# Patient Record
Sex: FEMALE | Race: WHITE | NOT HISPANIC OR LATINO | Employment: FULL TIME | URBAN - METROPOLITAN AREA
[De-identification: names, ages, dates, MRNs, and addresses within clinical notes are randomized per-mention and may not be internally consistent; named-entity substitution may affect disease eponyms.]

---

## 2017-01-04 ENCOUNTER — OFFICE VISIT (OUTPATIENT)
Dept: FAMILY MEDICINE | Facility: CLINIC | Age: 62
End: 2017-01-04
Payer: COMMERCIAL

## 2017-01-04 VITALS
OXYGEN SATURATION: 98 % | RESPIRATION RATE: 16 BRPM | HEART RATE: 90 BPM | DIASTOLIC BLOOD PRESSURE: 82 MMHG | TEMPERATURE: 97.7 F | BODY MASS INDEX: 21.31 KG/M2 | WEIGHT: 115.8 LBS | SYSTOLIC BLOOD PRESSURE: 132 MMHG | HEIGHT: 62 IN

## 2017-01-04 DIAGNOSIS — M79.12 STERNOCLEIDOMASTOID MUSCLE TENDERNESS: Primary | ICD-10-CM

## 2017-01-04 PROCEDURE — 99213 OFFICE O/P EST LOW 20 MIN: CPT | Performed by: FAMILY MEDICINE

## 2017-01-04 ASSESSMENT — PAIN SCALES - GENERAL: PAINLEVEL: MODERATE PAIN (5)

## 2017-01-05 NOTE — NURSING NOTE
"Chief Complaint   Patient presents with     Musculoskeletal Problem     left side collarbone pain x1 1/2 months       Initial /82 mmHg  Pulse 90  Temp(Src) 97.7  F (36.5  C) (Tympanic)  Resp 16  Ht 5' 1.6\" (1.565 m)  Wt 115 lb 12.8 oz (52.527 kg)  BMI 21.45 kg/m2  SpO2 98%  LMP 03/26/2003 Estimated body mass index is 21.45 kg/(m^2) as calculated from the following:    Height as of this encounter: 5' 1.6\" (1.565 m).    Weight as of this encounter: 115 lb 12.8 oz (52.527 kg).  BP completed using cuff size: regular  There are no preventive care reminders to display for this patient.  Micaela Alexander, [facility]      "

## 2017-01-05 NOTE — PROGRESS NOTES
SUBJECTIVE:                                                    Jackie Siddiqi is a 61 year old female who presents to clinic today for the following health issues:      Joint Pain     Onset: 1 1/2 months     Description:   Location: left collarbone  Character: Sharp and intermittent, constant dull ache2    Intensity: moderate, 5/10    Progression of Symptoms: worse    Accompanying Signs & Symptoms:  Other symptoms: radiation of pain to rt shoulder   History:   Previous similar pain: Sudden onset during coughing spell with sharp pain initially over the medial left clavicle and anterior triangle and then slowly moving laterally over the next few days. The pain is now a dull ache.     Precipitating factors:   Trauma or overuse: YES- she noticed a sharp pain when coughing and she states something might have happened to her collarbone    Alleviating factors:  Improved by: nothing       Therapies Tried and outcome: none          Problem list and histories reviewed & adjusted, as indicated.  Additional history: as documented    Patient Active Problem List   Diagnosis     Esophageal reflux     Lichen sclerosus et atrophicus of the vulva     Advanced directives, counseling/discussion     Hypothyroidism, unspecified type     Hyperlipidemia LDL goal <160     Tobacco use disorder     Osteoarthritis of carpometacarpal joint of right thumb, unspecified osteoarthritis type     Past Surgical History   Procedure Laterality Date     C  delivery only        x2     C nonspecific procedure       Laparoscopic exam with adhesions     C nonspecific procedure       Mastectomy for cystic breast disease, breast implants     Endoscopy  2007     Sm hiatus hernia     Colonoscopy  2007     Mastectomy, bilateral       Colonoscopy  2013     Procedure: COLONOSCOPY;  colonoscopy, Esophagoscopy, Gastroscopy, Duodenoscopy EGD, multiple biopsies;  Surgeon: Joe Benson MD;  Location: [facility]     Esophagoscopy,  gastroscopy, duodenoscopy (egd), combined  1/18/2013     Procedure: COMBINED ESOPHAGOSCOPY, GASTROSCOPY, DUODENOSCOPY (EGD), BIOPSY SINGLE OR MULTIPLE;  ESOPHAGOGASTRODUODENOSCOPY WITH MULTIPLE BIOPSIES;  Surgeon: Joe Benson MD;  Location:  GI       Social History   Substance Use Topics     Smoking status: Current Some Day Smoker -- 0.50 packs/day for 50 years     Types: Cigarettes     Smokeless tobacco: Never Used      Comment: 1/2  pack per day, started age 11     Alcohol Use: No      Comment: holidays only     Family History   Problem Relation Age of Onset     DIABETES Father      Hypertension Father      Alcohol/Drug Father      Eye Disorder Father      Obesity Father      Breast Cancer Mother      OSTEOPOROSIS Mother      Thyroid Disease Mother      CANCER Mother      Bladder     CANCER Sister      fallopian tube cancer     Obesity Sister      Alzheimer Disease Sister      CANCER Sister      Skin Cancer     Allergies Daughter      CANCER Maternal Grandmother      uterine cancer     Liver Disease Daughter      Biliary Atresia     Genitourinary Problems Brother      HEART DISEASE Brother      Heart Murmur     Genitourinary Problems Brother      kidney disease (two brothers)         Current Outpatient Prescriptions   Medication Sig Dispense Refill     levothyroxine (SYNTHROID, LEVOTHROID) 25 MCG tablet Take 1 tablet (25 mcg) by mouth daily 90 tablet 0     order for DME Equipment being ordered: Arm Band 1 Device 0     order for DME Equipment being ordered: Thumb Splint 1 Device 0     PREMARIN vaginal cream Place 0.5 g vaginally twice a week 42.5 g 12     meloxicam (MOBIC) 15 MG tablet Take 1 tablet (15 mg) by mouth daily 30 tablet 1     clobetasol (TEMOVATE) 0.05 % ointment Apply a small pea size amount at bedtime for 2 weeks then decrease it to a few times a week then weekly 30 g 0     Allergies   Allergen Reactions     No Known Drug Allergies      Recent Labs   Lab Test  11/28/16   1605  04/19/16   1145    "01/11/13   1131  07/01/11   0928   LDL  139*   --    --   161*  141*   HDL  58   --    --   59  52   TRIG  139   --    --   154*  164*   ALT  19  18   --   24   --    CR  0.70  0.76   < >  0.71  0.90   GFRESTIMATED  84  77   < >  84  64   GFRESTBLACK  >90   GFR Calc    >90   GFR Calc     < >  >90  78   POTASSIUM  3.6  4.0   < >  3.8   --    TSH  5.26*   --    --   5.39*   --     < > = values in this interval not displayed.        BP Readings from Last 3 Encounters:   01/04/17 132/82   11/28/16 112/68   10/20/16 98/54    Wt Readings from Last 3 Encounters:   01/04/17 115 lb 12.8 oz (52.527 kg)   11/28/16 119 lb 1.6 oz (54.023 kg)   11/28/16 118 lb (53.524 kg)                  Labs reviewed in EPIC  Problem list, Medication list, Allergies, and Medical/Social/Surgical histories reviewed in [facility] and updated as appropriate.    ROS:  Constitutional, HEENT, cardiovascular, pulmonary, gi and gu systems are negative, except as otherwise noted.    OBJECTIVE:                                                    /82 mmHg  Pulse 90  Temp(Src) 97.7  F (36.5  C) (Tympanic)  Resp 16  Ht 5' 1.6\" (1.565 m)  Wt 115 lb 12.8 oz (52.527 kg)  BMI 21.45 kg/m2  SpO2 98%  LMP 03/26/2003  Body mass index is 21.45 kg/(m^2).  GENERAL: healthy, alert and no distress  HENT: ear canals and TM's normal, nose and mouth without ulcers or lesions  NECK: no adenopathy, no asymmetry, masses, or scars, thyroid normal to palpation and tender insertion of SCM on medial clavicle and no palpable supraclavicular mass or adenopathy.  RESP: lungs clear to auscultation - no rales, rhonchi or wheezes  CV: regular rate and rhythm, normal S1 S2, no S3 or S4, no murmur, click or rub, no peripheral edema and peripheral pulses strong  ABDOMEN: soft, nontender, no hepatosplenomegaly, no masses and bowel sounds normal  MS: no gross musculoskeletal defects noted, no edema  LYMPH: no cervical, supraclavicular, axillary, or " inguinal adenopathy    Diagnostic Test Results:  none      ASSESSMENT/PLAN:                                                      1. Sternocleidomastoid muscle tenderness  Acute onset with cough and now with diffuse anterior triangle tenderness without appreciable mass or fullness. Most consistent with muscle strain with possible small hematoma which has resolved. Definitely no tenderness with firm palpation of clavicle and no abnormality in neck to warrant imaging at this time. Symptomatic therapy suggested:   Apply  warm packs for neck pain  May use acetaminophen, ibuprofen, aleve prn. Follow up if not improving as expected or if new symptoms develop.      Merrick Reardon MD  [facility]

## 2017-02-11 ENCOUNTER — TELEPHONE (OUTPATIENT)
Dept: NURSING | Facility: CLINIC | Age: 62
End: 2017-02-11

## 2017-02-11 NOTE — TELEPHONE ENCOUNTER
"Call Type: Triage Call    Presenting Problem: Jackie has some nagging \" lower back pain.\"  [facility]  Triage/Back Symptoms/disposition is to be seen within 72 hours and  Jackie agreed.  Triage Note:  Guideline Title: Back Symptoms  Recommended Disposition: See Provider within 72 Hours  Original Inclination: Wanted to speak with a nurse  Override Disposition:  Intended Action: Call PCP/HCP  Physician Contacted: No  Mild to moderate pain in back with normal activity or rest AND not responding to  72 hours of home care ?  YES  New or worsening signs and symptoms that may indicate shock ? NO  Pregnant,  less than 20 weeks gestation ? NO  Severe back pain AND history of IV drug use, alcoholism, or previous spinal trauma  ? NO  Pregnant and gestation greater than 37 weeks AND low backache/pain that is  constant or increasing in intensity ? NO  Pregnant and gestation 20-37 weeks AND low backache/pain that is constant or  increasing in intensity ? NO  Following significant trauma or injury to neck or back AND immobile since event ?  NO  Pain intensifies with coughing, sneezing or straining ? NO  Pregnant, back symptoms AND no signs of labor ? NO  Burning or tingling feeling, itchiness or stabbing pain in a localized area on one  side of body followed by reddened fluid-filled blisters that break and crust ? NO  Back pain associated with any breathing symptoms (such as noisy breathing,  struggling to breathe, sudden change in respiratory rate) ? NO  New paralysis (unable to move); new weakness (not due to pain); new loss of  coordination (purposeful action) OR new unexplained numbness/tingling involving  arm and leg on same side of body ? NO  Following significant trauma AND has been mobile since injury but is now having  back or neck pain ? NO  Age 50 years or older with sudden onset of deep boring or tearing pain in back OR  abdomen; may radiate to groin, hips or lower extremities ? NO  Pain predominately in flank area ? " NO  Urinary tract symptoms are primary symptoms ? NO  Urinary tract symptoms are primary symptoms ? NO  New onset back pain associated with numbness or weakness in both legs ? NO  Fever of 100.5 F (38.1 C) or higher associated with back symptoms ? NO  Low back pain AND urinary tract symptoms ? NO  Back pain radiates into thigh or below knee ? NO  New onset or unexplained change in bowel or bladder control (unable to urinate and  full feeling or loss of control of bowel or bladder) ? NO  Numbness in the groin and saddle area of pelvis AND lower extremity weakness OR  change in bowel or bladder control (loss of control, retention, overflow) ? NO  Painful spasms or cramping of large muscle groups (back, legs or abdomen) AND  recent heat exposure ? NO  Any other cardiac signs/symptoms for more than 5 minutes, now or within last hour.  Pain is NOT associated with taking a deep breath or a productive cough, movement,  or touch to a localized area on the chest or upper body. ? NO  Pain, redness, and local swelling over tailbone in the crease of the buttocks; may  notice pinkish, cloudy drainage or drainage of pus. ? NO  Pain predominately in the neck ? NO  New onset of severe disabling back pain (unable to stand upright; pain wakens you  from sleep; constant or intense pain when lying down) ? NO  Any temperature elevation in an immunocompromised individual OR frail elderly with  signs of dehydration ? NO  Physician Instructions:  Care Advice: Call provider if symptoms worsen or new symptoms develop.  Avoid heavy lifting, bending and twisting of the back, and prolonged  sitting until evaluated by provider.  CAUTIONS  Avoid activity that causes or worsens symptoms.  SYMPTOM / CONDITION MANAGEMENT  Sleep on a moderately firm mattress.  Try sleeping on back with a pillow  placed under knees or sleep on side with knees bent and a pillow between  knees.  When lying on stomach, place pillow under the abdomen and pelvis  do not  use a pillow for your head.  Go to the ED if you have worsening pain, numbness or weakness of arms or  legs, cannot walk, or have new unexplained changes in bladder or bowel  function. Another adult should drive.  To relieve discomfort and swelling, apply a cloth-covered cold pack to the  area for 20 minutes 4 to 8 times a day for the first 24 to 48 hours. After  24 to 48 hours of cold application, use a cloth-covered heat pack to the  area for 20 minutes 3 to 4 times a day.  Analgesic/Antipyretic Advice - NSAIDs: Consider aspirin, ibuprofen,  naproxen or ketoprofen for pain or fever as directed on label or by  pharmacist/provider. PRECAUTIONS: - You should not take this medicine for  more than 10 days unless recommended by your provider. EXCEPTIONS: - Should  not be used if taking blood thinners or have bleeding problems. - Do not  use if have history of sensitivity/allergy to any of these medications  or history of cardiovascular, ulcer, kidney, liver disease or diabetes  unless approved by provider. - Do not exceed recommended dose or frequency.  Analgesic/Antipyretic Advice - Acetaminophen: Consider acetaminophen as  directed on label or by pharmacist/provider for pain or fever. PRECAUTIONS:  - Use if there is no history of liver disease, alcoholism, or intake of  three or more alcohol drinks per day - Only if approved by provider during  pregnancy or when breastfeeding - Do not exceed recommended dose or  frequency. Do not take more than 3000 milligrams (mg) in 24 hours. Do not  take this medicine for more than 10 days unless recommended by your  provider. - During pregnancy, acetaminophen should not be taken more than 3  consecutive days without telling provider - To make sure you don't take too  much, check other medicines you take to see if they also contain  acetaminophen.

## 2017-02-12 ENCOUNTER — APPOINTMENT (OUTPATIENT)
Dept: GENERAL RADIOLOGY | Facility: CLINIC | Age: 62
End: 2017-02-12
Attending: PHYSICIAN ASSISTANT
Payer: COMMERCIAL

## 2017-02-12 ENCOUNTER — APPOINTMENT (OUTPATIENT)
Dept: CT IMAGING | Facility: CLINIC | Age: 62
End: 2017-02-12
Attending: PHYSICIAN ASSISTANT
Payer: COMMERCIAL

## 2017-02-12 ENCOUNTER — HOSPITAL ENCOUNTER (EMERGENCY)
Facility: CLINIC | Age: 62
Discharge: HOME | End: 2017-02-12
Attending: PHYSICIAN ASSISTANT | Admitting: PHYSICIAN ASSISTANT
Payer: COMMERCIAL

## 2017-02-12 VITALS
RESPIRATION RATE: 18 BRPM | TEMPERATURE: 98.3 F | HEART RATE: 62 BPM | WEIGHT: 114 LBS | BODY MASS INDEX: 21.12 KG/M2 | SYSTOLIC BLOOD PRESSURE: 92 MMHG | DIASTOLIC BLOOD PRESSURE: 56 MMHG | OXYGEN SATURATION: 96 %

## 2017-02-12 DIAGNOSIS — R73.09 ABNORMAL GLUCOSE: ICD-10-CM

## 2017-02-12 DIAGNOSIS — R53.83 FATIGUE, UNSPECIFIED TYPE: ICD-10-CM

## 2017-02-12 DIAGNOSIS — K80.20 CALCULUS OF GALLBLADDER WITHOUT CHOLECYSTITIS WITHOUT OBSTRUCTION: ICD-10-CM

## 2017-02-12 DIAGNOSIS — R10.12 LUQ ABDOMINAL PAIN: ICD-10-CM

## 2017-02-12 LAB
ALBUMIN SERPL-MCNC: 3.4 G/DL (ref 3.4–5)
ALBUMIN UR-MCNC: NEGATIVE MG/DL
ALP SERPL-CCNC: 57 U/L (ref 40–150)
ALT SERPL W P-5'-P-CCNC: 22 U/L (ref 0–50)
ANION GAP SERPL CALCULATED.3IONS-SCNC: 5 MMOL/L (ref 3–14)
APPEARANCE UR: CLEAR
AST SERPL W P-5'-P-CCNC: 18 U/L (ref 0–45)
BACTERIA #/AREA URNS HPF: ABNORMAL /HPF
BASOPHILS # BLD AUTO: 0.1 10E9/L (ref 0–0.2)
BASOPHILS NFR BLD AUTO: 1.3 %
BILIRUB SERPL-MCNC: 0.4 MG/DL (ref 0.2–1.3)
BILIRUB UR QL STRIP: NEGATIVE
BUN SERPL-MCNC: 15 MG/DL (ref 7–30)
CALCIUM SERPL-MCNC: 8.4 MG/DL (ref 8.5–10.1)
CHLORIDE SERPL-SCNC: 106 MMOL/L (ref 94–109)
CO2 SERPL-SCNC: 29 MMOL/L (ref 20–32)
COLOR UR AUTO: YELLOW
CREAT SERPL-MCNC: 0.69 MG/DL (ref 0.52–1.04)
DIFFERENTIAL METHOD BLD: ABNORMAL
EOSINOPHIL # BLD AUTO: 0 10E9/L (ref 0–0.7)
EOSINOPHIL NFR BLD AUTO: 0.8 %
ERYTHROCYTE [DISTWIDTH] IN BLOOD BY AUTOMATED COUNT: 12.6 % (ref 10–15)
GFR SERPL CREATININE-BSD FRML MDRD: 85 ML/MIN/1.7M2
GLUCOSE SERPL-MCNC: 145 MG/DL (ref 70–99)
GLUCOSE UR STRIP-MCNC: NEGATIVE MG/DL
HBA1C MFR BLD: 5.6 % (ref 4.3–6)
HCT VFR BLD AUTO: 39.3 % (ref 35–47)
HGB BLD-MCNC: 13.1 G/DL (ref 11.7–15.7)
HGB UR QL STRIP: ABNORMAL
IMM GRANULOCYTES # BLD: 0 10E9/L (ref 0–0.4)
IMM GRANULOCYTES NFR BLD: 0 %
KETONES UR STRIP-MCNC: NEGATIVE MG/DL
LEUKOCYTE ESTERASE UR QL STRIP: ABNORMAL
LIPASE SERPL-CCNC: 73 U/L (ref 73–393)
LYMPHOCYTES # BLD AUTO: 1.6 10E9/L (ref 0.8–5.3)
LYMPHOCYTES NFR BLD AUTO: 41.8 %
MCH RBC QN AUTO: 29.2 PG (ref 26.5–33)
MCHC RBC AUTO-ENTMCNC: 33.3 G/DL (ref 31.5–36.5)
MCV RBC AUTO: 88 FL (ref 78–100)
MONOCYTES # BLD AUTO: 0.4 10E9/L (ref 0–1.3)
MONOCYTES NFR BLD AUTO: 9.7 %
NEUTROPHILS # BLD AUTO: 1.8 10E9/L (ref 1.6–8.3)
NEUTROPHILS NFR BLD AUTO: 46.4 %
NITRATE UR QL: NEGATIVE
NON-SQ EPI CELLS #/AREA URNS LPF: ABNORMAL /LPF
PH UR STRIP: 6 PH (ref 5–7)
PLATELET # BLD AUTO: 171 10E9/L (ref 150–450)
POTASSIUM SERPL-SCNC: 3.7 MMOL/L (ref 3.4–5.3)
PROT SERPL-MCNC: 7.1 G/DL (ref 6.8–8.8)
RBC # BLD AUTO: 4.49 10E12/L (ref 3.8–5.2)
RBC #/AREA URNS AUTO: ABNORMAL /HPF (ref 0–2)
SODIUM SERPL-SCNC: 140 MMOL/L (ref 133–144)
SP GR UR STRIP: 1.01 (ref 1–1.03)
TSH SERPL DL<=0.005 MIU/L-ACNC: 2.5 MU/L (ref 0.4–4)
URN SPEC COLLECT METH UR: ABNORMAL
UROBILINOGEN UR STRIP-ACNC: 0.2 EU/DL (ref 0.2–1)
WBC # BLD AUTO: 3.9 10E9/L (ref 4–11)
WBC #/AREA URNS AUTO: ABNORMAL /HPF (ref 0–2)

## 2017-02-12 PROCEDURE — 99285 EMERGENCY DEPT VISIT HI MDM: CPT | Performed by: PHYSICIAN ASSISTANT

## 2017-02-12 PROCEDURE — 81001 URINALYSIS AUTO W/SCOPE: CPT | Performed by: PHYSICIAN ASSISTANT

## 2017-02-12 PROCEDURE — 85025 COMPLETE CBC W/AUTO DIFF WBC: CPT | Performed by: PHYSICIAN ASSISTANT

## 2017-02-12 PROCEDURE — 96361 HYDRATE IV INFUSION ADD-ON: CPT

## 2017-02-12 PROCEDURE — 96360 HYDRATION IV INFUSION INIT: CPT

## 2017-02-12 PROCEDURE — 74177 CT ABD & PELVIS W/CONTRAST: CPT

## 2017-02-12 PROCEDURE — 25500064 ZZH RX 255 OP 636: Performed by: PHYSICIAN ASSISTANT

## 2017-02-12 PROCEDURE — 25000125 ZZHC RX 250: Performed by: PHYSICIAN ASSISTANT

## 2017-02-12 PROCEDURE — 25000132 ZZH RX MED GY IP 250 OP 250 PS 637: Performed by: PHYSICIAN ASSISTANT

## 2017-02-12 PROCEDURE — 83690 ASSAY OF LIPASE: CPT | Performed by: PHYSICIAN ASSISTANT

## 2017-02-12 PROCEDURE — 25000128 H RX IP 250 OP 636: Performed by: PHYSICIAN ASSISTANT

## 2017-02-12 PROCEDURE — 71020 XR CHEST 2 VW: CPT | Mod: TC

## 2017-02-12 PROCEDURE — 83036 HEMOGLOBIN GLYCOSYLATED A1C: CPT | Performed by: PHYSICIAN ASSISTANT

## 2017-02-12 PROCEDURE — 80053 COMPREHEN METABOLIC PANEL: CPT | Performed by: PHYSICIAN ASSISTANT

## 2017-02-12 PROCEDURE — 99285 EMERGENCY DEPT VISIT HI MDM: CPT | Mod: 25

## 2017-02-12 PROCEDURE — 84443 ASSAY THYROID STIM HORMONE: CPT | Performed by: PHYSICIAN ASSISTANT

## 2017-02-12 RX ORDER — IOPAMIDOL 755 MG/ML
100 INJECTION, SOLUTION INTRAVASCULAR ONCE
Status: COMPLETED | OUTPATIENT
Start: 2017-02-12 | End: 2017-02-12

## 2017-02-12 RX ORDER — SODIUM CHLORIDE 9 MG/ML
1000 INJECTION, SOLUTION INTRAVENOUS CONTINUOUS
Status: DISCONTINUED | OUTPATIENT
Start: 2017-02-12 | End: 2017-02-12 | Stop reason: HOSPADM

## 2017-02-12 RX ORDER — ACETAMINOPHEN 325 MG/1
975 TABLET ORAL ONCE
Status: COMPLETED | OUTPATIENT
Start: 2017-02-12 | End: 2017-02-12

## 2017-02-12 RX ORDER — LIDOCAINE 40 MG/G
CREAM TOPICAL
Status: DISCONTINUED | OUTPATIENT
Start: 2017-02-12 | End: 2017-02-12 | Stop reason: HOSPADM

## 2017-02-12 RX ADMIN — SODIUM CHLORIDE 1000 ML: 9 INJECTION, SOLUTION INTRAVENOUS at 15:35

## 2017-02-12 RX ADMIN — SODIUM CHLORIDE 60 ML: 9 INJECTION, SOLUTION INTRAVENOUS at 16:35

## 2017-02-12 RX ADMIN — ACETAMINOPHEN 975 MG: 325 TABLET ORAL at 16:25

## 2017-02-12 RX ADMIN — IOPAMIDOL 56 ML: 755 INJECTION, SOLUTION INTRAVENOUS at 16:35

## 2017-02-12 ASSESSMENT — ENCOUNTER SYMPTOMS
DIARRHEA: 0
FEVER: 0
ABDOMINAL PAIN: 1
VOMITING: 0
WEAKNESS: 1
COUGH: 1
POLYDIPSIA: 1
FATIGUE: 1
APPETITE CHANGE: 1
HEMATURIA: 1
MYALGIAS: 1
BACK PAIN: 1
LIGHT-HEADEDNESS: 1
NAUSEA: 1

## 2017-02-12 NOTE — ED PROVIDER NOTES
"  History     Chief Complaint   Patient presents with     Abdominal Pain     The history is provided by the patient.     Jackie Siddiqi is a 61 year old female with a history of Hypothyroidism and Hyperlipidemia who presents to the ED complaining of abdominal pain. Patient states that her symptoms began with polydipsia about 3 weeks ago. She was waking up several times during the night to get water. She developed bilateral lower back pain 2 weeks ago, that radiates to her bilateral lower abdomen. She notes a \"nagging pain\" in her LLQ that has persisted since onset. Patient developed \"influenza like symptoms\" 5 days ago. She reports weakness, \"foggy-headedness\", muffled hearing, chills, fatigue, and myalgias. Patient also complains of a dry cough and congestion, she has been clearing her throat often because she feels that something is caught in he throat. with decreased appetite - she feels hungry \"but her brain is telling her not to eat\". Patient also notes decreased bowel movents. She thinks she was urinating frequently, but this has decreased as she is also not able to take much water in now that she is feeling sick. Patient has been in bed for 5 days because of these symptoms, and states that she does not feel that she is getting any better. Patient denies any fever, emesis, diarrhea, hemoptysis, rashes, hematuria, or other associated symptoms. She notes that she started on Levothyroxine to treat Hypothyroidism about 1.5 months ago. Her last TSH was 5.26 mU/L. She also reports paternal FHx of Type II Diabetes at age 58. Patient admits a history of tobacco use intermittently since age 11. She was smoking about 1/3 PPD before she got sick, but has not smoked since the onset of her symptoms.     Patient Active Problem List   Diagnosis     Esophageal reflux     Lichen sclerosus et atrophicus of the vulva     Advanced directives, counseling/discussion     Hypothyroidism, unspecified type     Hyperlipidemia LDL " goal <160     Tobacco use disorder     Osteoarthritis of carpometacarpal joint of right thumb, unspecified osteoarthritis type     Past Medical History   Diagnosis Date     Abnormal Papanicolaou smear of cervix and cervical HPV      cryocautery     Breast cystic disease      Hiatal hernia      Peptic ulcer, unspecified site, unspecified as acute or chronic, without mention of hemorrhage, perforation, or obstruction      Peptic ulcer disease     Personal history of colonic polyps        Past Surgical History   Procedure Laterality Date     C  delivery only        x2     C nonspecific procedure       Laparoscopic exam with adhesions     C nonspecific procedure       Mastectomy for cystic breast disease, breast implants     Endoscopy  2007     Sm hiatus hernia     Colonoscopy  2007     Mastectomy, bilateral       Colonoscopy  2013     Procedure: COLONOSCOPY;  colonoscopy, Esophagoscopy, Gastroscopy, Duodenoscopy EGD, multiple biopsies;  Surgeon: Joe Benson MD;  Location:  GI     Esophagoscopy, gastroscopy, duodenoscopy (egd), combined  2013     Procedure: COMBINED ESOPHAGOSCOPY, GASTROSCOPY, DUODENOSCOPY (EGD), BIOPSY SINGLE OR MULTIPLE;  ESOPHAGOGASTRODUODENOSCOPY WITH MULTIPLE BIOPSIES;  Surgeon: Joe Benson MD;  Location:  GI       Family History   Problem Relation Age of Onset     DIABETES Father      Hypertension Father      Alcohol/Drug Father      Eye Disorder Father      Obesity Father      Breast Cancer Mother      OSTEOPOROSIS Mother      Thyroid Disease Mother      CANCER Mother      Bladder     CANCER Sister      fallopian tube cancer     Obesity Sister      Alzheimer Disease Sister      CANCER Sister      Skin Cancer     Allergies Daughter      CANCER Maternal Grandmother      uterine cancer     Liver Disease Daughter      Biliary Atresia     Genitourinary Problems Brother      HEART DISEASE Brother      Heart Murmur     Genitourinary Problems Brother       kidney disease (two brothers)       Social History   Substance Use Topics     Smoking status: Current Some Day Smoker     Packs/day: 0.50     Years: 50.00     Types: Cigarettes     Smokeless tobacco: Never Used      Comment: 1/2  pack per day, started age 11     Alcohol use No      Comment: holidays only        Immunization History   Administered Date(s) Administered     TD (ADULT, 7+) 09/09/1994     TDAP (ADACEL AGES 11-64) 11/21/2010          Allergies   Allergen Reactions     No Known Drug Allergies        Current Outpatient Prescriptions   Medication Sig Dispense Refill     omeprazole (PRILOSEC) 20 MG CR capsule Take 1 capsule (20 mg) by mouth daily 30 capsule 0     levothyroxine (SYNTHROID, LEVOTHROID) 25 MCG tablet Take 1 tablet (25 mcg) by mouth daily 90 tablet 0     meloxicam (MOBIC) 15 MG tablet Take 1 tablet (15 mg) by mouth daily 30 tablet 1     order for DME Equipment being ordered: Thumb Splint 1 Device 0     clobetasol (TEMOVATE) 0.05 % ointment Apply a small pea size amount at bedtime for 2 weeks then decrease it to a few times a week then weekly 30 g 0     PREMARIN vaginal cream Place 0.5 g vaginally twice a week 42.5 g 12     order for DME Equipment being ordered: Arm Band 1 Device 0     I have reviewed the Medications, Allergies, Past Medical and Surgical History, and Social History in the Epic system.    Review of Systems   Constitutional: Positive for appetite change (decreased) and fatigue. Negative for fever.   HENT: Positive for congestion and hearing loss (muffled bilaterally).    Respiratory: Positive for cough (dry).    Gastrointestinal: Positive for abdominal pain (lower, more severe in LLQ) and nausea. Negative for diarrhea and vomiting.   Endocrine: Positive for polydipsia.   Genitourinary: Positive for hematuria.   Musculoskeletal: Positive for back pain (bilateral, lower) and myalgias.   Skin: Negative for rash.   Neurological: Positive for weakness and light-headedness.        Physical Exam   BP: 115/73  Pulse: 75  Temp: 98.3  F (36.8  C)  Resp: 18  Weight: 51.7 kg (114 lb)  SpO2: 97 %    Physical Exam   Constitutional: She is oriented to person, place, and time. No distress.   HENT:   Head: Atraumatic.   Mouth/Throat: Oropharynx is clear and moist. Mucous membranes are dry.   Eyes: Conjunctivae and EOM are normal.   Neck: Neck supple. No thyromegaly present.   Cardiovascular: Normal rate, regular rhythm, normal heart sounds and intact distal pulses.    No murmur heard.  Pulmonary/Chest: Effort normal and breath sounds normal. No respiratory distress. She has no decreased breath sounds. She has no wheezes. She has no rhonchi. She has no rales. She exhibits no tenderness.   Abdominal: Normal appearance. She exhibits no distension. There is no hepatosplenomegaly. There is generalized tenderness (Most severe in LLQ). There is no rigidity, no rebound, no guarding and no CVA tenderness.   Musculoskeletal:        Right hip: Normal. She exhibits normal range of motion and normal strength.        Left hip: She exhibits normal range of motion and normal strength.        Thoracic back: Normal.        Lumbar back: She exhibits tenderness (bilateral ). She exhibits no bony tenderness.   Lymphadenopathy:     She has no cervical adenopathy.   Neurological: She is alert and oriented to person, place, and time.   Skin: Skin is warm and dry. No rash noted. She is not diaphoretic.   Psychiatric: She has a normal mood and affect. Her behavior is normal.   Nursing note and vitals reviewed.      ED Course     ED Course     Procedures             Critical Care time:  none              Results for orders placed or performed during the hospital encounter of 02/12/17   XR Chest 2 Views    Narrative    CHEST TWO VIEW   2/12/2017 4:54 PM     HISTORY: Cough.    COMPARISON: Chest x-ray dated 6/17/2015.      FINDINGS:  The lungs are hyperaerated but otherwise clear.  There is  mild pulmonary scarring. No pleural  effusions or pneumothorax. Heart  size and vascularity are normal.      Impression    IMPRESSION:  Findings of COPD. No acute cardiopulmonary disease is  seen.         JUSTIN MARTIN MD   CT Abdomen Pelvis w Contrast    Narrative    CT ABDOMEN AND PELVIS WITH CONTRAST  2/12/2017 4:46 PM    HISTORY: LLQ abdominal pain. Family history of kidney problems. Peptic  ulcer, hiatal hernia, breast cystic disease, urinary tract infection,  bilateral mastectomy, laparoscopy for adhesions, C-sections x2.    TECHNIQUE: Scans obtained from the diaphragm through the pelvis with  oral and IV contrast, 56 mL Isovue-370.   Radiation dose for this scan was reduced using automated exposure  control, adjustment of the mA and/or kV according to patient size, or  iterative reconstruction technique.    COMPARISON:  Pelvic ultrasound dated 12/8/2016, abdominal ultrasound  dated 6/8/2007.    FINDINGS: Visualized portions of the lung bases and mediastinal  contents are unremarkable.  Degenerative changes are noted in the  spine and SI joints. No aggressive osseous lesions are seen.      Dependently layering calculi are seen in the gallbladder. The  gallbladder, liver, pancreas, spleen, bilateral adrenal glands, and  bilateral kidneys otherwise enhance normally. No hydronephrosis,  nephrolithiasis, hydroureter, or ureteral calculus is identified.  Urinary bladder is normal in appearance.    There is nonaneurysmal atherosclerosis. Structure likely representing  a normal uterus is seen in the pelvis. Ovaries are not well seen. No  adnexal masses are identified.    No adenopathy, free fluid, or free air is seen in the peritoneal  cavity. There is nonaneurysmal atherosclerosis.    The colon is grossly of normal caliber without pericolonic  inflammatory change to suggest acute diverticulitis. Appendix is not  definitely seen. No pericecal inflammatory change to suggest acute  appendicitis. Small bowel is grossly of normal caliber. Stomach  is  unremarkable. No evidence for bowel obstruction.      Impression    IMPRESSION:  1. Cholelithiasis without evidence for acute cholecystitis.  2. No evidence for diverticulitis, appendicitis, or urinary system  obstruction. Etiology for patient's symptoms is not definitely seen.  3. Hiatal hernia is not seen on this study.  4. Degenerative changes of spine are noted.    JUSTIN MARTIN MD   CBC with platelets differential   Result Value Ref Range    WBC 3.9 (L) 4.0 - 11.0 10e9/L    RBC Count 4.49 3.8 - 5.2 10e12/L    Hemoglobin 13.1 11.7 - 15.7 g/dL    Hematocrit 39.3 35.0 - 47.0 %    MCV 88 78 - 100 fl    MCH 29.2 26.5 - 33.0 pg    MCHC 33.3 31.5 - 36.5 g/dL    RDW 12.6 10.0 - 15.0 %    Platelet Count 171 150 - 450 10e9/L    Diff Method Automated Method     % Neutrophils 46.4 %    % Lymphocytes 41.8 %    % Monocytes 9.7 %    % Eosinophils 0.8 %    % Basophils 1.3 %    % Immature Granulocytes 0.0 %    Absolute Neutrophil 1.8 1.6 - 8.3 10e9/L    Absolute Lymphocytes 1.6 0.8 - 5.3 10e9/L    Absolute Monocytes 0.4 0.0 - 1.3 10e9/L    Absolute Eosinophils 0.0 0.0 - 0.7 10e9/L    Absolute Basophils 0.1 0.0 - 0.2 10e9/L    Abs Immature Granulocytes 0.0 0 - 0.4 10e9/L   Comprehensive metabolic panel   Result Value Ref Range    Sodium 140 133 - 144 mmol/L    Potassium 3.7 3.4 - 5.3 mmol/L    Chloride 106 94 - 109 mmol/L    Carbon Dioxide 29 20 - 32 mmol/L    Anion Gap 5 3 - 14 mmol/L    Glucose 145 (H) 70 - 99 mg/dL    Urea Nitrogen 15 7 - 30 mg/dL    Creatinine 0.69 0.52 - 1.04 mg/dL    GFR Estimate 85 >60 mL/min/1.7m2    GFR Estimate If Black >90   GFR Calc   >60 mL/min/1.7m2    Calcium 8.4 (L) 8.5 - 10.1 mg/dL    Bilirubin Total 0.4 0.2 - 1.3 mg/dL    Albumin 3.4 3.4 - 5.0 g/dL    Protein Total 7.1 6.8 - 8.8 g/dL    Alkaline Phosphatase 57 40 - 150 U/L    ALT 22 0 - 50 U/L    AST 18 0 - 45 U/L   Lipase   Result Value Ref Range    Lipase 73 73 - 393 U/L   TSH with free T4 reflex   Result Value Ref Range     TSH 2.50 0.40 - 4.00 mU/L   UA reflex to Microscopic and Culture   Result Value Ref Range    Color Urine Yellow     Appearance Urine Clear     Glucose Urine Negative NEG mg/dL    Bilirubin Urine Negative NEG    Ketones Urine Negative NEG mg/dL    Specific Gravity Urine 1.015 1.003 - 1.035    Blood Urine Trace (A) NEG    pH Urine 6.0 5.0 - 7.0 pH    Protein Albumin Urine Negative NEG mg/dL    Urobilinogen Urine 0.2 0.2 - 1.0 EU/dL    Nitrite Urine Negative NEG    Leukocyte Esterase Urine Trace (A) NEG    Source Midstream Urine    Hemoglobin A1c   Result Value Ref Range    Hemoglobin A1C 5.6 4.3 - 6.0 %   Urine Microscopic   Result Value Ref Range    WBC Urine O - 2 0 - 2 /HPF    RBC Urine O - 2 0 - 2 /HPF    Squamous Epithelial /LPF Urine Few FEW /LPF    Bacteria Urine Few (A) NEG /HPF          Results for orders placed or performed during the hospital encounter of 02/12/17 (from the past 24 hour(s))   CBC with platelets differential   Result Value Ref Range    WBC 3.9 (L) 4.0 - 11.0 10e9/L    RBC Count 4.49 3.8 - 5.2 10e12/L    Hemoglobin 13.1 11.7 - 15.7 g/dL    Hematocrit 39.3 35.0 - 47.0 %    MCV 88 78 - 100 fl    MCH 29.2 26.5 - 33.0 pg    MCHC 33.3 31.5 - 36.5 g/dL    RDW 12.6 10.0 - 15.0 %    Platelet Count 171 150 - 450 10e9/L    Diff Method Automated Method     % Neutrophils 46.4 %    % Lymphocytes 41.8 %    % Monocytes 9.7 %    % Eosinophils 0.8 %    % Basophils 1.3 %    % Immature Granulocytes 0.0 %    Absolute Neutrophil 1.8 1.6 - 8.3 10e9/L    Absolute Lymphocytes 1.6 0.8 - 5.3 10e9/L    Absolute Monocytes 0.4 0.0 - 1.3 10e9/L    Absolute Eosinophils 0.0 0.0 - 0.7 10e9/L    Absolute Basophils 0.1 0.0 - 0.2 10e9/L    Abs Immature Granulocytes 0.0 0 - 0.4 10e9/L   Comprehensive metabolic panel   Result Value Ref Range    Sodium 140 133 - 144 mmol/L    Potassium 3.7 3.4 - 5.3 mmol/L    Chloride 106 94 - 109 mmol/L    Carbon Dioxide 29 20 - 32 mmol/L    Anion Gap 5 3 - 14 mmol/L    Glucose 145 (H) 70 - 99  mg/dL    Urea Nitrogen 15 7 - 30 mg/dL    Creatinine 0.69 0.52 - 1.04 mg/dL    GFR Estimate 85 >60 mL/min/1.7m2    GFR Estimate If Black >90   GFR Calc   >60 mL/min/1.7m2    Calcium 8.4 (L) 8.5 - 10.1 mg/dL    Bilirubin Total 0.4 0.2 - 1.3 mg/dL    Albumin 3.4 3.4 - 5.0 g/dL    Protein Total 7.1 6.8 - 8.8 g/dL    Alkaline Phosphatase 57 40 - 150 U/L    ALT 22 0 - 50 U/L    AST 18 0 - 45 U/L   Lipase   Result Value Ref Range    Lipase 73 73 - 393 U/L   TSH with free T4 reflex   Result Value Ref Range    TSH 2.50 0.40 - 4.00 mU/L   Hemoglobin A1c   Result Value Ref Range    Hemoglobin A1C 5.6 4.3 - 6.0 %   UA reflex to Microscopic and Culture   Result Value Ref Range    Color Urine Yellow     Appearance Urine Clear     Glucose Urine Negative NEG mg/dL    Bilirubin Urine Negative NEG    Ketones Urine Negative NEG mg/dL    Specific Gravity Urine 1.015 1.003 - 1.035    Blood Urine Trace (A) NEG    pH Urine 6.0 5.0 - 7.0 pH    Protein Albumin Urine Negative NEG mg/dL    Urobilinogen Urine 0.2 0.2 - 1.0 EU/dL    Nitrite Urine Negative NEG    Leukocyte Esterase Urine Trace (A) NEG    Source Midstream Urine    Urine Microscopic   Result Value Ref Range    WBC Urine O - 2 0 - 2 /HPF    RBC Urine O - 2 0 - 2 /HPF    Squamous Epithelial /LPF Urine Few FEW /LPF    Bacteria Urine Few (A) NEG /HPF   CT Abdomen Pelvis w Contrast    Narrative    CT ABDOMEN AND PELVIS WITH CONTRAST  2/12/2017 4:46 PM    HISTORY: LLQ abdominal pain. Family history of kidney problems. Peptic  ulcer, hiatal hernia, breast cystic disease, urinary tract infection,  bilateral mastectomy, laparoscopy for adhesions, C-sections x2.    TECHNIQUE: Scans obtained from the diaphragm through the pelvis with  oral and IV contrast, 56 mL Isovue-370.   Radiation dose for this scan was reduced using automated exposure  control, adjustment of the mA and/or kV according to patient size, or  iterative reconstruction technique.    COMPARISON:  Pelvic  ultrasound dated 12/8/2016, abdominal ultrasound  dated 6/8/2007.    FINDINGS: Visualized portions of the lung bases and mediastinal  contents are unremarkable.  Degenerative changes are noted in the  spine and SI joints. No aggressive osseous lesions are seen.      Dependently layering calculi are seen in the gallbladder. The  gallbladder, liver, pancreas, spleen, bilateral adrenal glands, and  bilateral kidneys otherwise enhance normally. No hydronephrosis,  nephrolithiasis, hydroureter, or ureteral calculus is identified.  Urinary bladder is normal in appearance.    There is nonaneurysmal atherosclerosis. Structure likely representing  a normal uterus is seen in the pelvis. Ovaries are not well seen. No  adnexal masses are identified.    No adenopathy, free fluid, or free air is seen in the peritoneal  cavity. There is nonaneurysmal atherosclerosis.    The colon is grossly of normal caliber without pericolonic  inflammatory change to suggest acute diverticulitis. Appendix is not  definitely seen. No pericecal inflammatory change to suggest acute  appendicitis. Small bowel is grossly of normal caliber. Stomach is  unremarkable. No evidence for bowel obstruction.      Impression    IMPRESSION:  1. Cholelithiasis without evidence for acute cholecystitis.  2. No evidence for diverticulitis, appendicitis, or urinary system  obstruction. Etiology for patient's symptoms is not definitely seen.  3. Hiatal hernia is not seen on this study.  4. Degenerative changes of spine are noted.    JUSTIN MARTIN MD   XR Chest 2 Views    Narrative    CHEST TWO VIEW   2/12/2017 4:54 PM     HISTORY: Cough.    COMPARISON: Chest x-ray dated 6/17/2015.      FINDINGS:  The lungs are hyperaerated but otherwise clear.  There is  mild pulmonary scarring. No pleural effusions or pneumothorax. Heart  size and vascularity are normal.      Impression    IMPRESSION:  Findings of COPD. No acute cardiopulmonary disease is  seen.         JUSTIN MARTIN  MD       Medications   0.9% sodium chloride BOLUS (0 mLs Intravenous Stopped 2/12/17 1721)   acetaminophen (TYLENOL) tablet 975 mg (975 mg Oral Given 2/12/17 1625)   sodium chloride (PF) 0.9% PF flush 3 mL (3 mLs Intracatheter Given 2/12/17 1635)   iopamidol (ISOVUE-370) solution 100 mL (56 mLs Intravenous Given 2/12/17 1635)   sodium chloride 0.9 % for CT scan flush dose 500 mL (60 mLs Intravenous Given 2/12/17 1635)       Assessments & Plan (with Medical Decision Making)     LUQ abdominal pain  Calculus of gallbladder without cholecystitis without obstruction  Abnormal glucose  Fatigue, unspecified type     Jackie is a 61 year old female who presents to the ED complaining of lower back pain, fatigue, nausea, polydipsia, decreased appetite, and lower abdominal pain. She is afebrile with normal vitals upon presentation to the ED. She exhibits generalized tenderness of the abdomen, most severe in the LLQ, as well as tenderness in the bilateral lower lumbar area.   She does have noted dry oral mucosal membranes likely secondary to dehydration.  No other abnormalities are noted on exam. Patient was given IV fluid rehydration with one liter of normal saline and oral Tylenol with relief.  Labs drawn including Hemoglobin A1C, CBC, CMP, Lipase, and TSH.  Labs displayed an elevated glucose but normal hemoglobin A1c. Glucose is only up to 146, but this was a fasting specimen. It could be an indication that she has impaired fasting glucose versus the early development of type 2 diabetes. CMP normal otherwise with no hepatic inflammation. CBC with a borderline low WBC and no evidence for acute infection currently. Urinalysis negative for infection. CT of the abdomen/pelvis was performed and showed cholelithiasis without active cholecystitis. On repeat abdominal exam she does have some right upper quadrant discomfort with deep palpation and inspiration.  She certainly could have some cholelithiasis symptoms, but I do not think  this is a full cause of her early cystitis, left upper quadrant abdominal discomfort, fatigue, and weakness. We discussed a low fat diet to help avoid symptoms of cholecystitis. Given her persistent abdominal pain, I think it would be wise to try an antacid in the form of omeprazole to see if this helps some of her symptoms. Further evaluation may need to be considered with upper endoscopy if her GI symptoms persist despite this treatment.    I recommended a follow-up appointment with her primary care physician in the next 3-4 days. She is instructed to fast for 8 hours prior to this appointment so that she could have a repeat blood sugar done. It also would be wise to consider rechecking her hepatic enzymes if her abdominal discomfort persists.   I discussed with the patient in detail that I do not find any acute cause for her symptoms today, but we were able to rule out some significant health issues. She does state follow-up regarding her condition. It is possible that she could have a viral syndrome that has worsened her symptoms in the last 5 days. She was encouraged to return to the emergency department for repeat evaluation if her symptoms greatly worsened or changed prior to seeing her PCP for the follow-up evaluation.   Smoking cessation recommended and discussed with her in detail. The patient was in agreement with this plan and was suitable for discharge.    Discharge Instructions:  1. Please try and avoid 'fatty foods'. Fatty foods can cause gallbladder associated pain.   2. Please start taking the Omeprazole ( stomach acid medication ) to see if this helps with the abdominal discomfort, nausea, and troubles feeling full. If these symptoms are not improving, Dr. Quinonez could consider doing an Upper Endoscopy to look into the stomach.   3. Please see Dr. Quinonez in 3-4 days for a follow up appointment.  4. Make sure that you are drinking at least 8 glasses of water daily ( 64 ounces daily at a minimum ) to help  maintain adequate hydration.   5. Please do not eat or drink any sugar containing liquids within 8 hours of your appointment with Dr. Quinonez so that he can recheck your blood sugar.         I have reviewed the nursing notes.    I have reviewed the findings, diagnosis, plan and need for follow up with the patient.    Discharge Medication List as of 2/12/2017  7:10 PM      START taking these medications    Details   omeprazole (PRILOSEC) 20 MG CR capsule Take 1 capsule (20 mg) by mouth daily, Disp-30 capsule, R-0, E-Prescribe             Final diagnoses:   LUQ abdominal pain   Calculus of gallbladder without cholecystitis without obstruction   Abnormal glucose   Fatigue, unspecified type     This document serves as a record of services personally performed by Ricardo Medrano PA. It was created on their behalf by Eula Back, a trained medical scribe. The creation of this record is based on the provider's personal observations and the statements of the patient. This document has been checked and approved by the attending provider.    Note: Chart documentation done in part with Dragon Voice Recognition software. Although reviewed after completion, some word and grammatical errors may remain.    2/12/2017   Ricardo Medrano PA-C   [facility] EMERGENCY DEPARTMENT     Ricardo Medrano PA-C  02/13/17 0051

## 2017-02-12 NOTE — ED AVS SNAPSHOT
[facility] Emergency Department    [address] [place] [address]    Phone:  [phone]    Fax:  [phone]                                       Jackie Siddiqi   MRN: [id number]    Department:  [facility] Emergency Department   Date of Visit:  2/12/2017           Patient Information     Date Of Birth          1955        Your diagnoses for this visit were:     LUQ abdominal pain     Calculus of gallbladder without cholecystitis without obstruction     Abnormal glucose     Fatigue, unspecified type        You were seen by Ricardo Medrano PA-C.      Follow-up Information     Follow up with Jovana Quinonez MD. Schedule an appointment as soon as possible for a visit in 3 days.    Specialty:  Family Practice    Why:  For ED recheck    Contact information:    [facility]  [address] [place] [address]  [phone]          Discharge Instructions       1.  Please try and avoid 'fatty foods'.  Fatty foods can cause gallbladder associated pain.   2.  Please start taking the Omeprazole ( stomach acid medication ) to see if this helps with the abdominal discomfort, nausea, and troubles feeling full.  If these symptoms are not improving, Dr. Quinonez could consider doing an Upper Endoscopy to look into the stomach.   3.  Please see Dr. Quinonez in 3-4 days for a follow up appointment.  4.  Make sure that you are drinking at least 8 glasses of water daily ( 64 ounces daily at a minimum ) to help maintain adequate hydration.    5.  Please do not eat or drink any sugar containing liquids within 8 hours of your appointment with Dr. Quinonez so that he can recheck your blood sugar.            Discharge References/Attachments     GALLSTONES WITH BILIARY COLIC (CONFIRMED) (ENGLISH)      Future Appointments        Provider Department Dept Phone Center    2/13/2017 3:00 PM Tl Lyons MD [facility] [phone] [facility]      24 Hour Appointment Hotline        To make an appointment at any [facility], call [phone] ([phone]). If you don't have a family doctor or clinic, we will help you find one. [facility] clinics are conveniently located to serve the needs of you and your family.             Review of your medicines      START taking        Dose / Directions Last dose taken    omeprazole 20 MG CR capsule   Commonly known as:  priLOSEC   Dose:  20 mg   Quantity:  30 capsule        Take 1 capsule (20 mg) by mouth daily   Refills:  0          Our records show that you are taking the medicines listed below. If these are incorrect, please call your family doctor or clinic.        Dose / Directions Last dose taken    clobetasol 0.05 % ointment   Commonly known as:  TEMOVATE   Quantity:  30 g        Apply a small pea size amount at bedtime for 2 weeks then decrease it to a few times a week then weekly   Refills:  0        levothyroxine 25 MCG tablet   Commonly known as:  SYNTHROID/LEVOTHROID   Dose:  25 mcg   Quantity:  90 tablet        Take 1 tablet (25 mcg) by mouth daily   Refills:  0        meloxicam 15 MG tablet   Commonly known as:  MOBIC   Dose:  15 mg   Quantity:  30 tablet        Take 1 tablet (15 mg) by mouth daily   Refills:  1        * order for DME   Quantity:  1 Device        Equipment being ordered: Arm Band   Refills:  0        * order for DME   Quantity:  1 Device        Equipment being ordered: Thumb Splint   Refills:  0        PREMARIN cream   Dose:  0.5 g   Quantity:  42.5 g   Generic drug:  conjugated estrogens        Place 0.5 g vaginally twice a week   Refills:  12        * Notice:  This list has 2 medication(s) that are the same as other medications prescribed for you. Read the directions carefully, and ask your doctor or other care provider to review them with you.            Prescriptions were sent or printed at these locations (1 Prescription)                   [facility]   [address]  [place] [address]    Telephone:  [phone]   Fax:  [phone]   Hours:                  These medications are not ready yet because we are checking if your insurance will help you pay for them. Call your pharmacy to confirm that your medication is ready for pickup. It may take up to 24 hours for them to receive the prescription. If the prescription is not ready within 3 business days, please contact your clinic or your provider (1 of 1)         omeprazole (PRILOSEC) 20 MG CR capsule                Procedures and tests performed during your visit     CBC with platelets differential    CT Abdomen Pelvis w Contrast    Comprehensive metabolic panel    Hemoglobin A1c    Lipase    Peripheral IV catheter    TSH with free T4 reflex    UA reflex to Microscopic and Culture    Urine Microscopic    XR Chest 2 Views      Orders Needing Specimen Collection     None      Pending Results     Date and Time Order Name Status Description    2/12/2017 1519 CT Abdomen Pelvis w Contrast Preliminary     2/12/2017 1519 XR Chest 2 Views Preliminary             Pending Culture Results     No orders found from 2/10/2017 to 2/13/2017.            Thank you for choosing [facility]       Thank you for choosing [facility] for your care. Our goal is always to provide you with excellent care. Hearing back from our patients is one way we can continue to improve our services. Please take a few minutes to complete the written survey that you may receive in the mail after you visit with us. Thank you!        [employer] Information     [employer] gives you secure access to your electronic health record. If you see a primary care provider, you can also send messages to your care team and make appointments. If you have questions, please call your primary care clinic.  If you do not have a primary care provider, please call [phone] and they will assist you.        Care EveryWhere ID     This is your Care EveryWhere ID. This could be used by other organizations  to access your [facility] medical records  [id number]        After Visit Summary       This is your record. Keep this with you and show to your community pharmacist(s) and doctor(s) at your next visit.

## 2017-02-13 NOTE — DISCHARGE INSTRUCTIONS
1.  Please try and avoid 'fatty foods'.  Fatty foods can cause gallbladder associated pain.   2.  Please start taking the Omeprazole ( stomach acid medication ) to see if this helps with the abdominal discomfort, nausea, and troubles feeling full.  If these symptoms are not improving, Dr. Quinonez could consider doing an Upper Endoscopy to look into the stomach.   3.  Please see Dr. Quinonez in 3-4 days for a follow up appointment.  4.  Make sure that you are drinking at least 8 glasses of water daily ( 64 ounces daily at a minimum ) to help maintain adequate hydration.    5.  Please do not eat or drink any sugar containing liquids within 8 hours of your appointment with Dr. Quinonez so that he can recheck your blood sugar.

## 2017-02-15 ENCOUNTER — OFFICE VISIT (OUTPATIENT)
Dept: FAMILY MEDICINE | Facility: CLINIC | Age: 62
End: 2017-02-15
Payer: COMMERCIAL

## 2017-02-15 VITALS
TEMPERATURE: 97.2 F | RESPIRATION RATE: 16 BRPM | OXYGEN SATURATION: 97 % | DIASTOLIC BLOOD PRESSURE: 52 MMHG | BODY MASS INDEX: 21.31 KG/M2 | WEIGHT: 115 LBS | SYSTOLIC BLOOD PRESSURE: 84 MMHG | HEART RATE: 78 BPM

## 2017-02-15 DIAGNOSIS — R11.0 NAUSEA: ICD-10-CM

## 2017-02-15 DIAGNOSIS — K80.20 GALLSTONES: ICD-10-CM

## 2017-02-15 DIAGNOSIS — R73.01 IMPAIRED FASTING GLUCOSE: ICD-10-CM

## 2017-02-15 DIAGNOSIS — M54.50 LOW BACK PAIN WITHOUT SCIATICA, UNSPECIFIED BACK PAIN LATERALITY, UNSPECIFIED CHRONICITY: ICD-10-CM

## 2017-02-15 DIAGNOSIS — R10.84 ABDOMINAL PAIN, GENERALIZED: ICD-10-CM

## 2017-02-15 DIAGNOSIS — R10.2 PELVIC PAIN IN FEMALE: Primary | ICD-10-CM

## 2017-02-15 PROCEDURE — 99215 OFFICE O/P EST HI 40 MIN: CPT | Performed by: OBSTETRICS & GYNECOLOGY

## 2017-02-15 ASSESSMENT — PAIN SCALES - GENERAL: PAINLEVEL: MILD PAIN (2)

## 2017-02-15 NOTE — MR AVS SNAPSHOT
After Visit Summary   2/15/2017    Jackie Siddiqi    MRN: [id number]           Patient Information     Date Of Birth          1955        Visit Information        Provider Department      2/15/2017 8:10 AM Paxton Nieves MD [facility]        Today's Diagnoses     Pelvic pain in female    -  1    Low back pain without sciatica, unspecified back pain laterality, unspecified chronicity        Abdominal pain, generalized        Impaired fasting glucose        Nausea        Gallstones           Follow-ups after your visit        Additional Services     GENERAL SURG ADULT REFERRAL       Your provider has referred you to: [facility]: [facility] [phone]   [email or web address]    Please be aware that coverage of these services is subject to the terms and limitations of your health insurance plan.  Call member services at your health plan with any benefit or coverage questions.      Please bring the following with you to your appointment:    (1) Any X-Rays, CTs or MRIs which have been performed.  Contact the facility where they were done to arrange for  prior to your scheduled appointment.   (2) List of current medications   (3) This referral request   (4) Any documents/labs given to you for this referral                  Your next 10 appointments already scheduled     Feb 21, 2017 11:00 AM CST   US PELVIC COMPLETE W TRANSVAGINAL with PHUS1   [facility] ([facility])    [address] [place] [address] [phone]           Please bring a list of your medicines (including vitamins, minerals and over-the-counter drugs). Also, tell your doctor about any allergies you may have. Wear comfortable clothes and leave your valuables at home.  Adults: Drink four 8-ounce glasses of fluid one hour before your exam. Do NOT empty your  bladder.  If you need to empty your bladder before your exam, try to release only a little bit of urine. Then, drink another 8oz glass of fluid.  Children: Children who are potty trained should drink at least 4 cups (32 oz) of liquid 45 minutes to one hour prior to the exam. The child s bladder must be full in order to achieve a diagnostic exam. If your child is very uncomfortable or has an urgent need to pee, please notify a technologist; they will try to find out how much longer the wait may be and provide instructions to help relieve the pressure. Occasionally it is medically necessary to insert a urinary catheter to fill the bladder.  Please call the Imaging Department at your exam site with any questions.              Future tests that were ordered for you today     Open Future Orders        Priority Expected Expires Ordered    ALT Routine  2/15/2018 2/15/2017    AST Routine  2/15/2018 2/15/2017    US Pelvic Complete w Transvaginal Routine 5/16/2017 2/15/2018 2/15/2017    Lipase Routine  2/15/2018 2/15/2017    Amylase Routine  2/15/2018 2/15/2017    Glucose Routine  2/15/2018 2/15/2017            Who to contact     If you have questions or need follow up information about today's clinic visit or your schedule please contact [facility] directly at [phone].  Normal or non-critical lab and imaging results will be communicated to you by [employer], letter or phone within 4 business days after the clinic has received the results. If you do not hear from us within 7 days, please contact the clinic through MyChart or phone. If you have a critical or abnormal lab result, we will notify you by phone as soon as possible.  Submit refill requests through [employer] or call your pharmacy and they will forward the refill request to us. Please allow 3 business days for your refill to be completed.          Additional Information About Your Visit        [employer] Information     [employer] gives you secure access to  your electronic health record. If you see a primary care provider, you can also send messages to your care team and make appointments. If you have questions, please call your primary care clinic.  If you do not have a primary care provider, please call [phone] and they will assist you.        Care EveryWhere ID     This is your Care EveryWhere ID. This could be used by other organizations to access your [facility] medical records  [id number]        Your Vitals Were     Pulse Temperature Respirations Last Period Pulse Oximetry Breastfeeding?    78 97.2  F (36.2  C) (Tympanic) 16 03/26/2003 97% No    BMI (Body Mass Index)                   21.31 kg/m2            Blood Pressure from Last 3 Encounters:   02/15/17 (!) 84/52   02/12/17 92/56   01/04/17 132/82    Weight from Last 3 Encounters:   02/15/17 115 lb (52.2 kg)   02/12/17 114 lb (51.7 kg)   01/04/17 115 lb 12.8 oz (52.5 kg)              We Performed the Following     CBC with platelets     GENERAL SURG ADULT REFERRAL        Primary Care Provider Office Phone # Fax #    Jovana Wooten Mai, [phone]       [address] [place] [address]        Thank you!     Thank you for choosing [facility]  for your care. Our goal is always to provide you with excellent care. Hearing back from our patients is one way we can continue to improve our services. Please take a few minutes to complete the written survey that you may receive in the mail after your visit with us. Thank you!             Your Updated Medication List - Protect others around you: Learn how to safely use, store and throw away your medicines at www.disposemymeds.org.          This list is accurate as of: 2/15/17  8:58 AM.  Always use your most recent med list.                   Brand Name Dispense Instructions for use    clobetasol 0.05 % ointment    TEMOVATE    30 g    Apply a small pea size amount at bedtime for 2 weeks then decrease it to a  few times a week then weekly       levothyroxine 25 MCG tablet    SYNTHROID/LEVOTHROID    90 tablet    Take 1 tablet (25 mcg) by mouth daily       meloxicam 15 MG tablet    MOBIC    30 tablet    Take 1 tablet (15 mg) by mouth daily       omeprazole 20 MG CR capsule    priLOSEC    30 capsule    Take 1 capsule (20 mg) by mouth daily       * order for DME     1 Device    Equipment being ordered: Arm Band       * order for DME     1 Device    Equipment being ordered: Thumb Splint       PREMARIN cream   Generic drug:  conjugated estrogens     42.5 g    Place 0.5 g vaginally twice a week       * Notice:  This list has 2 medication(s) that are the same as other medications prescribed for you. Read the directions carefully, and ask your doctor or other care provider to review them with you.

## 2017-02-15 NOTE — NURSING NOTE
"Chief Complaint   Patient presents with     Hospital F/U     2/12/17       Initial BP (!) 84/52 (BP Location: Right arm, Patient Position: Chair, Cuff Size: Adult Regular)  Pulse 78  Temp 97.2  F (36.2  C) (Tympanic)  Resp 16  Wt 115 lb (52.2 kg)  LMP 03/26/2003  SpO2 97%  Breastfeeding? No  BMI 21.31 kg/m2 Estimated body mass index is 21.31 kg/(m^2) as calculated from the following:    Height as of 1/4/17: 5' 1.6\" (1.565 m).    Weight as of this encounter: 115 lb (52.2 kg).  Medication Reconciliation: complete   Phi Connelly MA      "

## 2017-02-15 NOTE — PROGRESS NOTES
Subjective: She was in ER on 2/12/17 with abdominal pain- mostly LUQ-  But also LLQ. She had a CT scan which was unremarkable, also a number of blood tests including a normal cbc and comprehensive panel except fasting sugar was high but A1C was normal at 5.6. She was advised to rechek in a few days and she was started on omeprazole. However she has not filled the prescription so she hasn't taken any of the omeprazole yet.          since then her abdominal pain and back pain have resolved But now she has bilateral pelvic pain. She is having normal daily bowel movements. No rectal bleeding. No vomiting. No fevers. She just feels tired. No energy. No cough. No other complaints. She has minimal appetite but is eating some and she is drinking well without difficulty.      The past medical history, social history, past surgical history and family history as shown below have been reviewed by me today.  Past Medical History   Diagnosis Date     Abnormal Papanicolaou smear of cervix and cervical HPV      cryocautery     Breast cystic disease      Hiatal hernia      Peptic ulcer, unspecified site, unspecified as acute or chronic, without mention of hemorrhage, perforation, or obstruction      Peptic ulcer disease     Personal history of colonic polyps         Allergies   Allergen Reactions     No Known Drug Allergies      Current Outpatient Prescriptions   Medication Sig Dispense Refill     levothyroxine (SYNTHROID, LEVOTHROID) 25 MCG tablet Take 1 tablet (25 mcg) by mouth daily 90 tablet 0     order for DME Equipment being ordered: Arm Band 1 Device 0     order for DME Equipment being ordered: Thumb Splint 1 Device 0     PREMARIN vaginal cream Place 0.5 g vaginally twice a week 42.5 g 12     omeprazole (PRILOSEC) 20 MG CR capsule Take 1 capsule (20 mg) by mouth daily (Patient not taking: Reported on 2/15/2017) 30 capsule 0     meloxicam (MOBIC) 15 MG tablet Take 1 tablet (15 mg) by mouth daily (Patient not taking: Reported  on 2/15/2017) 30 tablet 1     clobetasol (TEMOVATE) 0.05 % ointment Apply a small pea size amount at bedtime for 2 weeks then decrease it to a few times a week then weekly (Patient not taking: Reported on 2/15/2017) 30 g 0     Past Surgical History   Procedure Laterality Date     C  delivery only        x2     C nonspecific procedure       Laparoscopic exam with adhesions     C nonspecific procedure       Mastectomy for cystic breast disease, breast implants     Endoscopy  2007     Sm hiatus hernia     Colonoscopy  2007     Mastectomy, bilateral       Colonoscopy  2013     Procedure: COLONOSCOPY;  colonoscopy, Esophagoscopy, Gastroscopy, Duodenoscopy EGD, multiple biopsies;  Surgeon: Joe Benson MD;  Location: [facility]     Esophagoscopy, gastroscopy, duodenoscopy (egd), combined  2013     Procedure: COMBINED ESOPHAGOSCOPY, GASTROSCOPY, DUODENOSCOPY (EGD), BIOPSY SINGLE OR MULTIPLE;  ESOPHAGOGASTRODUODENOSCOPY WITH MULTIPLE BIOPSIES;  Surgeon: Joe Benson MD;  Location:  GI     Social History     Social History     Marital status: Single     Spouse name: N/A     Number of children: N/A     Years of education: N/A     Social History Main Topics     Smoking status: Current Some Day Smoker     Packs/day: 0.50     Years: 50.00     Types: Cigarettes     Smokeless tobacco: Never Used      Comment: 1/2  pack per day, started age 11     Alcohol use No      Comment: holidays only     Drug use: No     Sexual activity: Not Currently      Comment: divorce, 3 children.     Other Topics Concern      Service No     Blood Transfusions No     Caffeine Concern No     Occupational Exposure No     Hobby Hazards No     Sleep Concern No     Stress Concern No     Weight Concern No     Special Diet No     Back Care No     Exercise No     Bike Helmet No     Seat Belt Yes     Self-Exams No     Social History Narrative     Family History   Problem Relation Age of Onset     DIABETES Father       Hypertension Father      Alcohol/Drug Father      Eye Disorder Father      Obesity Father      Breast Cancer Mother      OSTEOPOROSIS Mother      Thyroid Disease Mother      CANCER Mother      Bladder     CANCER Sister      fallopian tube cancer     Obesity Sister      Alzheimer Disease Sister      CANCER Sister      Skin Cancer     Allergies Daughter      CANCER Maternal Grandmother      uterine cancer     Liver Disease Daughter      Biliary Atresia     Genitourinary Problems Brother      HEART DISEASE Brother      Heart Murmur     Genitourinary Problems Brother      kidney disease (two brothers)       ROS: A 12 point review of systems was done. Except for what is listed above in the HPI, the systems review is negative .      Objective: Vital signs: Blood pressure (!) 84/52, pulse 78, temperature 97.2  F (36.2  C), temperature source Tympanic, resp. rate 16, weight 115 lb (52.2 kg), last menstrual period 03/26/2003, SpO2 97 %, not currently breastfeeding.  HEENT:  Normal   Sclerae and conjunctiva are normal.   Ear canals and TMs look normal.  Nasal mucosa is pink  - no polyps or masses seen.    Throat is unremarkable . Mucous membranes are moist.   Neck is supple, mobile, no adenoapthy or masses palpable. Normal range of motion noted.  Chest is clear to auscultation. No wheezes, rales or rhonchi heard.  cardiac exam is normal with s1, s2, no murmurs or adventitious sounds.Normal rate and rhythm is heard.  Abdomen is soft,  nondistended, No masses felt.No HSM. No guarding or rigidity or rebound noted. Palpation reveals  no    tenderness   Normal bowel sounds heard.   Back is nontender to palpation.Pelvic exam:My nurse Estela   was present to chaperone the exam.    The external genitalia appeared normal. Atrophic-     The vaginal vault was without bleeding  or   discharge or odor.     No vaginal support defects were noted,      Bimanual exam revealed a    Nulliparous  sized uterus. It does not   descend   well in  the vaginal vault.      No adnexal masses were felt.  However she is tender in the right adnexa- to palpation. No uterine tenderness and no left sided tenderness.    There was no  cervical motion tenderness.      Exam was NOT   limited by the patient's body habitus.                Assessment/Plan:    1. Back pain- resolved    2. Abdominal pain-resolved- however, the CT scan showed gallstones- the wbc was normal and her pain is gone so i doubt cholecystitis- but if her pain in the upper quadrants or upper back is noted again, I would advise her to see Dr Spear to discuss possible cholecystectomy because of the gallstones and possible cause of her pain.    3. New bilateral pelvic pain - and on exam the right adnexa is tender- i will check pelvic US to r/o adnexal ,mass or torsion.    4. Recent evidence of elevated fasting glucose= will order fasting glucose to be checked tomorrow since she ate today.    5. Continued nausea- will check pancreatic enzymes with tomorrow's lab draw- also LFTs as advised by the ER.    6. Recheck acutely if her sx are worse, otherwise we will call with results and then decide further disposition.    ABBE Nieves MD

## 2017-02-16 ENCOUNTER — TELEPHONE (OUTPATIENT)
Dept: FAMILY MEDICINE | Facility: CLINIC | Age: 62
End: 2017-02-16

## 2017-02-16 DIAGNOSIS — R73.01 IMPAIRED FASTING GLUCOSE: ICD-10-CM

## 2017-02-16 DIAGNOSIS — R11.0 NAUSEA: ICD-10-CM

## 2017-02-16 DIAGNOSIS — R10.2 PELVIC PAIN IN FEMALE: ICD-10-CM

## 2017-02-16 DIAGNOSIS — R10.84 ABDOMINAL PAIN, GENERALIZED: ICD-10-CM

## 2017-02-16 LAB
ALT SERPL W P-5'-P-CCNC: 27 U/L (ref 0–50)
AMYLASE SERPL-CCNC: 33 U/L (ref 30–110)
AST SERPL W P-5'-P-CCNC: 22 U/L (ref 0–45)
ERYTHROCYTE [DISTWIDTH] IN BLOOD BY AUTOMATED COUNT: 12.3 % (ref 10–15)
GLUCOSE SERPL-MCNC: 91 MG/DL (ref 70–99)
HCT VFR BLD AUTO: 39 % (ref 35–47)
HGB BLD-MCNC: 13 G/DL (ref 11.7–15.7)
LIPASE SERPL-CCNC: 130 U/L (ref 73–393)
MCH RBC QN AUTO: 29.1 PG (ref 26.5–33)
MCHC RBC AUTO-ENTMCNC: 33.3 G/DL (ref 31.5–36.5)
MCV RBC AUTO: 87 FL (ref 78–100)
PLATELET # BLD AUTO: 247 10E9/L (ref 150–450)
RBC # BLD AUTO: 4.47 10E12/L (ref 3.8–5.2)
WBC # BLD AUTO: 4.8 10E9/L (ref 4–11)

## 2017-02-16 PROCEDURE — 84450 TRANSFERASE (AST) (SGOT): CPT | Performed by: OBSTETRICS & GYNECOLOGY

## 2017-02-16 PROCEDURE — 83690 ASSAY OF LIPASE: CPT | Performed by: OBSTETRICS & GYNECOLOGY

## 2017-02-16 PROCEDURE — 85027 COMPLETE CBC AUTOMATED: CPT | Performed by: OBSTETRICS & GYNECOLOGY

## 2017-02-16 PROCEDURE — 84460 ALANINE AMINO (ALT) (SGPT): CPT | Performed by: OBSTETRICS & GYNECOLOGY

## 2017-02-16 PROCEDURE — 82947 ASSAY GLUCOSE BLOOD QUANT: CPT | Performed by: OBSTETRICS & GYNECOLOGY

## 2017-02-16 PROCEDURE — 36415 COLL VENOUS BLD VENIPUNCTURE: CPT | Performed by: OBSTETRICS & GYNECOLOGY

## 2017-02-16 PROCEDURE — 82150 ASSAY OF AMYLASE: CPT | Performed by: OBSTETRICS & GYNECOLOGY

## 2017-02-16 NOTE — TELEPHONE ENCOUNTER
----- Message from Paxton Nieves MD sent at 2/16/2017 12:49 PM CST -----  Estela Please inform Jackie/ or caretaker  that this result(s) is/are normal.  Thanks. ABBE Nieves MD

## 2017-02-16 NOTE — TELEPHONE ENCOUNTER
Left message for patient to return call to clinic.  Please inform patient that all recent lab test were normal  Estela Leonardo CMA

## 2017-02-16 NOTE — LETTER
[address] [address]  Phone: [phone]  Fax: [phone]      February 20, 2017    Jackie Siddiqi  [address] [place] [address]          Dear Jackie,      LAB RESULTS:     The results of your recent labs were NORMAL.  If you have any further questions or problems, please contact our office.          Sincerely,      Paxton Nieves M.D.

## 2017-02-16 NOTE — PROGRESS NOTES
Estela Please inform Jackie/ or caretaker  that this result(s) is/are normal.  Thanks. ABBE Nieves MD

## 2017-02-21 ENCOUNTER — HOSPITAL ENCOUNTER (OUTPATIENT)
Dept: ULTRASOUND IMAGING | Facility: CLINIC | Age: 62
Discharge: HOME | End: 2017-02-21
Attending: OBSTETRICS & GYNECOLOGY | Admitting: OBSTETRICS & GYNECOLOGY
Payer: COMMERCIAL

## 2017-02-21 DIAGNOSIS — R10.2 PELVIC PAIN IN FEMALE: ICD-10-CM

## 2017-02-21 PROCEDURE — 76830 TRANSVAGINAL US NON-OB: CPT

## 2017-02-22 NOTE — PROGRESS NOTES
Estela Please inform Jackie/ or caretaker  that this result(s) is/are normal. The ultrasound was normal-Thanks. ABBE Nieves MD

## 2017-03-28 DIAGNOSIS — N95.2 ATROPHIC VAGINITIS: ICD-10-CM

## 2017-03-28 DIAGNOSIS — L29.2 VULVAR PRURITUS: ICD-10-CM

## 2017-03-28 NOTE — TELEPHONE ENCOUNTER
Premarin Cream 30gm      Last Written Prescription Date: 02/08/16  Last Fill Quantity: 42.5g,  # refills: 12   Last Office Visit with [facility], [facility] or [facility] prescribing provider: 02/15/17

## 2017-03-28 NOTE — TELEPHONE ENCOUNTER
clobetasol (TEMOVATE) 0.05 % ointment      Last Written Prescription Date: 02/08/16  Last Fill Quantity: 30g,  # refills: 0   Last Office Visit with [facility], [facility] or [facility] prescribing provider: 02/15/17

## 2017-03-30 ENCOUNTER — OFFICE VISIT (OUTPATIENT)
Dept: SURGERY | Facility: OTHER | Age: 62
End: 2017-03-30
Payer: COMMERCIAL

## 2017-03-30 VITALS — TEMPERATURE: 97.8 F | OXYGEN SATURATION: 99 % | HEART RATE: 70 BPM | WEIGHT: 120 LBS | BODY MASS INDEX: 22.23 KG/M2

## 2017-03-30 DIAGNOSIS — R10.32 LLQ ABDOMINAL PAIN: Primary | ICD-10-CM

## 2017-03-30 DIAGNOSIS — K80.20 ASYMPTOMATIC GALLSTONES: ICD-10-CM

## 2017-03-30 DIAGNOSIS — M54.17 LUMBOSACRAL RADICULOPATHY: ICD-10-CM

## 2017-03-30 PROCEDURE — 99244 OFF/OP CNSLTJ NEW/EST MOD 40: CPT | Performed by: SPECIALIST

## 2017-03-30 NOTE — NURSING NOTE
"Chief Complaint   Patient presents with     Abdominal Pain     lower left abd. pain-radiates to lower back and down leg     Consult     referring [place]       Initial Pulse 70  Temp 97.8  F (36.6  C) (Temporal)  Wt 120 lb (54.4 kg)  LMP 03/26/2003  SpO2 99%  BMI 22.23 kg/m2 Estimated body mass index is 22.23 kg/(m^2) as calculated from the following:    Height as of 1/4/17: 5' 1.6\" (1.565 m).    Weight as of this encounter: 120 lb (54.4 kg).  Medication Reconciliation: complete    "

## 2017-03-30 NOTE — PROGRESS NOTES
"Consult requested by Dr. Nieves    Reason for consultation - LLQ pain and gallstones      HPI:  Patient is a 61-year-old white female presenting with a several month history of left lower quadrant abdominal pain. The pain is intermittent throbbing occasionally radiates around to the back and down the lateral thigh. She denies any nausea vomiting fevers chills. She does report her \"whole-body feels sick\" after going to work for several hours.  There is no association of these symptoms with any meals. Her last colonoscopy was in  and was essentially normal. She had a CT that was essentially negative other than some gallstones. She now presents for evaluation of her left lower quadrant pain and gallstones. She also had an ultrasound in  that did not reveal any gallstones.    Past Medical History:   Diagnosis Date     Abnormal Papanicolaou smear of cervix and cervical HPV     cryocautery     Breast cystic disease      Hiatal hernia      Peptic ulcer, unspecified site, unspecified as acute or chronic, without mention of hemorrhage, perforation, or obstruction     Peptic ulcer disease     Personal history of colonic polyps      Past Surgical History:   Procedure Laterality Date     C  DELIVERY ONLY       x2     C NONSPECIFIC PROCEDURE      Laparoscopic exam with adhesions     C NONSPECIFIC PROCEDURE      Mastectomy for cystic breast disease, breast implants     COLONOSCOPY  2007     COLONOSCOPY  2013    Procedure: COLONOSCOPY;  colonoscopy, Esophagoscopy, Gastroscopy, Duodenoscopy EGD, multiple biopsies;  Surgeon: Joe Benson MD;  Location:  GI     ENDOSCOPY  2007    Sm hiatus hernia     ESOPHAGOSCOPY, GASTROSCOPY, DUODENOSCOPY (EGD), COMBINED  2013    Procedure: COMBINED ESOPHAGOSCOPY, GASTROSCOPY, DUODENOSCOPY (EGD), BIOPSY SINGLE OR MULTIPLE;  ESOPHAGOGASTRODUODENOSCOPY WITH MULTIPLE BIOPSIES;  Surgeon: Joe Benson MD;  Location:  GI     MASTECTOMY, " BILATERAL       Current Outpatient Prescriptions   Medication     levothyroxine (SYNTHROID, LEVOTHROID) 25 MCG tablet     meloxicam (MOBIC) 15 MG tablet     order for DME     order for DME     clobetasol (TEMOVATE) 0.05 % ointment     PREMARIN vaginal cream     No current facility-administered medications for this visit.         Allergies   Allergen Reactions     No Known Drug Allergies      Social History   Substance Use Topics     Smoking status: Current Some Day Smoker     Packs/day: 0.50     Years: 50.00     Types: Cigarettes     Smokeless tobacco: Never Used      Comment: 1/2  pack per day, started age 11     Alcohol use No      Comment: holidays only     Family History   Problem Relation Age of Onset     DIABETES Father      Hypertension Father      Alcohol/Drug Father      Eye Disorder Father      Obesity Father      Breast Cancer Mother      OSTEOPOROSIS Mother      Thyroid Disease Mother      CANCER Mother      Bladder     CANCER Sister      fallopian tube cancer     Obesity Sister      Alzheimer Disease Sister      CANCER Sister      Skin Cancer     Allergies Daughter      CANCER Maternal Grandmother      uterine cancer     Liver Disease Daughter      Biliary Atresia     Genitourinary Problems Brother      HEART DISEASE Brother      Heart Murmur     Genitourinary Problems Brother      kidney disease (two brothers)      ROS: 10 point ROS neg other than the symptoms noted above in the HPI.    PE:  B/P: Data Unavailable, T: 97.8, P: 70, R: Data Unavailable  General: well developed, well nourished thin WD who appears older than her stated age  HEENT: NC/AT, EOMI, (-)icterus, (-)injection  Neck: Supple, No JVD  Chest: CTA  Heart: S1, S2, (-)m/r/g  Abd: Soft, non distended, point tenderness LLQ.  No rebound, guarding or peritoneal signs  Back: Non tender  Ext; Warm, no edema  Psych: AAOx3  Neuro: No focal deficits    Lab Results   Component Value Date    WBC 4.8 02/16/2017     Lab Results   Component Value Date     RBC 4.47 02/16/2017     Lab Results   Component Value Date    HGB 13.0 02/16/2017     Lab Results   Component Value Date    HCT 39.0 02/16/2017     No components found for: MCT  Lab Results   Component Value Date    MCV 87 02/16/2017     Lab Results   Component Value Date    MCH 29.1 02/16/2017     Lab Results   Component Value Date    MCHC 33.3 02/16/2017     Lab Results   Component Value Date    RDW 12.3 02/16/2017     Lab Results   Component Value Date     02/16/2017       Liver Function Studies -   Recent Labs   Lab Test  02/16/17   0839  02/12/17   1526   PROTTOTAL   --   7.1   ALBUMIN   --   3.4   BILITOTAL   --   0.4   ALKPHOS   --   57   AST  22  18   ALT  27  22     Amylase, lipase normal    CT -  CT ABDOMEN AND PELVIS WITH CONTRAST 2/12/2017 4:46 PM     HISTORY: LLQ abdominal pain. Family history of kidney problems. Peptic  ulcer, hiatal hernia, breast cystic disease, urinary tract infection,  bilateral mastectomy, laparoscopy for adhesions, C-sections x2.     TECHNIQUE: Scans obtained from the diaphragm through the pelvis with  oral and IV contrast, 56 mL Isovue-370.   Radiation dose for this scan was reduced using automated exposure  control, adjustment of the mA and/or kV according to patient size, or  iterative reconstruction technique.     COMPARISON: Pelvic ultrasound dated 12/8/2016, abdominal ultrasound  dated 6/8/2007.     FINDINGS: Visualized portions of the lung bases and mediastinal  contents are unremarkable. Degenerative changes are noted in the  spine and SI joints. No aggressive osseous lesions are seen.      Dependently layering calculi are seen in the gallbladder. The  gallbladder, liver, pancreas, spleen, bilateral adrenal glands, and  bilateral kidneys otherwise enhance normally. No hydronephrosis,  nephrolithiasis, hydroureter, or ureteral calculus is identified.  Urinary bladder is normal in appearance.     There is nonaneurysmal atherosclerosis. Structure likely  representing  a normal uterus is seen in the pelvis. Ovaries are not well seen. No  adnexal masses are identified.     No adenopathy, free fluid, or free air is seen in the peritoneal  cavity. There is nonaneurysmal atherosclerosis.     The colon is grossly of normal caliber without pericolonic  inflammatory change to suggest acute diverticulitis. Appendix is not  definitely seen. No pericecal inflammatory change to suggest acute  appendicitis. Small bowel is grossly of normal caliber. Stomach is  unremarkable. No evidence for bowel obstruction.         IMPRESSION:  1. Cholelithiasis without evidence for acute cholecystitis.  2. No evidence for diverticulitis, appendicitis, or urinary system  obstruction. Etiology for patient's symptoms is not definitely seen.  3. Hiatal hernia is not seen on this study.  4. Degenerative changes of spine are noted.     JUSTIN MARTIN MD         Impression/plan:  This is a 61-year-old lady presenting with multiple complaints and left lower quadrant. I suspect it is musculoskeletal in origin and may represent radiculopathy as she does report chronic low back pain. There is a possibility she may have intra-abdominal adhesions as she did have a laparoscopy in that area many years ago. He is also an incidental finding of gallstones and those appear to be asymptomatic. I discussed all these findings with the patient and she expressed understanding. I also explained that even if I do remove her gallbladder it would most likely most likely not relieve her left lower quadrant pain. I did offer a diagnostic laparoscopy to rule out any intra-abdominal adhesions that could be causing the pain that she would like to think about it at this time. She will follow-up with me as necessary. She knows to follow-up she developed right upper quadrant pain or epigastric pain associated with meals.    Paxton Spear MD, FACS

## 2017-03-30 NOTE — MR AVS SNAPSHOT
After Visit Summary   3/30/2017    Jackie Siddiqi    MRN: [id number]           Patient Information     Date Of Birth          1955        Visit Information        Provider Department      3/30/2017 10:00 AM Paxton Spear MD [facility]        Today's Diagnoses     LLQ abdominal pain    -  1    Lumbosacral radiculopathy        Asymptomatic gallstones           Follow-ups after your visit        Who to contact     If you have questions or need follow up information about today's clinic visit or your schedule please contact [facility] directly at [phone].  Normal or non-critical lab and imaging results will be communicated to you by MyChart, letter or phone within 4 business days after the clinic has received the results. If you do not hear from us within 7 days, please contact the clinic through [employer] or phone. If you have a critical or abnormal lab result, we will notify you by phone as soon as possible.  Submit refill requests through [employer] or call your pharmacy and they will forward the refill request to us. Please allow 3 business days for your refill to be completed.          Additional Information About Your Visit        MyChart Information     [employer] gives you secure access to your electronic health record. If you see a primary care provider, you can also send messages to your care team and make appointments. If you have questions, please call your primary care clinic.  If you do not have a primary care provider, please call [phone] and they will assist you.        Care EveryWhere ID     This is your Care EveryWhere ID. This could be used by other organizations to access your [place] medical records  [id number]        Your Vitals Were     Pulse Temperature Last Period Pulse Oximetry BMI (Body Mass Index)       70 97.8  F (36.6  C) (Temporal) 03/26/2003 99% 22.23 kg/m2        Blood Pressure from Last 3 Encounters:   02/15/17 (!) 84/52    02/12/17 92/56   01/04/17 132/82    Weight from Last 3 Encounters:   03/30/17 120 lb (54.4 kg)   02/15/17 115 lb (52.2 kg)   02/12/17 114 lb (51.7 kg)              Today, you had the following     No orders found for display       Primary Care Provider Office Phone # Fax #    Jovana Wooten Mai, [phone]       [address] [place] [address]        Thank you!     Thank you for choosing [facility]  for your care. Our goal is always to provide you with excellent care. Hearing back from our patients is one way we can continue to improve our services. Please take a few minutes to complete the written survey that you may receive in the mail after your visit with us. Thank you!             Your Updated Medication List - Protect others around you: Learn how to safely use, store and throw away your medicines at www.disposemymeds.org.          This list is accurate as of: 3/30/17 10:33 AM.  Always use your most recent med list.                   Brand Name Dispense Instructions for use    clobetasol 0.05 % ointment    TEMOVATE    30 g    Apply a small pea size amount at bedtime for 2 weeks then decrease it to a few times a week then weekly       levothyroxine 25 MCG tablet    SYNTHROID/LEVOTHROID    90 tablet    Take 1 tablet (25 mcg) by mouth daily       meloxicam 15 MG tablet    MOBIC    30 tablet    Take 1 tablet (15 mg) by mouth daily       * order for DME     1 Device    Equipment being ordered: Arm Band       * order for DME     1 Device    Equipment being ordered: Thumb Splint       PREMARIN cream   Generic drug:  conjugated estrogens     42.5 g    Place 0.5 g vaginally twice a week       * Notice:  This list has 2 medication(s) that are the same as other medications prescribed for you. Read the directions carefully, and ask your doctor or other care provider to review them with you.

## 2017-03-31 RX ORDER — CLOBETASOL PROPIONATE 0.5 MG/G
OINTMENT TOPICAL
Qty: 30 G | Refills: 0 | Status: SHIPPED | OUTPATIENT
Start: 2017-03-31 | End: 2018-06-25

## 2017-03-31 RX ORDER — CONJUGATED ESTROGENS 0.62 MG/G
0.5 CREAM VAGINAL
Qty: 42.5 G | Refills: 10 | Status: SHIPPED | OUTPATIENT
Start: 2017-04-03 | End: 2018-06-25

## 2017-03-31 NOTE — TELEPHONE ENCOUNTER
Prescription approved per [facility] Refill Protocol.  Angelita Marin, RN, BSN

## 2017-04-03 DIAGNOSIS — E03.9 HYPOTHYROIDISM, UNSPECIFIED TYPE: ICD-10-CM

## 2017-04-03 NOTE — TELEPHONE ENCOUNTER
levothyroxine (SYNTHROID, LEVOTHROID) 25 MCG tablet     Last Written Prescription Date: 11/29/16  Last Quantity: 90, # refills: 0  Last Office Visit with [facility], [facility] or [facility] prescribing provider: 2/15/17        TSH   Date Value Ref Range Status   02/12/2017 2.50 0.40 - 4.00 mU/L Final

## 2017-04-05 RX ORDER — LEVOTHYROXINE SODIUM 25 UG/1
TABLET ORAL
Qty: 90 TABLET | Refills: 3 | Status: SHIPPED | OUTPATIENT
Start: 2017-04-05 | End: 2018-05-16

## 2017-04-05 NOTE — TELEPHONE ENCOUNTER
Prescription approved per [facility] Refill Protocol.    Alan No RN, BSN

## 2017-05-25 ENCOUNTER — HOSPITAL ENCOUNTER (EMERGENCY)
Facility: CLINIC | Age: 62
Discharge: HOME | End: 2017-05-25
Attending: EMERGENCY MEDICINE | Admitting: EMERGENCY MEDICINE
Payer: COMMERCIAL

## 2017-05-25 ENCOUNTER — APPOINTMENT (OUTPATIENT)
Dept: GENERAL RADIOLOGY | Facility: CLINIC | Age: 62
End: 2017-05-25
Attending: EMERGENCY MEDICINE
Payer: COMMERCIAL

## 2017-05-25 VITALS
HEART RATE: 81 BPM | OXYGEN SATURATION: 96 % | WEIGHT: 122 LBS | TEMPERATURE: 96.7 F | SYSTOLIC BLOOD PRESSURE: 109 MMHG | BODY MASS INDEX: 22.6 KG/M2 | RESPIRATION RATE: 16 BRPM | DIASTOLIC BLOOD PRESSURE: 66 MMHG
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
## 2017-05-25 DIAGNOSIS — M79.644 PAIN OF FINGER OF RIGHT HAND: ICD-10-CM

## 2017-05-25 PROCEDURE — 99284 EMERGENCY DEPT VISIT MOD MDM: CPT | Performed by: EMERGENCY MEDICINE

## 2017-05-25 PROCEDURE — 29125 APPL SHORT ARM SPLINT STATIC: CPT | Mod: RT | Performed by: EMERGENCY MEDICINE

## 2017-05-25 PROCEDURE — 73130 X-RAY EXAM OF HAND: CPT | Mod: TC,RT

## 2017-05-25 PROCEDURE — 99284 EMERGENCY DEPT VISIT MOD MDM: CPT | Mod: Z6 | Performed by: EMERGENCY MEDICINE

## 2017-05-25 RX ORDER — HYDROCODONE BITARTRATE AND ACETAMINOPHEN 5; 325 MG/1; MG/1
1-2 TABLET ORAL EVERY 4 HOURS PRN
Qty: 15 TABLET | Refills: 0 | Status: SHIPPED | OUTPATIENT
Start: 2017-05-25 | End: 2017-08-01

## 2017-05-25 NOTE — ED AVS SNAPSHOT
[facility] Emergency Department    [address] [place] [address]    Phone:  [phone]    Fax:  [phone]                                       Jackie Siddiqi   MRN: [id number]    Department:  [facility] Emergency Department   Date of Visit:  5/25/2017           Patient Information     Date Of Birth          1955        Your diagnoses for this visit were:     Pain of finger of right hand     Strain of finger of right hand, initial encounter        You were seen by Azael Machuca MD.      Follow-up Information     Follow up with Jovana Quinonez MD.    Specialty:  Family Practice    Why:  In 4-5 days for reassessment.    Contact information:    [facility]  [address] [place] [id number] [phone]        Discharge References/Attachments     FINGER SPRAIN (ENGLISH)      24 Hour Appointment Hotline       To make an appointment at any [facility], call [phone] ([phone]). If you don't have a family doctor or clinic, we will help you find one. [facility] are conveniently located to serve the needs of you and your family.             Review of your medicines      START taking        Dose / Directions Last dose taken    HYDROcodone-acetaminophen 5-325 MG per tablet   Commonly known as:  NORCO   Dose:  1-2 tablet   Quantity:  15 tablet        Take 1-2 tablets by mouth every 4 hours as needed for moderate to severe pain   Refills:  0          Our records show that you are taking the medicines listed below. If these are incorrect, please call your family doctor or clinic.        Dose / Directions Last dose taken    clobetasol 0.05 % ointment   Commonly known as:  TEMOVATE   Quantity:  30 g        Apply a small pea size amount at bedtime for 2 weeks then decrease it to a few times a week then weekly   Refills:  0        levothyroxine 25 MCG tablet   Commonly known as:  SYNTHROID/LEVOTHROID   Quantity:  90 tablet        TAKE ONE TABLET BY  MOUTH EVERY DAY   Refills:  3        meloxicam 15 MG tablet   Commonly known as:  MOBIC   Dose:  15 mg   Quantity:  30 tablet        Take 1 tablet (15 mg) by mouth daily   Refills:  1        * order for DME   Quantity:  1 Device        Equipment being ordered: Arm Band   Refills:  0        * order for DME   Quantity:  1 Device        Equipment being ordered: Thumb Splint   Refills:  0        PREMARIN cream   Dose:  0.5 g   Quantity:  42.5 g   Generic drug:  conjugated estrogens        Place 0.5 g vaginally twice a week   Refills:  10        * Notice:  This list has 2 medication(s) that are the same as other medications prescribed for you. Read the directions carefully, and ask your doctor or other care provider to review them with you.            Prescriptions were sent or printed at these locations (1 Prescription)                   [facility]   [address] [address]    Telephone:  [phone]   Fax:  [phone]   Hours:                  Printed at Department/Unit printer (1 of 1)         HYDROcodone-acetaminophen (NORCO) 5-325 MG per tablet                Procedures and tests performed during your visit     XR Hand Right G/E 3 Views      Orders Needing Specimen Collection     None      Pending Results     No orders found from 5/23/2017 to 5/26/2017.            Pending Culture Results     No orders found from 5/23/2017 to 5/26/2017.            Pending Results Instructions     If you had any lab results that were not finalized at the time of your Discharge, you can call the ED Lab Result RN at [phone]. You will be contacted by this team for any positive Lab results or changes in treatment. The nurses are available 7 days a week from 10A to 6:30P.  You can leave a message 24 hours per day and they will return your call.        Thank you for choosing [facility]       Thank you for choosing [facility] for your care. Our goal is always to provide you with excellent  care. Hearing back from our patients is one way we can continue to improve our services. Please take a few minutes to complete the written survey that you may receive in the mail after you visit with us. Thank you!        [employer] Information     [employer] gives you secure access to your electronic health record. If you see a primary care provider, you can also send messages to your care team and make appointments. If you have questions, please call your primary care clinic.  If you do not have a primary care provider, please call [phone] and they will assist you.        Care EveryWhere ID     This is your Care EveryWhere ID. This could be used by other organizations to access your [place] medical records  [id number]        After Visit Summary       This is your record. Keep this with you and show to your community pharmacist(s) and doctor(s) at your next visit.

## 2017-05-25 NOTE — ED AVS SNAPSHOT
[facility] Emergency Department    [address] [place] [address]    Phone:  [phone]    Fax:  [phone]                                       Jackie Sididqi   MRN: [id number]    Department:  [facility] Emergency Department   Date of Visit:  5/25/2017           After Visit Summary Signature Page     I have received my discharge instructions, and my questions have been answered. I have discussed any challenges I see with this plan with the nurse or doctor.    ..........................................................................................................................................  Patient/Patient Representative Signature      ..........................................................................................................................................  Patient Representative Print Name and Relationship to Patient    ..................................................               ................................................  Date                                            Time    ..........................................................................................................................................  Reviewed by Signature/Title    ...................................................              ..............................................  Date                                                            Time

## 2017-05-26 NOTE — ED NOTES
Pt reports about an hour ago she smashed her right third finger between a garden brick and cement. Pt is able to move finger but reports pain with activity and states that finger feels numb. Finger is swollen.

## 2017-05-26 NOTE — ED PROVIDER NOTES
History     Chief Complaint   Patient presents with     Hand Pain     HPI  Jackie Siddiqi is a 62 year old female who presents with complaints of right middle finger pain.  Patient states she is working the garden all day, laying paving blocks.  She thinks she dropped a patient block on her hand and now presents with moderate pain in the middle finger.  No abrasions or open sores.  No injury to the nail.  Denies paresthesias.  Has moderate pain.  Increased pain with movement.  No treatment prior to arrival.  She is right-hand dominant.  She denies headache, neck pain, shoulder, elbow or wrist pain.  Patient does have osteoarthritis of her right thumb.  No history of carpal tunnel syndrome.  No history of gout    I have reviewed the Medications, Allergies, Past Medical and Surgical History, and Social History in the Epic system.    Review of Systems all systems reviewed and are negative.  Past Medical History:   Diagnosis Date     Abnormal Papanicolaou smear of cervix and cervical HPV     cryocautery     Breast cystic disease      Hiatal hernia      Peptic ulcer, unspecified site, unspecified as acute or chronic, without mention of hemorrhage, perforation, or obstruction     Peptic ulcer disease     Personal history of colonic polyps      Patient Active Problem List   Diagnosis     Esophageal reflux     Lichen sclerosus et atrophicus of the vulva     Advanced directives, counseling/discussion     Hypothyroidism, unspecified type     Hyperlipidemia LDL goal <160     Tobacco use disorder     Osteoarthritis of carpometacarpal joint of right thumb, unspecified osteoarthritis type     No current facility-administered medications for this encounter.      Current Outpatient Prescriptions   Medication     HYDROcodone-acetaminophen (NORCO) 5-325 MG per tablet     levothyroxine (SYNTHROID/LEVOTHROID) 25 MCG tablet     PREMARIN cream     clobetasol (TEMOVATE) 0.05 % ointment     meloxicam (MOBIC) 15 MG tablet     order for  DME     order for DME        Allergies   Allergen Reactions     No Known Drug Allergies      Social History     Social History     Marital status: Single     Spouse name: N/A     Number of children: N/A     Years of education: N/A     Occupational History     Not on file.     Social History Main Topics     Smoking status: Current Some Day Smoker     Packs/day: 0.50     Years: 50.00     Types: Cigarettes     Smokeless tobacco: Never Used      Comment: 1/2  pack per day, started age 11     Alcohol use No      Comment: holidays only     Drug use: No     Sexual activity: Not Currently      Comment: divorce, 3 children.     Other Topics Concern      Service No     Blood Transfusions No     Caffeine Concern No     Occupational Exposure No     Hobby Hazards No     Sleep Concern No     Stress Concern No     Weight Concern No     Special Diet No     Back Care No     Exercise No     Bike Helmet No     Seat Belt Yes     Self-Exams No     Social History Narrative     Family History   Problem Relation Age of Onset     DIABETES Father      Hypertension Father      Alcohol/Drug Father      Eye Disorder Father      Obesity Father      Breast Cancer Mother      OSTEOPOROSIS Mother      Thyroid Disease Mother      CANCER Mother      Bladder     CANCER Sister      fallopian tube cancer     Obesity Sister      Alzheimer Disease Sister      CANCER Sister      Skin Cancer     Allergies Daughter      CANCER Maternal Grandmother      uterine cancer     Liver Disease Daughter      Biliary Atresia     Genitourinary Problems Brother      HEART DISEASE Brother      Heart Murmur     Genitourinary Problems Brother      kidney disease (two brothers)     Physical Exam   BP: 122/69  Pulse: 81  Temp: 97.9  F (36.6  C)  Resp: 20  Weight: 55.3 kg (122 lb)  SpO2: 96 %  Physical Exam Gen. alert cooperative female in moderate distress.  Examination of her head is unremarkable.  Neck is supple without limitation or reproduction of her hand pain.   "Patient's right shoulder and right elbow are unremarkable.  She has intact pulses at the wrist.  She has negative Phalen's and Tinel's sign.  No obvious swelling or deformity of the digits.  She has no tenderness of the thumb index ring or little finger.  She has tenderness of the PIP and DIP of the middle finger.  Limited range of motion due to pain.  She does not have a \"sausage finger\".  The nail is unaffected.  There is no puncture wounds, bruising or abrasions.  No obvious joint effusion and no laxity to joints.    ED Course     ED Course     Procedures           Results for orders placed or performed during the hospital encounter of 05/25/17   XR Hand Right G/E 3 Views    Narrative    XR HAND RT G/E 3 VW   5/25/2017 8:17 PM     HISTORY: Middle finger pain and swelling    COMPARISON: None.    FINDINGS: ] Right middle finger. No fracture.      Impression    IMPRESSION: Negative.    DOMINICK NGUYEN MD         Critical Care time:  none               Labs Ordered and Resulted from Time of ED Arrival Up to the Time of Departure from the ED - No data to display  Extremities the right hand showed no acute abnormality or fracture.  Assessments & Plan (with Medical Decision Making)   Patient is a 62-year-old female presents with right hand pain especially her middle finger.  She states she was working in garden with caregiver blocks and dropped a block injuring the finger.  No cuts, contusions or abrasions.  Moderate pain especially with movement.  She has no neck, shoulder, elbow or wrist swelling, pain or reproduction of symptoms with movement of these areas.  The hand is unaffected except at the base of the middle finger she has mild tenderness.  No sudden swelling.  She does not have a \"sausage finger\".  The nail is unaffected.  No joint effusion or laxity of the joints.  No history of gout X-ray showed no acute abnormality.  Patient was placed in a palmar splint for mobilization.  She will use ice for " inflammation.  Eye pain.  Vicodin for severe pain.  If she has persistent symptoms in 4-5 days as received primary for reassessment.  Reasons to return for reassessment in the department such as uncontrolled pain, increased swelling of the finger, redness, fever or other concerning symptoms.  I have reviewed the nursing notes.    I have reviewed the findings, diagnosis, plan and need for follow up with the patient.    New Prescriptions    HYDROCODONE-ACETAMINOPHEN (NORCO) 5-325 MG PER TABLET    Take 1-2 tablets by mouth every 4 hours as needed for moderate to severe pain       Final diagnoses:   Pain of finger of right hand   Strain of finger of right hand, initial encounter       5/25/2017   [facility] EMERGENCY DEPARTMENT     Azael Machuca MD  05/25/17 2037       Azael Machuca MD  05/25/17 2038

## 2017-05-26 NOTE — ED NOTES
Patient c/o pain and tingling to right hand, especially to right middle finger. 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

## 2017-06-14 ENCOUNTER — OFFICE VISIT (OUTPATIENT)
Dept: FAMILY MEDICINE | Facility: OTHER | Age: 62
End: 2017-06-14
Payer: COMMERCIAL

## 2017-06-14 VITALS
HEART RATE: 67 BPM | DIASTOLIC BLOOD PRESSURE: 60 MMHG | SYSTOLIC BLOOD PRESSURE: 102 MMHG | RESPIRATION RATE: 12 BRPM | WEIGHT: 120 LBS | OXYGEN SATURATION: 95 % | BODY MASS INDEX: 22.23 KG/M2 | TEMPERATURE: 97.9 F

## 2017-06-14 DIAGNOSIS — J30.2 SEASONAL ALLERGIC RHINITIS, UNSPECIFIED ALLERGIC RHINITIS TRIGGER: Primary | ICD-10-CM

## 2017-06-14 DIAGNOSIS — F17.200 TOBACCO USE DISORDER: ICD-10-CM

## 2017-06-14 PROCEDURE — 99213 OFFICE O/P EST LOW 20 MIN: CPT | Performed by: PHYSICIAN ASSISTANT

## 2017-06-14 RX ORDER — NICOTINE 21 MG/24HR
1 PATCH, TRANSDERMAL 24 HOURS TRANSDERMAL EVERY 24 HOURS
Qty: 30 PATCH | Refills: 0 | Status: SHIPPED | OUTPATIENT
Start: 2017-06-14 | End: 2017-11-08

## 2017-06-14 NOTE — NURSING NOTE
"Chief Complaint   Patient presents with     Sinus Problem       Initial /60 (BP Location: Right arm, Patient Position: Chair, Cuff Size: Adult Regular)  Pulse 67  Temp 97.9  F (36.6  C) (Temporal)  Resp 12  Wt 120 lb (54.4 kg)  LMP 03/26/2003  SpO2 95%  BMI 22.23 kg/m2 Estimated body mass index is 22.23 kg/(m^2) as calculated from the following:    Height as of 1/4/17: 5' 1.6\" (1.565 m).    Weight as of this encounter: 120 lb (54.4 kg).  Medication Reconciliation: complete     Betina Siddiqi CMA      "

## 2017-06-14 NOTE — PROGRESS NOTES
SUBJECTIVE:                                                    Jackie Siddiqi is a 62 year old female who presents to clinic today for the following health issues:    HPI    Acute Illness   Acute illness concerns: sinus  Onset: 2-3 months    Fever: no    Chills/Sweats: no    Headache (location?): no    Sinus Pressure:YES- teeth pain    Conjunctivitis:  no    Ear Pain: YES: left    Rhinorrhea: no    Congestion: YES- a little bit    Sore Throat: no     Cough: no    Wheeze: no    Decreased Appetite: no    Nausea: no    Vomiting: no    Diarrhea:  no    Dysuria/Freq.: no    Fatigue/Achiness: no    Sick/Strep Exposure: no     Therapies Tried and outcome: none    Patient was scratching inside of nose a few months ago a few months ago and had a bloody nose but has not had one since. She has had some teeth aching intermititently since that time and occasional congestion and watery eyes but denies current rhinorrhea, sinus congestion, fevers, or cough. She is a 1/2 PPD smoker and is interested in quitting smoking.    She did notice a grayish film over her tongue a few days ago but no pain.     Problem list and histories reviewed & adjusted, as indicated.  Additional history: none    ROS:  GENERAL: Denies fever, fatigue, weakness, weight gain, or weight loss.  HEENT: Eyes-Denies pain, redness, loss of vision, double or blurred vision.     Ears/Nose- +Congestion. Denies tinnitus, loss of hearing, epistaxis, decreased sense of smell. Denies loss of sense of taste, dry mouth, or sore throat.   CARDIOVASCULAR: Denies chest pain, shortness of breath, irregular heartbeats, palpitations, or edema.  RESPIRATORY: Denies cough, hemoptysis, and shortness of breath.    OBJECTIVE:                                                    /60 (BP Location: Right arm, Patient Position: Chair, Cuff Size: Adult Regular)  Pulse 67  Temp 97.9  F (36.6  C) (Temporal)  Resp 12  Wt 120 lb (54.4 kg)  LMP 03/26/2003  SpO2 95%  BMI 22.23  kg/m2  Body mass index is 22.23 kg/(m^2).  GENERAL: healthy, alert and no distress  EYES: Eyes grossly normal to inspection, PERRL and conjunctivae and sclerae normal  HENT: ear canals and TM's normal, nose and mouth without ulcers or lesions. Mild nasal mucosal erythema.   NECK: no adenopathy, no asymmetry, masses, or scars and thyroid normal to palpation  RESP: lungs clear to auscultation - no rales, rhonchi or wheezes  CV: regular rate and rhythm, normal S1 S2, no S3 or S4, no murmur, click or rub       ASSESSMENT/PLAN:                                                        ICD-10-CM    1. Seasonal allergic rhinitis, unspecified allergic rhinitis trigger J30.2    2. Tobacco use disorder F17.200 nicotine (NICODERM CQ) 14 MG/24HR 24 hr patch     nicotine (NICODERM CQ) 7 MG/24HR 24 hr patch       1. No signs of a sinus infection. Symptoms consistent with allergies.   I recommend she add a daily medication like Claritin or Zyrtec.  If nasal congestion worsens, she can try Flonase nasal spray.  Drink plenty of fluids.    2. She is interested in quitting smoking again so will try nicotine patches 14 mg daily for 1 month and then 7 mg daily thereafter.  Instructed to set a quit date before starting the patch.  Follow up if still having trouble quitting.  I provide smoking cessation counseling for a total of 4 minutes.       Cesar William PA-C  [facility]

## 2017-06-14 NOTE — MR AVS SNAPSHOT
After Visit Summary   6/14/2017    Jackie Siddiqi    MRN: [id number]           Patient Information     Date Of Birth          1955        Visit Information        Provider Department      6/14/2017 4:00 PM Cesar William PA-C [facility]        Today's Diagnoses     Seasonal allergic rhinitis, unspecified allergic rhinitis trigger    -  1    Tobacco use disorder          Care Instructions    No signs of a sinus infection.  I recommend you add a daily medication like Claritin or Zyrtec to help with allergies as I think this is contributing to your symptoms.  If nasal congestion worsens, you can try Flonase nasal spray.  Drink plenty of fluids.    Work on quitting smoking.  Set a quit date and start the patches.  Follow up if you are still having trouble.           Follow-ups after your visit        Who to contact     If you have questions or need follow up information about today's clinic visit or your schedule please contact [facility] directly at [phone].  Normal or non-critical lab and imaging results will be communicated to you by DocsInkhart, letter or phone within 4 business days after the clinic has received the results. If you do not hear from us within 7 days, please contact the clinic through CerRxt or phone. If you have a critical or abnormal lab result, we will notify you by phone as soon as possible.  Submit refill requests through Qonf or call your pharmacy and they will forward the refill request to us. Please allow 3 business days for your refill to be completed.          Additional Information About Your Visit        DocsInkhart Information     Qonf gives you secure access to your electronic health record. If you see a primary care provider, you can also send messages to your care team and make appointments. If you have questions, please call your primary care clinic.  If you do not have a primary care provider, please call [phone]  and they will assist you.        Care EveryWhere ID     This is your Care EveryWhere ID. This could be used by other organizations to access your [facility] medical records  [id number]        Your Vitals Were     Pulse Temperature Respirations Last Period Pulse Oximetry BMI (Body Mass Index)    67 97.9  F (36.6  C) (Temporal) 12 03/26/2003 95% 22.23 kg/m2       Blood Pressure from Last 3 Encounters:   06/14/17 102/60   05/25/17 109/66   02/15/17 (!) 84/52    Weight from Last 3 Encounters:   06/14/17 120 lb (54.4 kg)   05/25/17 122 lb (55.3 kg)   03/30/17 120 lb (54.4 kg)              Today, you had the following     No orders found for display         Today's Medication Changes          These changes are accurate as of: 6/14/17  4:27 PM.  If you have any questions, ask your nurse or doctor.               Start taking these medicines.        Dose/Directions    * nicotine 14 MG/24HR 24 hr patch   Commonly known as:  NICODERM CQ   Used for:  Tobacco use disorder   Started by:  Cesar William PA-C        Dose:  1 patch   Place 1 patch onto the skin every 24 hours   Quantity:  30 patch   Refills:  0       * nicotine 7 MG/24HR 24 hr patch   Commonly known as:  NICODERM CQ   Used for:  Tobacco use disorder   Started by:  Cesar William PA-C        Dose:  1 patch   Place 1 patch onto the skin every 24 hours Start after 14 mg patch   Quantity:  30 patch   Refills:  0       * Notice:  This list has 2 medication(s) that are the same as other medications prescribed for you. Read the directions carefully, and ask your doctor or other care provider to review them with you.         Where to get your medicines      These medications were sent to [facility] [place], [place] - [address]  [address] [address]     Phone:  [phone]     nicotine 14 MG/24HR 24 hr patch    nicotine 7 MG/24HR 24 hr patch                Primary Care Provider Office Phone # Fax #    Jovana Wooten Mai, MD  [phone]       [address]   [facility] [place] [address]        Thank you!     Thank you for choosing [facility]  for your care. Our goal is always to provide you with excellent care. Hearing back from our patients is one way we can continue to improve our services. Please take a few minutes to complete the written survey that you may receive in the mail after your visit with us. Thank you!             Your Updated Medication List - Protect others around you: Learn how to safely use, store and throw away your medicines at www.disposemymeds.org.          This list is accurate as of: 6/14/17  4:27 PM.  Always use your most recent med list.                   Brand Name Dispense Instructions for use    clobetasol 0.05 % ointment    TEMOVATE    30 g    Apply a small pea size amount at bedtime for 2 weeks then decrease it to a few times a week then weekly       HYDROcodone-acetaminophen 5-325 MG per tablet    NORCO    15 tablet    Take 1-2 tablets by mouth every 4 hours as needed for moderate to severe pain       levothyroxine 25 MCG tablet    SYNTHROID/LEVOTHROID    90 tablet    TAKE ONE TABLET BY MOUTH EVERY DAY       meloxicam 15 MG tablet    MOBIC    30 tablet    Take 1 tablet (15 mg) by mouth daily       * nicotine 14 MG/24HR 24 hr patch    NICODERM CQ    30 patch    Place 1 patch onto the skin every 24 hours       * nicotine 7 MG/24HR 24 hr patch    NICODERM CQ    30 patch    Place 1 patch onto the skin every 24 hours Start after 14 mg patch       * order for DME     1 Device    Equipment being ordered: Arm Band       * order for DME     1 Device    Equipment being ordered: Thumb Splint       PREMARIN cream   Generic drug:  conjugated estrogens     42.5 g    Place 0.5 g vaginally twice a week       * Notice:  This list has 4 medication(s) that are the same as other medications prescribed for you. Read the directions carefully, and ask your doctor or other care  provider to review them with you.

## 2017-06-14 NOTE — PATIENT INSTRUCTIONS
No signs of a sinus infection.  I recommend you add a daily medication like Claritin or Zyrtec to help with allergies as I think this is contributing to your symptoms.  If nasal congestion worsens, you can try Flonase nasal spray.  Drink plenty of fluids.    Work on quitting smoking.  Set a quit date and start the patches.  Follow up if you are still having trouble.

## 2017-08-01 ENCOUNTER — OFFICE VISIT (OUTPATIENT)
Dept: FAMILY MEDICINE | Facility: OTHER | Age: 62
End: 2017-08-01
Payer: COMMERCIAL

## 2017-08-01 VITALS
SYSTOLIC BLOOD PRESSURE: 96 MMHG | WEIGHT: 120 LBS | RESPIRATION RATE: 16 BRPM | HEART RATE: 78 BPM | BODY MASS INDEX: 22.08 KG/M2 | DIASTOLIC BLOOD PRESSURE: 64 MMHG | TEMPERATURE: 99.2 F | HEIGHT: 62 IN

## 2017-08-01 DIAGNOSIS — H04.209 WATERING OF EYE: ICD-10-CM

## 2017-08-01 DIAGNOSIS — H04.301 TEAR DUCT INFECTION, RIGHT: Primary | ICD-10-CM

## 2017-08-01 PROCEDURE — 99213 OFFICE O/P EST LOW 20 MIN: CPT | Performed by: FAMILY MEDICINE

## 2017-08-01 RX ORDER — OFLOXACIN 3 MG/ML
SOLUTION/ DROPS OPHTHALMIC
Qty: 1 BOTTLE | Refills: 0 | Status: SHIPPED | OUTPATIENT
Start: 2017-08-01 | End: 2017-08-10

## 2017-08-01 ASSESSMENT — PAIN SCALES - GENERAL: PAINLEVEL: NO PAIN (1)

## 2017-08-01 NOTE — MR AVS SNAPSHOT
After Visit Summary   8/1/2017    Jackie Siddiqi    MRN: [id number]           Patient Information     Date Of Birth          1955        Visit Information        Provider Department      8/1/2017 1:30 PM Jarod Alvarado MD [facility]        Today's Diagnoses     Tear duct infection, right    -  1    Watering of eye          Care Instructions      Infected Tear Sac (Dacryocystitis)    Dacryocystitis is an infection of the tear sac. The tear sac is the narrow pouch where tears from the eye drain before they flow into the nose. You have 1 tear sac for each eye.  Tears moisten and clean the eyes. They are made in a gland under the upper eyelids. Tiny tubes called tear ducts drain excess tears away from the eyes. The tears flow into the tear sacs, and then into the nose.  In some cases, a tear sac can get infected. This can happen if the tear duct is blocked. A blocked duct can happen for many reasons. It may be caused by an injury to the nose or eye. Or the duct may have narrowed on its own. A blocked tear duct can t drain tears. Bacteria may then become trapped in the duct and in the tear sac.  Dacryocystitis can cause redness, swelling, and pain just below the lower lid, near the nose. A fluid (pus) may drain from the eye. Antibiotics are used to treat the infection and stop it from spreading. If the infection doesn t go away, or comes back often, minor surgery may be needed to open the duct.  Home care  Follow these guidelines when caring for yourself at home:    You will be given antibiotic medicine to treat the infection. Follow all instructions for taking this medication. It may be taken by mouth. Or it may be eye drops or eye ointment. If you have pain, you may use acetaminophen or ibuprofen to reduce pain and fever. (Note: If you have chronic liver or kidney disease, or you have ever had a stomach ulcer or gastrointestinal bleeding, talk with your health care provider  before using these medicines.)    Wash your hands before caring for your eye.    Several times a day, apply a warm compress for 1 to 2 minutes. A warm compress is a clean towel damp with warm water.    After the warm compress, massage the tear sac with clean hands. Place your index finger between the corner of the eye and the nose. Your finger should be pointing toward the top of the nose. Gently massage from the nose toward the eye. A small amount of tear fluid or pus may appear in the corner of the eye.  Using eye drops  Apply drops in the corner of the eye, where the eyelid meets the nose. The drops will pool in this area. When you blink or open your eyelid, the drops will flow into the eye. Use the exact number of drops prescribed. Be careful not to touch your eye or eyelashes with the dropper.  Using ointment  If both drops and ointment are prescribed, use the drops first. Wait 3 minutes, and then apply the ointment. Doing this will give each medicine time to work. To apply the ointment, start by gently pulling down your lower lid. Place a thin line of ointment along the inside of the lid. Begin at the nose and move outward. Close the lid. Wipe away excess medicine from the nose area outward. This is to keep the eyes as clean as possible. Keep your eye closed for 1 or 2 minutes so the medicine has time to coat the eye. Eye ointment may cause blurry vision. This is normal. It may help to apply ointment at bedtime instead of during the day.  Follow-up care  Follow up with your health care provider, or as advised.  When to seek medical advice  Call your health care provider right away if any of these occur.    Fever of 100.4 F (38 C) or higher after 2 days of antibiotic treatment    You are pregnant    You just had surgery or another medical procedure, or were just discharged from the hospital    Symptoms don t get better, or get worse  Date Last Reviewed: 6/24/2015 2000-2017 [employer] [address]  "[place] [address]. All rights reserved. This information is not intended as a substitute for professional medical care. Always follow your healthcare professional's instructions.                Follow-ups after your visit        Who to contact     If you have questions or need follow up information about today's clinic visit or your schedule please contact [facility] directly at [phone].  Normal or non-critical lab and imaging results will be communicated to you by [employer], letter or phone within 4 business days after the clinic has received the results. If you do not hear from us within 7 days, please contact the clinic through [employer] or phone. If you have a critical or abnormal lab result, we will notify you by phone as soon as possible.  Submit refill requests through [employer] or call your pharmacy and they will forward the refill request to us. Please allow 3 business days for your refill to be completed.          Additional Information About Your Visit        MyChart Information     [employer] gives you secure access to your electronic health record. If you see a primary care provider, you can also send messages to your care team and make appointments. If you have questions, please call your primary care clinic.  If you do not have a primary care provider, please call [phone] and they will assist you.        Care EveryWhere ID     This is your Care EveryWhere ID. This could be used by other organizations to access your [place] medical records  [id number]        Your Vitals Were     Pulse Temperature Respirations Height Last Period Breastfeeding?    78 99.2  F (37.3  C) (Temporal) 16 5' 1.81\" (1.57 m) 03/26/2003 No    BMI (Body Mass Index)                   22.08 kg/m2            Blood Pressure from Last 3 Encounters:   08/01/17 96/64   06/14/17 102/60   05/25/17 109/66    Weight from Last 3 Encounters:   08/01/17 120 lb (54.4 kg)   06/14/17 120 lb (54.4 kg) "   05/25/17 122 lb (55.3 kg)              Today, you had the following     No orders found for display         Today's Medication Changes          These changes are accurate as of: 8/1/17  2:04 PM.  If you have any questions, ask your nurse or doctor.               Start taking these medicines.        Dose/Directions    ofloxacin 0.3 % ophthalmic solution   Commonly known as:  OCUFLOX   Used for:  Tear duct infection, right, Watering of eye   Started by:  Jarod Alvarado MD        Instill 1-2 drops in the affected eye(s) every 2 hours while awake for 2 days then 1-2 drops every 4 hours while awake for the next 5 days.   Quantity:  1 Bottle   Refills:  0            Where to get your medicines      These medications were sent to [facility] - [place] - [address] [place] [address]     Phone:  [phone]     ofloxacin 0.3 % ophthalmic solution                Primary Care Provider Office Phone # Fax #    Jovana Je Quinonez [phone]       [address]   [facility] [address]        Equal Access to Services     [facility] AH: Hadii aad ku hadasho Soomaali, waaxda luqadaha, qaybta kaalmada adeegyada, guzman cárdenas . So [facility] [phone].    ATENCIÓN: Si habla español, tiene a randall disposición servicios gratuitos de asistencia lingüística. Llame al [phone].    We comply with applicable federal civil rights laws and [place] laws. We do not discriminate on the basis of race, color, national origin, age, disability sex, sexual orientation or gender identity.            Thank you!     Thank you for choosing [facility]  for your care. Our goal is always to provide you with excellent care. Hearing back from our patients is one way we can continue to improve our services. Please take a few minutes to complete the written survey that you may receive in the mail after your visit with us.  Thank you!             Your Updated Medication List - Protect others around you: Learn how to safely use, store and throw away your medicines at www.disposemymeds.org.          This list is accurate as of: 8/1/17  2:04 PM.  Always use your most recent med list.                   Brand Name Dispense Instructions for use Diagnosis    clobetasol 0.05 % ointment    TEMOVATE    30 g    Apply a small pea size amount at bedtime for 2 weeks then decrease it to a few times a week then weekly    Vulvar pruritus       levothyroxine 25 MCG tablet    SYNTHROID/LEVOTHROID    90 tablet    TAKE ONE TABLET BY MOUTH EVERY DAY    Hypothyroidism, unspecified type       meloxicam 15 MG tablet    MOBIC    30 tablet    Take 1 tablet (15 mg) by mouth daily    Primary osteoarthritis of first carpometacarpal joint of left hand, Lateral epicondylitis of left elbow       * nicotine 14 MG/24HR 24 hr patch    NICODERM CQ    30 patch    Place 1 patch onto the skin every 24 hours    Tobacco use disorder       * nicotine 7 MG/24HR 24 hr patch    NICODERM CQ    30 patch    Place 1 patch onto the skin every 24 hours Start after 14 mg patch    Tobacco use disorder       ofloxacin 0.3 % ophthalmic solution    OCUFLOX    1 Bottle    Instill 1-2 drops in the affected eye(s) every 2 hours while awake for 2 days then 1-2 drops every 4 hours while awake for the next 5 days.    Tear duct infection, right, Watering of eye       order for DME     1 Device    Equipment being ordered: Arm Band    Lateral epicondylitis of left elbow       PREMARIN cream   Generic drug:  conjugated estrogens     42.5 g    Place 0.5 g vaginally twice a week    Atrophic vaginitis       * Notice:  This list has 2 medication(s) that are the same as other medications prescribed for you. Read the directions carefully, and ask your doctor or other care provider to review them with you.

## 2017-08-01 NOTE — PATIENT INSTRUCTIONS
Infected Tear Sac (Dacryocystitis)    Dacryocystitis is an infection of the tear sac. The tear sac is the narrow pouch where tears from the eye drain before they flow into the nose. You have 1 tear sac for each eye.  Tears moisten and clean the eyes. They are made in a gland under the upper eyelids. Tiny tubes called tear ducts drain excess tears away from the eyes. The tears flow into the tear sacs, and then into the nose.  In some cases, a tear sac can get infected. This can happen if the tear duct is blocked. A blocked duct can happen for many reasons. It may be caused by an injury to the nose or eye. Or the duct may have narrowed on its own. A blocked tear duct can t drain tears. Bacteria may then become trapped in the duct and in the tear sac.  Dacryocystitis can cause redness, swelling, and pain just below the lower lid, near the nose. A fluid (pus) may drain from the eye. Antibiotics are used to treat the infection and stop it from spreading. If the infection doesn t go away, or comes back often, minor surgery may be needed to open the duct.  Home care  Follow these guidelines when caring for yourself at home:    You will be given antibiotic medicine to treat the infection. Follow all instructions for taking this medication. It may be taken by mouth. Or it may be eye drops or eye ointment. If you have pain, you may use acetaminophen or ibuprofen to reduce pain and fever. (Note: If you have chronic liver or kidney disease, or you have ever had a stomach ulcer or gastrointestinal bleeding, talk with your health care provider before using these medicines.)    Wash your hands before caring for your eye.    Several times a day, apply a warm compress for 1 to 2 minutes. A warm compress is a clean towel damp with warm water.    After the warm compress, massage the tear sac with clean hands. Place your index finger between the corner of the eye and the nose. Your finger should be pointing toward the top of the  nose. Gently massage from the nose toward the eye. A small amount of tear fluid or pus may appear in the corner of the eye.  Using eye drops  Apply drops in the corner of the eye, where the eyelid meets the nose. The drops will pool in this area. When you blink or open your eyelid, the drops will flow into the eye. Use the exact number of drops prescribed. Be careful not to touch your eye or eyelashes with the dropper.  Using ointment  If both drops and ointment are prescribed, use the drops first. Wait 3 minutes, and then apply the ointment. Doing this will give each medicine time to work. To apply the ointment, start by gently pulling down your lower lid. Place a thin line of ointment along the inside of the lid. Begin at the nose and move outward. Close the lid. Wipe away excess medicine from the nose area outward. This is to keep the eyes as clean as possible. Keep your eye closed for 1 or 2 minutes so the medicine has time to coat the eye. Eye ointment may cause blurry vision. This is normal. It may help to apply ointment at bedtime instead of during the day.  Follow-up care  Follow up with your health care provider, or as advised.  When to seek medical advice  Call your health care provider right away if any of these occur.    Fever of 100.4 F (38 C) or higher after 2 days of antibiotic treatment    You are pregnant    You just had surgery or another medical procedure, or were just discharged from the hospital    Symptoms don t get better, or get worse  Date Last Reviewed: 6/24/2015 2000-2017 [employer]. [address] [place] [address], [place] [address]. All rights reserved. This information is not intended as a substitute for professional medical care. Always follow your healthcare professional's instructions.

## 2017-08-01 NOTE — PROGRESS NOTES
SUBJECTIVE:                                                    Jackie Siddiqi is a 62 year old female who presents to clinic today for the following health issues:    Eye(s) Problem  Onset: x 2 months, has been in before and they told her it is her allergies    Description:   Location: right  Pain: YES- By the tear duct, itches, watery  Redness: no    Accompanying Signs & Symptoms:  Discharge/mattering: YES- sometimes  Swelling: no  Visual changes: YES- sometimes things seem blurry  Fever: no  Nasal Congestion: no  Bothered by bright lights: no    History:   Trauma: no   Foreign body exposure: no     Precipitating factors:   Wearing contacts: no    Alleviating factors:  Improved by: nothing    Therapies Tried and outcome: nothing      PROBLEMS TO ADD ON...    Problem list and histories reviewed & adjusted, as indicated.  Additional history: as documented    Current Outpatient Prescriptions   Medication Sig Dispense Refill     ofloxacin (OCUFLOX) 0.3 % ophthalmic solution Instill 1-2 drops in the affected eye(s) every 2 hours while awake for 2 days then 1-2 drops every 4 hours while awake for the next 5 days. 1 Bottle 0     levothyroxine (SYNTHROID/LEVOTHROID) 25 MCG tablet TAKE ONE TABLET BY MOUTH EVERY DAY 90 tablet 3     nicotine (NICODERM CQ) 14 MG/24HR 24 hr patch Place 1 patch onto the skin every 24 hours (Patient not taking: Reported on 8/1/2017) 30 patch 0     nicotine (NICODERM CQ) 7 MG/24HR 24 hr patch Place 1 patch onto the skin every 24 hours Start after 14 mg patch (Patient not taking: Reported on 8/1/2017) 30 patch 0     PREMARIN cream Place 0.5 g vaginally twice a week (Patient not taking: Reported on 8/1/2017) 42.5 g 10     clobetasol (TEMOVATE) 0.05 % ointment Apply a small pea size amount at bedtime for 2 weeks then decrease it to a few times a week then weekly (Patient not taking: Reported on 8/1/2017) 30 g 0     meloxicam (MOBIC) 15 MG tablet Take 1 tablet (15 mg) by mouth daily (Patient not  "taking: Reported on 8/1/2017) 30 tablet 1     order for DME Equipment being ordered: Arm Band (Patient not taking: Reported on 8/1/2017) 1 Device 0     Allergies   Allergen Reactions     No Known Drug Allergies        Reviewed and updated as needed this visit by clinical staff     Reviewed and updated as needed this visit by Provider         ROS:  Constitutional, HEENT, cardiovascular, pulmonary, gi and gu systems are negative, except as otherwise noted.      OBJECTIVE:   BP 96/64  Pulse 78  Temp 99.2  F (37.3  C) (Temporal)  Resp 16  Ht 5' 1.81\" (1.57 m)  Wt 120 lb (54.4 kg)  LMP 03/26/2003  Breastfeeding? No  BMI 22.08 kg/m2  Body mass index is 22.08 kg/(m^2).  GENERAL: healthy, alert and no distress  EYES: Eyes grossly normal to inspection, PERRL and conjunctivae and sclerae normal, mild tenderness right tearduct.  HENT: ear canals and TM's normal, nose and mouth without ulcers or lesions  NECK: no adenopathy      ASSESSMENT/PLAN:       ICD-10-CM    1. Tear duct infection, right H04.301 ofloxacin (OCUFLOX) 0.3 % ophthalmic solution   2. Watering of eye H04.209 ofloxacin (OCUFLOX) 0.3 % ophthalmic solution   The patient understood the rational for the diagnosis and treatment plan.   All questions were answered to best of my ability and the patient's satisfaction.  Risks, benefits and alternatives of treatments discussed. Plan agreed on.    Will call, return to clinic if worsening or symptoms not improving as discussed.  Needs to follow up with [facility] if symptoms persists.  See patient instructions.     Patient Instructions     Infected Tear Sac (Dacryocystitis)    Dacryocystitis is an infection of the tear sac. The tear sac is the narrow pouch where tears from the eye drain before they flow into the nose. You have 1 tear sac for each eye.  Tears moisten and clean the eyes. They are made in a gland under the upper eyelids. Tiny tubes called tear ducts drain excess tears away from the eyes. The tears " flow into the tear sacs, and then into the nose.  In some cases, a tear sac can get infected. This can happen if the tear duct is blocked. A blocked duct can happen for many reasons. It may be caused by an injury to the nose or eye. Or the duct may have narrowed on its own. A blocked tear duct can t drain tears. Bacteria may then become trapped in the duct and in the tear sac.  Dacryocystitis can cause redness, swelling, and pain just below the lower lid, near the nose. A fluid (pus) may drain from the eye. Antibiotics are used to treat the infection and stop it from spreading. If the infection doesn t go away, or comes back often, minor surgery may be needed to open the duct.  Home care  Follow these guidelines when caring for yourself at home:    You will be given antibiotic medicine to treat the infection. Follow all instructions for taking this medication. It may be taken by mouth. Or it may be eye drops or eye ointment. If you have pain, you may use acetaminophen or ibuprofen to reduce pain and fever. (Note: If you have chronic liver or kidney disease, or you have ever had a stomach ulcer or gastrointestinal bleeding, talk with your health care provider before using these medicines.)    Wash your hands before caring for your eye.    Several times a day, apply a warm compress for 1 to 2 minutes. A warm compress is a clean towel damp with warm water.    After the warm compress, massage the tear sac with clean hands. Place your index finger between the corner of the eye and the nose. Your finger should be pointing toward the top of the nose. Gently massage from the nose toward the eye. A small amount of tear fluid or pus may appear in the corner of the eye.  Using eye drops  Apply drops in the corner of the eye, where the eyelid meets the nose. The drops will pool in this area. When you blink or open your eyelid, the drops will flow into the eye. Use the exact number of drops prescribed. Be careful not to touch  your eye or eyelashes with the dropper.  Using ointment  If both drops and ointment are prescribed, use the drops first. Wait 3 minutes, and then apply the ointment. Doing this will give each medicine time to work. To apply the ointment, start by gently pulling down your lower lid. Place a thin line of ointment along the inside of the lid. Begin at the nose and move outward. Close the lid. Wipe away excess medicine from the nose area outward. This is to keep the eyes as clean as possible. Keep your eye closed for 1 or 2 minutes so the medicine has time to coat the eye. Eye ointment may cause blurry vision. This is normal. It may help to apply ointment at bedtime instead of during the day.  Follow-up care  Follow up with your health care provider, or as advised.  When to seek medical advice  Call your health care provider right away if any of these occur.    Fever of 100.4 F (38 C) or higher after 2 days of antibiotic treatment    You are pregnant    You just had surgery or another medical procedure, or were just discharged from the hospital    Symptoms don t get better, or get worse  Date Last Reviewed: 6/24/2015 2000-2017 [employer]. [address] [place] [address], [place] [address]. All rights reserved. This information is not intended as a substitute for professional medical care. Always follow your healthcare professional's instructions.            Jarod Alvarado MD  [facility]

## 2017-08-01 NOTE — NURSING NOTE
"Chief Complaint   Patient presents with     Eye Problem       Initial BP 96/64  Pulse 78  Temp 99.2  F (37.3  C) (Temporal)  Resp 16  Ht 5' 1.81\" (1.57 m)  Wt 120 lb (54.4 kg)  LMP 03/26/2003  Breastfeeding? No  BMI 22.08 kg/m2 Estimated body mass index is 22.08 kg/(m^2) as calculated from the following:    Height as of this encounter: 5' 1.81\" (1.57 m).    Weight as of this encounter: 120 lb (54.4 kg).  Medication Reconciliation: complete    "

## 2017-08-10 ENCOUNTER — OFFICE VISIT (OUTPATIENT)
Dept: URGENT CARE | Facility: RETAIL CLINIC | Age: 62
End: 2017-08-10
Payer: COMMERCIAL

## 2017-08-10 VITALS
HEART RATE: 75 BPM | OXYGEN SATURATION: 97 % | SYSTOLIC BLOOD PRESSURE: 126 MMHG | DIASTOLIC BLOOD PRESSURE: 71 MMHG | TEMPERATURE: 101.1 F

## 2017-08-10 DIAGNOSIS — J00 COMMON COLD: ICD-10-CM

## 2017-08-10 DIAGNOSIS — J02.9 ACUTE PHARYNGITIS, UNSPECIFIED ETIOLOGY: Primary | ICD-10-CM

## 2017-08-10 PROCEDURE — 87880 STREP A ASSAY W/OPTIC: CPT | Mod: QW | Performed by: NURSE PRACTITIONER

## 2017-08-10 PROCEDURE — 87081 CULTURE SCREEN ONLY: CPT | Performed by: NURSE PRACTITIONER

## 2017-08-10 PROCEDURE — 99213 OFFICE O/P EST LOW 20 MIN: CPT | Performed by: NURSE PRACTITIONER

## 2017-08-10 NOTE — NURSING NOTE
"Chief Complaint   Patient presents with     Pharyngitis     Cough     started yesterday, getting worse       Initial /71 (Cuff Size: Adult Regular)  Pulse 75  Temp 101.1  F (38.4  C) (Tympanic)  LMP 03/26/2003  SpO2 97% Estimated body mass index is 22.08 kg/(m^2) as calculated from the following:    Height as of 8/1/17: 5' 1.81\" (1.57 m).    Weight as of 8/1/17: 120 lb (54.4 kg).  Medication Reconciliation: complete   Hortencia Cannon CMA (AAMA)      "

## 2017-08-10 NOTE — MR AVS SNAPSHOT
After Visit Summary   8/10/2017    Jackie Siddiqi    MRN: [id number]           Patient Information     Date Of Birth          1955        Visit Information        Provider Department      8/10/2017 6:00 PM Lb Tan APRN [facility]        Today's Diagnoses     Acute pharyngitis, unspecified etiology    -  1    Common cold           Follow-ups after your visit        Who to contact     You can reach your care team any time of the day by calling [phone].  Notification of test results:  If you have an abnormal lab result, we will notify you by phone as soon as possible.         Additional Information About Your Visit        MyChart Information     [employer] gives you secure access to your electronic health record. If you see a primary care provider, you can also send messages to your care team and make appointments. If you have questions, please call your primary care clinic.  If you do not have a primary care provider, please call [phone] and they will assist you.        Care EveryWhere ID     This is your Care EveryWhere ID. This could be used by other organizations to access your [place] medical records  [id number]        Your Vitals Were     Pulse Temperature Last Period Pulse Oximetry          75 101.1  F (38.4  C) (Tympanic) 03/26/2003 97%         Blood Pressure from Last 3 Encounters:   08/10/17 126/71   08/01/17 96/64   06/14/17 102/60    Weight from Last 3 Encounters:   08/01/17 120 lb (54.4 kg)   06/14/17 120 lb (54.4 kg)   05/25/17 122 lb (55.3 kg)              We Performed the Following     BETA STREP GROUP A R/O CULTURE     RAPID STREP SCREEN        Primary Care Provider Office Phone # Fax #    Jovana Wooten Mai, [phone]       [address] [place] [address]        Equal Access to Services     HARMEET HOPE AH: Jyoti Pelayo, joe zhang, gela barrera, guzman cárdenas  ah. So [facility] [phone].    ATENCIÓN: Si idris robles, tiene a randall disposición servicios gratuitos de asistencia lingüística. Destini al [phone].    We comply with applicable federal civil rights laws and [place] laws. We do not discriminate on the basis of race, color, national origin, age, disability sex, sexual orientation or gender identity.            Thank you!     Thank you for choosing [facility]  for your care. Our goal is always to provide you with excellent care. Hearing back from our patients is one way we can continue to improve our services. Please take a few minutes to complete the written survey that you may receive in the mail after your visit with us. Thank you!             Your Updated Medication List - Protect others around you: Learn how to safely use, store and throw away your medicines at www.disposemymeds.org.          This list is accurate as of: 8/10/17  6:31 PM.  Always use your most recent med list.                   Brand Name Dispense Instructions for use Diagnosis    clobetasol 0.05 % ointment    TEMOVATE    30 g    Apply a small pea size amount at bedtime for 2 weeks then decrease it to a few times a week then weekly    Vulvar pruritus       levothyroxine 25 MCG tablet    SYNTHROID/LEVOTHROID    90 tablet    TAKE ONE TABLET BY MOUTH EVERY DAY    Hypothyroidism, unspecified type       meloxicam 15 MG tablet    MOBIC    30 tablet    Take 1 tablet (15 mg) by mouth daily    Primary osteoarthritis of first carpometacarpal joint of left hand, Lateral epicondylitis of left elbow       * nicotine 14 MG/24HR 24 hr patch    NICODERM CQ    30 patch    Place 1 patch onto the skin every 24 hours    Tobacco use disorder       * nicotine 7 MG/24HR 24 hr patch    NICODERM CQ    30 patch    Place 1 patch onto the skin every 24 hours Start after 14 mg patch    Tobacco use disorder       order for DME     1 Device    Equipment being ordered: Arm Band    Lateral epicondylitis of  left elbow       PREMARIN cream   Generic drug:  conjugated estrogens     42.5 g    Place 0.5 g vaginally twice a week    Atrophic vaginitis       * Notice:  This list has 2 medication(s) that are the same as other medications prescribed for you. Read the directions carefully, and ask your doctor or other care provider to review them with you.

## 2017-08-10 NOTE — PROGRESS NOTES
[facility] Care clinic note    SUBJECTIVE:  Jackie Siddiqi is a 62 year old female who presents to [facility] with chief complaint of sore throat.    Onset of symptoms was 1 day(s) ago.    Course of illness: sudden onset and worsening.    Severity moderate  Course of illness:  Current and Associated symptoms: fever, rhinorrhea, cough, sore throat, plugged left ear feeling, hoarse voice, headache, stomach ache and chills  Treatment measures tried at home include Fluids and OTC meds.  Predisposing factors include None.    Current Outpatient Prescriptions   Medication     levothyroxine (SYNTHROID/LEVOTHROID) 25 MCG tablet     nicotine (NICODERM CQ) 14 MG/24HR 24 hr patch     nicotine (NICODERM CQ) 7 MG/24HR 24 hr patch     PREMARIN cream     clobetasol (TEMOVATE) 0.05 % ointment     meloxicam (MOBIC) 15 MG tablet     order for DME     No current facility-administered medications for this visit.      PAST MEDICAL HISTORY:   Past Medical History:   Diagnosis Date     Abnormal Papanicolaou smear of cervix and cervical HPV     cryocautery     Breast cystic disease      Hiatal hernia      Peptic ulcer, unspecified site, unspecified as acute or chronic, without mention of hemorrhage, perforation, or obstruction     Peptic ulcer disease     Personal history of colonic polyps        PAST SURGICAL HISTORY:   Past Surgical History:   Procedure Laterality Date     C  DELIVERY ONLY       x2     C NONSPECIFIC PROCEDURE      Laparoscopic exam with adhesions     C NONSPECIFIC PROCEDURE      Mastectomy for cystic breast disease, breast implants     COLONOSCOPY  2007     COLONOSCOPY  2013    Procedure: COLONOSCOPY;  colonoscopy, Esophagoscopy, Gastroscopy, Duodenoscopy EGD, multiple biopsies;  Surgeon: Joe Benson MD;  Location:  GI     ENDOSCOPY  2007    Sm hiatus hernia     ESOPHAGOSCOPY, GASTROSCOPY, DUODENOSCOPY (EGD), COMBINED  2013     Procedure: COMBINED ESOPHAGOSCOPY, GASTROSCOPY, DUODENOSCOPY (EGD), BIOPSY SINGLE OR MULTIPLE;  ESOPHAGOGASTRODUODENOSCOPY WITH MULTIPLE BIOPSIES;  Surgeon: Joe Benson MD;  Location: [facility]     MASTECTOMY, BILATERAL         FAMILY HISTORY:   Family History   Problem Relation Age of Onset     DIABETES Father      Hypertension Father      Alcohol/Drug Father      Eye Disorder Father      Obesity Father      Breast Cancer Mother      OSTEOPOROSIS Mother      Thyroid Disease Mother      CANCER Mother      Bladder     CANCER Sister      fallopian tube cancer     Obesity Sister      Alzheimer Disease Sister      CANCER Sister      Skin Cancer     Allergies Daughter      CANCER Maternal Grandmother      uterine cancer     Liver Disease Daughter      Biliary Atresia     Genitourinary Problems Brother      HEART DISEASE Brother      Heart Murmur     Genitourinary Problems Brother      kidney disease (two brothers)       SOCIAL HISTORY:   Social History   Substance Use Topics     Smoking status: Current Some Day Smoker     Packs/day: 0.50     Years: 50.00     Types: Cigarettes     Smokeless tobacco: Never Used      Comment: 1/2  pack per day, started age 11     Alcohol use No      Comment: holidays only       ROS:  Review of systems negative except as stated above.    OBJECTIVE:   Vitals:    08/10/17 1801   BP: 126/71   Cuff Size: Adult Regular   Pulse: 75   Temp: 101.1  F (38.4  C)   TempSrc: Tympanic   SpO2: 97%     GENERAL APPEARANCE: alert, moderate distress and cooperative  EYES: EOMI,  PERRL, conjunctiva clear  HENT: ear canals and TM's normal.  Pharynx slightly erythematous noted.  HENT: nasal turbinates erythematous, swollen and maxillary sinus tenderness   NECK: bilateral anterior cervical adenopathy and tender  RESP: lungs clear to auscultation - no rales, rhonchi or wheezes  CV: regular rates and rhythm, normal S1 S2, no murmur noted  ABDOMEN: soft, normal bowel sounds, tenderness mild epigastric, RUQ and  LUQ  SKIN: no suspicious lesions or rashes    Rapid Strep test is negative; await throat culture results.    ASSESSMENT:  Acute pharyngitis, unspecified etiology      PLAN:   Outpatient Encounter Prescriptions as of 8/10/2017   Medication Sig Dispense Refill     levothyroxine (SYNTHROID/LEVOTHROID) 25 MCG tablet TAKE ONE TABLET BY MOUTH EVERY DAY 90 tablet 3     [DISCONTINUED] ofloxacin (OCUFLOX) 0.3 % ophthalmic solution Instill 1-2 drops in the affected eye(s) every 2 hours while awake for 2 days then 1-2 drops every 4 hours while awake for the next 5 days. 1 Bottle 0     nicotine (NICODERM CQ) 14 MG/24HR 24 hr patch Place 1 patch onto the skin every 24 hours (Patient not taking: Reported on 8/1/2017) 30 patch 0     nicotine (NICODERM CQ) 7 MG/24HR 24 hr patch Place 1 patch onto the skin every 24 hours Start after 14 mg patch (Patient not taking: Reported on 8/1/2017) 30 patch 0     PREMARIN cream Place 0.5 g vaginally twice a week (Patient not taking: Reported on 8/1/2017) 42.5 g 10     clobetasol (TEMOVATE) 0.05 % ointment Apply a small pea size amount at bedtime for 2 weeks then decrease it to a few times a week then weekly (Patient not taking: Reported on 8/1/2017) 30 g 0     meloxicam (MOBIC) 15 MG tablet Take 1 tablet (15 mg) by mouth daily (Patient not taking: Reported on 8/1/2017) 30 tablet 1     order for DME Equipment being ordered: Arm Band (Patient not taking: Reported on 8/1/2017) 1 Device 0     No facility-administered encounter medications on file as of 8/10/2017.      If not improving Follow up at:  [facility] [phone]  Encourage good hydration (mainly water), may drink tea /c honey, warm chicken broth to sooth throat.  Soft foods may be preferred for several days.  Symptomatic treatment with warm Na+ H2O gargles, OTC analgesic, etc. discussed.   Strep culture pending.   Jackie Siddiqi told positive cultures called only.  Rest as needed.  Follow-up with primary care  provider if not improving.    If difficulty breathing or swallowing be seen immediately in the ED.    Lb Tan MSN, APRN, Family NP-C  [facility]

## 2017-08-13 LAB — BETA STREP CONFIRM: NORMAL

## 2017-11-08 ENCOUNTER — OFFICE VISIT (OUTPATIENT)
Dept: FAMILY MEDICINE | Facility: CLINIC | Age: 62
End: 2017-11-08
Payer: COMMERCIAL

## 2017-11-08 VITALS
HEART RATE: 64 BPM | DIASTOLIC BLOOD PRESSURE: 76 MMHG | RESPIRATION RATE: 16 BRPM | TEMPERATURE: 96.8 F | SYSTOLIC BLOOD PRESSURE: 106 MMHG | BODY MASS INDEX: 21.9 KG/M2 | WEIGHT: 119 LBS | OXYGEN SATURATION: 96 %

## 2017-11-08 DIAGNOSIS — Z86.0100 HISTORY OF COLONIC POLYPS: ICD-10-CM

## 2017-11-08 DIAGNOSIS — R10.32 ABDOMINAL PAIN, LEFT LOWER QUADRANT: Primary | ICD-10-CM

## 2017-11-08 PROCEDURE — 99213 OFFICE O/P EST LOW 20 MIN: CPT | Performed by: OBSTETRICS & GYNECOLOGY

## 2017-11-08 ASSESSMENT — PAIN SCALES - GENERAL: PAINLEVEL: MILD PAIN (2)

## 2017-11-08 NOTE — PROGRESS NOTES
Subjective: she has had a change of bowel movements recent-ly   And had a history of colon polyps- also gets LLQ pain from time to time- currently no pain. No rectal bleeding or melena. No vomiting.  No RUQ pain or fatty food intolerance- she had gallstones  seen on CT abdomen in 2017-    The past medical history, social history, past surgical history and family history as shown below have been reviewed by me today.  Past Medical History:   Diagnosis Date     Abnormal Papanicolaou smear of cervix and cervical HPV     cryocautery     Breast cystic disease      Hiatal hernia      Peptic ulcer, unspecified site, unspecified as acute or chronic, without mention of hemorrhage, perforation, or obstruction     Peptic ulcer disease     Personal history of colonic polyps         Allergies   Allergen Reactions     No Known Drug Allergies      Current Outpatient Prescriptions   Medication Sig Dispense Refill     levothyroxine (SYNTHROID/LEVOTHROID) 25 MCG tablet TAKE ONE TABLET BY MOUTH EVERY DAY 90 tablet 3     clobetasol (TEMOVATE) 0.05 % ointment Apply a small pea size amount at bedtime for 2 weeks then decrease it to a few times a week then weekly 30 g 0     PREMARIN cream Place 0.5 g vaginally twice a week (Patient not taking: Reported on 2017) 42.5 g 10     order for DME Equipment being ordered: Arm Band (Patient not taking: Reported on 2017) 1 Device 0     Past Surgical History:   Procedure Laterality Date     C  DELIVERY ONLY       x2     C NONSPECIFIC PROCEDURE      Laparoscopic exam with adhesions     C NONSPECIFIC PROCEDURE      Mastectomy for cystic breast disease, breast implants     COLONOSCOPY  2007     COLONOSCOPY  2013    Procedure: COLONOSCOPY;  colonoscopy, Esophagoscopy, Gastroscopy, Duodenoscopy EGD, multiple biopsies;  Surgeon: Joe Benson MD;  Location:  GI     ENDOSCOPY  2007     hiatus hernia     ESOPHAGOSCOPY, GASTROSCOPY, DUODENOSCOPY (EGD),  COMBINED  1/18/2013    Procedure: COMBINED ESOPHAGOSCOPY, GASTROSCOPY, DUODENOSCOPY (EGD), BIOPSY SINGLE OR MULTIPLE;  ESOPHAGOGASTRODUODENOSCOPY WITH MULTIPLE BIOPSIES;  Surgeon: Joe Benson MD;  Location: [facility]     MASTECTOMY, BILATERAL       Social History     Social History     Marital status: Single     Spouse name: N/A     Number of children: N/A     Years of education: N/A     Social History Main Topics     Smoking status: Former Smoker     Packs/day: 0.50     Years: 50.00     Types: Cigarettes     Quit date: 10/2/2017     Smokeless tobacco: Never Used      Comment: 1/2  pack per day, started age 11     Alcohol use No      Comment: holidays only     Drug use: No     Sexual activity: Not Currently      Comment: divorce, 3 children.     Other Topics Concern      Service No     Blood Transfusions No     Caffeine Concern No     Occupational Exposure No     Hobby Hazards No     Sleep Concern No     Stress Concern No     Weight Concern No     Special Diet No     Back Care No     Exercise No     Bike Helmet No     Seat Belt Yes     Self-Exams No     Social History Narrative     Family History   Problem Relation Age of Onset     DIABETES Father      Hypertension Father      Alcohol/Drug Father      Eye Disorder Father      Obesity Father      Breast Cancer Mother      OSTEOPOROSIS Mother      Thyroid Disease Mother      CANCER Mother      Bladder     CANCER Sister      fallopian tube cancer     Obesity Sister      Alzheimer Disease Sister      CANCER Sister      Skin Cancer     Allergies Daughter      CANCER Maternal Grandmother      uterine cancer     Liver Disease Daughter      Biliary Atresia     Genitourinary Problems Brother      HEART DISEASE Brother      Heart Murmur     Genitourinary Problems Brother      kidney disease (two brothers)       ROS: A 12 point review of systems was done. Except for what is listed above in the HPI, the systems review is negative .      Objective: Vital signs: Blood  pressure 106/76, pulse 64, temperature 96.8  F (36  C), temperature source Temporal, resp. rate 16, weight 119 lb (54 kg), last menstrual period 03/26/2003, SpO2 96 %, not currently breastfeeding.    Neck is supple, mobile, no adenopathy or masses palpable. The thyroid feels normal.   Normal range of motion noted.  Chest is clear to auscultation.  No wheezes, rales or rhonchi heard.  Cardiac exam is normal with s1, s2, no murmurs or adventitious sounds.Normal rate and rhythm is heard.   Abdomen is soft,  nondistended, No masses felt.No HSM. No guarding or rigidity or rebound   noted. Palpation reveals  no    tenderness   Normal bowel sounds heard.     .        Assessment/Plan:    1. Irregular BMs and a history of colonic polyops- r/o recurrent polyps-  Will check US abdomen to look at her GB and other organs= also arrange for colonoscopy repeat now- will call with results- if pains recur will consder laparoscopy- will refer to general surgery for that if needed-        ABBE Nieves MD

## 2017-11-08 NOTE — LETTER

## 2017-11-08 NOTE — MR AVS SNAPSHOT
After Visit Summary   11/8/2017    Jackie Siddiqi    MRN: [id number]           Patient Information     Date Of Birth          1955        Visit Information        Provider Department      11/8/2017 11:10 AM Paxton Nieves MD [facility]        Today's Diagnoses     Abdominal pain, left lower quadrant    -  1       Follow-ups after your visit        Who to contact     If you have questions or need follow up information about today's clinic visit or your schedule please contact [facility] directly at [phone].  Normal or non-critical lab and imaging results will be communicated to you by [employer], letter or phone within 4 business days after the clinic has received the results. If you do not hear from us within 7 days, please contact the clinic through [employer] or phone. If you have a critical or abnormal lab result, we will notify you by phone as soon as possible.  Submit refill requests through [employer] or call your pharmacy and they will forward the refill request to us. Please allow 3 business days for your refill to be completed.          Additional Information About Your Visit        MyChart Information     [employer] gives you secure access to your electronic health record. If you see a primary care provider, you can also send messages to your care team and make appointments. If you have questions, please call your primary care clinic.  If you do not have a primary care provider, please call [phone] and they will assist you.        Care EveryWhere ID     This is your Care EveryWhere ID. This could be used by other organizations to access your [place] medical records  [id number]        Your Vitals Were     Pulse Temperature Respirations Last Period Pulse Oximetry Breastfeeding?    64 96.8  F (36  C) (Temporal) 16 03/26/2003 96% No    BMI (Body Mass Index)                   21.9 kg/m2            Blood Pressure from Last 3 Encounters:   11/08/17  106/76   08/10/17 126/71   08/01/17 96/64    Weight from Last 3 Encounters:   11/08/17 119 lb (54 kg)   08/01/17 120 lb (54.4 kg)   06/14/17 120 lb (54.4 kg)              Today, you had the following     No orders found for display       Primary Care Provider Office Phone # Fax #    Jovana Je Quinonez [phone]       [address] [place] [address]        Equal Access to Services     HARMEET HOPE : Hadii aad ku hadasho Soomaali, waaxda luqadaha, qaybta kaalmada adeegyada, waxay eliin haykimn jovita cárdenas . So [facility] [phone].    ATENCIÓN: Si habla español, tiene a randall disposición servicios gratuitos de asistencia lingüística. LlSelect [facility], [facility] [phone].    We comply with applicable federal civil rights laws and [place] laws. We do not discriminate on the basis of race, color, national origin, age, disability, sex, sexual orientation, or gender identity.            Thank you!     Thank you for choosing [facility]  for your care. Our goal is always to provide you with excellent care. Hearing back from our patients is one way we can continue to improve our services. Please take a few minutes to complete the written survey that you may receive in the mail after your visit with us. Thank you!             Your Updated Medication List - Protect others around you: Learn how to safely use, store and throw away your medicines at www.disposemymeds.org.          This list is accurate as of: 11/8/17 11:46 AM.  Always use your most recent med list.                   Brand Name Dispense Instructions for use Diagnosis    clobetasol 0.05 % ointment    TEMOVATE    30 g    Apply a small pea size amount at bedtime for 2 weeks then decrease it to a few times a week then weekly    Vulvar pruritus       levothyroxine 25 MCG tablet    SYNTHROID/LEVOTHROID    90 tablet    TAKE ONE TABLET BY MOUTH EVERY DAY    Hypothyroidism, unspecified type       order for DME     1 Device    Equipment being ordered:  Arm Band    Lateral epicondylitis of left elbow       PREMARIN cream   Generic drug:  conjugated estrogens     42.5 g    Place 0.5 g vaginally twice a week    Atrophic vaginitis

## 2017-11-08 NOTE — NURSING NOTE
"Chief Complaint   Patient presents with     Gastrointestinal Problem       Initial /76  Pulse 64  Temp 96.8  F (36  C) (Temporal)  Resp 16  Wt 119 lb (54 kg)  LMP 03/26/2003  SpO2 96%  Breastfeeding? No  BMI 21.9 kg/m2 Estimated body mass index is 21.9 kg/(m^2) as calculated from the following:    Height as of 8/1/17: 5' 1.81\" (1.57 m).    Weight as of this encounter: 119 lb (54 kg)..   BP completed using cuff size: regular  Medication Rec Completed    Estela Leonardo CMA    "

## 2017-11-13 ENCOUNTER — HOSPITAL ENCOUNTER (OUTPATIENT)
Dept: ULTRASOUND IMAGING | Facility: CLINIC | Age: 62
Discharge: HOME | End: 2017-11-13
Attending: OBSTETRICS & GYNECOLOGY | Admitting: OBSTETRICS & GYNECOLOGY
Payer: COMMERCIAL

## 2017-11-13 ENCOUNTER — TELEPHONE (OUTPATIENT)
Dept: FAMILY MEDICINE | Facility: CLINIC | Age: 62
End: 2017-11-13

## 2017-11-13 DIAGNOSIS — R10.32 ABDOMINAL PAIN, LEFT LOWER QUADRANT: ICD-10-CM

## 2017-11-13 PROCEDURE — 76830 TRANSVAGINAL US NON-OB: CPT

## 2017-11-21 ENCOUNTER — HOSPITAL ENCOUNTER (OUTPATIENT)
Dept: ULTRASOUND IMAGING | Facility: CLINIC | Age: 62
Discharge: HOME | End: 2017-11-21
Attending: OBSTETRICS & GYNECOLOGY | Admitting: OBSTETRICS & GYNECOLOGY
Payer: COMMERCIAL

## 2017-11-21 DIAGNOSIS — R10.32 ABDOMINAL PAIN, LEFT LOWER QUADRANT: ICD-10-CM

## 2017-11-21 PROCEDURE — 76700 US EXAM ABDOM COMPLETE: CPT

## 2017-11-22 NOTE — PROGRESS NOTES
Estela Please inform Jackie/ or caretaker  that this result(s) is/are normal.  The pelvic US is normal-Thanks. ABBE Nieves MD

## 2017-11-22 NOTE — PROGRESS NOTES
Estela Please inform Jackie/ or caretaker  that this result(s) is/are normal. Except that she still has gallstones- if she gets any pain in the RUQ of abdomen or any fatty food intolerance she should definitiely see a general surgeon- otherwise try to avoid fatty foods because perhaps her gallstones are a reason for some of her loose BMs. Thanks. ABBE Nieves MD

## 2017-12-06 ENCOUNTER — HOSPITAL ENCOUNTER (OUTPATIENT)
Facility: CLINIC | Age: 62
Discharge: HOME | End: 2017-12-06
Attending: INTERNAL MEDICINE | Admitting: INTERNAL MEDICINE
Payer: COMMERCIAL

## 2017-12-06 ENCOUNTER — SURGERY (OUTPATIENT)
Age: 62
End: 2017-12-06

## 2017-12-06 VITALS
BODY MASS INDEX: 21.9 KG/M2 | TEMPERATURE: 97.8 F | DIASTOLIC BLOOD PRESSURE: 53 MMHG | RESPIRATION RATE: 18 BRPM | SYSTOLIC BLOOD PRESSURE: 101 MMHG | WEIGHT: 119 LBS | OXYGEN SATURATION: 95 %

## 2017-12-06 LAB — COLONOSCOPY: NORMAL

## 2017-12-06 PROCEDURE — 88305 TISSUE EXAM BY PATHOLOGIST: CPT | Performed by: INTERNAL MEDICINE

## 2017-12-06 PROCEDURE — G0500 MOD SEDAT ENDO SERVICE >5YRS: HCPCS

## 2017-12-06 PROCEDURE — 88305 TISSUE EXAM BY PATHOLOGIST: CPT | Mod: 26 | Performed by: INTERNAL MEDICINE

## 2017-12-06 PROCEDURE — 45380 COLONOSCOPY AND BIOPSY: CPT | Mod: PT | Performed by: INTERNAL MEDICINE

## 2017-12-06 PROCEDURE — 25000128 H RX IP 250 OP 636: Performed by: INTERNAL MEDICINE

## 2017-12-06 PROCEDURE — 40000296 ZZH STATISTIC ENDO RECOVERY CLASS 1:2 FIRST HOUR: Performed by: INTERNAL MEDICINE

## 2017-12-06 RX ORDER — LIDOCAINE 40 MG/G
CREAM TOPICAL
Status: DISCONTINUED | OUTPATIENT
Start: 2017-12-06 | End: 2017-12-06 | Stop reason: HOSPADM

## 2017-12-06 RX ORDER — FENTANYL CITRATE 50 UG/ML
INJECTION, SOLUTION INTRAMUSCULAR; INTRAVENOUS PRN
Status: DISCONTINUED | OUTPATIENT
Start: 2017-12-06 | End: 2017-12-06 | Stop reason: HOSPADM

## 2017-12-06 RX ORDER — ONDANSETRON 2 MG/ML
4 INJECTION INTRAMUSCULAR; INTRAVENOUS
Status: DISCONTINUED | OUTPATIENT
Start: 2017-12-06 | End: 2017-12-06 | Stop reason: HOSPADM

## 2017-12-06 RX ADMIN — FENTANYL CITRATE 25 MCG: 50 INJECTION, SOLUTION INTRAMUSCULAR; INTRAVENOUS at 11:13

## 2017-12-06 RX ADMIN — FENTANYL CITRATE 50 MCG: 50 INJECTION, SOLUTION INTRAMUSCULAR; INTRAVENOUS at 11:07

## 2017-12-06 RX ADMIN — MIDAZOLAM HYDROCHLORIDE 1 MG: 1 INJECTION, SOLUTION INTRAMUSCULAR; INTRAVENOUS at 11:13

## 2017-12-06 RX ADMIN — MIDAZOLAM HYDROCHLORIDE 1 MG: 1 INJECTION, SOLUTION INTRAMUSCULAR; INTRAVENOUS at 11:08

## 2017-12-06 RX ADMIN — MIDAZOLAM HYDROCHLORIDE 2 MG: 1 INJECTION, SOLUTION INTRAMUSCULAR; INTRAVENOUS at 11:07

## 2017-12-06 NOTE — PRE-PROCEDURE INSTRUCTIONS
[facility] GI Pre-Procedure Physical Assessment    Jackie Siddiqi MRN# [id number]   Age: 62 year old YOB: 1955      Date of Surgery: 12/6/2017  Location [facility]      Date of Exam 12/6/2017 Facility (Same day)         Primary care provider: Jovana Quinonez         Active problem list:   Patient Active Problem List   Diagnosis     Esophageal reflux     Lichen sclerosus et atrophicus of the vulva     Advanced directives, counseling/discussion     Hypothyroidism, unspecified type     Hyperlipidemia LDL goal <160     Tobacco use disorder     Osteoarthritis of carpometacarpal joint of right thumb, unspecified osteoarthritis type            Medications (include herbals and vitamins):   Any Plavix use in the last 7 days?  No     Current Facility-Administered Medications   Medication     lidocaine 1 % 1 mL     lidocaine (LMX4) kit     sodium chloride (PF) 0.9% PF flush 3 mL     sodium chloride (PF) 0.9% PF flush 3 mL     ondansetron (ZOFRAN) injection 4 mg             Allergies:      Allergies   Allergen Reactions     No Known Drug Allergies      Allergy to Latex?  No  Allergy to tape?    No          Social History:     Social History   Substance Use Topics     Smoking status: Former Smoker     Packs/day: 0.50     Years: 50.00     Types: Cigarettes     Quit date: 10/2/2017     Smokeless tobacco: Never Used      Comment: 1/2  pack per day, started age 11     Alcohol use No      Comment: holidays only            Physical Exam:   All vitals have been reviewed  Blood pressure 109/55, temperature 97.8  F (36.6  C), temperature source Oral, resp. rate 16, weight 54 kg (119 lb), last menstrual period 03/26/2003, SpO2 100 %, not currently breastfeeding.  Airway assessment:   Patient is able to open mouth wide      Lungs:   No increased work of breathing, good air exchange, clear to auscultation bilaterally, no crackles or wheezing      Cardiovascular:   Normal apical impulse, regular rate and  rhythm, normal S1 and S2, no S3 or S4, and no murmur noted           Lab / Radiology Results:   All laboratory data reviewed          Assessment:   Appropriately NPO  Chief complaint or anatomic assessment of involved area: history of polyps         Plan:   Moderate (conscious) sedation     Patient's active problems diagnostically and therapeutically optimized for the planned procedure  Risks, benefits, alternatives to procedure explained and consent obtained  P2 (patient with mild systemic disease)  Orders and progress notes are in the chart  Discharge from Phase 1 and / or Phase 2 recovery when patient meets criteria    I have reviewed the history and physical, lab finding(s), diagnostic data, medicaitons, and the plan for sedation.  I have determined this patient to be an appropriate candidate for the planned sedation / procedure and have reassessed the patient immediately prior to sedation / procedure.    I have personally and medically directed the administration of medications used.    Tolu No MD

## 2017-12-06 NOTE — DISCHARGE INSTRUCTIONS
[facility] Discharge Instructions     Discharge disposition:  Discharged to home       Diet:  Regular       Activity Activity as tolerated       Follow-up: with Primary Physician PRN       Additional instructions: None

## 2017-12-07 ENCOUNTER — TELEPHONE (OUTPATIENT)
Dept: FAMILY MEDICINE | Facility: CLINIC | Age: 62
End: 2017-12-07

## 2017-12-07 NOTE — TELEPHONE ENCOUNTER
Reason for Call:  Other   Jackie is having the same pain she had prior to her colonoscopy.  Jackie states it was started 2 days prior to colonoscopy and thought it may go away but it has not.  It has moved and also gotten a little worse.  Also having a lot of girgling and discomfort.    Detailed comments:  Jackie has questions about her pain and she does not think it pertains to her colonoscopy    Phone Number Patient can be reached at: Home number on file [phone] (home)    Best Time: any    Can we leave a detailed message on this number? YES    Call taken on 12/7/2017 at 8:29 AM by Madeline Corona

## 2017-12-07 NOTE — TELEPHONE ENCOUNTER
Per RM patient should monitor for a few more days. Could be from procedure, if not getting better by Wed. Needs to be seen. Also if worsens patient needs ER eval. Patient agreeable to plan  Estela Leonardo CMA

## 2017-12-07 NOTE — TELEPHONE ENCOUNTER
"Patient is reporting she started with LUQ pain just below her ribs.  This does not go up into the ribs.  Patient is reporting she is feeling overall \"sicker\" as the days go by.  She states this pain is higher than the original pain and is accompanied by gurgling of her GI tract.  Patient is denying the following: fever, vomiting, diarrhea (she has not had a BM since her colonoscopy).  She states she can tolerate the pain, but the overall feeling of illness would have made her call in sick to work today had she had to work today.  She states if she feels like this tomorrow, she will have to call in sick.    She is informed a message can be sent to Dr. Nieves as this is who she would like to continue with this about.  She is informed that most likely he will want to wait for the pathology to come back on the polyp, but that the message will be sent to see what he would like to do from her for next steps for patient.  Sola Malave RN    "

## 2017-12-11 LAB — COPATH REPORT: NORMAL

## 2017-12-13 ENCOUNTER — TRANSFERRED RECORDS (OUTPATIENT)
Dept: HEALTH INFORMATION MANAGEMENT | Facility: CLINIC | Age: 62
End: 2017-12-13

## 2017-12-14 ENCOUNTER — TELEPHONE (OUTPATIENT)
Dept: FAMILY MEDICINE | Facility: CLINIC | Age: 62
End: 2017-12-14

## 2017-12-14 NOTE — TELEPHONE ENCOUNTER
Per RM benign pathology but she needs to be advised on recommended follow up on next colonoscopy per Dr. Oneal Leonardo, CMA

## 2017-12-14 NOTE — TELEPHONE ENCOUNTER
Reason for Call:  Request for results:    Name of test or procedure: Colonoscopy    Date of test of procedure: 12/6/17    Location of the test or procedure: [facility] to leave the result message on voice mail or with a family member? No    Phone number Patient can be reached at:  Home number on file [phone] (home)    Additional comments: Please call pt with results    Call taken on 12/14/2017 at 11:50 AM by Sylvia Blair

## 2017-12-20 ENCOUNTER — TELEPHONE (OUTPATIENT)
Dept: FAMILY MEDICINE | Facility: CLINIC | Age: 62
End: 2017-12-20

## 2017-12-20 NOTE — TELEPHONE ENCOUNTER
": 1955  PHONE #'s: [phone] (home)     PRESENTING PROBLEM:  Cancelled appt with Dr. Nieves as has the flu today. \" MY whole family has this cough and cold thing going on. I have a fever of 101 , so I didn't think I should come in. The  rescheduled me to see Dr. Nieevs on 18 for my abdominal pain. This has been on-going from back in November.  I had a colonoscopy 2 weeks ago and they found a polyp.  Since then , I feel like that pain is getting worse, It will go into my side and kidney area.  CAn Dr. Nieves see me any sooner than 18?\"    NURSING ASSESSMENT  Description:  As above.   Onset/duration:  Since 2017  Precip. factors:   Etiology unknown  Assoc. Sx:  Side pain  Improves/worsens Sx:  worse  Pain scale (1-10)   5/10. Initially , I would rate it a 3/10  Sx specific meds:  NONE  LMP/preg/breast feeding:   NA  Last exam/Tx:  17 with Dr. Nieves.     RECOMMENDED DISPOSITION:  Home care advice - for her cold:  *  Drink 6 to 8 glasses of water daily.  *  Warm mist may help improve conditions. Helps to run humidifier or vaporizer in bedroom. May sit in a steam-filled bathroom for 20 to 30 minutes prn.   *  Elevate head of bed to reduce coughing at night.  * For children  younger than 1 year, give 1/2 tsp lemon mixed with 1/2 tsp corn syrup to soothe cough. ( DO NOT give young children honey.)  *  Give older children and adults 1/2 tsp lemon mixed with 1/2 tsp honey or corn syrup.   * Drink warm lemonade, apple cider, or tea to help soother cough.   * Avoid irritants such as smoking, smog,  and chemical smells.   *  Turn heat down, open the windows, or go out into cooler air to help suppress cough.  *Take cough suppressants (ask your pharmacist for product suggestions) If cough is interfering with activity, causing chest pain or vomiting, or interrupting sleep at night. Follow instructions on the label.  *  If congested avoid milk products.   *  Take OTC meds as needed, " "being sure to follow instructions on the labetl. For a wet cough, use a decongestant,for a dry cough, use an expectorant during the day and suppressant at night. for allergy, use an antihistamine or decongestant. Ask your pharmacist for product for product suggestion.  * If fever for > 72 hours then schedule appointment.     RN will send Dr. Nieves a message about her abdominal pain, she would like to be seen sooner, if possible, than 1/4/18. \" I had to reschedule today so I wouldn't bring in my illness with a temp.\"     Will comply with recommendation: YES   If further questions/concerns or if Sx do not improve, worsen or new Sx develop, call your PCP or [facility] as soon as possible.    NOTES:  Disposition was determined by the first positive assessment question, therefore all previous assessment questions were negative.  Informed to check provider manual or call insurance company to assure coverage.    Guideline used: Common cold, cough  Telephone Triage Protocols for Nurses, Fifth Edition, Maryam Tate RN    "

## 2017-12-29 ENCOUNTER — OFFICE VISIT (OUTPATIENT)
Dept: FAMILY MEDICINE | Facility: CLINIC | Age: 62
End: 2017-12-29
Payer: COMMERCIAL

## 2017-12-29 VITALS
HEIGHT: 62 IN | BODY MASS INDEX: 22.08 KG/M2 | OXYGEN SATURATION: 97 % | TEMPERATURE: 98.4 F | SYSTOLIC BLOOD PRESSURE: 94 MMHG | RESPIRATION RATE: 14 BRPM | HEART RATE: 62 BPM | DIASTOLIC BLOOD PRESSURE: 60 MMHG | WEIGHT: 120 LBS

## 2017-12-29 DIAGNOSIS — R10.32 ABDOMINAL PAIN, LEFT LOWER QUADRANT: Primary | ICD-10-CM

## 2017-12-29 LAB
ALBUMIN UR-MCNC: NEGATIVE MG/DL
APPEARANCE UR: ABNORMAL
BACTERIA #/AREA URNS HPF: ABNORMAL /HPF
BILIRUB UR QL STRIP: NEGATIVE
COLOR UR AUTO: YELLOW
GLUCOSE UR STRIP-MCNC: NEGATIVE MG/DL
HGB UR QL STRIP: ABNORMAL
KETONES UR STRIP-MCNC: NEGATIVE MG/DL
LEUKOCYTE ESTERASE UR QL STRIP: ABNORMAL
MUCOUS THREADS #/AREA URNS LPF: PRESENT /LPF
NITRATE UR QL: NEGATIVE
PH UR STRIP: 5 PH (ref 5–7)
RBC #/AREA URNS AUTO: 3 /HPF (ref 0–2)
SOURCE: ABNORMAL
SP GR UR STRIP: 1.02 (ref 1–1.03)
SQUAMOUS #/AREA URNS AUTO: 2 /HPF (ref 0–1)
UROBILINOGEN UR STRIP-MCNC: 0 MG/DL (ref 0–2)
WBC #/AREA URNS AUTO: 3 /HPF (ref 0–2)

## 2017-12-29 PROCEDURE — 87086 URINE CULTURE/COLONY COUNT: CPT | Performed by: OBSTETRICS & GYNECOLOGY

## 2017-12-29 PROCEDURE — 81001 URINALYSIS AUTO W/SCOPE: CPT | Performed by: OBSTETRICS & GYNECOLOGY

## 2017-12-29 PROCEDURE — 99214 OFFICE O/P EST MOD 30 MIN: CPT | Performed by: OBSTETRICS & GYNECOLOGY

## 2017-12-29 ASSESSMENT — PAIN SCALES - GENERAL: PAINLEVEL: MODERATE PAIN (5)

## 2017-12-29 NOTE — NURSING NOTE
"Chief Complaint   Patient presents with     RECHECK       Initial BP 94/60  Pulse 62  Temp 98.4  F (36.9  C) (Temporal)  Resp 14  Ht 5' 2\" (1.575 m)  Wt 120 lb (54.4 kg)  LMP 03/26/2003  SpO2 97%  Breastfeeding? No  BMI 21.95 kg/m2 Estimated body mass index is 21.95 kg/(m^2) as calculated from the following:    Height as of this encounter: 5' 2\" (1.575 m).    Weight as of this encounter: 120 lb (54.4 kg)..   BP completed using cuff size: regular  Medication Rec Completed    Estela Leonardo CMA    "

## 2017-12-29 NOTE — MR AVS SNAPSHOT
After Visit Summary   12/29/2017    Jackie Siddiqi    MRN: [id number]           Patient Information     Date Of Birth          1955        Visit Information        Provider Department      12/29/2017 11:40 AM Paxton Nieves MD [facility]        Today's Diagnoses     Abdominal pain, left lower quadrant    -  1       Follow-ups after your visit        Your next 10 appointments already scheduled     Dec 29, 2017 11:40 AM CST   Office Visit with Paxton Nieves MD   [facility] ([facility])    [address] [place] [id number] [phone]           Bring a current list of meds and any records pertaining to this visit. For Physicals, please bring immunization records and any forms needing to be filled out. Please arrive 10 minutes early to complete paperwork.            Jan 02, 2018  1:00 PM CST   CT ABDOMEN W/O CONTRAST with PHCT1   [facility] CT Scan ([facility])    [address] [place] [id number] [phone]           Please bring any scans or X-rays taken at other hospitals, if similar tests were done. Also bring a list of your medicines, including vitamins, minerals and over-the-counter drugs. It is safest to leave personal items at home.  Be sure to tell your doctor:   If you have any allergies.   If there s any chance you are pregnant.   If you are breastfeeding.   If you have any special needs.  You do not need to do anything special to prepare.  Please wear loose clothing, such as a sweat suit or jogging clothes. Avoid snaps, zippers and other metal. We may ask you to undress and put on a hospital gown.            Jan 03, 2018  1:00 PM CST   (Arrive by 12:45 PM)   NM HEPATOBILIARY SCAN W GB EF with PHNM1   [facility] Nuclear Medicine ([facility])    [address] [place] [phone]           Please bring a list of your  medicines to the exam. (Include vitamins, minerals and over-the-counter drugs.) You should wear comfortable clothes. Leave your valuables at home. Please bring related prior results and films.  Tell your doctor:   If you are breastfeeding or may be pregnant.   If you have had a barium test within the past few days. Barium may change the results of certain exams.   If you think you may need sedation (medicine to help you relax).  No food or drink (including water) for 4 hours prior to the exam.  No morphine or morphine derivatives for 6 hours prior to the exam.  Please call your Imaging Department at your exam site with any questions.            Jan 04, 2018 11:10 AM CST   Office Visit with Paxton [facility]ieves MD   [facility]    [address] [place] [phone]           Bring a current list of meds and any records pertaining to this visit. For Physicals, please bring immunization records and any forms needing to be filled out. Please arrive 10 minutes early to complete paperwork.              Future tests that were ordered for you today     Open Future Orders        Priority Expected Expires Ordered    NM Hepatobiliary Scan w GB EF STAT  12/29/2018 12/29/2017    CT Abdomen w/o Contrast STAT  12/29/2018 12/29/2017            Who to contact     If you have questions or need follow up information about today's clinic visit or your schedule please contact [facility] directly at [phone].  Normal or non-critical lab and imaging results will be communicated to you by MyChart, letter or phone within 4 business days after the clinic has received the results. If you do not hear from us within 7 days, please contact the clinic through MyChart or phone. If you have a critical or abnormal lab result, we will notify you by phone as soon as possible.  Submit refill requests through [facility] or call your pharmacy and they will forward  "the refill request to us. Please allow 3 business days for your refill to be completed.          Additional Information About Your Visit        MyChart Information     [email or web address] [employer] gives you secure access to your electronic health record. If you see a primary care provider, you can also send messages to your care team and make appointments. If you have questions, please call your primary care clinic.  If you do not have a primary care provider, please call [phone] and they will assist you.        Care EveryWhere ID     This is your Care EveryWhere ID. This could be used by other organizations to access your [facility] medical records  [id number]        Your Vitals Were     Pulse Temperature Respirations Height Last Period Pulse Oximetry    62 98.4  F (36.9  C) (Temporal) 14 5' 2\" (1.575 m) 03/26/2003 97%    Breastfeeding? BMI (Body Mass Index)                No 21.95 kg/m2           Blood Pressure from Last 3 Encounters:   12/29/17 94/60   12/06/17 101/53   11/08/17 106/76    Weight from Last 3 Encounters:   12/29/17 120 lb (54.4 kg)   12/06/17 119 lb (54 kg)   11/08/17 119 lb (54 kg)              We Performed the Following     *UA reflex to Microscopic and Culture ([place]; [facility]; [facility]; [facility]; [facility]; [facility]; [place]; [place])        Primary Care Provider Office Phone # Fax #    Jovana Wooten Mai, [phone]       [address] [place] [address]        Equal Access to Services     HARMEET HOPE : Hadii aad ku hadasho Soomaali, waaxda luqadaha, qaybta kaalmada adeegyada, guzman swartz. So [facility] [phone].    ATENCIÓN: Si habla español, tiene a randall disposición servicios gratuitos de asistencia lingüística. Llame al [phone].    We comply with applicable federal civil rights laws and [place] laws. We do not discriminate on the basis of race, color, national origin, age, disability, sex, sexual orientation, or gender " identity.            Thank you!     Thank you for choosing [facility]  for your care. Our goal is always to provide you with excellent care. Hearing back from our patients is one way we can continue to improve our services. Please take a few minutes to complete the written survey that you may receive in the mail after your visit with us. Thank you!             Your Updated Medication List - Protect others around you: Learn how to safely use, store and throw away your medicines at www.disposemymeds.org.          This list is accurate as of: 12/29/17  9:55 AM.  Always use your most recent med list.                   Brand Name Dispense Instructions for use Diagnosis    clobetasol 0.05 % ointment    TEMOVATE    30 g    Apply a small pea size amount at bedtime for 2 weeks then decrease it to a few times a week then weekly    Vulvar pruritus       levothyroxine 25 MCG tablet    SYNTHROID/LEVOTHROID    90 tablet    TAKE ONE TABLET BY MOUTH EVERY DAY    Hypothyroidism, unspecified type       order for DME     1 Device    Equipment being ordered: Arm Band    Lateral epicondylitis of left elbow       PREMARIN cream   Generic drug:  conjugated estrogens     42.5 g    Place 0.5 g vaginally twice a week    Atrophic vaginitis

## 2017-12-29 NOTE — PROGRESS NOTES
Subjective:  Here for recheck of chronic LLQ abdominal pain. Has regular BMs- sometimes they are painful though. Had recent colonoscopy which was normal except for a polyp- had recetn normal pelvic US and normal abdominal US except for gallstones. Has no RUQ pain or fatty food intolerance. No fevers. No hematuria recently but a few years ago she did.      The past medical history, social history, past surgical history and family history as shown below have been reviewed by me today.  Past Medical History:   Diagnosis Date     Abnormal Papanicolaou smear of cervix and cervical HPV     cryocautery     Breast cystic disease      Hiatal hernia      Motion sickness      Peptic ulcer, unspecified site, unspecified as acute or chronic, without mention of hemorrhage, perforation, or obstruction     Peptic ulcer disease     Personal history of colonic polyps         Allergies   Allergen Reactions     No Known Drug Allergies      Current Outpatient Prescriptions   Medication Sig Dispense Refill     levothyroxine (SYNTHROID/LEVOTHROID) 25 MCG tablet TAKE ONE TABLET BY MOUTH EVERY DAY 90 tablet 3     PREMARIN cream Place 0.5 g vaginally twice a week 42.5 g 10     clobetasol (TEMOVATE) 0.05 % ointment Apply a small pea size amount at bedtime for 2 weeks then decrease it to a few times a week then weekly 30 g 0     order for DME Equipment being ordered: Arm Band 1 Device 0     Past Surgical History:   Procedure Laterality Date     C  DELIVERY ONLY       x2     C NONSPECIFIC PROCEDURE      Laparoscopic exam with adhesions     C NONSPECIFIC PROCEDURE      Mastectomy for cystic breast disease, breast implants     COLONOSCOPY  2007     COLONOSCOPY  2013    Procedure: COLONOSCOPY;  colonoscopy, Esophagoscopy, Gastroscopy, Duodenoscopy EGD, multiple biopsies;  Surgeon: Joe Benson MD;  Location:  GI     COLONOSCOPY N/A 2017    Procedure: COMBINED COLONOSCOPY, SINGLE OR MULTIPLE BIOPSY/POLYPECTOMY  BY BIOPSY;  colonoscopy with polypectomy by biopsy;  Surgeon: Tolu No MD;  Location: [facility]     ENDOSCOPY  07/16/2007    Sm hiatus hernia     ESOPHAGOSCOPY, GASTROSCOPY, DUODENOSCOPY (EGD), COMBINED  1/18/2013    Procedure: COMBINED ESOPHAGOSCOPY, GASTROSCOPY, DUODENOSCOPY (EGD), BIOPSY SINGLE OR MULTIPLE;  ESOPHAGOGASTRODUODENOSCOPY WITH MULTIPLE BIOPSIES;  Surgeon: Joe Benson MD;  Location: [facility]     MASTECTOMY, BILATERAL       Social History     Social History     Marital status: Single     Spouse name: N/A     Number of children: N/A     Years of education: N/A     Social History Main Topics     Smoking status: Former Smoker     Packs/day: 0.50     Years: 50.00     Types: Cigarettes     Quit date: 10/2/2017     Smokeless tobacco: Never Used      Comment: 1/2  pack per day, started age 11     Alcohol use No      Comment: holidays only     Drug use: No     Sexual activity: Not Currently      Comment: divorce, 3 children.     Other Topics Concern      Service No     Blood Transfusions No     Caffeine Concern No     Occupational Exposure No     Hobby Hazards No     Sleep Concern No     Stress Concern No     Weight Concern No     Special Diet No     Back Care No     Exercise No     Bike Helmet No     Seat Belt Yes     Self-Exams No     Social History Narrative     Family History   Problem Relation Age of Onset     DIABETES Father      Hypertension Father      Alcohol/Drug Father      Eye Disorder Father      Obesity Father      Breast Cancer Mother      OSTEOPOROSIS Mother      Thyroid Disease Mother      CANCER Mother      Bladder     CANCER Sister      fallopian tube cancer     Obesity Sister      Alzheimer Disease Sister      CANCER Sister      Skin Cancer     Allergies Daughter      CANCER Maternal Grandmother      uterine cancer     Liver Disease Daughter      Biliary Atresia     Genitourinary Problems Brother      HEART DISEASE Brother      Heart Murmur     Genitourinary Problems  "Brother      kidney disease (two brothers)       ROS: A 12 point review of systems was done. Except for what is listed above in the HPI, the systems review is negative .      Objective: Vital signs: Blood pressure 94/60, pulse 62, temperature 98.4  F (36.9  C), temperature source Temporal, resp. rate 14, height 5' 2\" (1.575 m), weight 120 lb (54.4 kg), last menstrual period 03/26/2003, SpO2 97 %, not currently breastfeeding.    HEENT:    Sclerae and conjunctiva are normal.   Ear canals and TMs look normal.  Nasal mucosa is pink  - no polyps or masses seen.  sinuses are non tender to palpation.  Throat is unremarkable . Mucous membranes are moist.   Neck is supple, mobile, no adenopathy or masses palpable. The thyroid feels normal.   Normal range of motion noted.  Chest is clear to auscultation.  No wheezes, rales or rhonchi heard.  Cardiac exam is normal with s1, s2, no murmurs or adventitious sounds.Normal rate and rhythm is heard.   Abdomen is soft,  nondistended, No masses felt.No HSM. No guarding or rigidity or rebound   noted. Palpation reveals   tenderness  In the LLQ- to deep palpation/. Normal bowel sounds heard.           Assessment/Plan:    1. Persistent LLQ pain. Normal colonoscopy and abd/pelvic US except for gallstones.  I will add UA to look for hematuria/stones   And HIDA scan and CT stone protocal.  If these are normal then will refer to general surgery to consider joint laparoscopy with me-    2. rechekc in 1 week after results return.        ABBE Nieves MD              "

## 2017-12-29 NOTE — PROGRESS NOTES
Estela I called her with the UA result- I advised her to be referred to Dr Govea to evaluate her for the hematuria- please help her set up an appt with Dr Govea-Thanks. ABBE Nieves MD

## 2017-12-30 LAB
BACTERIA SPEC CULT: NORMAL
Lab: NORMAL
SPECIMEN SOURCE: NORMAL

## 2018-01-02 ENCOUNTER — HOSPITAL ENCOUNTER (OUTPATIENT)
Dept: CT IMAGING | Facility: CLINIC | Age: 63
Discharge: HOME | End: 2018-01-02
Attending: OBSTETRICS & GYNECOLOGY | Admitting: OBSTETRICS & GYNECOLOGY
Payer: COMMERCIAL

## 2018-01-02 ENCOUNTER — TELEPHONE (OUTPATIENT)
Dept: OBGYN | Facility: CLINIC | Age: 63
End: 2018-01-02

## 2018-01-02 DIAGNOSIS — R10.32 ABDOMINAL PAIN, LEFT LOWER QUADRANT: ICD-10-CM

## 2018-01-02 DIAGNOSIS — R31.9 URINARY TRACT INFECTION WITH HEMATURIA, SITE UNSPECIFIED: Primary | ICD-10-CM

## 2018-01-02 DIAGNOSIS — N39.0 URINARY TRACT INFECTION WITH HEMATURIA, SITE UNSPECIFIED: Primary | ICD-10-CM

## 2018-01-02 PROCEDURE — 74176 CT ABD & PELVIS W/O CONTRAST: CPT

## 2018-01-02 NOTE — TELEPHONE ENCOUNTER
----- Message from Paxton Nieves MD sent at 12/29/2017  1:57 PM CST -----  Estela I called her with the UA result- I advised her to be referred to Dr Govea to evaluate her for the hematuria- please help her set up an appt with Dr Govea-Thanks. ABBE Nieves MD

## 2018-01-03 ENCOUNTER — TELEPHONE (OUTPATIENT)
Dept: FAMILY MEDICINE | Facility: CLINIC | Age: 63
End: 2018-01-03

## 2018-01-03 ENCOUNTER — HOSPITAL ENCOUNTER (OUTPATIENT)
Dept: NUCLEAR MEDICINE | Facility: CLINIC | Age: 63
Setting detail: NUCLEAR MEDICINE
Discharge: HOME | End: 2018-01-03
Attending: OBSTETRICS & GYNECOLOGY | Admitting: OBSTETRICS & GYNECOLOGY
Payer: COMMERCIAL

## 2018-01-03 ENCOUNTER — TELEPHONE (OUTPATIENT)
Dept: OBGYN | Facility: CLINIC | Age: 63
End: 2018-01-03

## 2018-01-03 DIAGNOSIS — R10.32 ABDOMINAL PAIN, LEFT LOWER QUADRANT: ICD-10-CM

## 2018-01-03 DIAGNOSIS — R10.32 ABDOMINAL PAIN, LEFT LOWER QUADRANT: Primary | ICD-10-CM

## 2018-01-03 PROCEDURE — A9537 TC99M MEBROFENIN: HCPCS | Performed by: OBSTETRICS & GYNECOLOGY

## 2018-01-03 PROCEDURE — 25000128 H RX IP 250 OP 636: Performed by: OBSTETRICS & GYNECOLOGY

## 2018-01-03 PROCEDURE — 34300033 ZZH RX 343: Performed by: OBSTETRICS & GYNECOLOGY

## 2018-01-03 PROCEDURE — 78227 HEPATOBIL SYST IMAGE W/DRUG: CPT

## 2018-01-03 RX ORDER — KIT FOR THE PREPARATION OF TECHNETIUM TC 99M MEBROFENIN 45 MG/10ML
5.1 INJECTION, POWDER, LYOPHILIZED, FOR SOLUTION INTRAVENOUS ONCE
Status: COMPLETED | OUTPATIENT
Start: 2018-01-03 | End: 2018-01-03

## 2018-01-03 RX ADMIN — SODIUM CHLORIDE 1.1 MCG: 9 INJECTION, SOLUTION INTRAVENOUS at 14:05

## 2018-01-03 RX ADMIN — MEBROFENIN 5.1 MILLICURIE: 45 INJECTION, POWDER, LYOPHILIZED, FOR SOLUTION INTRAVENOUS at 13:10

## 2018-01-03 NOTE — TELEPHONE ENCOUNTER
Reason for Call:  Other appointment    Detailed comments: Patient has a question about her appointment for 1.4 with Dr Whatley. Patient would like to know if she needs to keep this appointment and has more questions regarding this appointment. Please advise.      Phone Number Patient can be reached at: Home number on file [phone] (home)    Best Time: any    Can we leave a detailed message on this number? YES    Call taken on 1/3/2018 at 3:15 PM by Anette Trevizo

## 2018-01-03 NOTE — PROGRESS NOTES
Estela Please inform Jackie/ or caretaker  that this result(s) is/are normal except for the gallstones  that we already knew about. Please refer her to Dr Spear for a consult- so we can consider a laparoscopy jointly between me and him.Thanks. ABBE Nieves MD

## 2018-01-03 NOTE — TELEPHONE ENCOUNTER
Left message for patient to return call to clinic.  Referral placed for Gen Surg. Please inform patient of below message and assist in scheduling with Dr. Rainer Leonardo, [facility]

## 2018-01-03 NOTE — TELEPHONE ENCOUNTER
Attempted to contact pt to let her know Dr. Nieves and his assistant our now gone for the day. Will route msg to provider to review when they return to clinic tomorrow to advise. Radha Albrecht, CMA

## 2018-01-03 NOTE — TELEPHONE ENCOUNTER
Patient returns call and is informed of results. Pt agreeable to plan and was transferred to specialty scheduling  Estela Leonardo CMA

## 2018-01-03 NOTE — TELEPHONE ENCOUNTER
----- Message from Paxton Nieves MD sent at 1/3/2018  2:31 PM CST -----  Estela Please inform Jackie/ or caretaker  that this result(s) is/are normal except for the gallstones  that we already knew about. Please refer her to Dr Spear for a consult- so we can consider a laparoscopy jointly between me and him.Thanks. ABBE Nieves MD

## 2018-01-04 ENCOUNTER — OFFICE VISIT (OUTPATIENT)
Dept: FAMILY MEDICINE | Facility: CLINIC | Age: 63
End: 2018-01-04
Payer: COMMERCIAL

## 2018-01-04 VITALS
TEMPERATURE: 97.8 F | HEART RATE: 76 BPM | OXYGEN SATURATION: 97 % | SYSTOLIC BLOOD PRESSURE: 130 MMHG | HEIGHT: 62 IN | RESPIRATION RATE: 16 BRPM | WEIGHT: 120 LBS | DIASTOLIC BLOOD PRESSURE: 62 MMHG | BODY MASS INDEX: 22.08 KG/M2

## 2018-01-04 DIAGNOSIS — R10.2 PELVIC PAIN IN FEMALE: Primary | ICD-10-CM

## 2018-01-04 DIAGNOSIS — R10.32 LLQ ABDOMINAL PAIN: ICD-10-CM

## 2018-01-04 PROCEDURE — 99214 OFFICE O/P EST MOD 30 MIN: CPT | Performed by: OBSTETRICS & GYNECOLOGY

## 2018-01-04 ASSESSMENT — PAIN SCALES - GENERAL: PAINLEVEL: NO PAIN (0)

## 2018-01-04 NOTE — PROGRESS NOTES
Subjective:  She is here to discuss the recent results of the CT scan- it showed:      FINDINGS: The visualized lung bases are clear. The kidneys, ureters,  and bladder are unremarkable. Specifically, no calculus,  hydronephrosis, or other evidence of obstruction is demonstrated. The  liver, spleen, and pancreas are normal. Again seen are numerous small  layering gallstones. The adrenal glands are normal. No enlarged lymph  node or other abnormal mass is demonstrated. No free fluid is seen. No  free intraperitoneal gas is identified. There are a few scattered  diverticula of the distal colon with no evidence of diverticulitis. No  other gastrointestinal tract abnormality is seen. The appendix is not  definitely seen. There is no additional evidence of appendicitis. No  vascular abnormality is seen. The osseous structures are unremarkable.  No abdominal or pelvic wall pathology is demonstrated. I see no  definite change since the previous examination.         IMPRESSION:   1. No acute abnormality is seen. Specifically, no urinary tract  calculus or evidence of obstruction is demonstrated.   2. Cholelithiasis with no evidence of cholecystitis.     DANY EMMANUEL MD      The HIDA scan showed:    FINDINGS: Gallbladder activity is identified at 25 minutes into the  study. Bowel activity is identified at 50 minutes into the study. The  gallbladder ejection fraction is calculated to be 49%. Usually greater  than 35% is considered normal.         IMPRESSION:  1. Normal visualization of gallbladder.  2. Gallbladder ejection fraction is within normal limits.      DOMINICK OSMAN MD              She continues to have LLQ abdominal pain. I had told her that if the HIDA and CT were normal we would consider laparoscopy. She wants to do that now.  I advised that we get a consult with Dr Spear to consider a combined laparoscopic evaluation.      Objective: Blood pressure 130/62, pulse 76, temperature 97.8  F (36.6  C),  "temperature source Temporal, resp. rate 16, height 5' 2\" (1.575 m), weight 120 lb (54.4 kg), last menstrual period 03/26/2003, SpO2 97 %, not currently breastfeeding.    Her abdominal exam reveals tenderness in the left lower quadrant to deep palpation but not superficial palpation. The pain radiates also down into the left groin. I don't feel any hernia. The right lower abdomen and both areas of upper abdomen are nontender to palpation      Assessment:   persistent LLQ abdominal pain-and pelvic pain- colonoscopy was  normal except for a polyp which was removed. Her pain preceded the polyp removal. She has had the pain for almost a year. She has had basically a normal pelvic ultrasound a normal colonoscopy normal ultrasound of her abdomen other than gallstones. She has had a normal hiatus scan. CAT scan of the abdomen was normal and did not show any kidney stones she has an appointment pending with Dr. Govea for consideration of cystoscopy. She also has an appointment pending with Dr Spear to consider laparoscopy and also to determine whether or not the gallstones could be the cause of her pain.      Plan: A total of 25 minutes were spent face-to-face with this patient during today's consultation, with more than 50% of that time devoted to conversation and counseling about the management decisions.    Laparoscopy was discussed with the patient in detail today. The purpose of the surgery is to evaluate the left lower quadrant of the abdomen and also her pelvis to look for possible causes for the pain  Risks include but are not limited to bleeding, infection, injury to bowel and bladder and other organs. There is a chance that laparotomy will be needed if I suspect that an injury has occurred or if I see a problems such as a bleeding ovary or a situation that needs to be addressed urgently.She read over the consent form and signed it , witnessed by my nurse. I offered her a second opinion.       I plan to discuss " this with Dr. Spear next week after he sees her and then also to discuss it with Dr. Govea. Possibly we can coordinate his surgery between the 3 of us.    ABBE Nieves MD

## 2018-01-04 NOTE — TELEPHONE ENCOUNTER
Spoke with patient, patient wondering if she needs her appt today. Per RM patient to keep her appt today. We can consent her for laparoscopy. Pt agreeable to plan  Estela Leonardo CMA

## 2018-01-04 NOTE — MR AVS SNAPSHOT
After Visit Summary   1/4/2018    Jackie Siddiqi    MRN: [id number]           Patient Information     Date Of Birth          1955        Visit Information        Provider Department      1/4/2018 11:10 AM Paxton Nieves MD [facility]         Follow-ups after your visit        Your next 10 appointments already scheduled     Jan 08, 2018  2:30 PM CST   New Visit with Paxton Spear MD   [facility] ([facility])    [address] [place] [id number] [phone]              Who to contact     If you have questions or need follow up information about today's clinic visit or your schedule please contact [facility] directly at [phone].  Normal or non-critical lab and imaging results will be communicated to you by MyChart, letter or phone within 4 business days after the clinic has received the results. If you do not hear from us within 7 days, please contact the clinic through MyChart or phone. If you have a critical or abnormal lab result, we will notify you by phone as soon as possible.  Submit refill requests through [facility] or call your pharmacy and they will forward the refill request to us. Please allow 3 business days for your refill to be completed.          Additional Information About Your Visit        MyChart Information     [facility] gives you secure access to your electronic health record. If you see a primary care provider, you can also send messages to your care team and make appointments. If you have questions, please call your primary care clinic.  If you do not have a primary care provider, please call [phone] and they will assist you.        Care EveryWhere ID     This is your Care EveryWhere ID. This could be used by other organizations to access your [place] medical records  [id number]        Your Vitals Were     Last Period                   03/26/2003            Blood Pressure  from Last 3 Encounters:   12/29/17 94/60   12/06/17 101/53   11/08/17 106/76    Weight from Last 3 Encounters:   12/29/17 120 lb (54.4 kg)   12/06/17 119 lb (54 kg)   11/08/17 119 lb (54 kg)              Today, you had the following     No orders found for display       Primary Care Provider Office Phone # Fax #    Jovana Wooten Mai, [phone]        [facility] [address] [place] [address]        Equal Access to Services     [facility]: Hadii rajesh marinelli hadasho Soomaali, waaxda luqadaha, qaybta kaalmada adeegyamarylou, guzman cárdenas . So [facility] [phone].    ATENCIÓN: Si habla español, tiene a randall disposición servicios gratuitos de asistencia lingüística. [facility] [phone].    We comply with applicable federal civil rights laws and [place] laws. We do not discriminate on the basis of race, color, national origin, age, disability, sex, sexual orientation, or gender identity.            Thank you!     Thank you for choosing [facility]  for your care. Our goal is always to provide you with excellent care. Hearing back from our patients is one way we can continue to improve our services. Please take a few minutes to complete the written survey that you may receive in the mail after your visit with us. Thank you!             Your Updated Medication List - Protect others around you: Learn how to safely use, store and throw away your medicines at www.disposemymeds.org.          This list is accurate as of: 1/4/18 11:56 AM.  Always use your most recent med list.                   Brand Name Dispense Instructions for use Diagnosis    clobetasol 0.05 % ointment    TEMOVATE    30 g    Apply a small pea size amount at bedtime for 2 weeks then decrease it to a few times a week then weekly    Vulvar pruritus       levothyroxine 25 MCG tablet    SYNTHROID/LEVOTHROID    90 tablet    TAKE ONE TABLET BY MOUTH EVERY DAY    Hypothyroidism, unspecified type       order for DME     1  Device    Equipment being ordered: Arm Band    Lateral epicondylitis of left elbow       PREMARIN cream   Generic drug:  conjugated estrogens     42.5 g    Place 0.5 g vaginally twice a week    Atrophic vaginitis

## 2018-01-04 NOTE — NURSING NOTE
"Chief Complaint   Patient presents with     Abdominal Pain       Initial /62 (Cuff Size: Adult Regular)  Pulse 76  Temp 97.8  F (36.6  C) (Temporal)  Resp 16  Ht 5' 2\" (1.575 m)  Wt 120 lb (54.4 kg)  LMP 03/26/2003  SpO2 97%  Breastfeeding? No  BMI 21.95 kg/m2 Estimated body mass index is 21.95 kg/(m^2) as calculated from the following:    Height as of this encounter: 5' 2\" (1.575 m).    Weight as of this encounter: 120 lb (54.4 kg).  Medication Reconciliation: complete     Phi Gudino MA      "

## 2018-01-08 ENCOUNTER — OFFICE VISIT (OUTPATIENT)
Dept: SURGERY | Facility: CLINIC | Age: 63
End: 2018-01-08
Payer: COMMERCIAL

## 2018-01-08 VITALS — TEMPERATURE: 96.1 F | OXYGEN SATURATION: 98 % | WEIGHT: 122.4 LBS | BODY MASS INDEX: 22.39 KG/M2 | HEART RATE: 80 BPM

## 2018-01-08 DIAGNOSIS — K80.20 GALLSTONES: ICD-10-CM

## 2018-01-08 DIAGNOSIS — R10.32 LLQ ABDOMINAL PAIN: Primary | ICD-10-CM

## 2018-01-08 PROCEDURE — 99213 OFFICE O/P EST LOW 20 MIN: CPT | Performed by: SPECIALIST

## 2018-01-08 NOTE — NURSING NOTE
"    Chief Complaint   Patient presents with     Abdominal Pain     Consult     refering Devi       Initial Pulse 80  Temp 96.1  F (35.6  C) (Temporal)  Wt 55.5 kg (122 lb 6.4 oz)  LMP 03/26/2003  SpO2 98%  BMI 22.39 kg/m2 Estimated body mass index is 22.39 kg/(m^2) as calculated from the following:    Height as of 1/4/18: 1.575 m (5' 2\").    Weight as of this encounter: 55.5 kg (122 lb 6.4 oz).  Medication Reconciliation: complete    "

## 2018-01-08 NOTE — LETTER
2018         RE: Jackie Siddiqi  [address]  [place] [address]        Dear Colleague,    Thank you for referring your patient, Jackie Siddiqi, to the [facility]. Please see a copy of my visit note below.    Consult requested by Dr. Nieves    Reason for consultation - LLQ pain and gallstones      HPI:  Patient is a 62-year-old white female presenting with a several month history of left lower quadrant pain. It was described as intermittent and severe up until about a week ago. There was no association with meals. The pain this was described as sharp and radiating around the back. 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 She had an extensive workup including multiple imaging studies as well as a colonoscopy. Her colonoscopy only revealed a small polyp and her CT/ultrasound revealed some gallstones. She denies any nausea, vomiting, fever, chills, right upper quadrant pain or symptoms associated with fatty meals. Over the past week her left lower quadrant pain is greatly improved to where is now down to one or two.  She now presents for evaluation of her left lower quadrant pain and possible laparoscopy.    Past Medical History:   Diagnosis Date     Abnormal Papanicolaou smear of cervix and cervical HPV     cryocautery     Breast cystic disease      Hiatal hernia      Motion sickness      Peptic ulcer, unspecified site, unspecified as acute or chronic, without mention of hemorrhage, perforation, or obstruction     Peptic ulcer disease     Personal history of colonic polyps      Past Surgical History:   Procedure Laterality Date     C  DELIVERY ONLY       x2     C NONSPECIFIC PROCEDURE      Laparoscopic exam with adhesions     C NONSPECIFIC PROCEDURE      Mastectomy for cystic breast disease, breast implants     COLONOSCOPY  2007     COLONOSCOPY  2013    Procedure: COLONOSCOPY;  colonoscopy,  Esophagoscopy, Gastroscopy, Duodenoscopy EGD, multiple biopsies;  Surgeon: Joe Benson MD;  Location: [facility]     COLONOSCOPY N/A 12/6/2017    Procedure: COMBINED COLONOSCOPY, SINGLE OR MULTIPLE BIOPSY/POLYPECTOMY BY BIOPSY;  colonoscopy with polypectomy by biopsy;  Surgeon: Tolu No MD;  Location: [facility]     ENDOSCOPY  07/16/2007    Sm hiatus hernia     ESOPHAGOSCOPY, GASTROSCOPY, DUODENOSCOPY (EGD), COMBINED  1/18/2013    Procedure: COMBINED ESOPHAGOSCOPY, GASTROSCOPY, DUODENOSCOPY (EGD), BIOPSY SINGLE OR MULTIPLE;  ESOPHAGOGASTRODUODENOSCOPY WITH MULTIPLE BIOPSIES;  Surgeon: Joe Benson MD;  Location: [facility]     MASTECTOMY, BILATERAL       Current Outpatient Prescriptions   Medication     levothyroxine (SYNTHROID/LEVOTHROID) 25 MCG tablet     PREMARIN cream     clobetasol (TEMOVATE) 0.05 % ointment     order for DME     No current facility-administered medications for this visit.         Allergies   Allergen Reactions     No Known Drug Allergies      Social History   Substance Use Topics     Smoking status: Former Smoker     Packs/day: 0.50     Years: 50.00     Types: Cigarettes     Quit date: 10/2/2017     Smokeless tobacco: Never Used      Comment: 1/2  pack per day, started age 11     Alcohol use No      Comment: holidays only     Family History   Problem Relation Age of Onset     DIABETES Father      Hypertension Father      Alcohol/Drug Father      Eye Disorder Father      Obesity Father      Breast Cancer Mother      OSTEOPOROSIS Mother      Thyroid Disease Mother      CANCER Mother      Bladder     CANCER Sister      fallopian tube cancer     Obesity Sister      Alzheimer Disease Sister      CANCER Sister      Skin Cancer     Allergies Daughter      CANCER Maternal Grandmother      uterine cancer     Liver Disease Daughter      Biliary Atresia     Genitourinary Problems Brother      HEART DISEASE Brother      Heart Murmur     Genitourinary Problems Brother      kidney disease (two  brothers)      ROS: 10 point ROS neg other than the symptoms noted above in the HPI.    PE:  B/P: Data Unavailable, T: 96.1, P: 80, R: Data Unavailable  General: well developed, well nourished WF who appears her stated age  HEENT: NC/AT, EOMI, (-)icterus, (-)injection  Neck: Supple, No JVD  Chest: CTA  Heart: S1, S2, (-)m/r/g  Abd: Soft, non distended, minimal LLQ tenderness to deep palpation.  No hernias  Back: LS spine tenderness  Ext; Warm, no edema  Psych: AAOx3  Neuro: No focal deficits    Lab Results   Component Value Date    WBC 4.8 02/16/2017     Lab Results   Component Value Date    RBC 4.47 02/16/2017     Lab Results   Component Value Date    HGB 13.0 02/16/2017     Lab Results   Component Value Date    HCT 39.0 02/16/2017     No components found for: MCT  Lab Results   Component Value Date    MCV 87 02/16/2017     Lab Results   Component Value Date    MCH 29.1 02/16/2017     Lab Results   Component Value Date    MCHC 33.3 02/16/2017     Lab Results   Component Value Date    RDW 12.3 02/16/2017     Lab Results   Component Value Date     02/16/2017     Liver Function Studies -   Recent Labs   Lab Test  02/16/17   0839  02/12/17   1526   PROTTOTAL   --   7.1   ALBUMIN   --   3.4   BILITOTAL   --   0.4   ALKPHOS   --   57   AST  22  18   ALT  27  22     US -   ABDOMINAL ULTRASOUND  11/21/2017 11:05 AM      HISTORY: Abdominal pain, left lower quadrant.     COMPARISON: None.     FINDINGS:    Gallbladder: Multiple shadowing stones. Gallbladder wall thickness is  normal.     Bile ducts: CHD is normal diameter. No intrahepatic biliary  dilatation.     Liver: Normal.      Pancreas Normal     Spleen: Normal.      Right kidney: Normal.      Left kidney: Normal.     Aorta and IVC: Normal.      Additional findings: Ultrasound targeted to the area of pain in the  left lower quadrant shows no abnormality.         IMPRESSION:    1. Cholelithiasis. No sonographic evidence of acute cholecystitis.  2. Otherwise  unremarkable abdominal ultrasound.     WENDY ARCHULETA MD    CT -   CT ABDOMEN AND PELVIS WITHOUT CONTRAST  1/2/2018 1:01 PM     HISTORY: Left lower quadrant abdominal pain. The concern is for a  urinary tract calculus.     COMPARISON: A CT with contrast on 2/12/2017.     TECHNIQUE: Routine transverse CT imaging of the abdomen and pelvis was  performed without contrast. Radiation dose for this scan was reduced  using automated exposure control, adjustment of the mA and/or kV  according to patient size, or iterative reconstruction technique.     FINDINGS: The visualized lung bases are clear. The kidneys, ureters,  and bladder are unremarkable. Specifically, no calculus,  hydronephrosis, or other evidence of obstruction is demonstrated. The  liver, spleen, and pancreas are normal. Again seen are numerous small  layering gallstones. The adrenal glands are normal. No enlarged lymph  node or other abnormal mass is demonstrated. No free fluid is seen. No  free intraperitoneal gas is identified. There are a few scattered  diverticula of the distal colon with no evidence of diverticulitis. No  other gastrointestinal tract abnormality is seen. The appendix is not  definitely seen. There is no additional evidence of appendicitis. No  vascular abnormality is seen. The osseous structures are unremarkable.  No abdominal or pelvic wall pathology is demonstrated. I see no  definite change since the previous examination.         IMPRESSION:   1. No acute abnormality is seen. Specifically, no urinary tract  calculus or evidence of obstruction is demonstrated.   2. Cholelithiasis with no evidence of cholecystitis.     DAYN EMMANUEL MD      HIDA -   NM HEPATOBILIARY SCAN WITH GB EF 1/3/2018 2:58 PM     HISTORY: Pain.     PROCEDURE: 5.2 mCi of Tc 99m Mebrofenin is given intravenously for  this study. For the gallbladder ejection fraction portion of the  study, 1.1 mcg of CCK is given intravenously.     FINDINGS: Gallbladder  activity is identified at 25 minutes into the  study. Bowel activity is identified at 50 minutes into the study. The  gallbladder ejection fraction is calculated to be 49%. Usually greater  than 35% is considered normal.         IMPRESSION:  1. Normal visualization of gallbladder.  2. Gallbladder ejection fraction is within normal limits.      DOMINICK OSMAN MD       Colonoscopy report reviewed    Impression/plan:  This is a 62-year-old lady with left lower quadrant pain of unclear etiology. I suspect it was originally musculoskeletal in origin as it did radiate around from the back. On exam she has no hernias and she is only minimally tender in the left lower quadrant. She does have some gallstones but does appear to be asymptomatic at this time. I discussed all her imaging findings with the patient and she expressed understanding. As she is greatly improved on her own over the past week and I recommended observation and holding off on laparoscopy. She is in agreement with that plan. She knows to be seen again should the pain suddenly worsen or her symptoms change. She will follow-up with me as necessary.    Paxton Spear MD, FACS      Again, thank you for allowing me to participate in the care of your patient.        Sincerely,        Paxton Spear MD

## 2018-01-08 NOTE — PROGRESS NOTES
Consult requested by Dr. Nieves    Reason for consultation - LLQ pain and gallstones      HPI:  Patient is a 62-year-old white female presenting with a several month history of left lower quadrant pain. It was described as intermittent and severe up until about a week ago. There was no association with meals. The pain this was described as sharp and radiating around the back. 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 She had an extensive workup including multiple imaging studies as well as a colonoscopy. Her colonoscopy only revealed a small polyp and her CT/ultrasound revealed some gallstones. She denies any nausea, vomiting, fever, chills, right upper quadrant pain or symptoms associated with fatty meals. Over the past week her left lower quadrant pain is greatly improved to where is now down to one or two.  She now presents for evaluation of her left lower quadrant pain and possible laparoscopy.    Past Medical History:   Diagnosis Date     Abnormal Papanicolaou smear of cervix and cervical HPV     cryocautery     Breast cystic disease      Hiatal hernia      Motion sickness      Peptic ulcer, unspecified site, unspecified as acute or chronic, without mention of hemorrhage, perforation, or obstruction     Peptic ulcer disease     Personal history of colonic polyps      Past Surgical History:   Procedure Laterality Date     C  DELIVERY ONLY       x2     C NONSPECIFIC PROCEDURE      Laparoscopic exam with adhesions     C NONSPECIFIC PROCEDURE      Mastectomy for cystic breast disease, breast implants     COLONOSCOPY  2007     COLONOSCOPY  2013    Procedure: COLONOSCOPY;  colonoscopy, Esophagoscopy, Gastroscopy, Duodenoscopy EGD, multiple biopsies;  Surgeon: Joe Benson MD;  Location:  GI     COLONOSCOPY N/A 2017    Procedure: COMBINED COLONOSCOPY, SINGLE OR MULTIPLE BIOPSY/POLYPECTOMY BY BIOPSY;  colonoscopy with polypectomy  by biopsy;  Surgeon: Tolu No MD;  Location: [facility]     ENDOSCOPY  07/16/2007    Sm hiatus hernia     ESOPHAGOSCOPY, GASTROSCOPY, DUODENOSCOPY (EGD), COMBINED  1/18/2013    Procedure: COMBINED ESOPHAGOSCOPY, GASTROSCOPY, DUODENOSCOPY (EGD), BIOPSY SINGLE OR MULTIPLE;  ESOPHAGOGASTRODUODENOSCOPY WITH MULTIPLE BIOPSIES;  Surgeon: Joe Benson MD;  Location: [facility]     MASTECTOMY, BILATERAL       Current Outpatient Prescriptions   Medication     levothyroxine (SYNTHROID/LEVOTHROID) 25 MCG tablet     PREMARIN cream     clobetasol (TEMOVATE) 0.05 % ointment     order for DME     No current facility-administered medications for this visit.         Allergies   Allergen Reactions     No Known Drug Allergies      Social History   Substance Use Topics     Smoking status: Former Smoker     Packs/day: 0.50     Years: 50.00     Types: Cigarettes     Quit date: 10/2/2017     Smokeless tobacco: Never Used      Comment: 1/2  pack per day, started age 11     Alcohol use No      Comment: holidays only     Family History   Problem Relation Age of Onset     DIABETES Father      Hypertension Father      Alcohol/Drug Father      Eye Disorder Father      Obesity Father      Breast Cancer Mother      OSTEOPOROSIS Mother      Thyroid Disease Mother      CANCER Mother      Bladder     CANCER Sister      fallopian tube cancer     Obesity Sister      Alzheimer Disease Sister      CANCER Sister      Skin Cancer     Allergies Daughter      CANCER Maternal Grandmother      uterine cancer     Liver Disease Daughter      Biliary Atresia     Genitourinary Problems Brother      HEART DISEASE Brother      Heart Murmur     Genitourinary Problems Brother      kidney disease (two brothers)      ROS: 10 point ROS neg other than the symptoms noted above in the HPI.    PE:  B/P: Data Unavailable, T: 96.1, P: 80, R: Data Unavailable  General: well developed, well nourished WF who appears her stated age  HEENT: NC/AT, EOMI, (-)icterus,  (-)injection  Neck: Supple, No JVD  Chest: CTA  Heart: S1, S2, (-)m/r/g  Abd: Soft, non distended, minimal LLQ tenderness to deep palpation.  No hernias  Back: LS spine tenderness  Ext; Warm, no edema  Psych: AAOx3  Neuro: No focal deficits    Lab Results   Component Value Date    WBC 4.8 02/16/2017     Lab Results   Component Value Date    RBC 4.47 02/16/2017     Lab Results   Component Value Date    HGB 13.0 02/16/2017     Lab Results   Component Value Date    HCT 39.0 02/16/2017     No components found for: MCT  Lab Results   Component Value Date    MCV 87 02/16/2017     Lab Results   Component Value Date    MCH 29.1 02/16/2017     Lab Results   Component Value Date    MCHC 33.3 02/16/2017     Lab Results   Component Value Date    RDW 12.3 02/16/2017     Lab Results   Component Value Date     02/16/2017     Liver Function Studies -   Recent Labs   Lab Test  02/16/17   0839  02/12/17   1526   PROTTOTAL   --   7.1   ALBUMIN   --   3.4   BILITOTAL   --   0.4   ALKPHOS   --   57   AST  22  18   ALT  27  22     US -   ABDOMINAL ULTRASOUND  11/21/2017 11:05 AM      HISTORY: Abdominal pain, left lower quadrant.     COMPARISON: None.     FINDINGS:    Gallbladder: Multiple shadowing stones. Gallbladder wall thickness is  normal.     Bile ducts: CHD is normal diameter. No intrahepatic biliary  dilatation.     Liver: Normal.      Pancreas Normal     Spleen: Normal.      Right kidney: Normal.      Left kidney: Normal.     Aorta and IVC: Normal.      Additional findings: Ultrasound targeted to the area of pain in the  left lower quadrant shows no abnormality.         IMPRESSION:    1. Cholelithiasis. No sonographic evidence of acute cholecystitis.  2. Otherwise unremarkable abdominal ultrasound.     WENDY ARCHULETA MD    CT -   CT ABDOMEN AND PELVIS WITHOUT CONTRAST  1/2/2018 1:01 PM     HISTORY: Left lower quadrant abdominal pain. The concern is for a  urinary tract calculus.     COMPARISON: A CT with contrast on  2/12/2017.     TECHNIQUE: Routine transverse CT imaging of the abdomen and pelvis was  performed without contrast. Radiation dose for this scan was reduced  using automated exposure control, adjustment of the mA and/or kV  according to patient size, or iterative reconstruction technique.     FINDINGS: The visualized lung bases are clear. The kidneys, ureters,  and bladder are unremarkable. Specifically, no calculus,  hydronephrosis, or other evidence of obstruction is demonstrated. The  liver, spleen, and pancreas are normal. Again seen are numerous small  layering gallstones. The adrenal glands are normal. No enlarged lymph  node or other abnormal mass is demonstrated. No free fluid is seen. No  free intraperitoneal gas is identified. There are a few scattered  diverticula of the distal colon with no evidence of diverticulitis. No  other gastrointestinal tract abnormality is seen. The appendix is not  definitely seen. There is no additional evidence of appendicitis. No  vascular abnormality is seen. The osseous structures are unremarkable.  No abdominal or pelvic wall pathology is demonstrated. I see no  definite change since the previous examination.         IMPRESSION:   1. No acute abnormality is seen. Specifically, no urinary tract  calculus or evidence of obstruction is demonstrated.   2. Cholelithiasis with no evidence of cholecystitis.     DANY EMMANUEL MD      HIDA -   NM HEPATOBILIARY SCAN WITH GB EF 1/3/2018 2:58 PM     HISTORY: Pain.     PROCEDURE: 5.2 mCi of Tc 99m Mebrofenin is given intravenously for  this study. For the gallbladder ejection fraction portion of the  study, 1.1 mcg of CCK is given intravenously.     FINDINGS: Gallbladder activity is identified at 25 minutes into the  study. Bowel activity is identified at 50 minutes into the study. The  gallbladder ejection fraction is calculated to be 49%. Usually greater  than 35% is considered normal.         IMPRESSION:  1. Normal  visualization of gallbladder.  2. Gallbladder ejection fraction is within normal limits.      DOMINICK OSMAN MD       Colonoscopy report reviewed    Impression/plan:  This is a 62-year-old lady with left lower quadrant pain of unclear etiology. I suspect it was originally musculoskeletal in origin as it did radiate around from the back. On exam she has no hernias and she is only minimally tender in the left lower quadrant. She does have some gallstones but does appear to be asymptomatic at this time. I discussed all her imaging findings with the patient and she expressed understanding. As she is greatly improved on her own over the past week and I recommended observation and holding off on laparoscopy. She is in agreement with that plan. She knows to be seen again should the pain suddenly worsen or her symptoms change. She will follow-up with me as necessary.    Paxton Spear MD, FACS

## 2018-01-08 NOTE — MR AVS SNAPSHOT
After Visit Summary   1/8/2018    Jackie Siddiqi    MRN: [id number]           Patient Information     Date Of Birth          1955        Visit Information        Provider Department      1/8/2018 2:30 PM Paxton Spear MD [facility]        Today's Diagnoses     LLQ abdominal pain    -  1    Gallstones           Follow-ups after your visit        Who to contact     If you have questions or need follow up information about today's clinic visit or your schedule please contact [facility] directly at [phone].  Normal or non-critical lab and imaging results will be communicated to you by [employer], letter or phone within 4 business days after the clinic has received the results. If you do not hear from us within 7 days, please contact the clinic through [employer] or phone. If you have a critical or abnormal lab result, we will notify you by phone as soon as possible.  Submit refill requests through [employer] or call your pharmacy and they will forward the refill request to us. Please allow 3 business days for your refill to be completed.          Additional Information About Your Visit        MyChart Information     [employer] gives you secure access to your electronic health record. If you see a primary care provider, you can also send messages to your care team and make appointments. If you have questions, please call your primary care clinic.  If you do not have a primary care provider, please call [phone] and they will assist you.        Care EveryWhere ID     This is your Care EveryWhere ID. This could be used by other organizations to access your [place] medical records  [id number]        Your Vitals Were     Pulse Temperature Last Period Pulse Oximetry BMI (Body Mass Index)       80 96.1  F (35.6  C) (Temporal) 03/26/2003 98% 22.39 kg/m2        Blood Pressure from Last 3 Encounters:   01/04/18 130/62   12/29/17 94/60   12/06/17 101/53    Weight from Last 3  Encounters:   01/08/18 55.5 kg (122 lb 6.4 oz)   01/04/18 54.4 kg (120 lb)   12/29/17 54.4 kg (120 lb)              Today, you had the following     No orders found for display       Primary Care Provider Office Phone # Fax #    Jovana Wooten Mai, [phone] [address] [place] [address]        Equal Access to Services     HARMEET HOPE : Hadii aad ku hadasho Soomaali, waaxda luqadaha, qaybta kaalmada adeegyada, waxay idiin hayaan jovita masterskenjiezequiel cárdenas . So [facility] [phone].    ATENCIÓN: Si hugola margaret, tiene a randall disposición servicios gratuitos de asistencia lingüística. Llame al [phone].    We comply with applicable federal civil rights laws and [place] laws. We do not discriminate on the basis of race, color, national origin, age, disability, sex, sexual orientation, or gender identity.            Thank you!     Thank you for choosing [facility]  for your care. Our goal is always to provide you with excellent care. Hearing back from our patients is one way we can continue to improve our services. Please take a few minutes to complete the written survey that you may receive in the mail after your visit with us. Thank you!             Your Updated Medication List - Protect others around you: Learn how to safely use, store and throw away your medicines at www.disposemymeds.org.          This list is accurate as of: 1/8/18  4:00 PM.  Always use your most recent med list.                   Brand Name Dispense Instructions for use Diagnosis    clobetasol 0.05 % ointment    TEMOVATE    30 g    Apply a small pea size amount at bedtime for 2 weeks then decrease it to a few times a week then weekly    Vulvar pruritus       levothyroxine 25 MCG tablet    SYNTHROID/LEVOTHROID    90 tablet    TAKE ONE TABLET BY MOUTH EVERY DAY    Hypothyroidism, unspecified type       order for DME     1 Device    Equipment being ordered: Arm Band    Lateral epicondylitis of left elbow        PREMARIN cream   Generic drug:  conjugated estrogens     42.5 g    Place 0.5 g vaginally twice a week    Atrophic vaginitis

## 2018-01-18 ENCOUNTER — TRANSFERRED RECORDS (OUTPATIENT)
Dept: HEALTH INFORMATION MANAGEMENT | Facility: CLINIC | Age: 63
End: 2018-01-18

## 2018-02-12 NOTE — TELEPHONE ENCOUNTER
APPT INFO    Date /Time: 3/1/18   Reason for Appt: Sinus, sore throat   Ref Provider/Clinic: self   Are there internal records? Yes/No?  IF YES, list clinic names: Yes;   FL[facility]ooers   Are there outside records? Yes/No? No   Patient Contact (Y/N) & Call Details: No   Action: Chart Reviewed

## 2018-03-01 ENCOUNTER — PRE VISIT (OUTPATIENT)
Dept: OTOLARYNGOLOGY | Facility: CLINIC | Age: 63
End: 2018-03-01

## 2018-03-02 ENCOUNTER — OFFICE VISIT (OUTPATIENT)
Dept: OTOLARYNGOLOGY | Facility: CLINIC | Age: 63
End: 2018-03-02
Payer: COMMERCIAL

## 2018-03-02 ENCOUNTER — RADIANT APPOINTMENT (OUTPATIENT)
Dept: CT IMAGING | Facility: CLINIC | Age: 63
End: 2018-03-02
Attending: OTOLARYNGOLOGY
Payer: COMMERCIAL

## 2018-03-02 VITALS — WEIGHT: 121 LBS | HEIGHT: 63 IN | BODY MASS INDEX: 21.44 KG/M2

## 2018-03-02 DIAGNOSIS — J34.89 NASAL DRYNESS: ICD-10-CM

## 2018-03-02 DIAGNOSIS — H69.93 DYSFUNCTION OF BOTH EUSTACHIAN TUBES: ICD-10-CM

## 2018-03-02 DIAGNOSIS — J32.1 SINUSITIS CHRONIC, FRONTAL: ICD-10-CM

## 2018-03-02 DIAGNOSIS — J32.1 SINUSITIS CHRONIC, FRONTAL: Primary | ICD-10-CM

## 2018-03-02 RX ORDER — EUCALYPTUS/PEPPERMINT OIL
SOLUTION, NON-ORAL NASAL
Qty: 10 ML | Refills: 3 | Status: SHIPPED | OUTPATIENT
Start: 2018-03-02 | End: 2018-10-25

## 2018-03-02 RX ORDER — MUPIROCIN 20 MG/G
OINTMENT TOPICAL
Qty: 2 G | Refills: 3 | Status: SHIPPED | OUTPATIENT
Start: 2018-03-02 | End: 2018-10-25

## 2018-03-02 ASSESSMENT — PAIN SCALES - GENERAL: PAINLEVEL: NO PAIN (0)

## 2018-03-02 NOTE — PATIENT INSTRUCTIONS
The patient presents with a history of chronic sinusitis, facial pain, irritation in the right nostrils and eustachian tube dysfunction with facial pressure on the right. We will obtain a CT scan of the sinuses to assess for sinusitis. The patient will use bactroban and ponaris nasal drops.  We will see the patient again in four weeks to assess progress with treatment. If the CT scan of the sinuses is positive, then we will add the use of Augmentin to her therapy.

## 2018-03-02 NOTE — MR AVS SNAPSHOT
After Visit Summary   3/2/2018    Jackie Siddiqi    MRN: [id number]           Patient Information     Date Of Birth          1955        Visit Information        Provider Department      3/2/2018 7:15 AM Richi Marroquin MD [facility] Ear Nose [facility] Thro[facility]        Today's Diagnoses     Sinusitis chronic, frontal    -  1    Dysfunction of both eustachian tubes        Nasal dryness          Care Instructions      The patient presents with a history of chronic sinusitis, facial pain, irritation in the right nostrils and eustachian tube dysfunction with facial pressure on the right. We will obtain a CT scan of the sinuses to assess for sinusitis. The patient will use bactroban and ponaris nasal drops.  We will see the patient again in four weeks to assess progress with treatment. If the CT scan of the sinuses is positive, then we will add the use of Augmentin to her therapy.             Follow-ups after your visit        Your next 10 appointments already scheduled     Mar 02, 2018  8:00 PM CST   CT MAXILLOFACIAL W/O CONTRAST with UCCT1   [facility] [place] [facility] ([facility])    [address] [address] [phone]           Please bring any scans or X-rays taken at other hospitals, if similar tests were done. Also bring a list of your medicines, including vitamins, minerals and over-the-counter drugs. It is safest to leave personal items at home.  Be sure to tell your doctor:   If you have any allergies.   If there s any chance you are pregnant.   If you are breastfeeding.  You do not need to do anything special to prepare for this exam.  Please wear loose clothing, such as a sweat suit or jogging clothes. Avoid snaps, zippers and other metal. We may ask you to undress and put on a hospital gown.            Apr 05, 2018  7:00 AM CDT   (Arrive by 6:45 AM)   Return Visit with Richi Marroquin MD   [facility] Ear Nose [facility] Throat  "([facility])    [address]  [address] [facility] [address] [phone]              Future tests that were ordered for you today     Open Future Orders        Priority Expected Expires Ordered    CT Maxillofacial w/o contrast Routine  3/2/2019 3/2/2018            Who to contact     Please call your clinic at [phone] to:    Ask questions about your health    Make or cancel appointments    Discuss your medicines    Learn about your test results    Speak to your doctor            Additional Information About Your Visit        [employer] Information     [employer] gives you secure access to your electronic health record. If you see a primary care provider, you can also send messages to your care team and make appointments. If you have questions, please call your primary care clinic.  If you do not have a primary care provider, please call [phone] and they will assist you.      [employer] is an electronic gateway that provides easy, online access to your medical records. With [employer], you can request a clinic appointment, read your test results, renew a prescription or communicate with your care team.     To access your existing account, please contact your [facility] or call [phone] for assistance.        Care EveryWhere ID     This is your Care EveryWhere ID. This could be used by other organizations to access your [place] medical records  [id number]        Your Vitals Were     Height Last Period BMI (Body Mass Index)             1.59 m (5' 2.6\") 03/26/2003 21.71 kg/m2          Blood Pressure from Last 3 Encounters:   01/04/18 130/62   12/29/17 94/60   12/06/17 101/53    Weight from Last 3 Encounters:   03/02/18 54.9 kg (121 lb)   01/08/18 55.5 kg (122 lb 6.4 oz)   01/04/18 54.4 kg (120 lb)              We Performed the Following     LARYNGOSCOPY FLEX FIBEROPTIC, DIAGNOSTIC          Today's Medication Changes          These changes are " accurate as of 3/2/18  7:45 AM.  If you have any questions, ask your nurse or doctor.               Start taking these medicines.        Dose/Directions    mupirocin 2 % ointment   Commonly known as:  BACTROBAN   Used for:  Sinusitis chronic, frontal, Dysfunction of both eustachian tubes, Nasal dryness   Started by:  Richi Marroquin MD        Apply to anterior nares BID X 2 month supply   Quantity:  2 g   Refills:  3       PONARIS Soln   Used for:  Sinusitis chronic, frontal, Dysfunction of both eustachian tubes, Nasal dryness   Started by:  Richi Marroquin MD        Apply two drops to each nostril BID X 2 month supply   Quantity:  10 mL   Refills:  3            Where to get your medicines      These medications were sent to [facility] [address] [place] [address] [address]  [address] , [facility] [address]     Phone:  [phone]     mupirocin 2 % ointment    PONARIS Soln                Primary Care Provider Office Phone # Fax #    Jovana Wooten Mai, [phone] [address] [place] [address]        Equal Access to Services     HARMEET HOPE AH: Hadii rajesh marinelli hadasho Soomaali, waaxda luqadaha, qaybta kaalmada adeegyada, guzman vora haybambi swartz. So [facility] [phone].    ATENCIÓN: Si habla español, tiene a randall disposición servicios gratuitos de asistencia lingüística. Llame al [phone].    We comply with applicable federal civil rights laws and [place] laws. We do not discriminate on the basis of race, color, national origin, age, disability, sex, sexual orientation, or gender identity.            Thank you!     Thank you for choosing [facility]  for your care. Our goal is always to provide you with excellent care. Hearing back from our patients is one way we can continue to improve our services. Please take a few minutes to complete the written survey that you may receive in the mail  after your visit with us. Thank you!             Your Updated Medication List - Protect others around you: Learn how to safely use, store and throw away your medicines at www.disposemymeds.org.          This list is accurate as of 3/2/18  7:45 AM.  Always use your most recent med list.                   Brand Name Dispense Instructions for use Diagnosis    clobetasol 0.05 % ointment    TEMOVATE    30 g    Apply a small pea size amount at bedtime for 2 weeks then decrease it to a few times a week then weekly    Vulvar pruritus       levothyroxine 25 MCG tablet    SYNTHROID/LEVOTHROID    90 tablet    TAKE ONE TABLET BY MOUTH EVERY DAY    Hypothyroidism, unspecified type       mupirocin 2 % ointment    BACTROBAN    2 g    Apply to anterior nares BID X 2 month supply    Sinusitis chronic, frontal, Dysfunction of both eustachian tubes, Nasal dryness       order for DME     1 Device    Equipment being ordered: Arm Band    Lateral epicondylitis of left elbow       PONARIS Soln     10 mL    Apply two drops to each nostril BID X 2 month supply    Sinusitis chronic, frontal, Dysfunction of both eustachian tubes, Nasal dryness       PREMARIN cream   Generic drug:  conjugated estrogens     42.5 g    Place 0.5 g vaginally twice a week    Atrophic vaginitis

## 2018-03-02 NOTE — NURSING NOTE
"Chief Complaint   Patient presents with     Consult     sinus infection and pain, sore throat . no pain at this time      Height 1.59 m (5' 2.6\"), weight 54.9 kg (121 lb), last menstrual period 03/26/2003, not currently breastfeeding.    Allen Tijerina LPN    "

## 2018-03-02 NOTE — LETTER
3/2/2018       RE: Jackie Siddiqi  [address]  [place] [address]     Dear Colleague,    Thank you for referring your patient, Jackie Siddiqi, to the [facility] EAR NOSE AND THROAT at [facility]. Please see a copy of my visit note below.    The patient presents with a history of chronic sinusitis, facial pressure and nasal irritation.  The patient is also having difficulty breathing through the nostrils.  She reports that the difficulty began after she accidentally scratched her nose on the right side with her finger nail. She developed a scab in the nostril after epistaxis developed. She also reports recurrent pressure in the left ear.  The patient denies chronic or recurrent tonsillitis, chronic or recurrent pharyngitis. The patient denies otalgia, otorrhea, eustachian tube dysfunction, ear infections, dizziness or tinnitus.     This patient is seen in consultation as a self referral to my clinic.    All other systems were reviewed and they are either negative or they are not directly pertinent to this Otolaryngology examination.      Past Medical History:    Past Medical History:   Diagnosis Date     Abnormal Papanicolaou smear of cervix and cervical HPV     cryocautery     Breast cystic disease      Hiatal hernia      Motion sickness      Peptic ulcer, unspecified site, unspecified as acute or chronic, without mention of hemorrhage, perforation, or obstruction     Peptic ulcer disease     Personal history of colonic polyps        Past Surgical History:    Past Surgical History:   Procedure Laterality Date     C  DELIVERY ONLY       x2     C NONSPECIFIC PROCEDURE      Laparoscopic exam with adhesions     C NONSPECIFIC PROCEDURE      Mastectomy for cystic breast disease, breast implants     COLONOSCOPY  2007     COLONOSCOPY  2013    Procedure: COLONOSCOPY;  colonoscopy, Esophagoscopy, Gastroscopy, Duodenoscopy EGD, multiple biopsies;   Surgeon: Joe Benson MD;  Location:  GI     COLONOSCOPY N/A 12/6/2017    Procedure: COMBINED COLONOSCOPY, SINGLE OR MULTIPLE BIOPSY/POLYPECTOMY BY BIOPSY;  colonoscopy with polypectomy by biopsy;  Surgeon: Tolu No MD;  Location:  GI     ENDOSCOPY  07/16/2007    Sm hiatus hernia     ESOPHAGOSCOPY, GASTROSCOPY, DUODENOSCOPY (EGD), COMBINED  1/18/2013    Procedure: COMBINED ESOPHAGOSCOPY, GASTROSCOPY, DUODENOSCOPY (EGD), BIOPSY SINGLE OR MULTIPLE;  ESOPHAGOGASTRODUODENOSCOPY WITH MULTIPLE BIOPSIES;  Surgeon: Joe Benson MD;  Location:  GI     MASTECTOMY, BILATERAL         Medications:      Current Outpatient Prescriptions:      levothyroxine (SYNTHROID/LEVOTHROID) 25 MCG tablet, TAKE ONE TABLET BY MOUTH EVERY DAY, Disp: 90 tablet, Rfl: 3     PREMARIN cream, Place 0.5 g vaginally twice a week, Disp: 42.5 g, Rfl: 10     clobetasol (TEMOVATE) 0.05 % ointment, Apply a small pea size amount at bedtime for 2 weeks then decrease it to a few times a week then weekly, Disp: 30 g, Rfl: 0     order for DME, Equipment being ordered: Arm Band, Disp: 1 Device, Rfl: 0    Allergies:    No known drug allergies    Physical Examination:    The patient is a well developed, well nourished female in no apparent distress.  She is normocephalic, atraumatic with pupils equally round and reactive to light.    Oral Cavity Examination:  Normal mucosa with no masses or lesions  Nasal Examination:  Engorged nasal turbinates and a straigh septum.  There are no masses or lesions in either nostril and no discharge or infection in either nostril at this time. There is some crusting along the right inferior turbinate.   Ear Examination: Ear canals clear, tympanic membranes and middle ear spaces normal  Neurological Examination: Facial nerve function intact and symmetric  Integumentary Examination: No lesions on the skin of the head and neck  Neck Examination: No masses or lesions, no lymphadenopathy  Endocrine Examination:  Normal thyroid examination  Flexible Fiberoptic Laryngoscopy:  Normal nasopharynx, base of tongue, pyriform sinuses, epiglottis, valleculae, false vocal cords, true vocal cords, and larynx.  Normal motion of the vocal cords with no lesions, masses, nodules, or polyps bilaterally.     Assessment and Plan:    The patient presents with a history of chronic sinusitis, facial pain, irritation in the right nostrils and eustachian tube dysfunction with facial pressure on the right. We will obtain a CT scan of the sinuses to assess for sinusitis. The patient will use bactroban and ponaris nasal drops.  We will see the patient again in four weeks to assess progress with treatment. If the CT scan of the sinuses is positive, then we will add the use of Augmentin to her therapy.       Again, thank you for allowing me to participate in the care of your patient.      Sincerely,    Richi Marroquin MD

## 2018-03-02 NOTE — PROGRESS NOTES
The patient presents with a history of chronic sinusitis, facial pressure and nasal irritation.  The patient is also having difficulty breathing through the nostrils.  She reports that the difficulty began after she accidentally scratched her nose on the right side with her finger nail. She developed a scab in the nostril after epistaxis developed. She also reports recurrent pressure in the left ear.  The patient denies chronic or recurrent tonsillitis, chronic or recurrent pharyngitis. The patient denies otalgia, otorrhea, eustachian tube dysfunction, ear infections, dizziness or tinnitus.     This patient is seen in consultation as a self referral to my clinic.    All other systems were reviewed and they are either negative or they are not directly pertinent to this Otolaryngology examination.      Past Medical History:    Past Medical History:   Diagnosis Date     Abnormal Papanicolaou smear of cervix and cervical HPV     cryocautery     Breast cystic disease      Hiatal hernia      Motion sickness      Peptic ulcer, unspecified site, unspecified as acute or chronic, without mention of hemorrhage, perforation, or obstruction     Peptic ulcer disease     Personal history of colonic polyps        Past Surgical History:    Past Surgical History:   Procedure Laterality Date     C  DELIVERY ONLY       x2     C NONSPECIFIC PROCEDURE      Laparoscopic exam with adhesions     C NONSPECIFIC PROCEDURE      Mastectomy for cystic breast disease, breast implants     COLONOSCOPY  2007     COLONOSCOPY  2013    Procedure: COLONOSCOPY;  colonoscopy, Esophagoscopy, Gastroscopy, Duodenoscopy EGD, multiple biopsies;  Surgeon: Joe Benson MD;  Location:  GI     COLONOSCOPY N/A 2017    Procedure: COMBINED COLONOSCOPY, SINGLE OR MULTIPLE BIOPSY/POLYPECTOMY BY BIOPSY;  colonoscopy with polypectomy by biopsy;  Surgeon: Tolu No MD;  Location:  GI     ENDOSCOPY  2007     hiatus  hernia     ESOPHAGOSCOPY, GASTROSCOPY, DUODENOSCOPY (EGD), COMBINED  1/18/2013    Procedure: COMBINED ESOPHAGOSCOPY, GASTROSCOPY, DUODENOSCOPY (EGD), BIOPSY SINGLE OR MULTIPLE;  ESOPHAGOGASTRODUODENOSCOPY WITH MULTIPLE BIOPSIES;  Surgeon: Joe Benson MD;  Location: [facility]     MASTECTOMY, BILATERAL         Medications:      Current Outpatient Prescriptions:      levothyroxine (SYNTHROID/LEVOTHROID) 25 MCG tablet, TAKE ONE TABLET BY MOUTH EVERY DAY, Disp: 90 tablet, Rfl: 3     PREMARIN cream, Place 0.5 g vaginally twice a week, Disp: 42.5 g, Rfl: 10     clobetasol (TEMOVATE) 0.05 % ointment, Apply a small pea size amount at bedtime for 2 weeks then decrease it to a few times a week then weekly, Disp: 30 g, Rfl: 0     order for DME, Equipment being ordered: Arm Band, Disp: 1 Device, Rfl: 0    Allergies:    No known drug allergies    Physical Examination:    The patient is a well developed, well nourished female in no apparent distress.  She is normocephalic, atraumatic with pupils equally round and reactive to light.    Oral Cavity Examination:  Normal mucosa with no masses or lesions  Nasal Examination:  Engorged nasal turbinates and a straigh septum.  There are no masses or lesions in either nostril and no discharge or infection in either nostril at this time. There is some crusting along the right inferior turbinate.   Ear Examination: Ear canals clear, tympanic membranes and middle ear spaces normal  Neurological Examination: Facial nerve function intact and symmetric  Integumentary Examination: No lesions on the skin of the head and neck  Neck Examination: No masses or lesions, no lymphadenopathy  Endocrine Examination: Normal thyroid examination  Flexible Fiberoptic Laryngoscopy:  Normal nasopharynx, base of tongue, pyriform sinuses, epiglottis, valleculae, false vocal cords, true vocal cords, and larynx.  Normal motion of the vocal cords with no lesions, masses, nodules, or polyps bilaterally.     Assessment  and Plan:    The patient presents with a history of chronic sinusitis, facial pain, irritation in the right nostrils and eustachian tube dysfunction with facial pressure on the right. We will obtain a CT scan of the sinuses to assess for sinusitis. The patient will use bactroban and ponaris nasal drops.  We will see the patient again in four weeks to assess progress with treatment. If the CT scan of the sinuses is positive, then we will add the use of Augmentin to her therapy.

## 2018-03-09 ENCOUNTER — TELEPHONE (OUTPATIENT)
Dept: OTOLARYNGOLOGY | Facility: CLINIC | Age: 63
End: 2018-03-09

## 2018-03-09 DIAGNOSIS — J32.9 SINUSITIS, CHRONIC: Primary | ICD-10-CM

## 2018-03-09 RX ORDER — AMOXICILLIN AND CLAVULANATE POTASSIUM 500; 125 MG/1; MG/1
1 TABLET, FILM COATED ORAL 2 TIMES DAILY
Qty: 20 TABLET | Refills: 0 | Status: SHIPPED | OUTPATIENT
Start: 2018-03-09 | End: 2018-05-29

## 2018-03-09 NOTE — TELEPHONE ENCOUNTER
Patient has called for CT results. Reviewed and she would like to try Augmentin as per 's note, given mucosal thickening and continued symptoms of facial pain and congestion. She states the nasal Bactroban is helpful. We reviewed that the CT reading showed thickening and inflamed mucosa in the LEFT sphenoid and RIGHt ethmoid, frontal and cheek sinuses clear, widely patent outflow sinus tracts, and she should continue her Ponaris as well. She will RTC 4/5 as scheduled and let us know how  she is  Doing, understands that Dr ORTIZ is out this week. Message to CC  Results for orders placed or performed in visit on 03/02/18   CT Maxillofacial w/o contrast    Narrative    CT MAXILLOFACIAL W/O CONTRAST 3/2/2018 8:01 AM    Provided History: History of chronic sinusitis, facial pressure and  nasal irritation. Also complains of difficulty breathing through the  nostrils. Complains of recurrent pressure in the left ear.    ICD-10: Sinusitis chronic, frontal     Comparison: None     Technique:  Using thin collimation multidetector helical acquisition  technique, axial, coronal, and sagittal thin section CT images were  reconstructed through the paranasal sinuses. Images were reviewed in  bone and soft tissue windows.    Findings:   Minimal polypoid mucosal thickening within the pneumatized pterygoid  recesses of the left sphenoid sinus. Thin mucosal thickening in the  ethmoid air cells, left greater than right. Frontal and maxillary  sinuses are clear. Widely patent major sinus outflow tracts. No  intrasinus fluid or significant osteitis. No inflammatory changes in  the soft tissues about the paranasal sinuses. Orbits and visualized  intracranial contents are unremarkable.        Impression    Impression: Minimal left sphenoid and ethmoid mucosal thickening.  Widely patent major sinus outflow tracts.    I have personally reviewed the examination and initial interpretation  and I agree with the findings.    YAZMIN  MD PAULO

## 2018-05-16 DIAGNOSIS — E03.9 HYPOTHYROIDISM, UNSPECIFIED TYPE: ICD-10-CM

## 2018-05-16 NOTE — TELEPHONE ENCOUNTER
"Requested Prescriptions   Pending Prescriptions Disp Refills     levothyroxine (SYNTHROID/LEVOTHROID) 25 MCG tablet [Pharmacy Med Name: LEVOTHYROXINE SODIUM 25MCG TABS] 90 tablet 3     Sig: TAKE ONE TABLET BY MOUTH EVERY DAY    Thyroid Protocol Failed    5/16/2018  1:30 PM       Failed - Normal TSH on file in past 12 months    Recent Labs   Lab Test  02/12/17   1526   TSH  2.50             Passed - Patient is 12 years or older       Passed - Recent (12 mo) or future (30 days) visit within the authorizing provider's specialty    Patient had office visit in the last 12 months or has a visit in the next 30 days with authorizing provider or within the authorizing provider's specialty.  See \"Patient Info\" tab in inbasket, or \"Choose Columns\" in Meds & Orders section of the refill encounter.           Passed - No active pregnancy on record    If patient is pregnant or has had a positive pregnancy test, please check TSH.         Passed - No positive pregnancy test in past 12 months    If patient is pregnant or has had a positive pregnancy test, please check TSH.            Last Written Prescription Date:  4/5/17  Last Fill Quantity: 90,  # refills: 3   Last Office Visit with G, P or [facility] prescribing provider:  11/28/16   Future Office Visit:       "

## 2018-05-17 RX ORDER — LEVOTHYROXINE SODIUM 25 UG/1
TABLET ORAL
Qty: 90 TABLET | Refills: 0 | Status: SHIPPED | OUTPATIENT
Start: 2018-05-17 | End: 2018-09-10

## 2018-05-17 NOTE — TELEPHONE ENCOUNTER
Medication is being filled for 1 time refill only due to:  Patient needs labs TSh with free T4. Future labs ordered  as listed. Patient needs to be seen because it has been more than one year since last visit. Notified patient per comment section of RX. Will forward to schedulers to call and set up BOTH lab and clinic appt..........................................PHYLLIS Raza

## 2018-05-29 ENCOUNTER — OFFICE VISIT (OUTPATIENT)
Dept: FAMILY MEDICINE | Facility: CLINIC | Age: 63
End: 2018-05-29
Payer: COMMERCIAL

## 2018-05-29 VITALS
WEIGHT: 119.6 LBS | DIASTOLIC BLOOD PRESSURE: 64 MMHG | RESPIRATION RATE: 16 BRPM | OXYGEN SATURATION: 95 % | HEART RATE: 80 BPM | SYSTOLIC BLOOD PRESSURE: 110 MMHG | TEMPERATURE: 97.9 F | BODY MASS INDEX: 21.46 KG/M2

## 2018-05-29 DIAGNOSIS — Z23 NEED FOR VACCINATION: ICD-10-CM

## 2018-05-29 DIAGNOSIS — R10.32 LLQ ABDOMINAL PAIN: ICD-10-CM

## 2018-05-29 DIAGNOSIS — E78.5 HYPERLIPIDEMIA LDL GOAL <160: ICD-10-CM

## 2018-05-29 DIAGNOSIS — E03.9 HYPOTHYROIDISM, UNSPECIFIED TYPE: Primary | ICD-10-CM

## 2018-05-29 LAB
CHOLEST SERPL-MCNC: 258 MG/DL
HDLC SERPL-MCNC: 57 MG/DL
LDLC SERPL CALC-MCNC: 172 MG/DL
NONHDLC SERPL-MCNC: 201 MG/DL
TRIGL SERPL-MCNC: 146 MG/DL
TSH SERPL DL<=0.005 MIU/L-ACNC: 2.26 MU/L (ref 0.4–4)

## 2018-05-29 PROCEDURE — 90471 IMMUNIZATION ADMIN: CPT | Performed by: FAMILY MEDICINE

## 2018-05-29 PROCEDURE — 36415 COLL VENOUS BLD VENIPUNCTURE: CPT | Performed by: FAMILY MEDICINE

## 2018-05-29 PROCEDURE — 90732 PPSV23 VACC 2 YRS+ SUBQ/IM: CPT | Performed by: FAMILY MEDICINE

## 2018-05-29 PROCEDURE — 80061 LIPID PANEL: CPT | Performed by: FAMILY MEDICINE

## 2018-05-29 PROCEDURE — 84443 ASSAY THYROID STIM HORMONE: CPT | Performed by: FAMILY MEDICINE

## 2018-05-29 PROCEDURE — 99214 OFFICE O/P EST MOD 30 MIN: CPT | Mod: 25 | Performed by: FAMILY MEDICINE

## 2018-05-29 ASSESSMENT — ANXIETY QUESTIONNAIRES
IF YOU CHECKED OFF ANY PROBLEMS ON THIS QUESTIONNAIRE, HOW DIFFICULT HAVE THESE PROBLEMS MADE IT FOR YOU TO DO YOUR WORK, TAKE CARE OF THINGS AT HOME, OR GET ALONG WITH OTHER PEOPLE: NOT DIFFICULT AT ALL
5. BEING SO RESTLESS THAT IT IS HARD TO SIT STILL: NOT AT ALL
GAD7 TOTAL SCORE: 0
2. NOT BEING ABLE TO STOP OR CONTROL WORRYING: NOT AT ALL
7. FEELING AFRAID AS IF SOMETHING AWFUL MIGHT HAPPEN: NOT AT ALL
3. WORRYING TOO MUCH ABOUT DIFFERENT THINGS: NOT AT ALL
6. BECOMING EASILY ANNOYED OR IRRITABLE: NOT AT ALL
1. FEELING NERVOUS, ANXIOUS, OR ON EDGE: NOT AT ALL

## 2018-05-29 ASSESSMENT — PAIN SCALES - GENERAL: PAINLEVEL: NO PAIN (0)

## 2018-05-29 ASSESSMENT — PATIENT HEALTH QUESTIONNAIRE - PHQ9: 5. POOR APPETITE OR OVEREATING: NOT AT ALL

## 2018-05-29 NOTE — PROGRESS NOTES
SUBJECTIVE:   Jackie Siddiqi is a 63 year old female who presents to clinic today for the following health issues:    Hyperlipidemia Follow-Up      Rate your low fat/cholesterol diet?: not monitoring fat    Taking statin?  No    Other lipid medications/supplements?:  none    Hypothyroidism Follow-up      Since last visit, patient describes the following symptoms: Weight stable, no hair loss, no skin changes, no constipation, no loose stools      Amount of exercise or physical activity: plans on getting back into it    Problems taking medications regularly: No    Medication side effects: none    Diet: regular (no restrictions)    Patient here for hypothyroidism follow up. She is on levothyroxine 25mcg, which she has been taking daily as prescribed. She has not noticed a change in energy level since starting the medication. She denies any symptoms. No weight change, hair loss, skin changes or constipation.      Patient has also had LLQ abdominal and back pain for over a year - initially it was on and off nature. Six months ago, the pain was constant in nature. She was seen by another provider for the pain. Abdominal CT did not show any acute abnormality causing pain. She was seen by a general surgeon, who thought pain was likely musculoskeletal in nature. Patient also seen by urologist and has cystoscopy, which did not reveal origin of symptoms. She did have a colonoscopy in 12/17, which revealed diverticulosis. No diverticulitis on CT the following month. Since the work up six months ago, she the pain has been intermittent. It occurs every other day approximately, more often while at work. She localizes pain to LLQ and left flank. Pain does not radiate. She has vomited once from pain, but typically does not. No change in stool. No hematochezia, melena, diarrhea or constipation. No change in stool caliber. No urinary symptoms, including hematuria or dysuria. No vaginal discharge or bleeding. No fever or chills.  No other symptoms.  No pain today.      Problem list and histories reviewed & adjusted, as indicated.  Additional history: as documented    Patient Active Problem List   Diagnosis     Esophageal reflux     Lichen sclerosus et atrophicus of the vulva     Advanced directives, counseling/discussion     Hypothyroidism, unspecified type     Hyperlipidemia LDL goal <160     Tobacco use disorder     Osteoarthritis of carpometacarpal joint of right thumb, unspecified osteoarthritis type     Past Surgical History:   Procedure Laterality Date     C  DELIVERY ONLY       x2     C NONSPECIFIC PROCEDURE      Laparoscopic exam with adhesions     C NONSPECIFIC PROCEDURE      Mastectomy for cystic breast disease, breast implants     COLONOSCOPY  2007     COLONOSCOPY  2013    Procedure: COLONOSCOPY;  colonoscopy, Esophagoscopy, Gastroscopy, Duodenoscopy EGD, multiple biopsies;  Surgeon: Joe Benson MD;  Location: [facility]     COLONOSCOPY N/A 2017    Procedure: COMBINED COLONOSCOPY, SINGLE OR MULTIPLE BIOPSY/POLYPECTOMY BY BIOPSY;  colonoscopy with polypectomy by biopsy;  Surgeon: Tolu No MD;  Location: [facility]     ENDOSCOPY  2007    Sm hiatus hernia     ESOPHAGOSCOPY, GASTROSCOPY, DUODENOSCOPY (EGD), COMBINED  2013    Procedure: COMBINED ESOPHAGOSCOPY, GASTROSCOPY, DUODENOSCOPY (EGD), BIOPSY SINGLE OR MULTIPLE;  ESOPHAGOGASTRODUODENOSCOPY WITH MULTIPLE BIOPSIES;  Surgeon: Joe Benson MD;  Location: [facility]     MASTECTOMY, BILATERAL         Social History   Substance Use Topics     Smoking status: Former Smoker     Packs/day: 0.50     Years: 50.00     Types: Cigarettes     Quit date: 10/2/2017     Smokeless tobacco: Never Used      Comment: 1/2  pack per day, started age 11     Alcohol use No      Comment: holidays only     Family History   Problem Relation Age of Onset     DIABETES Father      Hypertension Father      Alcohol/Drug Father      Eye Disorder Father      Obesity  Father      Breast Cancer Mother      OSTEOPOROSIS Mother      Thyroid Disease Mother      CANCER Mother      Bladder     CANCER Sister      fallopian tube cancer     Obesity Sister      Alzheimer Disease Sister      CANCER Sister      Skin Cancer     Allergies Daughter      CANCER Maternal Grandmother      uterine cancer     Liver Disease Daughter      Biliary Atresia     Genitourinary Problems Brother      HEART DISEASE Brother      Heart Murmur     Genitourinary Problems Brother      kidney disease (two brothers)         Current Outpatient Prescriptions   Medication Sig Dispense Refill     clobetasol (TEMOVATE) 0.05 % ointment Apply a small pea size amount at bedtime for 2 weeks then decrease it to a few times a week then weekly 30 g 0     levothyroxine (SYNTHROID/LEVOTHROID) 25 MCG tablet TAKE ONE TABLET BY MOUTH EVERY DAY 90 tablet 0     Misc Natural Product Nasal (PONARIS) SOLN Apply two drops to each nostril BID X 2 month supply 10 mL 3     mupirocin (BACTROBAN) 2 % ointment Apply to anterior nares BID X 2 month supply (Patient not taking: Reported on 5/29/2018) 2 g 3     order for DME Equipment being ordered: Arm Band 1 Device 0     PREMARIN cream Place 0.5 g vaginally twice a week 42.5 g 10       Reviewed and updated as needed this visit by clinical staff  Allergies  Meds       Reviewed and updated as needed this visit by Provider         ROS:  Constitutional, HEENT, cardiovascular, pulmonary, gi and gu systems are negative, except as otherwise noted.    OBJECTIVE:     /64 (BP Location: Right arm, Patient Position: Sitting, Cuff Size: Adult Regular)  Pulse 80  Temp 97.9  F (36.6  C) (Temporal)  Resp 16  Wt 119 lb 9.6 oz (54.3 kg)  LMP 03/26/2003  SpO2 95%  Breastfeeding? No  BMI 21.46 kg/m2  Body mass index is 21.46 kg/(m^2).  GENERAL: healthy, alert and no distress  NECK: no adenopathy, supple and thyroid normal to palpation  RESP: normal respiratory effort, lungs clear to auscultation - no  rales, rhonchi or wheezes  CV: regular rate and rhythm, no murmur, no peripheral edema and peripheral pulses strong  ABDOMEN: soft, mild LLQ pain, no guarding or rebound tenderness, no ventral hernias, no hepatosplenomegaly, no masses and bowel sounds normal. Pain was not reproducible  MS: no gross musculoskeletal defects noted, no edema.  No tender with palpation to lumbar sacral spine or its paraspinous muscle  PSYCH: mentation appears normal, affect normal/bright    Diagnostic Test Results:  Results for orders placed or performed in visit on 05/29/18 (from the past 24 hour(s))   Lipid panel reflex to direct LDL Fasting   Result Value Ref Range    Cholesterol 258 (H) <200 mg/dL    Triglycerides 146 <150 mg/dL    HDL Cholesterol 57 >49 mg/dL    LDL Cholesterol Calculated 172 (H) <100 mg/dL    Non HDL Cholesterol 201 (H) <130 mg/dL   TSH with free T4 reflex   Result Value Ref Range    TSH 2.26 0.40 - 4.00 mU/L       ASSESSMENT/PLAN:       ICD-10-CM    1. Hypothyroidism, unspecified type E03.9 TSH with free T4 reflex   2. LLQ abdominal pain R10.32    3. Hyperlipidemia LDL goal <160 E78.5 Lipid panel reflex to direct LDL Fasting   4. Need for vaccination Z23 Pneumococcal vaccine 23 valent PPSV23  (Pneumovax) [35312]     ADMIN: Vaccine, Initial (17600)     1. Hypothyroidism  Stable. Patient asymptomatic, no change when she began levothyroxine. Will check TSH today and augment dose if indicated. Patient comfortable with this plan. All questions answered.     2. LLQ abdominal pain  Patient here with LLQ abdominal pain and back pain for over a year. She has undergone extensive work up for pain, including abdominal CT, labs and cystoscopy without cause identified. She is currently asymptomatic. Reassured patient that work up thus far has been negative and that it is encouraging that pain has lessened. Recommend patient return to clinic if pain recurs, as we may do more tests at that time. She is agreeable to this plan. All  questions answered.     3.  Her last cholesterol level was high.  Not taking medication.  Discussed about healthy diet and exercise.  Rechecked cholesterol panel today.    Danni Oliva, MS4  [facility]    I, Danni Oliva, am serving as a scribe; to document services personally performed by Jovana Wooten Mai, MD - based on data collection and the provider's statements to me.    Provider Disclosure:  I agree with above History, Review of Systems, Physical exam and Plan. I have reviewed the content of the documentation and have edited it as needed. I have personally performed the services documented here and the documentation accurately represents those services and the decisions I have made.    Jovana Wooten Mai, MD  [facility]

## 2018-05-29 NOTE — MR AVS SNAPSHOT
After Visit Summary   5/29/2018    Jackie Siddiqi    MRN: [id number]           Patient Information     Date Of Birth          1955        Visit Information        Provider Department      5/29/2018 3:00 PM Jovana Quinonez MD [facility]        Today's Diagnoses     Hypothyroidism, unspecified type    -  1    LLQ abdominal pain        Hyperlipidemia LDL goal <160        Need for vaccination           Follow-ups after your visit        Follow-up notes from your care team     Return if symptoms worsen or fail to improve.      Who to contact     If you have questions or need follow up information about today's clinic visit or your schedule please contact [facility] directly at [phone].  Normal or non-critical lab and imaging results will be communicated to you by MyChart, letter or phone within 4 business days after the clinic has received the results. If you do not hear from us within 7 days, please contact the clinic through minicabithart or phone. If you have a critical or abnormal lab result, we will notify you by phone as soon as possible.  Submit refill requests through [employer] La[employer] or call your pharmacy and they will forward the refill request to us. Please allow 3 business days for your refill to be completed.          Additional Information About Your Visit        MyChart Information     [employer] gives you secure access to your electronic health record. If you see a primary care provider, you can also send messages to your care team and make appointments. If you have questions, please call your primary care clinic.  If you do not have a primary care provider, please call [phone] and they will assist you.        Care EveryWhere ID     This is your Care EveryWhere ID. This could be used by other organizations to access your [facility] medical records  [id number]        Your Vitals Were     Pulse Temperature Respirations Last Period Pulse Oximetry Breastfeeding?     80 97.9  F (36.6  C) (Temporal) 16 03/26/2003 95% No    BMI (Body Mass Index)                   21.46 kg/m2            Blood Pressure from Last 3 Encounters:   05/29/18 110/64   01/04/18 130/62   12/29/17 94/60    Weight from Last 3 Encounters:   05/29/18 119 lb 9.6 oz (54.3 kg)   03/02/18 121 lb (54.9 kg)   01/08/18 122 lb 6.4 oz (55.5 kg)              We Performed the Following     ADMIN: Vaccine, Initial (19081)     Lipid panel reflex to direct LDL Fasting     Pneumococcal vaccine 23 valent PPSV23  (Pneumovax) [70865]     SCREENING QUESTIONS FOR ADULT IMMUNIZATIONS     TSH with free T4 reflex        Primary Care Provider Office Phone # Fax #    Jovana Wooten Mai, [phone] [address] [place] [address]        Equal Access to Services     [facility] : Hadii rajesh randolph Soalfonso, waaxda luscot, qaybta kaalmada holly, guzman swartz. So [facility] [phone].    ATENCIÓN: Si habla español, tiene a randall disposición servicios gratuitos de asistencia lingüística. Llame al [phone].    We comply with applicable federal civil rights laws and [place] laws. We do not discriminate on the basis of race, color, national origin, age, disability, sex, sexual orientation, or gender identity.            Thank you!     Thank you for choosing [facility]  for your care. Our goal is always to provide you with excellent care. Hearing back from our patients is one way we can continue to improve our services. Please take a few minutes to complete the written survey that you may receive in the mail after your visit with us. Thank you!             Your Updated Medication List - Protect others around you: Learn how to safely use, store and throw away your medicines at www.disposemymeds.org.          This list is accurate as of 5/29/18 11:59 PM.  Always use your most recent med list.                   Brand Name Dispense Instructions for use Diagnosis     clobetasol 0.05 % ointment    TEMOVATE    30 g    Apply a small pea size amount at bedtime for 2 weeks then decrease it to a few times a week then weekly    Vulvar pruritus       levothyroxine 25 MCG tablet    SYNTHROID/LEVOTHROID    90 tablet    TAKE ONE TABLET BY MOUTH EVERY DAY    Hypothyroidism, unspecified type       mupirocin 2 % ointment    BACTROBAN    2 g    Apply to anterior nares BID X 2 month supply    Sinusitis chronic, frontal, Dysfunction of both eustachian tubes, Nasal dryness       order for DME     1 Device    Equipment being ordered: Arm Band    Lateral epicondylitis of left elbow       PONARIS Soln     10 mL    Apply two drops to each nostril BID X 2 month supply    Sinusitis chronic, frontal, Dysfunction of both eustachian tubes, Nasal dryness       PREMARIN cream   Generic drug:  conjugated estrogens     42.5 g    Place 0.5 g vaginally twice a week    Atrophic vaginitis

## 2018-05-29 NOTE — NURSING NOTE
Chief Complaint   Patient presents with     Hypertension     Lipids     Health Maintenance Due   Topic Date Due     TOBACCO CESSATION COUNSELING Q1 YR  1955     LIPID MONITORING Q1 YEAR  11/28/2017     DAMIEN QUESTIONNAIRE 1 YEAR  11/28/2017     PHQ-9 Q1YR  11/28/2017     ADVANCE DIRECTIVE PLANNING Q5 YRS  01/11/2018     PNEUMOCOCCAL (1 of 2 - PCV13) 05/07/2018     FALL RISK ASSESSMENT  05/07/2018     Mary Serrano, [facility]

## 2018-05-29 NOTE — NURSING NOTE
Screening Questionnaire for Adult Immunization    Are you sick today?   No   Do you have allergies to medications, food, a vaccine component or latex?   No   Have you ever had a serious reaction after receiving a vaccination?   No   Do you have a long-term health problem with heart disease, lung disease, asthma, kidney disease, metabolic disease (e.g. diabetes), anemia, or other blood disorder?   No   Do you have cancer, leukemia, HIV/AIDS, or any other immune system problem?   No   In the past 3 months, have you taken medications that affect  your immune system, such as prednisone, other steroids, or anticancer drugs; drugs for the treatment of rheumatoid arthritis, Crohn s disease, or psoriasis; or have you had radiation treatments?   No   Have you had a seizure, or a brain or other nervous system problem?   No   During the past year, have you received a transfusion of blood or blood     products, or been given immune (gamma) globulin or antiviral drug?   No   For women: Are you pregnant or is there a chance you could become        pregnant during the next month?   No   Have you received any vaccinations in the past 4 weeks?   No     Immunization questionnaire answers were all negative.        Per orders of Dr. Quinonez, injection of Pneumovax given by Mary Serrano. Patient instructed to remain in clinic for 15 minutes afterwards, and to report any adverse reaction to me immediately.       Screening performed by Mary Serrano on 5/29/2018 at 4:09 PM.

## 2018-05-30 ASSESSMENT — ANXIETY QUESTIONNAIRES: GAD7 TOTAL SCORE: 0

## 2018-05-30 ASSESSMENT — PATIENT HEALTH QUESTIONNAIRE - PHQ9: SUM OF ALL RESPONSES TO PHQ QUESTIONS 1-9: 0

## 2018-06-25 DIAGNOSIS — N95.2 ATROPHIC VAGINITIS: ICD-10-CM

## 2018-06-25 DIAGNOSIS — L29.2 VULVAR PRURITUS: ICD-10-CM

## 2018-06-26 NOTE — TELEPHONE ENCOUNTER
"Requested Prescriptions   Pending Prescriptions Disp Refills     clobetasol (TEMOVATE) 0.05 % ointment 30 g 0     Sig: Apply a small pea size amount at bedtime for 2 weeks then decrease it to a few times a week then weekly    There is no refill protocol information for this order        PREMARIN cream 42.5 g 10     Sig: Place 0.5 g vaginally twice a week    Hormone Replacement Therapy Failed    6/25/2018 12:06 PM       Failed - Recent (12 mo) or future (30 days) visit within the authorizing provider's specialty    Patient had office visit in the last 12 months or has a visit in the next 30 days with authorizing provider or within the authorizing provider's specialty.  See \"Patient Info\" tab in inbasket, or \"Choose Columns\" in Meds & Orders section of the refill encounter.           Failed - Patient has mammogram in past 2 years on file if age 50-75       Passed - Blood pressure under 140/90 in past 12 months    BP Readings from Last 3 Encounters:   05/29/18 110/64   01/04/18 130/62   12/29/17 94/60          Passed - Patient is 18 years of age or older       Passed - No active pregnancy on record       Passed - No positive pregnancy test on record in past 12 months        clobetasol (TEMOVATE) 0.05 % ointment  Routing refill request to provider for review/approval because:  Drug not on the [facility] refill protocol     PREMARIN cream  Routing refill request to provider for review/approval because:  A break in medication, last prescribed 04/03/2017  Patient needs to be seen because:  Due for physcial  Review age    Giuliana Gamboa, RN, BSN           "

## 2018-06-27 RX ORDER — CLOBETASOL PROPIONATE 0.5 MG/G
OINTMENT TOPICAL
Qty: 30 G | Refills: 0 | Status: SHIPPED | OUTPATIENT
Start: 2018-06-27 | End: 2019-09-16

## 2018-06-27 RX ORDER — CONJUGATED ESTROGENS 0.62 MG/G
0.5 CREAM VAGINAL
Qty: 42.5 G | Refills: 10 | Status: SHIPPED | OUTPATIENT
Start: 2018-06-28 | End: 2019-09-16

## 2018-09-10 DIAGNOSIS — E03.9 HYPOTHYROIDISM, UNSPECIFIED TYPE: ICD-10-CM

## 2018-09-10 NOTE — TELEPHONE ENCOUNTER
"Requested Prescriptions   Pending Prescriptions Disp Refills     levothyroxine (SYNTHROID/LEVOTHROID) 25 MCG tablet 90 tablet 0    Last Written Prescription Date:  05/17/2018  Last Fill Quantity: 90,  # refills: 0   Last office visit: 5/29/2018 with prescribing provider:  05/29/2018DIONNE   Future Office Visit:     Sig: Take 1 tablet (25 mcg) by mouth daily    Thyroid Protocol Passed    9/10/2018  3:31 PM       Passed - Patient is 12 years or older       Passed - Recent (12 mo) or future (30 days) visit within the authorizing provider's specialty    Patient had office visit in the last 12 months or has a visit in the next 30 days with authorizing provider or within the authorizing provider's specialty.  See \"Patient Info\" tab in inbasket, or \"Choose Columns\" in Meds & Orders section of the refill encounter.           Passed - Normal TSH on file in past 12 months    Recent Labs   Lab Test  05/29/18   1604   TSH  2.26             Passed - No active pregnancy on record    If patient is pregnant or has had a positive pregnancy test, please check TSH.         Passed - No positive pregnancy test in past 12 months    If patient is pregnant or has had a positive pregnancy test, please check TSH.        Patient is requesting this medication changed to [facility]     "

## 2018-09-12 RX ORDER — LEVOTHYROXINE SODIUM 25 UG/1
25 TABLET ORAL DAILY
Qty: 90 TABLET | Refills: 1 | Status: SHIPPED | OUTPATIENT
Start: 2018-09-12 | End: 2019-03-11

## 2018-10-25 ENCOUNTER — OFFICE VISIT (OUTPATIENT)
Dept: FAMILY MEDICINE | Facility: CLINIC | Age: 63
End: 2018-10-25
Payer: COMMERCIAL

## 2018-10-25 VITALS
HEART RATE: 82 BPM | OXYGEN SATURATION: 97 % | BODY MASS INDEX: 21.53 KG/M2 | TEMPERATURE: 96.9 F | DIASTOLIC BLOOD PRESSURE: 64 MMHG | RESPIRATION RATE: 14 BRPM | SYSTOLIC BLOOD PRESSURE: 118 MMHG | WEIGHT: 120 LBS

## 2018-10-25 DIAGNOSIS — F17.200 TOBACCO USE DISORDER: ICD-10-CM

## 2018-10-25 DIAGNOSIS — J20.9 ACUTE BRONCHITIS WITH SYMPTOMS > 10 DAYS: ICD-10-CM

## 2018-10-25 DIAGNOSIS — R10.32 LEFT LOWER QUADRANT PAIN: Primary | ICD-10-CM

## 2018-10-25 PROCEDURE — 99214 OFFICE O/P EST MOD 30 MIN: CPT | Performed by: FAMILY MEDICINE

## 2018-10-25 RX ORDER — AZITHROMYCIN 250 MG/1
TABLET, FILM COATED ORAL
Qty: 6 TABLET | Refills: 0 | Status: SHIPPED | OUTPATIENT
Start: 2018-10-25 | End: 2019-03-21

## 2018-10-25 ASSESSMENT — PAIN SCALES - GENERAL: PAINLEVEL: MILD PAIN (2)

## 2018-10-25 NOTE — MR AVS SNAPSHOT
After Visit Summary   10/25/2018    Jackie Siddiqi    MRN: [id number]           Patient Information     Date Of Birth          1955        Visit Information        Provider Department      10/25/2018 2:40 PM Jovana Quinonez MD [facility]        Today's Diagnoses     Left lower quadrant pain    -  1    Acute bronchitis with symptoms > 10 days        Tobacco use disorder           Follow-ups after your visit        Follow-up notes from your care team     Return if symptoms worsen or fail to improve.      Who to contact     If you have questions or need follow up information about today's clinic visit or your schedule please contact [facility] directly at [phone].  Normal or non-critical lab and imaging results will be communicated to you by MyChart, letter or phone within 4 business days after the clinic has received the results. If you do not hear from us within 7 days, please contact the clinic through Polimaxhart or phone. If you have a critical or abnormal lab result, we will notify you by phone as soon as possible.  Submit refill requests through [employer] or call your pharmacy and they will forward the refill request to us. Please allow 3 business days for your refill to be completed.          Additional Information About Your Visit        MyChart Information     [employer] gives you secure access to your electronic health record. If you see a primary care provider, you can also send messages to your care team and make appointments. If you have questions, please call your primary care clinic.  If you do not have a primary care provider, please call [phone] and they will assist you.        Care EveryWhere ID     This is your Care EveryWhere ID. This could be used by other organizations to access your [facility] medical records  [id number]        Your Vitals Were     Pulse Temperature Respirations Last Period Pulse Oximetry Breastfeeding?    82 96.9  F (36.1  C)  (Temporal) 14 03/26/2003 97% No    BMI (Body Mass Index)                   21.53 kg/m2            Blood Pressure from Last 3 Encounters:   10/25/18 118/64   05/29/18 110/64   01/04/18 130/62    Weight from Last 3 Encounters:   10/25/18 120 lb (54.4 kg)   05/29/18 119 lb 9.6 oz (54.3 kg)   03/02/18 121 lb (54.9 kg)              Today, you had the following     No orders found for display         Today's Medication Changes          These changes are accurate as of 10/25/18 11:59 PM.  If you have any questions, ask your nurse or doctor.               Start taking these medicines.        Dose/Directions    azithromycin 250 MG tablet   Commonly known as:  ZITHROMAX   Used for:  Acute bronchitis with symptoms > 10 days   Started by:  Jovana Quinonez MD        Two tablets first day, then one tablet daily for four days.   Quantity:  6 tablet   Refills:  0            Where to get your medicines      [facility] [place] [address] [facility]   [address] [place] [address] , [facility] [address]     Phone:  [phone]     azithromycin 250 MG tablet                Primary Care Provider Office Phone # Fax #    Jovana Wooten Mai, [phone]       [address] [place] [address]   [facility] [address]        Equal Access to Services     [employer] AH: Hadii rajesh marinelli hadasho Soomaali, waaxda luqadaha, qaybta kaalmada adeegyada, waxaleks vora haybambi swartz. So [facility] [phone].    ATENCIÓN: Si habla español, tiene a randall disposición servicios gratuitos de asistencia lingüística. Llame al [phone].    We comply with applicable federal civil rights laws and [place] laws. We do not discriminate on the basis of race, color, national origin, age, disability, sex, sexual orientation, or gender identity.            Thank you!     Thank you for choosing [facility]  for your care. Our goal is always to provide you with excellent care. Hearing back from our patients is one way we  can continue to improve our services. Please take a few minutes to complete the written survey that you may receive in the mail after your visit with us. Thank you!             Your Updated Medication List - Protect others around you: Learn how to safely use, store and throw away your medicines at www.disposemymeds.org.          This list is accurate as of 10/25/18 11:59 PM.  Always use your most recent med list.                   Brand Name Dispense Instructions for use Diagnosis    azithromycin 250 MG tablet    ZITHROMAX    6 tablet    Two tablets first day, then one tablet daily for four days.    Acute bronchitis with symptoms > 10 days       clobetasol 0.05 % ointment    TEMOVATE    30 g    Apply a small pea size amount at bedtime for 2 weeks then decrease it to a few times a week then weekly    Vulvar pruritus       levothyroxine 25 MCG tablet    SYNTHROID/LEVOTHROID    90 tablet    Take 1 tablet (25 mcg) by mouth daily    Hypothyroidism, unspecified type       order for DME     1 Device    Equipment being ordered: Arm Band    Lateral epicondylitis of left elbow       PREMARIN cream   Generic drug:  conjugated estrogens     42.5 g    Place 0.5 g vaginally twice a week    Atrophic vaginitis

## 2018-10-25 NOTE — NURSING NOTE
Chief Complaint   Patient presents with     Abdominal Pain     llq x 1 year     Cough     x4 weeks     Health Maintenance Due   Topic Date Due     TOBACCO CESSATION COUNSELING Q1 YR  1955     ADVANCE DIRECTIVE PLANNING Q5 YRS  01/11/2018     INFLUENZA VACCINE (1) 09/01/2018     Mary Serrano, [facility]

## 2018-10-25 NOTE — PROGRESS NOTES
SUBJECTIVE:   Jackie Siddiqi is a 63 year old female who presents to clinic today for the following health issues:    ABDOMINAL PAIN     Onset: 1 year    Description:   Character: stinging, sharp  Location: left upper quadrant left lower quadrant  Radiation: Back    Intensity: 2/10, 8/10    Progression of Symptoms:  intermittent    Accompanying Signs & Symptoms:  Fever/Chills?: no   Gas/Bloating: YES- gas  Nausea: YES- nausea  Vomitting: YES- sometimes  Diarrhea?: no   Constipation:YES  Dysuria or Hematuria: no    History:   Trauma: no   Previous similar pain: YES- for  The past year   Previous tests done: Colonoscopy and ultrasound    Precipitating factors:   Does the pain change with:     Food: no      BM: YES    Urination: no     Alleviating factors:  none    Therapies Tried and outcome: none    LMP:  not applicable     Jackie is here today for worsening of the lower abdominal that she has bee having for almost 2 years. Pain is mainly in the LLQ and lower abdomen.  Been radiating up along the left flank to underneath the rib cage.  Also has back pain that comes and goes.  It is a nagging constant pain.  Initially it was on and off in nature but in the last year, it has become constant.  The pain had gotten worse the last several months.  Workup included abdominal and pelvic CT scan, colonoscopy, pelvic ultrasound and HIDA scan were normal.  No kidney stone.  She saw a urologist and had a cystoscopy which was normal as well.  She saw Dr. Spear, a general surgeon who thought her pain was due to skeletal muscle in nature and and recommended close monitoring.  She was recommended to follow-up if the pain gets worse or persists.  Had  with adhesion that was released once.  The pain has affected her significantly.  It is not worst with urination and she has no trouble with urination.  Pain is worse with defecation, even with a soft stool.  No black or bloody stool.  Also has the back pain that comes and  goes.  Does not feel the pain is relating to the abdominal pain. no fever or chills.  No abnormal discharge.       Acute Illness   Acute illness concerns: cough  Onset: 1 month    Fever: no    Chills/Sweats: no    Headache (location?): no    Sinus Pressure:no    Conjunctivitis:  no    Ear Pain: no    Rhinorrhea: YES    Congestion: no    Sore Throat: no     Cough: YES-productive of yellow sputum    Wheeze: no    Decreased Appetite: no    Nausea: no    Vomiting: no    Diarrhea:  no    Dysuria/Freq.: no    Fatigue/Achiness: YES    Sick/Strep Exposure: no     Therapies Tried and outcome: vitamin C    Also been coughing for over a month and it is getting worse lately.  Cough is productive with yellow sputum.  Has some runny nose but no nasal congestions or sore throat.  No sinus pain or pressure.  No headache or dizziness.  No chest pain or shortness of breath.  No fever, chills, nausea or vomiting.  Has not been smoking for a while, was a smoker for years.  No chest tightness sensations or wheezing.  The cough is worse at night.  No history of asthma or COPD.  No other concern.    Problem list and histories reviewed & adjusted, as indicated.  Additional history: as documented    Current Outpatient Prescriptions   Medication Sig Dispense Refill     azithromycin (ZITHROMAX) 250 MG tablet Two tablets first day, then one tablet daily for four days. 6 tablet 0     clobetasol (TEMOVATE) 0.05 % ointment Apply a small pea size amount at bedtime for 2 weeks then decrease it to a few times a week then weekly 30 g 0     levothyroxine (SYNTHROID/LEVOTHROID) 25 MCG tablet Take 1 tablet (25 mcg) by mouth daily 90 tablet 1     order for DME Equipment being ordered: Arm Band 1 Device 0     PREMARIN cream Place 0.5 g vaginally twice a week 42.5 g 10     Allergies   Allergen Reactions     No Known Drug Allergies        Reviewed and updated as needed this visit by clinical staff  Tobacco  Allergies  Meds  Soc Hx      Reviewed and  updated as needed this visit by Provider         ROS:  Constitutional, HEENT, cardiovascular, pulmonary, gi and gu systems are negative, except as otherwise noted.    OBJECTIVE:     /64 (BP Location: Right arm, Patient Position: Sitting, Cuff Size: Adult Regular)  Pulse 82  Temp 96.9  F (36.1  C) (Temporal)  Resp 14  Wt 120 lb (54.4 kg)  LMP 2003  SpO2 97%  Breastfeeding? No  BMI 21.53 kg/m2  Body mass index is 21.53 kg/(m^2).  GENERAL: healthy, alert and no distress.  Speaking in full sentences.  HENT: ear canals and TM's normal.  Nares are non-congested. Oropharynx is pink and moist. No tender with palpation to the sinuses.  NECK: no adenopathy, supple, no lymphadenopathy or thyromegaly.  RESP: lungs clear to auscultation - no rales, rhonchi or wheezes  CV: regular rate and rhythm, no murmur, no peripheral edema and peripheral pulses strong  ABDOMEN: soft, nondistended, no palpable masses or organomegaly with normal bowel sounds.  Tender with guarding but no rebound with palpation to the lower left quadrants and across lower abdomen along the  incision.  No CVA tenderness.    MS: no gross musculoskeletal defects noted, no edema.  Hip exam were normal.  Active straight leg maneuver.  Minimal tenderness without guarding no rebound with palpation to the lumbar sacral spine or its paraspinous muscle.    Diagnostic Test Results:  none    ASSESSMENT/PLAN:     1. Left lower quadrant pain  Chronic condition that she has for couple years.  It has gotten worse.  Was seen by the GYN and its extensive work up was negative.  Also saw a urologist and general surgeon for this.  Workup including negative CT scan of the abdomen and pelvis, pelvic ultrasound, HIDA scan, colonoscopy and cystoscopy.  The general surgeon thought that her pain may be a skeletal muscle in nature.  She is known to have adhesion from the  that required surgical release.  No fever.  No diarrhea or constipation.  She  does not look acutely sick.  Physical exam showed no concerned finding.  Unlikely this is diverticulitis.  No history of kidney stone.  This is the same kind of pain as he has had for couple years.  Physical exam today again does not suggest of skeletal muscle or radicular pain from the low back.  I am concerned that she may have adhesion from the previous .  Will hold off on repeating CT scan or further lab work for now.  Referral back to general surgeon for reevaluation and management.  In the meantime, encouraged her to drink a lot of water and increase fiber intake, keep the stool soft side.  Tylenol or Motrin as needed for pain.  She feels comfortable with the plan.  Instructed to go to the emergency room if the pain getting worse or develops fever/chills, bloody stool or if has any concern.  She feels comfortable with the plan.    2. Acute bronchitis with symptoms > 10 days  Discussed with patient about the nature of the condition.  Started her a 5 days course of Zithromax and encourage her to complete the whole course of antibiotic as prescribed.  Call in if not improve or worsening of he symptoms.  All of her questions were answered.    - azithromycin (ZITHROMAX) 250 MG tablet; Two tablets first day, then one tablet daily for four days.  Dispense: 6 tablet; Refill: 0    3. Tobacco use disorder  Encouraged to keep up the good work with stop smoking.      Jovana Wooten Mai, MD  [facility]

## 2018-10-25 NOTE — Clinical Note
Please  refer her to Dr. Spear,  general surgeon as soon as possible for re-evaluation of lower abdominal pain.

## 2018-11-01 ENCOUNTER — OFFICE VISIT (OUTPATIENT)
Dept: SURGERY | Facility: CLINIC | Age: 63
End: 2018-11-01
Payer: COMMERCIAL

## 2018-11-01 VITALS
WEIGHT: 119.9 LBS | DIASTOLIC BLOOD PRESSURE: 60 MMHG | SYSTOLIC BLOOD PRESSURE: 102 MMHG | HEART RATE: 72 BPM | OXYGEN SATURATION: 97 % | BODY MASS INDEX: 21.51 KG/M2

## 2018-11-01 DIAGNOSIS — R19.8 IRREGULAR BOWEL HABITS: ICD-10-CM

## 2018-11-01 DIAGNOSIS — R10.32 LLQ ABDOMINAL PAIN: Primary | ICD-10-CM

## 2018-11-01 PROCEDURE — 99214 OFFICE O/P EST MOD 30 MIN: CPT | Performed by: SPECIALIST

## 2018-11-01 NOTE — LETTER
11/1/2018         RE: Jackie Siddiqi  [address]  [place] [address]        Dear Colleague,    Thank you for referring your patient, Jackie Siddiqi, to the [facility]. Please see a copy of my visit note below.    F/U for LLQ pain     Subjective:  Patient is a 62-year-old white female who originally presented 01/18 with a several month history of left lower quadrant pain. It was described as intermittent and severe up until about a week ago when she was last seen. There was no association with meals. The pain this was described as sharp and radiating around the back. 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 She had an extensive workup including multiple imaging studies as well as a colonoscopy. Her colonoscopy only revealed a small polyp and her CT/ultrasound revealed some gallstones. She denies any nausea, vomiting, fever, chills, right upper quadrant pain or symptoms associated with fatty meals.       The pain has been waxing and waning since she was last seen.  She also reports occasional irregular BMs.  Thinks pain might be worse on days she skip a BM.    No nausea, vomiting, fevers or chills.        Abd: Soft, non distended, minimal LLQ tenderness to deep palpation.  No hernias  Back: LS spine tenderness    US -   ABDOMINAL ULTRASOUND  11/21/2017 11:05 AM      HISTORY: Abdominal pain, left lower quadrant.     COMPARISON: None.     FINDINGS:    Gallbladder: Multiple shadowing stones. Gallbladder wall thickness is  normal.     Bile ducts: CHD is normal diameter. No intrahepatic biliary  dilatation.     Liver: Normal.      Pancreas Normal     Spleen: Normal.      Right kidney: Normal.      Left kidney: Normal.     Aorta and IVC: Normal.      Additional findings: Ultrasound targeted to the area of pain in the  left lower quadrant shows no abnormality.         IMPRESSION:    1. Cholelithiasis. No sonographic evidence of acute  cholecystitis.  2. Otherwise unremarkable abdominal ultrasound.     WENDY ARCHULETA MD    CT -   CT ABDOMEN AND PELVIS WITHOUT CONTRAST  1/2/2018 1:01 PM     HISTORY: Left lower quadrant abdominal pain. The concern is for a  urinary tract calculus.     COMPARISON: A CT with contrast on 2/12/2017.     TECHNIQUE: Routine transverse CT imaging of the abdomen and pelvis was  performed without contrast. Radiation dose for this scan was reduced  using automated exposure control, adjustment of the mA and/or kV  according to patient size, or iterative reconstruction technique.     FINDINGS: The visualized lung bases are clear. The kidneys, ureters,  and bladder are unremarkable. Specifically, no calculus,  hydronephrosis, or other evidence of obstruction is demonstrated. The  liver, spleen, and pancreas are normal. Again seen are numerous small  layering gallstones. The adrenal glands are normal. No enlarged lymph  node or other abnormal mass is demonstrated. No free fluid is seen. No  free intraperitoneal gas is identified. There are a few scattered  diverticula of the distal colon with no evidence of diverticulitis. No  other gastrointestinal tract abnormality is seen. The appendix is not  definitely seen. There is no additional evidence of appendicitis. No  vascular abnormality is seen. The osseous structures are unremarkable.  No abdominal or pelvic wall pathology is demonstrated. I see no  definite change since the previous examination.         IMPRESSION:   1. No acute abnormality is seen. Specifically, no urinary tract  calculus or evidence of obstruction is demonstrated.   2. Cholelithiasis with no evidence of cholecystitis.     DANY EMMANUEL MD      HIDA -   NM HEPATOBILIARY SCAN WITH GB EF 1/3/2018 2:58 PM     HISTORY: Pain.     PROCEDURE: 5.2 mCi of Tc 99m Mebrofenin is given intravenously for  this study. For the gallbladder ejection fraction portion of the  study, 1.1 mcg of CCK is given  intravenously.     FINDINGS: Gallbladder activity is identified at 25 minutes into the  study. Bowel activity is identified at 50 minutes into the study. The  gallbladder ejection fraction is calculated to be 49%. Usually greater  than 35% is considered normal.         IMPRESSION:  1. Normal visualization of gallbladder.  2. Gallbladder ejection fraction is within normal limits.      DOMINICK OSMAN MD       Colonoscopy report reviewed    Impression/plan:  This is a 62-year-old lady with left lower quadrant pain of unclear etiology. I suspect it was originally musculoskeletal in origin as it did radiate around from the back. On exam she has no hernias and she is only minimally tender in the left lower quadrant. She does have some gallstones but does appear to be asymptomatic at this time.    She continued to have the pain since she was last seen my be.  She also reports worsening with missed BMs.  I suspect this may be due some form of constipation.   I discussed the options of miralax vs dx laparoscopy.   She wishes to try miralax and first.      If she does not improve will consider dx laparoscopy.  She will f/u with me in 3-4 weeks.    Paxton Spear MD, FACS      Again, thank you for allowing me to participate in the care of your patient.        Sincerely,        Paxton Spear MD

## 2018-11-01 NOTE — PROGRESS NOTES
F/U for LLQ pain     Subjective:  Patient is a 62-year-old white female who originally presented 01/18 with a several month history of left lower quadrant pain. It was described as intermittent and severe up until about a week ago when she was last seen. There was no association with meals. The pain this was described as sharp and radiating around the back. 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 She had an extensive workup including multiple imaging studies as well as a colonoscopy. Her colonoscopy only revealed a small polyp and her CT/ultrasound revealed some gallstones. She denies any nausea, vomiting, fever, chills, right upper quadrant pain or symptoms associated with fatty meals.       The pain has been waxing and waning since she was last seen.  She also reports occasional irregular BMs.  Thinks pain might be worse on days she skip a BM.    No nausea, vomiting, fevers or chills.        Abd: Soft, non distended, minimal LLQ tenderness to deep palpation.  No hernias  Back: LS spine tenderness    US -   ABDOMINAL ULTRASOUND  11/21/2017 11:05 AM      HISTORY: Abdominal pain, left lower quadrant.     COMPARISON: None.     FINDINGS:    Gallbladder: Multiple shadowing stones. Gallbladder wall thickness is  normal.     Bile ducts: CHD is normal diameter. No intrahepatic biliary  dilatation.     Liver: Normal.      Pancreas Normal     Spleen: Normal.      Right kidney: Normal.      Left kidney: Normal.     Aorta and IVC: Normal.      Additional findings: Ultrasound targeted to the area of pain in the  left lower quadrant shows no abnormality.         IMPRESSION:    1. Cholelithiasis. No sonographic evidence of acute cholecystitis.  2. Otherwise unremarkable abdominal ultrasound.     WENDY ARCHULETA MD    CT -   CT ABDOMEN AND PELVIS WITHOUT CONTRAST  1/2/2018 1:01 PM     HISTORY: Left lower quadrant abdominal pain. The concern is for a  urinary tract  calculus.     COMPARISON: A CT with contrast on 2/12/2017.     TECHNIQUE: Routine transverse CT imaging of the abdomen and pelvis was  performed without contrast. Radiation dose for this scan was reduced  using automated exposure control, adjustment of the mA and/or kV  according to patient size, or iterative reconstruction technique.     FINDINGS: The visualized lung bases are clear. The kidneys, ureters,  and bladder are unremarkable. Specifically, no calculus,  hydronephrosis, or other evidence of obstruction is demonstrated. The  liver, spleen, and pancreas are normal. Again seen are numerous small  layering gallstones. The adrenal glands are normal. No enlarged lymph  node or other abnormal mass is demonstrated. No free fluid is seen. No  free intraperitoneal gas is identified. There are a few scattered  diverticula of the distal colon with no evidence of diverticulitis. No  other gastrointestinal tract abnormality is seen. The appendix is not  definitely seen. There is no additional evidence of appendicitis. No  vascular abnormality is seen. The osseous structures are unremarkable.  No abdominal or pelvic wall pathology is demonstrated. I see no  definite change since the previous examination.         IMPRESSION:   1. No acute abnormality is seen. Specifically, no urinary tract  calculus or evidence of obstruction is demonstrated.   2. Cholelithiasis with no evidence of cholecystitis.     DANY EMMANUEL MD      HIDA -   NM HEPATOBILIARY SCAN WITH GB EF 1/3/2018 2:58 PM     HISTORY: Pain.     PROCEDURE: 5.2 mCi of Tc 99m Mebrofenin is given intravenously for  this study. For the gallbladder ejection fraction portion of the  study, 1.1 mcg of CCK is given intravenously.     FINDINGS: Gallbladder activity is identified at 25 minutes into the  study. Bowel activity is identified at 50 minutes into the study. The  gallbladder ejection fraction is calculated to be 49%. Usually greater  than 35% is considered  normal.         IMPRESSION:  1. Normal visualization of gallbladder.  2. Gallbladder ejection fraction is within normal limits.      DOMINICK OSMAN MD       Colonoscopy report reviewed    Impression/plan:  This is a 62-year-old lady with left lower quadrant pain of unclear etiology. I suspect it was originally musculoskeletal in origin as it did radiate around from the back. On exam she has no hernias and she is only minimally tender in the left lower quadrant. She does have some gallstones but does appear to be asymptomatic at this time.    She continued to have the pain since she was last seen my be.  She also reports worsening with missed BMs.  I suspect this may be due some form of constipation.   I discussed the options of miralax vs dx laparoscopy.   She wishes to try miralax and first.      If she does not improve will consider dx laparoscopy.  She will f/u with me in 3-4 weeks.    Paxton Spear MD, FACS

## 2018-11-01 NOTE — MR AVS SNAPSHOT
After Visit Summary   11/1/2018    Jackie Siddiqi    MRN: [id number]           Patient Information     Date Of Birth          1955        Visit Information        Provider Department      11/1/2018 10:00 AM Paxton Spear MD [facility] Instructions      How to Quit Smoking  Smoking is one of the hardest habits to break. About half of all people who have ever smoked have been able to quit. Most people who still smoke want to quit. Here are some of the best ways to stop smoking.    Keep trying  Most smokers make many attempts at quitting before they are successful. It s important not to give up.  Go cold turkey  Most former smokers quit cold turkey (all at once). Trying to cut back gradually doesn't seem to work as well, perhaps because it continues the smoking habit. Also, it is possible to inhale more while smoking fewer cigarettes. This results in the same amount of nicotine in your body.  Get support  Support programs can be a big help, especially for heavy smokers. These groups offer lectures, ways to change behavior, and peer support. Here are some ways to find a support program:    Free national quitline: 800[employer] GroupQUIT-NOW (466-168-8192).    [facility] quit-smoking programs.    American Lung Association: (253.270.7510).    American Cancer Society (727-444-0661).  Support at home is important too. Nonsmokers can offer praise and encouragement. If the smoker in your life finds it hard to quit, encourage them to keep trying.  Over-the-counter medicines  Nicotine replacement therapy may make quitting easier. Certain aids, such as the nicotine patch, gum, and lozenges, are available without a prescription. It is best to use these under a doctor s care, though. The skin patch provides a steady supply of nicotine. Nicotine gum and lozenges give temporary bursts of low levels of nicotine. Both methods reduce the craving for cigarettes. Warning: If you have nausea, vomiting,  "dizziness, weakness, or a fast heartbeat, stop using these products and see your doctor.  Prescription medicines  After reviewing your smoking patterns and past attempts to quit, your doctor may offer a prescription medicine such as bupropion, varenicline, a nicotine inhaler, or nasal spray. Each has advantages and side effects. Your doctor can review these with you.  Health benefits of quitting  The benefits of quitting start right away and keep improving the longer you go without smoking. These benefits occur at any age.  So whether you are 17 or 70, quitting is a good decision. Some of the benefits include:    20 minutes: Blood pressure and pulse return to normal.    8 hours: Oxygen levels return to normal.    2 days: Ability to smell and taste begin to improve as damaged nerves regrow.    2 to 3 weeks: Circulation and lung function improve.    1 to 9 months: Coughing, congestion, and shortness of breath decrease; tiredness decreases.    1 year: Risk of heart attack decreases by half.    5 years: Risk of lung cancer decreases by half; risk of stroke becomes the same as a nonsmoker s.  For more on how to quit smoking, try these online resources:     Smokefree.gov    \"Clearing the Air\" booklet from the National Cancer [place]: smokefree.gov/sites/default/files/pdf/clearing-the-air-accessible.pdf  Date Last Reviewed: 3/1/2017    5561-6609 [employer]. [address] [place] [address]. All rights reserved. This information is not intended as a substitute for professional medical care. Always follow your healthcare professional's instructions.                Follow-ups after your visit        Who to contact     If you have questions or need follow up information about today's clinic visit or your schedule please contact [facility] directly at [phone].  Normal or non-critical lab and imaging results will be communicated to you by MyChart, letter or phone within 4 business " days after the clinic has received the results. If you do not hear from us within 7 days, please contact the clinic through [employer] or phone. If you have a critical or abnormal lab result, we will notify you by phone as soon as possible.  Submit refill requests through [employer] or call your pharmacy and they will forward the refill request to us. Please allow 3 business days for your refill to be completed.          Additional Information About Your Visit        [employer] Information     [employer] gives you secure access to your electronic health record. If you see a primary care provider, you can also send messages to your care team and make appointments. If you have questions, please call your primary care clinic.  If you do not have a primary care provider, please call [phone] and they will assist you.        Care EveryWhere ID     This is your Care EveryWhere ID. This could be used by other organizations to access your [facility] medical records  [id number]        Your Vitals Were     Pulse Last Period Pulse Oximetry BMI (Body Mass Index)          72 03/26/2003 97% 21.51 kg/m2         Blood Pressure from Last 3 Encounters:   11/01/18 102/60   10/25/18 118/64   05/29/18 110/64    Weight from Last 3 Encounters:   11/01/18 54.4 kg (119 lb 14.4 oz)   10/25/18 54.4 kg (120 lb)   05/29/18 54.3 kg (119 lb 9.6 oz)              Today, you had the following     No orders found for display       Primary Care Provider Office Phone # Fax #    Alvaroreji Wooten Mai, [phone]       [address] [place] [address]        Equal Access to Services     [facility]: Hadii aad ku hadasho Soomaali, waaxda luqadaha, qaybta kaalmada adejohn, guzman cárdenas . So [facility] [phone].    ATENCIÓN: Si habla español, tiene a randall disposición servicios gratuitos de asistencia lingüística. Llame al [phone].    We comply with applicable federal civil rights laws and [place] laws. We do not  discriminate on the basis of race, color, national origin, age, disability, sex, sexual orientation, or gender identity.            Thank you!     Thank you for choosing [facility]  for your care. Our goal is always to provide you with excellent care. Hearing back from our patients is one way we can continue to improve our services. Please take a few minutes to complete the written survey that you may receive in the mail after your visit with us. Thank you!             Your Updated Medication List - Protect others around you: Learn how to safely use, store and throw away your medicines at www.disposemymeds.org.          This list is accurate as of 11/1/18 10:19 AM.  Always use your most recent med list.                   Brand Name Dispense Instructions for use Diagnosis    azithromycin 250 MG tablet    ZITHROMAX    6 tablet    Two tablets first day, then one tablet daily for four days.    Acute bronchitis with symptoms > 10 days       clobetasol 0.05 % ointment    TEMOVATE    30 g    Apply a small pea size amount at bedtime for 2 weeks then decrease it to a few times a week then weekly    Vulvar pruritus       levothyroxine 25 MCG tablet    SYNTHROID/LEVOTHROID    90 tablet    Take 1 tablet (25 mcg) by mouth daily    Hypothyroidism, unspecified type       order for DME     1 Device    Equipment being ordered: Arm Band    Lateral epicondylitis of left elbow       PREMARIN cream   Generic drug:  conjugated estrogens     42.5 g    Place 0.5 g vaginally twice a week    Atrophic vaginitis

## 2018-11-01 NOTE — PATIENT INSTRUCTIONS

## 2018-12-31 NOTE — ED AVS SNAPSHOT
[facility] Emergency Department    [address] [place] [address]    Phone:  [phone]    Fax:  [phone]                                       Jackie Siddiqi   MRN: [id number]    Department:  [facility] Emergency Department   Date of Visit:  2/12/2017           After Visit Summary Signature Page     I have received my discharge instructions, and my questions have been answered. I have discussed any challenges I see with this plan with the nurse or doctor.    ..........................................................................................................................................  Patient/Patient Representative Signature      ..........................................................................................................................................  Patient Representative Print Name and Relationship to Patient    ..................................................               ................................................  Date                                            Time    ..........................................................................................................................................  Reviewed by Signature/Title    ...................................................              ..............................................  Date                                                            Time           Dexa scan is normal for age

## 2019-03-11 DIAGNOSIS — E03.9 HYPOTHYROIDISM, UNSPECIFIED TYPE: ICD-10-CM

## 2019-03-11 RX ORDER — LEVOTHYROXINE SODIUM 25 UG/1
25 TABLET ORAL DAILY
Qty: 90 TABLET | Refills: 1 | Status: SHIPPED | OUTPATIENT
Start: 2019-03-11 | End: 2019-09-19

## 2019-03-11 NOTE — TELEPHONE ENCOUNTER
"Synthroid  Last Written Prescription Date:  09/12/2018  Last Fill Quantity: 90,  # refills: 1   Last office visit: 10/25/2018 with prescribing provider:  Devi   Future Office Visit:  None    Requested Prescriptions   Pending Prescriptions Disp Refills     levothyroxine (SYNTHROID/LEVOTHROID) 25 MCG tablet [Pharmacy Med Name: LEVOTHYROXINE 25 MCG TABLET] 90 tablet 1     Sig: TAKE 1 TABLET (25 MCG) BY MOUTH DAILY    Thyroid Protocol Passed - 3/11/2019  1:28 AM       Passed - Patient is 12 years or older       Passed - Recent (12 mo) or future (30 days) visit within the authorizing provider's specialty    Patient had office visit in the last 12 months or has a visit in the next 30 days with authorizing provider or within the authorizing provider's specialty.  See \"Patient Info\" tab in inbasket, or \"Choose Columns\" in Meds & Orders section of the refill encounter.         Passed - Medication is active on med list       Passed - Normal TSH on file in past 12 months    Recent Labs   Lab Test 05/29/18  1604   TSH 2.26          Passed - No active pregnancy on record    If patient is pregnant or has had a positive pregnancy test, please check TSH.       Passed - No positive pregnancy test in past 12 months    If patient is pregnant or has had a positive pregnancy test, please check TSH.      Joann Blas RN   "

## 2019-03-21 ENCOUNTER — OFFICE VISIT (OUTPATIENT)
Dept: OTOLARYNGOLOGY | Facility: CLINIC | Age: 64
End: 2019-03-21
Payer: COMMERCIAL

## 2019-03-21 ENCOUNTER — OFFICE VISIT (OUTPATIENT)
Dept: AUDIOLOGY | Facility: CLINIC | Age: 64
End: 2019-03-21
Payer: COMMERCIAL

## 2019-03-21 VITALS
WEIGHT: 122 LBS | DIASTOLIC BLOOD PRESSURE: 65 MMHG | BODY MASS INDEX: 21.89 KG/M2 | SYSTOLIC BLOOD PRESSURE: 107 MMHG | HEART RATE: 69 BPM | OXYGEN SATURATION: 95 %

## 2019-03-21 DIAGNOSIS — M54.2 CERVICALGIA: ICD-10-CM

## 2019-03-21 DIAGNOSIS — H92.02 OTALGIA, LEFT: ICD-10-CM

## 2019-03-21 DIAGNOSIS — J34.2 DEVIATED NASAL SEPTUM: ICD-10-CM

## 2019-03-21 DIAGNOSIS — J32.0 CHRONIC MAXILLARY SINUSITIS: Primary | ICD-10-CM

## 2019-03-21 DIAGNOSIS — H90.3 SENSORINEURAL HEARING LOSS, BILATERAL: Primary | ICD-10-CM

## 2019-03-21 PROCEDURE — 92557 COMPREHENSIVE HEARING TEST: CPT | Performed by: AUDIOLOGIST

## 2019-03-21 PROCEDURE — 99203 OFFICE O/P NEW LOW 30 MIN: CPT | Mod: 25 | Performed by: OTOLARYNGOLOGY

## 2019-03-21 PROCEDURE — 99207 ZZC NO CHARGE LOS: CPT | Performed by: AUDIOLOGIST

## 2019-03-21 PROCEDURE — 31575 DIAGNOSTIC LARYNGOSCOPY: CPT | Performed by: OTOLARYNGOLOGY

## 2019-03-21 PROCEDURE — 92550 TYMPANOMETRY & REFLEX THRESH: CPT | Performed by: AUDIOLOGIST

## 2019-03-21 NOTE — PROGRESS NOTES
AUDIOLOGY REPORT     SUMMARY: Audiology visit completed. See audiogram for results.     RECOMMENDATIONS: Follow-up with ENT    Lalo Oneill Licensed Audiologist #[id number]

## 2019-03-21 NOTE — LETTER
3/21/2019         RE: Jackie Siddiqi  [address]  [place] [address]        Dear Colleague,    Thank you for referring your patient, Jackie Siddiqi, to the [facility]. Please see a copy of my visit note below.    ENT Consultation    Jackie Siddiqi is a 63 year old female who is seen in consultation at the request of .      History of Present Illness - Jackie Siddiqi is a 63 year old female here today for left side ear pain, pressure and feeling of it being plugged, she also post nasal drip from the left side. Sx started about 1 yr ago.  She feels that her ear on the left hears slightly worse than the right.  She gets clear postnasal drainage bilaterally.  She is a smoker even though quite infrequent.  In the left side of her nose feels more congested in the right.  She also experienced some discomfort in the neck going towards her ear on the left side.    Body mass index is 21.89 kg/m .        BP Readings from Last 1 Encounters:   11/01/18 102/60       BP noted to be well controlled today in office.     Jackie IS a smoker/uses chewing tobacco.  Jackie is ready to quit      Past Medical History -   Past Medical History:   Diagnosis Date     Abnormal Papanicolaou smear of cervix and cervical HPV     cryocautery     Breast cystic disease      Hiatal hernia      Motion sickness      Peptic ulcer, unspecified site, unspecified as acute or chronic, without mention of hemorrhage, perforation, or obstruction     Peptic ulcer disease     Personal history of colonic polyps        Current Medications -   Current Outpatient Medications:      azithromycin (ZITHROMAX) 250 MG tablet, Two tablets first day, then one tablet daily for four days. (Patient not taking: Reported on 11/1/2018), Disp: 6 tablet, Rfl: 0     clobetasol (TEMOVATE) 0.05 % ointment, Apply a small pea size amount at bedtime for 2 weeks then decrease it to a few times a week then weekly, Disp: 30 g, Rfl: 0      levothyroxine (SYNTHROID/LEVOTHROID) 25 MCG tablet, TAKE 1 TABLET (25 MCG) BY MOUTH DAILY, Disp: 90 tablet, Rfl: 1     order for DME, Equipment being ordered: Arm Band, Disp: 1 Device, Rfl: 0     PREMARIN cream, Place 0.5 g vaginally twice a week, Disp: 42.5 g, Rfl: 10    Allergies -   Allergies   Allergen Reactions     No Known Drug Allergies        Social History -   Social History     Socioeconomic History     Marital status: Single     Spouse name: Not on file     Number of children: Not on file     Years of education: Not on file     Highest education level: Not on file   Occupational History     Not on file   Social Needs     Financial resource strain: Not on file     Food insecurity:     Worry: Not on file     Inability: Not on file     Transportation needs:     Medical: Not on file     Non-medical: Not on file   Tobacco Use     Smoking status: Current Every Day Smoker     Packs/day: 0.50     Years: 50.00     Pack years: 25.00     Types: Cigarettes     Last attempt to quit: 10/2/2017     Years since quittin.4     Smokeless tobacco: Never Used     Tobacco comment: 1/2  pack per day, started age 11   Substance and Sexual Activity     Alcohol use: No     Alcohol/week: 0.0 oz     Comment: holidays only     Drug use: No     Sexual activity: Not Currently     Comment: divorce, 3 children.   Lifestyle     Physical activity:     Days per week: Not on file     Minutes per session: Not on file     Stress: Not on file   Relationships     Social connections:     Talks on phone: Not on file     Gets together: Not on file     Attends [employer] service: Not on file     Active member of club or organization: Not on file     Attends meetings of clubs or organizations: Not on file     Relationship status: Not on file     Intimate partner violence:     Fear of current or ex partner: Not on file     Emotionally abused: Not on file     Physically abused: Not on file     Forced sexual activity: Not on file   Other Topics  Concern      Service No     Blood Transfusions No     Caffeine Concern No     Occupational Exposure No     Hobby Hazards No     Sleep Concern No     Stress Concern No     Weight Concern No     Special Diet No     Back Care No     Exercise No     Bike Helmet No     Seat Belt Yes     Self-Exams No     Parent/sibling w/ CABG, MI or angioplasty before 65F 55M? Not Asked   Social History Narrative     Not on file       Family History -   Family History   Problem Relation Age of Onset     Diabetes Father      Hypertension Father      Alcohol/Drug Father      Eye Disorder Father      Obesity Father      Breast Cancer Mother      Osteoporosis Mother      Thyroid Disease Mother      Cancer Mother         Bladder     Cancer Sister         fallopian tube cancer     Obesity Sister      Alzheimer Disease Sister      Cancer Sister         Skin Cancer     Allergies Daughter      Cancer Maternal Grandmother         uterine cancer     Liver Disease Daughter         Biliary Atresia     Genitourinary Problems Brother      Heart Disease Brother         Heart Murmur     Genitourinary Problems Brother         kidney disease (two brothers)       Review of Systems - As per HPI and PMHx, otherwise review of system review of the head and neck negative. Otherwise 10+ review systems negative.    Physical Exam  LMP 03/26/2003   BMI: There is no height or weight on file to calculate BMI.    General - The patient is well nourished and well developed, and appears to have good nutritional status.  Alert and oriented to person and place, answers questions and cooperates with examination appropriately.    SKIN - No suspicious lesions or rashes.  Respiration - No respiratory distress.  Head and Face - Normocephalic and atraumatic, with no gross asymmetry noted of the contour of the facial features.  The facial nerve is intact, with strong symmetric movements.    Voice and Breathing - The patient was breathing comfortably without the use of  accessory muscles. The patients voice was clear and strong, and had appropriate pitch and quality.    Ears - Bilateral pinna and EACs with normal appearing overlying skin. Tympanic membrane intact with good mobility on pneumatic otoscopy bilaterally. Bony landmarks of the ossicular chain are normal. The tympanic membranes are normal in appearance. No retraction, perforation, or masses.  No fluid or purulence was seen in the external canal or the middle ear.     Eyes - Extraocular movements intact.  Sclera were not icteric or injected, conjunctiva were pink and moist.    Mouth - Examination of the oral cavity showed pink, healthy oral mucosa. No lesions or ulcerations noted.  The tongue was mobile and midline, and the dentition were in good condition.      Throat - The walls of the oropharynx were smooth, pink, moist, symmetric, and had no lesions or ulcerations.  The tonsillar pillars and soft palate were symmetric.  The uvula was midline on elevation.    Neck - Normal midline excursion of the laryngotracheal complex during swallowing.  Full range of motion on passive movement.  Palpation of the occipital, submental, submandibular, internal jugular chain, and supraclavicular nodes did not demonstrate any abnormal lymph nodes or masses.  The carotid pulse was palpable bilaterally.  Palpation of the thyroid was soft and smooth, with no nodules or goiter appreciated.  The trachea was mobile and midline.  There is definitely some cervicalgia noted tenderness around the sternocleidomastoid level 3 node area.  However no lymphadenopathy or masses palpated on deep palpation.  Nose - External contour is symmetric, no gross deflection or scars.  Nasal mucosa is pink and moist with no abnormal mucus.  The septum was deviated to the left slightly obstructive with a large inferior turbinates bilaterally.  No polyps, masses, or purulence noted on examination.    Neuro - Nonfocal neuro exam is normal, CN 2 through 12 intact,  normal gait and muscle tone.      Performed in clinic today:  Attempts at mirror laryngoscopy were not possible due to gag reflex.  Therefore I proceeded with a fiberoptic examination.  First I sprayed both sides of the nose with a mixture of lidocaine and neosynephrine.  I then passed the scope through the nasal cavity.  The nasal cavity was unremarkable.  The nasopharynx was mucosally covered and symmetric.  The Eustachian tube openings were unobstructed.  Going further down I had a clear view of the base of tongue which had normal appearing lingual tonsillar tissue.  The base of tongue was free of lesions, and the vallecula was open.  The epiglottis was smooth and mucosally covered.  The supraglottic larynx was then clearly visualized.  The vocal cords moved smoothly and symmetrically, they were pearly white and no lesions were seen.  The pyriform sinuses were open, and the limited view of the postcricoid region did not show any lesions.  Looking towards the middle meatus especially in the left side see normal middle turbinate no polyps no lesions.  Light blue - [id number]      A/P - Jackie Siddiqi is a 63 year old female with a nasal obstruction deviated septum large inferior turbinates eustachian tube dysfunction symptoms cervicalgia.  There is still possibility of some other sinus disease.  At this point would like to obtain CT of the sinuses to further understand the anatomy and pathology in that area and then discuss further treatment options for this condition.  Her back in 2 weeks.      Juan Marie MD    Again, thank you for allowing me to participate in the care of your patient.        Sincerely,        Juan Marie MD, MD

## 2019-03-21 NOTE — PROGRESS NOTES
ENT Consultation    Jackie Siddiqi is a 63 year old female who is seen in consultation at the request of .      History of Present Illness - Jackie Siddiqi is a 63 year old female here today for left side ear pain, pressure and feeling of it being plugged, she also post nasal drip from the left side. Sx started about 1 yr ago.  She feels that her ear on the left hears slightly worse than the right.  She gets clear postnasal drainage bilaterally.  She is a smoker even though quite infrequent.  In the left side of her nose feels more congested in the right.  She also experienced some discomfort in the neck going towards her ear on the left side.    Body mass index is 21.89 kg/m .        BP Readings from Last 1 Encounters:   11/01/18 102/60       BP noted to be well controlled today in office.     Jackie IS a smoker/uses chewing tobacco.  Jackie is ready to quit      Past Medical History -   Past Medical History:   Diagnosis Date     Abnormal Papanicolaou smear of cervix and cervical HPV     cryocautery     Breast cystic disease      Hiatal hernia      Motion sickness      Peptic ulcer, unspecified site, unspecified as acute or chronic, without mention of hemorrhage, perforation, or obstruction     Peptic ulcer disease     Personal history of colonic polyps        Current Medications -   Current Outpatient Medications:      azithromycin (ZITHROMAX) 250 MG tablet, Two tablets first day, then one tablet daily for four days. (Patient not taking: Reported on 11/1/2018), Disp: 6 tablet, Rfl: 0     clobetasol (TEMOVATE) 0.05 % ointment, Apply a small pea size amount at bedtime for 2 weeks then decrease it to a few times a week then weekly, Disp: 30 g, Rfl: 0     levothyroxine (SYNTHROID/LEVOTHROID) 25 MCG tablet, TAKE 1 TABLET (25 MCG) BY MOUTH DAILY, Disp: 90 tablet, Rfl: 1     order for DME, Equipment being ordered: Arm Band, Disp: 1 Device, Rfl: 0     PREMARIN cream, Place 0.5 g vaginally twice a week, Disp: 42.5 g,  Rfl: 10    Allergies -   Allergies   Allergen Reactions     No Known Drug Allergies        Social History -   Social History     Socioeconomic History     Marital status: Single     Spouse name: Not on file     Number of children: Not on file     Years of education: Not on file     Highest education level: Not on file   Occupational History     Not on file   Social Needs     Financial resource strain: Not on file     Food insecurity:     Worry: Not on file     Inability: Not on file     Transportation needs:     Medical: Not on file     Non-medical: Not on file   Tobacco Use     Smoking status: Current Every Day Smoker     Packs/day: 0.50     Years: 50.00     Pack years: 25.00     Types: Cigarettes     Last attempt to quit: 10/2/2017     Years since quittin.4     Smokeless tobacco: Never Used     Tobacco comment: 1/2  pack per day, started age 11   Substance and Sexual Activity     Alcohol use: No     Alcohol/week: 0.0 oz     Comment: holidays only     Drug use: No     Sexual activity: Not Currently     Comment: divorce, 3 children.   Lifestyle     Physical activity:     Days per week: Not on file     Minutes per session: Not on file     Stress: Not on file   Relationships     Social connections:     Talks on phone: Not on file     Gets together: Not on file     Attends Oriental orthodox service: Not on file     Active member of club or organization: Not on file     Attends meetings of clubs or organizations: Not on file     Relationship status: Not on file     Intimate partner violence:     Fear of current or ex partner: Not on file     Emotionally abused: Not on file     Physically abused: Not on file     Forced sexual activity: Not on file   Other Topics Concern      Service No     Blood Transfusions No     Caffeine Concern No     Occupational Exposure No     Hobby Hazards No     Sleep Concern No     Stress Concern No     Weight Concern No     Special Diet No     Back Care No     Exercise No     Bike Helmet  No     Seat Belt Yes     Self-Exams No     Parent/sibling w/ CABG, MI or angioplasty before 65F 55M? Not Asked   Social History Narrative     Not on file       Family History -   Family History   Problem Relation Age of Onset     Diabetes Father      Hypertension Father      Alcohol/Drug Father      Eye Disorder Father      Obesity Father      Breast Cancer Mother      Osteoporosis Mother      Thyroid Disease Mother      Cancer Mother         Bladder     Cancer Sister         fallopian tube cancer     Obesity Sister      Alzheimer Disease Sister      Cancer Sister         Skin Cancer     Allergies Daughter      Cancer Maternal Grandmother         uterine cancer     Liver Disease Daughter         Biliary Atresia     Genitourinary Problems Brother      Heart Disease Brother         Heart Murmur     Genitourinary Problems Brother         kidney disease (two brothers)       Review of Systems - As per HPI and PMHx, otherwise review of system review of the head and neck negative. Otherwise 10+ review systems negative.    Physical Exam  LMP 03/26/2003   BMI: There is no height or weight on file to calculate BMI.    General - The patient is well nourished and well developed, and appears to have good nutritional status.  Alert and oriented to person and place, answers questions and cooperates with examination appropriately.    SKIN - No suspicious lesions or rashes.  Respiration - No respiratory distress.  Head and Face - Normocephalic and atraumatic, with no gross asymmetry noted of the contour of the facial features.  The facial nerve is intact, with strong symmetric movements.    Voice and Breathing - The patient was breathing comfortably without the use of accessory muscles. The patients voice was clear and strong, and had appropriate pitch and quality.    Ears - Bilateral pinna and EACs with normal appearing overlying skin. Tympanic membrane intact with good mobility on pneumatic otoscopy bilaterally. Bony landmarks of  the ossicular chain are normal. The tympanic membranes are normal in appearance. No retraction, perforation, or masses.  No fluid or purulence was seen in the external canal or the middle ear.     Eyes - Extraocular movements intact.  Sclera were not icteric or injected, conjunctiva were pink and moist.    Mouth - Examination of the oral cavity showed pink, healthy oral mucosa. No lesions or ulcerations noted.  The tongue was mobile and midline, and the dentition were in good condition.      Throat - The walls of the oropharynx were smooth, pink, moist, symmetric, and had no lesions or ulcerations.  The tonsillar pillars and soft palate were symmetric.  The uvula was midline on elevation.    Neck - Normal midline excursion of the laryngotracheal complex during swallowing.  Full range of motion on passive movement.  Palpation of the occipital, submental, submandibular, internal jugular chain, and supraclavicular nodes did not demonstrate any abnormal lymph nodes or masses.  The carotid pulse was palpable bilaterally.  Palpation of the thyroid was soft and smooth, with no nodules or goiter appreciated.  The trachea was mobile and midline.  There is definitely some cervicalgia noted tenderness around the sternocleidomastoid level 3 node area.  However no lymphadenopathy or masses palpated on deep palpation.  Nose - External contour is symmetric, no gross deflection or scars.  Nasal mucosa is pink and moist with no abnormal mucus.  The septum was deviated to the left slightly obstructive with a large inferior turbinates bilaterally.  No polyps, masses, or purulence noted on examination.    Neuro - Nonfocal neuro exam is normal, CN 2 through 12 intact, normal gait and muscle tone.      Performed in clinic today:  Attempts at mirror laryngoscopy were not possible due to gag reflex.  Therefore I proceeded with a fiberoptic examination.  First I sprayed both sides of the nose with a mixture of lidocaine and neosynephrine.   I then passed the scope through the nasal cavity.  The nasal cavity was unremarkable.  The nasopharynx was mucosally covered and symmetric.  The Eustachian tube openings were unobstructed.  Going further down I had a clear view of the base of tongue which had normal appearing lingual tonsillar tissue.  The base of tongue was free of lesions, and the vallecula was open.  The epiglottis was smooth and mucosally covered.  The supraglottic larynx was then clearly visualized.  The vocal cords moved smoothly and symmetrically, they were pearly white and no lesions were seen.  The pyriform sinuses were open, and the limited view of the postcricoid region did not show any lesions.  Looking towards the middle meatus especially in the left side see normal middle turbinate no polyps no lesions.  Light blue - [id number]      A/P - Jackie Siddiqi is a 63 year old female with a nasal obstruction deviated septum large inferior turbinates eustachian tube dysfunction symptoms cervicalgia.  There is still possibility of some other sinus disease.  At this point would like to obtain CT of the sinuses to further understand the anatomy and pathology in that area and then discuss further treatment options for this condition.  Her back in 2 weeks.      Juan Marie MD

## 2019-04-04 ENCOUNTER — HOSPITAL ENCOUNTER (OUTPATIENT)
Dept: CT IMAGING | Facility: CLINIC | Age: 64
Discharge: HOME | End: 2019-04-04
Attending: OTOLARYNGOLOGY | Admitting: OTOLARYNGOLOGY
Payer: COMMERCIAL

## 2019-04-04 DIAGNOSIS — J32.0 CHRONIC MAXILLARY SINUSITIS: ICD-10-CM

## 2019-04-04 PROCEDURE — 70486 CT MAXILLOFACIAL W/O DYE: CPT

## 2019-04-15 ENCOUNTER — OFFICE VISIT (OUTPATIENT)
Dept: SURGERY | Facility: OTHER | Age: 64
End: 2019-04-15
Payer: COMMERCIAL

## 2019-04-15 VITALS
WEIGHT: 122.4 LBS | DIASTOLIC BLOOD PRESSURE: 60 MMHG | HEART RATE: 74 BPM | BODY MASS INDEX: 21.96 KG/M2 | SYSTOLIC BLOOD PRESSURE: 122 MMHG

## 2019-04-15 DIAGNOSIS — R10.32 LLQ ABDOMINAL PAIN: Primary | ICD-10-CM

## 2019-04-15 DIAGNOSIS — R19.8 IRREGULAR BOWEL HABITS: ICD-10-CM

## 2019-04-15 PROCEDURE — 99214 OFFICE O/P EST MOD 30 MIN: CPT | Performed by: SPECIALIST

## 2019-04-15 NOTE — NURSING NOTE
Patient given referral with phone number to [facility] , she will call and schedule appointment per her schedule. Patient will also check with her insurance company for other GI providers she may see as she doesn't want to wait long for an appointment. ...................Penelope Jacobsen CMA  ([facility])

## 2019-04-15 NOTE — PATIENT INSTRUCTIONS
Patient Education     How to Quit Smoking  Smoking is one of the hardest habits to break. About half of all people who have ever smoked have been able to quit. Most people who still smoke want to quit. Here are some of the best ways to stop smoking.    Keep trying  Most smokers make many attempts at quitting before they are successful. It s important not to give up.  Go cold turkey  Most former smokers quit cold turkey (all at once). Trying to cut back gradually doesn't seem to work as well, perhaps because it continues the smoking habit. Also, it is possible to inhale more while smoking fewer cigarettes. This results in the same amount of nicotine in your body.  Get support  Support programs can be a big help, especially for heavy smokers. These groups offer lectures, ways to change behavior, and peer support. Here are some ways to find a support program:    Free national quitline: 800-QUIT-NOW (845-524-3749).    [facility] quit-smoking programs.    American Lung Association: (910.390.4210).    American Cancer Society (321-543-9278).  Support at home is important too. Nonsmokers can offer praise and encouragement. If the smoker in your life finds it hard to quit, encourage them to keep trying.  Over-the-counter medicines  Nicotine replacement therapy may make quitting easier. Certain aids, such as the nicotine patch, gum, and lozenges, are available without a prescription. It is best to use these under a doctor s care, though. The skin patch provides a steady supply of nicotine. Nicotine gum and lozenges give temporary bursts of low levels of nicotine. Both methods reduce the craving for cigarettes. Warning: If you have nausea, vomiting, dizziness, weakness, or a fast heartbeat, stop using these products and see your doctor.  Prescription medicines  After reviewing your smoking patterns and past attempts to quit, your doctor may offer a prescription medicine such as bupropion, varenicline, a nicotine inhaler, or  "nasal spray. Each has advantages and side effects. Your doctor can review these with you.  Health benefits of quitting  The benefits of quitting start right away and keep improving the longer you go without smoking. These benefits occur at any age.  So whether you are 17 or 70, quitting is a good decision. Some of the benefits include:    20 minutes: Blood pressure and pulse return to normal.    8 hours: Oxygen levels return to normal.    2 days: Ability to smell and taste begin to improve as damaged nerves regrow.    2 to 3 weeks: Circulation and lung function improve.    1 to 9 months: Coughing, congestion, and shortness of breath decrease; tiredness decreases.    1 year: Risk of heart attack decreases by half.    5 years: Risk of lung cancer decreases by half; risk of stroke becomes the same as a nonsmoker s.  For more on how to quit smoking, try these online resources:     Smokefree.gov    \"Clearing the Air\" booklet from the National [employer] [place]: smokefree.gov/sites/default/files/pdf/clearing-the-air-accessible.pdf  Date Last Reviewed: 3/1/2017    2361-8707 [employer]. [address] [place] [address]. All rights reserved. This information is not intended as a substitute for professional medical care. Always follow your healthcare professional's instructions.           "

## 2019-04-15 NOTE — LETTER
4/15/2019         RE: Jackie Siddiqi  [address]  [place] [id number]        Dear Colleague,    Thank you for referring your patient, Jackie Siddiqi, to the [facility]. Please see a copy of my visit note below.    F/U for LLQ pain     Subjective:  Patient is a 63-year-old white female who originally presented 01/2018 with a several month history of left lower quadrant pain. It was described as intermittent and severe up until about a week ago when she was last seen. There was no association with meals. The pain this was described as sharp and radiating around the back. 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 She had an extensive workup including multiple imaging studies as well as a colonoscopy. Her colonoscopy only revealed a small polyp and her CT/ultrasound revealed some gallstones. She denies any nausea, vomiting, fever, chills, right upper quadrant pain or symptoms associated with fatty meals.       The pain has been waxing and waning since she was last seen.  She also reports occasional irregular BMs.    No nausea, vomiting, fevers or chills.  No real changes since last year.      Objective:  B/P: 122/60, T: Data Unavailable, P: 74, R: Data Unavailable  Abd: Soft, non distended, minimal LLQ tenderness to deep palpation.  Minimal epigastric tenderness to deep palpation.   No hernias  Back: LS spine tenderness    US -   ABDOMINAL ULTRASOUND  11/21/2017 11:05 AM      HISTORY: Abdominal pain, left lower quadrant.     COMPARISON: None.     FINDINGS:    Gallbladder: Multiple shadowing stones. Gallbladder wall thickness is  normal.     Bile ducts: CHD is normal diameter. No intrahepatic biliary  dilatation.     Liver: Normal.      Pancreas Normal     Spleen: Normal.      Right kidney: Normal.      Left kidney: Normal.     Aorta and IVC: Normal.      Additional findings: Ultrasound targeted to the area of pain in the  left lower  quadrant shows no abnormality.         IMPRESSION:    1. Cholelithiasis. No sonographic evidence of acute cholecystitis.  2. Otherwise unremarkable abdominal ultrasound.     WENDY ARCHULETA MD    CT -   CT ABDOMEN AND PELVIS WITHOUT CONTRAST  1/2/2018 1:01 PM     HISTORY: Left lower quadrant abdominal pain. The concern is for a  urinary tract calculus.     COMPARISON: A CT with contrast on 2/12/2017.     TECHNIQUE: Routine transverse CT imaging of the abdomen and pelvis was  performed without contrast. Radiation dose for this scan was reduced  using automated exposure control, adjustment of the mA and/or kV  according to patient size, or iterative reconstruction technique.     FINDINGS: The visualized lung bases are clear. The kidneys, ureters,  and bladder are unremarkable. Specifically, no calculus,  hydronephrosis, or other evidence of obstruction is demonstrated. The  liver, spleen, and pancreas are normal. Again seen are numerous small  layering gallstones. The adrenal glands are normal. No enlarged lymph  node or other abnormal mass is demonstrated. No free fluid is seen. No  free intraperitoneal gas is identified. There are a few scattered  diverticula of the distal colon with no evidence of diverticulitis. No  other gastrointestinal tract abnormality is seen. The appendix is not  definitely seen. There is no additional evidence of appendicitis. No  vascular abnormality is seen. The osseous structures are unremarkable.  No abdominal or pelvic wall pathology is demonstrated. I see no  definite change since the previous examination.         IMPRESSION:   1. No acute abnormality is seen. Specifically, no urinary tract  calculus or evidence of obstruction is demonstrated.   2. Cholelithiasis with no evidence of cholecystitis.     DANY EMMANUEL MD      HIDA -   NM HEPATOBILIARY SCAN WITH GB EF 1/3/2018 2:58 PM     HISTORY: Pain.     PROCEDURE: 5.2 mCi of Tc 99m Mebrofenin is given intravenously for  this  study. For the gallbladder ejection fraction portion of the  study, 1.1 mcg of CCK is given intravenously.     FINDINGS: Gallbladder activity is identified at 25 minutes into the  study. Bowel activity is identified at 50 minutes into the study. The  gallbladder ejection fraction is calculated to be 49%. Usually greater  than 35% is considered normal.         IMPRESSION:  1. Normal visualization of gallbladder.  2. Gallbladder ejection fraction is within normal limits.      DOMINICK OSMAN MD       Colonoscopy report reviewed    Impression/plan:  This is a 63-year-old lady with left lower quadrant pain of unclear etiology. I suspect it was originally musculoskeletal in origin as it did radiate around from the back. On exam she has no hernias and she is only minimally tender in the left lower quadrant. She does have some gallstones but does appear to be asymptomatic at this time other than epigastric tenderness.    She continued to have the pain since she was last seen my be.   I discussed the options dx laparoscopy vs GI consult.   If GI eval is non diagnostic will consider dx laparoscopy with possible lap deepak.  She will f/u with me in GI eval      Paxton Spear MD, FACS      Again, thank you for allowing me to participate in the care of your patient.        Sincerely,        Paxton Spear MD

## 2019-04-15 NOTE — PROGRESS NOTES
F/U for LLQ pain     Subjective:  Patient is a 63-year-old white female who originally presented 01/2018 with a several month history of left lower quadrant pain. It was described as intermittent and severe up until about a week ago when she was last seen. There was no association with meals. The pain this was described as sharp and radiating around the back. 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 She had an extensive workup including multiple imaging studies as well as a colonoscopy. Her colonoscopy only revealed a small polyp and her CT/ultrasound revealed some gallstones. She denies any nausea, vomiting, fever, chills, right upper quadrant pain or symptoms associated with fatty meals.       The pain has been waxing and waning since she was last seen.  She also reports occasional irregular BMs.    No nausea, vomiting, fevers or chills.  No real changes since last year.      Objective:  B/P: 122/60, T: Data Unavailable, P: 74, R: Data Unavailable  Abd: Soft, non distended, minimal LLQ tenderness to deep palpation.  Minimal epigastric tenderness to deep palpation.   No hernias  Back: LS spine tenderness    US -   ABDOMINAL ULTRASOUND  11/21/2017 11:05 AM      HISTORY: Abdominal pain, left lower quadrant.     COMPARISON: None.     FINDINGS:    Gallbladder: Multiple shadowing stones. Gallbladder wall thickness is  normal.     Bile ducts: CHD is normal diameter. No intrahepatic biliary  dilatation.     Liver: Normal.      Pancreas Normal     Spleen: Normal.      Right kidney: Normal.      Left kidney: Normal.     Aorta and IVC: Normal.      Additional findings: Ultrasound targeted to the area of pain in the  left lower quadrant shows no abnormality.         IMPRESSION:    1. Cholelithiasis. No sonographic evidence of acute cholecystitis.  2. Otherwise unremarkable abdominal ultrasound.     WENDY ARCUHLETA MD    CT -   CT ABDOMEN AND PELVIS WITHOUT CONTRAST  1/2/2018  1:01 PM     HISTORY: Left lower quadrant abdominal pain. The concern is for a  urinary tract calculus.     COMPARISON: A CT with contrast on 2/12/2017.     TECHNIQUE: Routine transverse CT imaging of the abdomen and pelvis was  performed without contrast. Radiation dose for this scan was reduced  using automated exposure control, adjustment of the mA and/or kV  according to patient size, or iterative reconstruction technique.     FINDINGS: The visualized lung bases are clear. The kidneys, ureters,  and bladder are unremarkable. Specifically, no calculus,  hydronephrosis, or other evidence of obstruction is demonstrated. The  liver, spleen, and pancreas are normal. Again seen are numerous small  layering gallstones. The adrenal glands are normal. No enlarged lymph  node or other abnormal mass is demonstrated. No free fluid is seen. No  free intraperitoneal gas is identified. There are a few scattered  diverticula of the distal colon with no evidence of diverticulitis. No  other gastrointestinal tract abnormality is seen. The appendix is not  definitely seen. There is no additional evidence of appendicitis. No  vascular abnormality is seen. The osseous structures are unremarkable.  No abdominal or pelvic wall pathology is demonstrated. I see no  definite change since the previous examination.         IMPRESSION:   1. No acute abnormality is seen. Specifically, no urinary tract  calculus or evidence of obstruction is demonstrated.   2. Cholelithiasis with no evidence of cholecystitis.     DANY EMMANUEL MD      HIDA -   NM HEPATOBILIARY SCAN WITH GB EF 1/3/2018 2:58 PM     HISTORY: Pain.     PROCEDURE: 5.2 mCi of Tc 99m Mebrofenin is given intravenously for  this study. For the gallbladder ejection fraction portion of the  study, 1.1 mcg of CCK is given intravenously.     FINDINGS: Gallbladder activity is identified at 25 minutes into the  study. Bowel activity is identified at 50 minutes into the study.  The  gallbladder ejection fraction is calculated to be 49%. Usually greater  than 35% is considered normal.         IMPRESSION:  1. Normal visualization of gallbladder.  2. Gallbladder ejection fraction is within normal limits.      DOMINICK OSMAN MD       Colonoscopy report reviewed    Impression/plan:  This is a 63-year-old lady with left lower quadrant pain of unclear etiology. I suspect it was originally musculoskeletal in origin as it did radiate around from the back. On exam she has no hernias and she is only minimally tender in the left lower quadrant. She does have some gallstones but does appear to be asymptomatic at this time other than epigastric tenderness.    She continued to have the pain since she was last seen my be.   I discussed the options dx laparoscopy vs GI consult.   If GI eval is non diagnostic will consider dx laparoscopy with possible lap deepak.  She will f/u with me in GI eval      Paxton Spear MD, FACS

## 2019-04-22 ENCOUNTER — OFFICE VISIT (OUTPATIENT)
Dept: FAMILY MEDICINE | Facility: OTHER | Age: 64
End: 2019-04-22
Payer: COMMERCIAL

## 2019-04-22 VITALS
BODY MASS INDEX: 21.9 KG/M2 | TEMPERATURE: 97.4 F | OXYGEN SATURATION: 97 % | HEART RATE: 60 BPM | HEIGHT: 62 IN | DIASTOLIC BLOOD PRESSURE: 62 MMHG | SYSTOLIC BLOOD PRESSURE: 100 MMHG | WEIGHT: 119 LBS | RESPIRATION RATE: 16 BRPM

## 2019-04-22 DIAGNOSIS — L03.011 CELLULITIS OF FINGER OF RIGHT HAND: Primary | ICD-10-CM

## 2019-04-22 PROCEDURE — 99213 OFFICE O/P EST LOW 20 MIN: CPT | Performed by: PHYSICIAN ASSISTANT

## 2019-04-22 RX ORDER — CEPHALEXIN 500 MG/1
500 CAPSULE ORAL 3 TIMES DAILY
Qty: 21 CAPSULE | Refills: 0 | Status: SHIPPED | OUTPATIENT
Start: 2019-04-22 | End: 2019-09-16

## 2019-04-22 ASSESSMENT — MIFFLIN-ST. JEOR: SCORE: 1038.03

## 2019-04-22 ASSESSMENT — PAIN SCALES - GENERAL: PAINLEVEL: SEVERE PAIN (7)

## 2019-04-22 NOTE — PROGRESS NOTES
SUBJECTIVE:   Jackie Siddiqi is a 63 year old female who presents to clinic today for the following health issues:      HPI  Right index finger pain x 1 week    Patient presents today for evaluation of redness, swelling and pain affecting the right index finger. Patient reports symptoms started 1 week ago. First noticed a lump on her finger. The redness around this area has been increasing. She has not noticed any drainage. No fevers. Decreased ROM of finger due to swelling.     Additional history: as documented    Reviewed and updated as needed this visit by clinical staff  Tobacco  Allergies  Meds  Soc Hx      Reviewed and updated as needed this visit by Provider       Patient Active Problem List   Diagnosis     Esophageal reflux     Lichen sclerosus et atrophicus of the vulva     Advanced directives, counseling/discussion     Hypothyroidism, unspecified type     Hyperlipidemia LDL goal <160     Tobacco use disorder     Osteoarthritis of carpometacarpal joint of right thumb, unspecified osteoarthritis type     Past Surgical History:   Procedure Laterality Date     C  DELIVERY ONLY       x2     C NONSPECIFIC PROCEDURE      Laparoscopic exam with adhesions     C NONSPECIFIC PROCEDURE      Mastectomy for cystic breast disease, breast implants     COLONOSCOPY  2007     COLONOSCOPY  2013    Procedure: COLONOSCOPY;  colonoscopy, Esophagoscopy, Gastroscopy, Duodenoscopy EGD, multiple biopsies;  Surgeon: Joe Benson MD;  Location:  GI     COLONOSCOPY N/A 2017    Procedure: COMBINED COLONOSCOPY, SINGLE OR MULTIPLE BIOPSY/POLYPECTOMY BY BIOPSY;  colonoscopy with polypectomy by biopsy;  Surgeon: Tolu No MD;  Location:  GI     ENDOSCOPY  2007    Sm hiatus hernia     ESOPHAGOSCOPY, GASTROSCOPY, DUODENOSCOPY (EGD), COMBINED  2013    Procedure: COMBINED ESOPHAGOSCOPY, GASTROSCOPY, DUODENOSCOPY (EGD), BIOPSY SINGLE OR MULTIPLE;  ESOPHAGOGASTRODUODENOSCOPY  WITH MULTIPLE BIOPSIES;  Surgeon: Joe Benson MD;  Location: [facility]     MASTECTOMY, BILATERAL         Social History     Tobacco Use     Smoking status: Current Every Day Smoker     Packs/day: 0.50     Years: 50.00     Pack years: 25.00     Types: Cigarettes     Last attempt to quit: 10/2/2017     Years since quittin.5     Smokeless tobacco: Never Used     Tobacco comment: 1/2  pack per day, started age 11   Substance Use Topics     Alcohol use: No     Alcohol/week: 0.0 oz     Comment: holidays only     Family History   Problem Relation Age of Onset     Diabetes Father      Hypertension Father      Alcohol/Drug Father      Eye Disorder Father      Obesity Father      Breast Cancer Mother      Osteoporosis Mother      Thyroid Disease Mother      Cancer Mother         Bladder     Cancer Sister         fallopian tube cancer     Obesity Sister      Alzheimer Disease Sister      Cancer Sister         Skin Cancer     Allergies Daughter      Cancer Maternal Grandmother         uterine cancer     Liver Disease Daughter         Biliary Atresia     Genitourinary Problems Brother      Heart Disease Brother         Heart Murmur     Genitourinary Problems Brother         kidney disease (two brothers)         Current Outpatient Medications   Medication Sig Dispense Refill     cephALEXin (KEFLEX) 500 MG capsule Take 1 capsule (500 mg) by mouth 3 times daily for 7 days 21 capsule 0     clobetasol (TEMOVATE) 0.05 % ointment Apply a small pea size amount at bedtime for 2 weeks then decrease it to a few times a week then weekly 30 g 0     levothyroxine (SYNTHROID/LEVOTHROID) 25 MCG tablet TAKE 1 TABLET (25 MCG) BY MOUTH DAILY 90 tablet 1     PREMARIN cream Place 0.5 g vaginally twice a week 42.5 g 10     order for DME Equipment being ordered: Arm Band 1 Device 0     Allergies   Allergen Reactions     No Known Drug Allergies      BP Readings from Last 3 Encounters:   19 100/62   04/15/19 122/60   19 107/65    Wt  "Readings from Last 3 Encounters:   04/22/19 54 kg (119 lb)   04/15/19 55.5 kg (122 lb 6.4 oz)   03/21/19 55.3 kg (122 lb)        ROS:  Constitutional, HEENT, cardiovascular, pulmonary, gi and gu systems are negative, except as otherwise noted.    OBJECTIVE:     /62   Pulse 60   Temp 97.4  F (36.3  C) (Temporal)   Resp 16   Ht 1.575 m (5' 2\")   Wt 54 kg (119 lb)   LMP 03/26/2003   SpO2 97%   BMI 21.77 kg/m    Body mass index is 21.77 kg/m .  GENERAL: healthy, alert and no distress  SKIN: erythematous sore present on DIP joint of right index finger. Area with surrounding erythema and edema. Area is warm and tender to the touch.   PSYCH: mentation appears normal, affect normal/bright    Diagnostic Test Results:  none     ASSESSMENT/PLAN:     1. Cellulitis of finger of right hand  Antibiotics started as below. Encouraged warm soaks. Patient will otherwise keep finger clean and dry. Patient encouraged to monitor for symptoms of worsening infection with increased redness, warmth, fevers. Patient will follow-up in clinic if new symptoms develop or current symptoms fail to improve.  - cephALEXin (KEFLEX) 500 MG capsule; Take 1 capsule (500 mg) by mouth 3 times daily for 7 days  Dispense: 21 capsule; Refill: 0    The patient indicates understanding of these issues and agrees with the plan.    Patti Baron PA-C  [facility]  "

## 2019-05-01 ENCOUNTER — OFFICE VISIT (OUTPATIENT)
Dept: GASTROENTEROLOGY | Facility: CLINIC | Age: 64
End: 2019-05-01
Attending: SPECIALIST
Payer: COMMERCIAL

## 2019-05-01 VITALS
WEIGHT: 120 LBS | HEIGHT: 62 IN | BODY MASS INDEX: 22.08 KG/M2 | OXYGEN SATURATION: 97 % | HEART RATE: 59 BPM | DIASTOLIC BLOOD PRESSURE: 69 MMHG | SYSTOLIC BLOOD PRESSURE: 118 MMHG

## 2019-05-01 DIAGNOSIS — R53.83 OTHER FATIGUE: ICD-10-CM

## 2019-05-01 DIAGNOSIS — R10.32 LLQ ABDOMINAL PAIN: Primary | ICD-10-CM

## 2019-05-01 DIAGNOSIS — R39.9 SYMPTOMS INVOLVING URINARY SYSTEM: ICD-10-CM

## 2019-05-01 DIAGNOSIS — K59.00 CONSTIPATION, UNSPECIFIED CONSTIPATION TYPE: ICD-10-CM

## 2019-05-01 LAB
ALBUMIN SERPL-MCNC: 4 G/DL (ref 3.4–5)
ALBUMIN UR-MCNC: NEGATIVE MG/DL
ALP SERPL-CCNC: 65 U/L (ref 40–150)
ALT SERPL W P-5'-P-CCNC: 21 U/L (ref 0–50)
ANION GAP SERPL CALCULATED.3IONS-SCNC: 5 MMOL/L (ref 3–14)
APPEARANCE UR: ABNORMAL
AST SERPL W P-5'-P-CCNC: 20 U/L (ref 0–45)
BASOPHILS # BLD AUTO: 0.1 10E9/L (ref 0–0.2)
BASOPHILS NFR BLD AUTO: 1 %
BILIRUB SERPL-MCNC: 0.6 MG/DL (ref 0.2–1.3)
BILIRUB UR QL STRIP: NEGATIVE
BUN SERPL-MCNC: 14 MG/DL (ref 7–30)
CALCIUM SERPL-MCNC: 8.9 MG/DL (ref 8.5–10.1)
CHLORIDE SERPL-SCNC: 106 MMOL/L (ref 94–109)
CO2 SERPL-SCNC: 30 MMOL/L (ref 20–32)
COLOR UR AUTO: ABNORMAL
CREAT SERPL-MCNC: 0.72 MG/DL (ref 0.52–1.04)
DIFFERENTIAL METHOD BLD: NORMAL
EOSINOPHIL # BLD AUTO: 0.4 10E9/L (ref 0–0.7)
EOSINOPHIL NFR BLD AUTO: 4.9 %
ERYTHROCYTE [DISTWIDTH] IN BLOOD BY AUTOMATED COUNT: 12.4 % (ref 10–15)
FOLATE SERPL-MCNC: 11.4 NG/ML
GFR SERPL CREATININE-BSD FRML MDRD: 87 ML/MIN/{1.73_M2}
GLUCOSE SERPL-MCNC: 92 MG/DL (ref 70–99)
GLUCOSE UR STRIP-MCNC: NEGATIVE MG/DL
HCT VFR BLD AUTO: 39.6 % (ref 35–47)
HGB BLD-MCNC: 13.2 G/DL (ref 11.7–15.7)
HGB UR QL STRIP: NEGATIVE
IMM GRANULOCYTES # BLD: 0 10E9/L (ref 0–0.4)
IMM GRANULOCYTES NFR BLD: 0.3 %
KETONES UR STRIP-MCNC: NEGATIVE MG/DL
LEUKOCYTE ESTERASE UR QL STRIP: NEGATIVE
LYMPHOCYTES # BLD AUTO: 2.5 10E9/L (ref 0.8–5.3)
LYMPHOCYTES NFR BLD AUTO: 32.9 %
MCH RBC QN AUTO: 29.9 PG (ref 26.5–33)
MCHC RBC AUTO-ENTMCNC: 33.3 G/DL (ref 31.5–36.5)
MCV RBC AUTO: 90 FL (ref 78–100)
MONOCYTES # BLD AUTO: 0.5 10E9/L (ref 0–1.3)
MONOCYTES NFR BLD AUTO: 6.3 %
NEUTROPHILS # BLD AUTO: 4.2 10E9/L (ref 1.6–8.3)
NEUTROPHILS NFR BLD AUTO: 54.6 %
NITRATE UR QL: NEGATIVE
NON-SQ EPI CELLS #/AREA URNS LPF: ABNORMAL /LPF
PH UR STRIP: 7 PH (ref 5–7)
PLATELET # BLD AUTO: 294 10E9/L (ref 150–450)
POTASSIUM SERPL-SCNC: 3.8 MMOL/L (ref 3.4–5.3)
PROT SERPL-MCNC: 7.8 G/DL (ref 6.8–8.8)
RBC # BLD AUTO: 4.42 10E12/L (ref 3.8–5.2)
RBC #/AREA URNS AUTO: ABNORMAL /HPF
SODIUM SERPL-SCNC: 141 MMOL/L (ref 133–144)
SOURCE: ABNORMAL
SP GR UR STRIP: 1.01 (ref 1–1.03)
TSH SERPL DL<=0.005 MIU/L-ACNC: 2.37 MU/L (ref 0.4–4)
UROBILINOGEN UR STRIP-MCNC: NORMAL MG/DL (ref 0–2)
VIT B12 SERPL-MCNC: 463 PG/ML (ref 193–986)
WBC # BLD AUTO: 7.7 10E9/L (ref 4–11)
WBC #/AREA URNS AUTO: ABNORMAL /HPF

## 2019-05-01 PROCEDURE — 82306 VITAMIN D 25 HYDROXY: CPT | Performed by: PHYSICIAN ASSISTANT

## 2019-05-01 PROCEDURE — 36415 COLL VENOUS BLD VENIPUNCTURE: CPT | Performed by: PHYSICIAN ASSISTANT

## 2019-05-01 PROCEDURE — 80050 GENERAL HEALTH PANEL: CPT | Performed by: PHYSICIAN ASSISTANT

## 2019-05-01 PROCEDURE — 82607 VITAMIN B-12: CPT | Performed by: PHYSICIAN ASSISTANT

## 2019-05-01 PROCEDURE — 81001 URINALYSIS AUTO W/SCOPE: CPT | Performed by: PHYSICIAN ASSISTANT

## 2019-05-01 PROCEDURE — 99203 OFFICE O/P NEW LOW 30 MIN: CPT | Performed by: PHYSICIAN ASSISTANT

## 2019-05-01 PROCEDURE — 2894A VOIDCORRECT: CPT | Performed by: PHYSICIAN ASSISTANT

## 2019-05-01 PROCEDURE — 82746 ASSAY OF FOLIC ACID SERUM: CPT | Performed by: PHYSICIAN ASSISTANT

## 2019-05-01 ASSESSMENT — ENCOUNTER SYMPTOMS
CHOKING: 0
WEAKNESS: 0
CONSTIPATION: 1
UNEXPECTED WEIGHT CHANGE: 0
CHEST TIGHTNESS: 0
NERVOUS/ANXIOUS: 0
FLANK PAIN: 0
RECTAL PAIN: 0
DIARRHEA: 0
VOICE CHANGE: 0
TROUBLE SWALLOWING: 0
SHORTNESS OF BREATH: 0
FATIGUE: 1
DYSURIA: 0
VOMITING: 0
BLOOD IN STOOL: 0
NAUSEA: 0
FREQUENCY: 0
ABDOMINAL PAIN: 1

## 2019-05-01 ASSESSMENT — MIFFLIN-ST. JEOR: SCORE: 1042.57

## 2019-05-01 ASSESSMENT — PAIN SCALES - GENERAL: PAINLEVEL: MILD PAIN (2)

## 2019-05-01 NOTE — PROGRESS NOTES
GASTROENTEROLOGY NEW PATIENT CLINIC VISIT    CC/REFERRING MD:    Jovana Quinonez    REASON FOR CONSULTATION:   Referred by Paxton Spear for Consult (Abdominal pain- started in the LLQ and is now radiating all over; patient reports pain started a year or more ago; Irregular bowel movements)      HISTORY OF PRESENT ILLNESS:    Jackie Siddiqi is 63 year old female who presents for evaluation of left lower quadrant abdominal pain that started over 1.5 years ago.  Her previous evaluation includes a colonoscopy December 2017 which revealed a small adenomatous descending colon polyp, diverticulosis and external hemorrhoids but was otherwise unremarkable.  She also had a CT of the abdomen pelvis without contrast in January 2018 which did not reveal any acute pathology to explain her pain.  She was noted to have a gallstone however without evidence of cholecystitis.  She was further evaluated with a HIDA scan also in January 2018 which was unremarkable.  She denies any right upper quadrant abdominal pain, nausea or vomiting.  She reports today that her LLQ pain has progressively worsened over the last few months.  At times feels like an ache.  She reports abdominal bloating and gas.  Her bowel movements are irregular.  She states she will have normal bowel movements to 3 to 4 days followed by several days without a bowel movement.  She describes her stool as per stools stools 3.  She denies any diarrhea.  There is no blood or black tarry stools.  Her symptoms at times to improve after having a bowel movement.     She also notes tailbone pain.  She mentions that she did break her tailbone after having her first daughter years ago.  No recent injury, fall or car accident.  She denies any rectal pain or tenesmus.  No nighttime symptoms.    She overall feels fatigue and tired.    Social history: Patient states she drinks alcohol occasionally.  Patient does smoke about 1 pack/week.    She has history  of 2 C-sections but otherwise denies any abdominal surgeries.    PREVIOUS ENDOSCOPY:    Colonoscopy 2017  Impression:  - External hemorrhoids.                        - Diverticulosis.                        - Small ascending colon polyp s/p cold biopsy removal.                        - Otherwise normal colonoscopy.   Tolu No MD     FINAL DIAGNOSIS:   Ascending colon, mucosal biopsy:   - Tubular adenoma.   - Negative for high-grade dysplasia and malignancy.     Electronically signed out by:     Mitul Jensen M.D.     PROBLEM LIST  Patient Active Problem List    Diagnosis Date Noted     Hypothyroidism, unspecified type 2016     Priority: Medium     Hyperlipidemia LDL goal <160 2016     Priority: Medium     Tobacco use disorder 2016     Priority: Medium     Osteoarthritis of carpometacarpal joint of right thumb, unspecified osteoarthritis type 2016     Priority: Medium     Advanced directives, counseling/discussion 2013     Priority: Medium     Discussed advance care planning with patient; information given to patient to review. 2013          Lichen sclerosus et atrophicus of the vulva 2012     Priority: Medium     Esophageal reflux 2005     Priority: Medium       PERTINENT PAST MEDICAL HISTORY:  (I personally reviewed this history with the patient at today's visit)   Past Medical History:   Diagnosis Date     Abnormal Papanicolaou smear of cervix and cervical HPV     cryocautery     Breast cystic disease      Hiatal hernia      Motion sickness      Peptic ulcer, unspecified site, unspecified as acute or chronic, without mention of hemorrhage, perforation, or obstruction     Peptic ulcer disease     Personal history of colonic polyps          PREVIOUS SURGERIES: (I personally reviewed this history with the patient at today's visit)   Past Surgical History:   Procedure Laterality Date     C  DELIVERY ONLY       x2     C NONSPECIFIC PROCEDURE       Laparoscopic exam with adhesions     C NONSPECIFIC PROCEDURE      Mastectomy for cystic breast disease, breast implants     COLONOSCOPY  07/16/2007     COLONOSCOPY  1/18/2013    Procedure: COLONOSCOPY;  colonoscopy, Esophagoscopy, Gastroscopy, Duodenoscopy EGD, multiple biopsies;  Surgeon: Joe Benson MD;  Location:  GI     COLONOSCOPY N/A 12/6/2017    Procedure: COMBINED COLONOSCOPY, SINGLE OR MULTIPLE BIOPSY/POLYPECTOMY BY BIOPSY;  colonoscopy with polypectomy by biopsy;  Surgeon: Tolu No MD;  Location:  GI     ENDOSCOPY  07/16/2007    Sm hiatus hernia     ESOPHAGOSCOPY, GASTROSCOPY, DUODENOSCOPY (EGD), COMBINED  1/18/2013    Procedure: COMBINED ESOPHAGOSCOPY, GASTROSCOPY, DUODENOSCOPY (EGD), BIOPSY SINGLE OR MULTIPLE;  ESOPHAGOGASTRODUODENOSCOPY WITH MULTIPLE BIOPSIES;  Surgeon: Joe Benson MD;  Location:  GI     MASTECTOMY, BILATERAL           ALLERGIES:     Allergies   Allergen Reactions     No Known Drug Allergies        PERTINENT MEDICATIONS:    Current Outpatient Medications:      clobetasol (TEMOVATE) 0.05 % ointment, Apply a small pea size amount at bedtime for 2 weeks then decrease it to a few times a week then weekly (Patient taking differently: as needed Apply a small pea size amount at bedtime for 2 weeks then decrease it to a few times a week then weekly), Disp: 30 g, Rfl: 0     levothyroxine (SYNTHROID/LEVOTHROID) 25 MCG tablet, TAKE 1 TABLET (25 MCG) BY MOUTH DAILY, Disp: 90 tablet, Rfl: 1     order for DME, Equipment being ordered: Arm Band, Disp: 1 Device, Rfl: 0     PREMARIN cream, Place 0.5 g vaginally twice a week (Patient taking differently: Place 0.5 g vaginally as needed ), Disp: 42.5 g, Rfl: 10    SOCIAL HISTORY:  Social History     Socioeconomic History     Marital status: Single     Spouse name: Not on file     Number of children: Not on file     Years of education: Not on file     Highest education level: Not on file   Occupational History     Not on file    Social Needs     Financial resource strain: Not on file     Food insecurity:     Worry: Not on file     Inability: Not on file     Transportation needs:     Medical: Not on file     Non-medical: Not on file   Tobacco Use     Smoking status: Current Every Day Smoker     Packs/day: 0.50     Years: 50.00     Pack years: 25.00     Types: Cigarettes     Last attempt to quit: 10/2/2017     Years since quittin.5     Smokeless tobacco: Never Used     Tobacco comment: 1 pack per week, started age 11   Substance and Sexual Activity     Alcohol use: No     Alcohol/week: 0.0 oz     Comment: holidays only     Drug use: No     Sexual activity: Not Currently     Comment: divorce, 3 children.   Lifestyle     Physical activity:     Days per week: Not on file     Minutes per session: Not on file     Stress: Not on file   Relationships     Social connections:     Talks on phone: Not on file     Gets together: Not on file     Attends Spiritism service: Not on file     Active member of club or organization: Not on file     Attends meetings of clubs or organizations: Not on file     Relationship status: Not on file     Intimate partner violence:     Fear of current or ex partner: Not on file     Emotionally abused: Not on file     Physically abused: Not on file     Forced sexual activity: Not on file   Other Topics Concern      Service No     Blood Transfusions No     Caffeine Concern No     Occupational Exposure No     Hobby Hazards No     Sleep Concern No     Stress Concern No     Weight Concern No     Special Diet No     Back Care No     Exercise No     Bike Helmet No     Seat Belt Yes     Self-Exams No     Parent/sibling w/ CABG, MI or angioplasty before 65F 55M? Not Asked   Social History Narrative     Not on file       FAMILY HISTORY: (I personally reviewed this history with the patient at today's visit)  Family History   Problem Relation Age of Onset     Diabetes Father      Hypertension Father      Alcohol/Drug  "Father      Eye Disorder Father      Obesity Father      Breast Cancer Mother      Osteoporosis Mother      Thyroid Disease Mother      Cancer Mother         Bladder     Cancer Sister         fallopian tube cancer     Obesity Sister      Alzheimer Disease Sister      Cancer Sister         Skin Cancer     Allergies Daughter      Cancer Maternal Grandmother         uterine cancer     Liver Disease Daughter         Biliary Atresia     Genitourinary Problems Brother      Heart Disease Brother         Heart Murmur     Genitourinary Problems Brother         kidney disease (two brothers)          Review of Systems   Constitutional: Positive for fatigue. Negative for unexpected weight change.   HENT: Negative for trouble swallowing and voice change.    Respiratory: Negative for choking, chest tightness and shortness of breath.    Cardiovascular: Negative for chest pain and leg swelling.   Gastrointestinal: Positive for abdominal pain and constipation. Negative for blood in stool, diarrhea, nausea, rectal pain and vomiting.   Endocrine: Positive for cold intolerance.   Genitourinary: Negative for dysuria, flank pain, frequency, vaginal bleeding and vaginal discharge.        Feels that she is not urinating like normal, but no odor or painful urination   Skin: Negative for pallor and rash.   Neurological: Negative for weakness.   Psychiatric/Behavioral: The patient is not nervous/anxious.      PHYSICAL EXAMINATION:  Constitutional: aaox3, cooperative, pleasant, not dyspneic/diaphoretic, no acute distress  Vitals reviewed: /69 (BP Location: Left arm, Patient Position: Sitting, Cuff Size: Adult Regular)   Pulse 59   Ht 1.575 m (5' 2\")   Wt 54.4 kg (120 lb)   LMP 03/26/2003   SpO2 97%   BMI 21.95 kg/m     Wt:   Wt Readings from Last 2 Encounters:   05/01/19 54.4 kg (120 lb)   04/22/19 54 kg (119 lb)          Eyes: Sclera anicteric/injected  Ears/nose/mouth/throat: Normal oropharynx without ulcers or exudate, mucus " membranes moist, hearing intact  Neck: supple, thyroid normal size  CV: No edema, RRR  Respiratory: Unlabored breathing, CTAB  Lymph: No submandibular, supraclavicular or inguinal lymphadenopathy  Abd: mildly distended, +LLQ abdominal tenderness, no masses, +bs, no hepatosplenomegaly, no peritoneal signs  Skin: warm, perfused, no jaundice  Psych: Normal affect  MSK: Normal gait      PERTINENT STUDIES: (I personally reviewed these laboratory studies today)  Most recent CBC:   Recent Labs   Lab Test 02/16/17  0839 02/12/17  1526   WBC 4.8 3.9*   HGB 13.0 13.1   HCT 39.0 39.3    171     Most recent hepatic panel:  Recent Labs   Lab Test 02/16/17  0839 02/12/17  1526   ALT 27 22   AST 22 18     Most recent creatinine:  Recent Labs   Lab Test 02/12/17  1526 11/28/16  1605   CR 0.69 0.70       TSH   Date Value Ref Range Status   05/29/2018 2.26 0.40 - 4.00 mU/L Final         RADIOLOGY:      CT ABDOMEN AND PELVIS WITHOUT CONTRAST  1/2/2018 1:01 PM     HISTORY: Left lower quadrant abdominal pain. The concern is for a  urinary tract calculus.     COMPARISON: A CT with contrast on 2/12/2017.     TECHNIQUE: Routine transverse CT imaging of the abdomen and pelvis was  performed without contrast. Radiation dose for this scan was reduced  using automated exposure control, adjustment of the mA and/or kV  according to patient size, or iterative reconstruction technique.     FINDINGS: The visualized lung bases are clear. The kidneys, ureters,  and bladder are unremarkable. Specifically, no calculus,  hydronephrosis, or other evidence of obstruction is demonstrated. The  liver, spleen, and pancreas are normal. Again seen are numerous small  layering gallstones. The adrenal glands are normal. No enlarged lymph  node or other abnormal mass is demonstrated. No free fluid is seen. No  free intraperitoneal gas is identified. There are a few scattered  diverticula of the distal colon with no evidence of diverticulitis. No  other  gastrointestinal tract abnormality is seen. The appendix is not  definitely seen. There is no additional evidence of appendicitis. No  vascular abnormality is seen. The osseous structures are unremarkable.  No abdominal or pelvic wall pathology is demonstrated. I see no  definite change since the previous examination.                                                                      IMPRESSION:   1. No acute abnormality is seen. Specifically, no urinary tract calculus or evidence of obstruction is demonstrated.   2. Cholelithiasis with no evidence of cholecystitis.     DANY EMMANUEL MD          ABDOMINAL ULTRASOUND  11/21/2017 11:05 AM      HISTORY: Abdominal pain, left lower quadrant.     COMPARISON: None.     FINDINGS:    Gallbladder: Multiple shadowing stones. Gallbladder wall thickness is  normal.     Bile ducts: CHD is normal diameter. No intrahepatic biliary  dilatation.     Liver: Normal.      Pancreas Normal     Spleen: Normal.      Right kidney: Normal.      Left kidney: Normal.     Aorta and IVC: Normal.      Additional findings: Ultrasound targeted to the area of pain in the  left lower quadrant shows no abnormality.                                                                      IMPRESSION:    1. Cholelithiasis. No sonographic evidence of acute cholecystitis.  2. Otherwise unremarkable abdominal ultrasound.     WENDY ARCHULETA MD            ASSESSMENT/PLAN:    Jackie Siddiqi is a 63 year old female who presents for evaluation of chronic left lower quadrant abdominal pain.  Her previous work-up includes colonoscopy in December 2017 which revealed asmall adenomatous descending colon polyp, diverticulosis and external hemorrhoids but was otherwise unremarkable.  She also had a CT of the abdomen pelvis without contrast in January 2018 which did not reveal any acute pathology to explain her pain.  She was noted to have a gallstone however without evidence of cholecystitis.  She was further  evaluated with a HIDA scan also in January 2018 which was unremarkable.       She has had a previous work-up with colonoscopy and CT scan as mentioned above. Since her pain has recently exacerbated we will repeat CT scan to rule out any underlying pathology.  She will be due for repeat colonoscopy by December 2020.  We can consider repeating sooner if any concerning or alarming symptoms develop such as rectal bleeding or unintentional weight loss.  We can also reconsider if work-up is negative and if symptoms are not improving.      She is unable to correlate her pain to her diet however she does note some improvement in pain after having a bowel movement.  It is possible that her irregular bowel movements and constipation could be causing/contributing to her symptoms.  Advised patient start taking MiraLAX 1 capful daily to induce more regular bowel movements and see if this will alleviate her pain.         LLQ abdominal pain  Constipation   Other fatigue  -checking labs today including cbc, cmp, tsh, vitamin D, Vitamin b12 and folate.   Symptoms involving urinary system  -Patient feels that she is not urinating normally however denies dysuria, urgency, frequency or hematuria.  Checking urinalysis today however advised patient to consider urology consult or discuss with PCP if she continues to have concerns with her urine.         Orders Placed This Encounter   Procedures     CT Abdomen Pelvis w Contrast     CBC with platelets differential     Comprehensive metabolic panel     TSH with free T4 reflex     Vitamin D Deficiency     Vitamin B12     Folate     UA with Microscopic reflex to Culture     UA with Microscopic reflex to Culture (Sena Levy; [facility])           RTC 6 weeks    Thank you for this consultation.  It was a pleasure to participate in the care of this patient; please contact us with any further questions.     This note was created with voice recognition software, and while reviewed for  accuracy, typos may remain.     Martha Adam PA-C  Gastroenterology  [facility]

## 2019-05-01 NOTE — PATIENT INSTRUCTIONS
Blood work today.   Please call [phone] to schedule a CT scan of your abdomen and pelvis.     Start taking miralax 1 capful daily for constipation.

## 2019-05-01 NOTE — NURSING NOTE
"Jackie Siddiqi's goals for this visit include:   Chief Complaint   Patient presents with     Consult     Abdominal pain- started in the LLQ and is now radiating all over; patient reports pain started a year or more ago; Irregular bowel movements     She requests these members of her care team be copied on today's visit information: Yes    PCP: Jovana Quinonez    Referring Provider:  Paxton Spear MD  [address] [place] [address]    /69 (BP Location: Left arm, Patient Position: Sitting, Cuff Size: Adult Regular)   Pulse 59   Ht 1.575 m (5' 2\")   Wt 54.4 kg (120 lb)   LMP 03/26/2003   SpO2 97%   BMI 21.95 kg/m      Do you need any medication refills at today's visit? No    Meg Hartley LPN      "

## 2019-05-02 LAB — DEPRECATED CALCIDIOL+CALCIFEROL SERPL-MC: 21 UG/L (ref 20–75)

## 2019-05-15 ENCOUNTER — HOSPITAL ENCOUNTER (OUTPATIENT)
Dept: CT IMAGING | Facility: CLINIC | Age: 64
Discharge: HOME | End: 2019-05-15
Attending: PHYSICIAN ASSISTANT | Admitting: PHYSICIAN ASSISTANT
Payer: COMMERCIAL

## 2019-05-15 DIAGNOSIS — R10.32 LLQ ABDOMINAL PAIN: ICD-10-CM

## 2019-05-15 PROCEDURE — 25000128 H RX IP 250 OP 636: Performed by: PHYSICIAN ASSISTANT

## 2019-05-15 PROCEDURE — 25000125 ZZHC RX 250: Performed by: PHYSICIAN ASSISTANT

## 2019-05-15 PROCEDURE — 74177 CT ABD & PELVIS W/CONTRAST: CPT

## 2019-05-15 RX ORDER — IOPAMIDOL 755 MG/ML
500 INJECTION, SOLUTION INTRAVASCULAR ONCE
Status: COMPLETED | OUTPATIENT
Start: 2019-05-15 | End: 2019-05-15

## 2019-05-15 RX ADMIN — SODIUM CHLORIDE 60 ML: 9 INJECTION, SOLUTION INTRAVENOUS at 09:01

## 2019-05-15 RX ADMIN — IOPAMIDOL 65 ML: 755 INJECTION, SOLUTION INTRAVENOUS at 09:01

## 2019-06-03 ENCOUNTER — TELEPHONE (OUTPATIENT)
Dept: GASTROENTEROLOGY | Facility: CLINIC | Age: 64
End: 2019-06-03

## 2019-06-03 NOTE — TELEPHONE ENCOUNTER
"LPN called and spoke with patient. Patient stated \"I went into mychart and saw the results. It looks like everything came back normal.\" Patient had no further questions. Closing encounter at this time.     Meg Hartley LPN    "

## 2019-06-03 NOTE — TELEPHONE ENCOUNTER
[facility] Call Center    Phone Message    May a detailed message be left on voicemail: yes    Reason for Call: Requesting Results   Name/type of test: CT scan  Date of test: 05/15/2019  Was test done at a location other than [facility] (Please fill in the location if not [facility])?: Yes, [place]      Action Taken: Message routed to:  Adult Clinics: Gastroenterology (GI) p [phone]

## 2019-08-06 ENCOUNTER — TELEPHONE (OUTPATIENT)
Dept: FAMILY MEDICINE | Facility: CLINIC | Age: 64
End: 2019-08-06

## 2019-08-06 NOTE — TELEPHONE ENCOUNTER
Panel Management Review      Patient has the following on her problem list: None      Composite cancer screening  Chart review shows that this patient is due/due soon for the following None  Summary:    Patient is due/failing the following:   Tobacco cessation, zoster, advance care planning, medicare annual wellness visit, lipids, josé, fall risk, phq-9, pneumonia shot    Action needed:   Patient needs office visit for wellness/physical visit.    Type of outreach:    Phone, spoke to patient.  now set up for physical on 08/15/19.    Questions for provider review:    None                                                                                                                                Chandler Bennett CMA       Chart routed to closed .

## 2019-09-16 ENCOUNTER — OFFICE VISIT (OUTPATIENT)
Dept: FAMILY MEDICINE | Facility: CLINIC | Age: 64
End: 2019-09-16
Payer: COMMERCIAL

## 2019-09-16 VITALS
HEART RATE: 83 BPM | OXYGEN SATURATION: 97 % | DIASTOLIC BLOOD PRESSURE: 84 MMHG | TEMPERATURE: 97.9 F | WEIGHT: 116.6 LBS | SYSTOLIC BLOOD PRESSURE: 134 MMHG | BODY MASS INDEX: 21.33 KG/M2 | RESPIRATION RATE: 16 BRPM

## 2019-09-16 DIAGNOSIS — K21.9 GASTROESOPHAGEAL REFLUX DISEASE WITHOUT ESOPHAGITIS: ICD-10-CM

## 2019-09-16 DIAGNOSIS — R10.32 LLQ ABDOMINAL PAIN: ICD-10-CM

## 2019-09-16 DIAGNOSIS — E03.9 HYPOTHYROIDISM, UNSPECIFIED TYPE: ICD-10-CM

## 2019-09-16 DIAGNOSIS — Z00.00 ENCOUNTER FOR MEDICARE ANNUAL WELLNESS EXAM: Primary | ICD-10-CM

## 2019-09-16 DIAGNOSIS — F17.200 TOBACCO USE DISORDER: ICD-10-CM

## 2019-09-16 DIAGNOSIS — N95.2 ATROPHIC VAGINITIS: ICD-10-CM

## 2019-09-16 DIAGNOSIS — N90.4 LICHEN SCLEROSUS ET ATROPHICUS OF THE VULVA: ICD-10-CM

## 2019-09-16 DIAGNOSIS — Z87.891 PERSONAL HISTORY OF TOBACCO USE: ICD-10-CM

## 2019-09-16 DIAGNOSIS — E78.5 HYPERLIPIDEMIA LDL GOAL <160: ICD-10-CM

## 2019-09-16 LAB
ALBUMIN SERPL-MCNC: 3.9 G/DL (ref 3.4–5)
ALBUMIN UR-MCNC: NEGATIVE MG/DL
ALP SERPL-CCNC: 71 U/L (ref 40–150)
ALT SERPL W P-5'-P-CCNC: 28 U/L (ref 0–50)
ANION GAP SERPL CALCULATED.3IONS-SCNC: 8 MMOL/L (ref 3–14)
APPEARANCE UR: CLEAR
AST SERPL W P-5'-P-CCNC: 34 U/L (ref 0–45)
BACTERIA #/AREA URNS HPF: ABNORMAL /HPF
BASOPHILS # BLD AUTO: 0.1 10E9/L (ref 0–0.2)
BASOPHILS NFR BLD AUTO: 0.8 %
BILIRUB SERPL-MCNC: 0.7 MG/DL (ref 0.2–1.3)
BILIRUB UR QL STRIP: NEGATIVE
BUN SERPL-MCNC: 11 MG/DL (ref 7–30)
CALCIUM SERPL-MCNC: 8.9 MG/DL (ref 8.5–10.1)
CHLORIDE SERPL-SCNC: 107 MMOL/L (ref 94–109)
CHOLEST SERPL-MCNC: 227 MG/DL
CO2 SERPL-SCNC: 29 MMOL/L (ref 20–32)
COLOR UR AUTO: YELLOW
CREAT SERPL-MCNC: 0.74 MG/DL (ref 0.52–1.04)
DIFFERENTIAL METHOD BLD: NORMAL
EOSINOPHIL NFR BLD AUTO: 3.6 %
ERYTHROCYTE [DISTWIDTH] IN BLOOD BY AUTOMATED COUNT: 12.9 % (ref 10–15)
ERYTHROCYTE [SEDIMENTATION RATE] IN BLOOD BY WESTERGREN METHOD: 29 MM/H (ref 0–30)
GFR SERPL CREATININE-BSD FRML MDRD: 84 ML/MIN/{1.73_M2}
GLUCOSE SERPL-MCNC: 109 MG/DL (ref 70–99)
GLUCOSE UR STRIP-MCNC: NEGATIVE MG/DL
HCT VFR BLD AUTO: 40.5 % (ref 35–47)
HDLC SERPL-MCNC: 59 MG/DL
HGB BLD-MCNC: 13.3 G/DL (ref 11.7–15.7)
HGB UR QL STRIP: NEGATIVE
IMM GRANULOCYTES # BLD: 0 10E9/L (ref 0–0.4)
IMM GRANULOCYTES NFR BLD: 0.1 %
KETONES UR STRIP-MCNC: NEGATIVE MG/DL
LDLC SERPL CALC-MCNC: 128 MG/DL
LEUKOCYTE ESTERASE UR QL STRIP: ABNORMAL
LYMPHOCYTES # BLD AUTO: 2.1 10E9/L (ref 0.8–5.3)
LYMPHOCYTES NFR BLD AUTO: 27.3 %
MCH RBC QN AUTO: 30.1 PG (ref 26.5–33)
MCHC RBC AUTO-ENTMCNC: 32.8 G/DL (ref 31.5–36.5)
MCV RBC AUTO: 92 FL (ref 78–100)
MONOCYTES # BLD AUTO: 0.6 10E9/L (ref 0–1.3)
MONOCYTES NFR BLD AUTO: 7.2 %
MUCOUS THREADS #/AREA URNS LPF: PRESENT /LPF
NEUTROPHILS # BLD AUTO: 4.6 10E9/L (ref 1.6–8.3)
NEUTROPHILS NFR BLD AUTO: 61 %
NITRATE UR QL: NEGATIVE
NONHDLC SERPL-MCNC: 168 MG/DL
NRBC # BLD AUTO: 0 10*3/UL
NRBC BLD AUTO-RTO: 0 /100
PH UR STRIP: 5 PH (ref 5–7)
PLATELET # BLD AUTO: 268 10E9/L (ref 150–450)
POTASSIUM SERPL-SCNC: 3.6 MMOL/L (ref 3.4–5.3)
PROT SERPL-MCNC: 8.3 G/DL (ref 6.8–8.8)
RBC # BLD AUTO: 4.42 10E12/L (ref 3.8–5.2)
RBC #/AREA URNS AUTO: 6 /HPF (ref 0–2)
SODIUM SERPL-SCNC: 144 MMOL/L (ref 133–144)
SOURCE: ABNORMAL
SP GR UR STRIP: 1.01 (ref 1–1.03)
SQUAMOUS #/AREA URNS AUTO: 2 /HPF (ref 0–1)
TRIGL SERPL-MCNC: 199 MG/DL
TSH SERPL DL<=0.005 MIU/L-ACNC: 1.73 MU/L (ref 0.4–4)
UROBILINOGEN UR STRIP-MCNC: 0 MG/DL (ref 0–2)
WBC # BLD AUTO: 7.6 10E9/L (ref 4–11)
WBC #/AREA URNS AUTO: 15 /HPF (ref 0–5)

## 2019-09-16 PROCEDURE — 82306 VITAMIN D 25 HYDROXY: CPT | Performed by: FAMILY MEDICINE

## 2019-09-16 PROCEDURE — 99213 OFFICE O/P EST LOW 20 MIN: CPT | Mod: 25 | Performed by: FAMILY MEDICINE

## 2019-09-16 PROCEDURE — 80061 LIPID PANEL: CPT | Performed by: FAMILY MEDICINE

## 2019-09-16 PROCEDURE — 81001 URINALYSIS AUTO W/SCOPE: CPT | Performed by: FAMILY MEDICINE

## 2019-09-16 PROCEDURE — 85025 COMPLETE CBC W/AUTO DIFF WBC: CPT | Performed by: FAMILY MEDICINE

## 2019-09-16 PROCEDURE — 85652 RBC SED RATE AUTOMATED: CPT | Performed by: FAMILY MEDICINE

## 2019-09-16 PROCEDURE — 84443 ASSAY THYROID STIM HORMONE: CPT | Performed by: FAMILY MEDICINE

## 2019-09-16 PROCEDURE — 87086 URINE CULTURE/COLONY COUNT: CPT | Performed by: FAMILY MEDICINE

## 2019-09-16 PROCEDURE — 80053 COMPREHEN METABOLIC PANEL: CPT | Performed by: FAMILY MEDICINE

## 2019-09-16 PROCEDURE — 36415 COLL VENOUS BLD VENIPUNCTURE: CPT | Performed by: FAMILY MEDICINE

## 2019-09-16 PROCEDURE — G0296 VISIT TO DETERM LDCT ELIG: HCPCS | Performed by: FAMILY MEDICINE

## 2019-09-16 PROCEDURE — 99397 PER PM REEVAL EST PAT 65+ YR: CPT | Performed by: FAMILY MEDICINE

## 2019-09-16 RX ORDER — CLOBETASOL PROPIONATE 0.5 MG/G
OINTMENT TOPICAL
Qty: 60 G | Refills: 1 | Status: SHIPPED | OUTPATIENT
Start: 2019-09-16 | End: 2021-01-29

## 2019-09-16 RX ORDER — POLYETHYLENE GLYCOL 3350 17 G/17G
1 POWDER, FOR SOLUTION ORAL DAILY PRN
Qty: 100 PACKET | Refills: 3 | Status: SHIPPED | OUTPATIENT
Start: 2019-09-16 | End: 2019-11-15

## 2019-09-16 RX ORDER — CONJUGATED ESTROGENS 0.62 MG/G
0.5 CREAM VAGINAL
Qty: 42.5 G | Refills: 10 | Status: SHIPPED | OUTPATIENT
Start: 2019-09-16 | End: 2021-01-29

## 2019-09-16 RX ORDER — ALBUTEROL SULFATE 90 UG/1
2 AEROSOL, METERED RESPIRATORY (INHALATION) EVERY 4 HOURS PRN
Qty: 1 INHALER | Refills: 11 | Status: SHIPPED | OUTPATIENT
Start: 2019-09-16 | End: 2021-09-27

## 2019-09-16 ASSESSMENT — PAIN SCALES - GENERAL: PAINLEVEL: MILD PAIN (2)

## 2019-09-16 NOTE — PROGRESS NOTES
"  SUBJECTIVE:   Jackie Siddiqi is a 64 year old female who presents for Preventive Visit.      Are you in the first 12 months of your Medicare Part B coverage?  No    Physical Health:    In general, how would you rate your overall physical health? fair    Outside of work, how many days during the week do you exercise? none    Outside of work, approximately how many minutes a day do you exercise?not applicable    If you drink alcohol do you typically have >3 drinks per day or >7 drinks per week? No    Do you usually eat at least 4 servings of fruit and vegetables a day, include whole grains & fiber and avoid regularly eating high fat or \"junk\" foods? NO    Do you have any problems taking medications regularly?  No    Do you have any side effects from medications? none    Needs assistance for the following daily activities: no assistance needed    Which of the following safety concerns are present in your home?  none identified     Hearing impairment: Yes, buzz in the left ear     In the past 6 months, have you been bothered by leaking of urine? no    Mental Health:    In general, how would you rate your overall mental or emotional health? fair  PHQ-2 Score:      Do you feel safe in your environment? Yes    Do you have a Health Care Directive? No: Advance care planning reviewed with patient; information given to patient to review.    Additional concerns to address?      Fall risk:  Fallen 2 or more times in the past year?: Yes  Any fall with injury in the past year?: No    Cognitive Screenin) Repeat 3 items (Leader, Season, Table)    2) Clock draw: NORMAL  3) 3 item recall: Recalls 2 objects   Results: NORMAL clock, 1-2 items recalled: COGNITIVE IMPAIRMENT LESS LIKELY    Mini-CogTM Copyright LUIS ALBERTO Irwin. Licensed by the author for use in [facility]; reprinted with permission (lexy@.[facility]). All rights reserved.      Do you have sleep apnea, excessive snoring or daytime drowsiness?: no    Jackie is " here today for her general physical with a couple of concerns.    1.  First concern is about the lower left and left groin pain.  Has had it on and off since 2017.  Multiple work-up to include colonoscopy, pelvic ultrasound, HIDA scan, and CT scan and they were all normal except of mild diverticulosis.  Most recent CT scan was about 4 months ago and it was normal.  Saw GYN and urologist -their work up was negative, include cystoscopy.  Also saw general surgery who thought it was skeletal muscle in nature.  General surgery also recommended exploratory laparoscopy if work up from GI was negative.  Saw GI specialist recently and still not sure the cause of pain was. Had  with scar adhesion that required surgical release couple years ago.  Overall the pain is not getting any better - no change in its nature and distributon.  Mainly on the left groin but at times it radiates across the lower abdomen and the lower back. The same kind of pain that she has had.  No diarrhea or constipation.  No melena or hematochezia.  No problem with urination or obvious hematuria.  No history of kidney stone.  No fever or chills and denies of unusual food.  Feeling nauseous on and off but tolerate oral intake well.    2.  COPD follow up which is stable.  Used the Albuterol inhaler as needed and rarely.  Continues to smoke 1/2 pack a day.  Used to smoked more than a pack a day for more than 30 years.  No wheezing.  No chest pain or shortness of breath.  No chest tightness sensation or wheezing.  Not interested to quit smoking at this time.  She is interested to be screened lung cancer however.    3.  Otherwise, she is doing well. Her last physical exam was a year ago and it was normal.  Extensive work-ups for chronic lower left abdominal pain as above. No headache or dizziness.  No acute visual change. No URI symptoms include running nose, nasal congestion, coughing, fever or chill. No CP/SOB. No N/V/D/C. No problem with  urination.  No abdominal pain.  She is post-menopausal - no history of abnormal pap or STD.  Last pap was 2 years ago with normal pap and negative high risk HPV.  Denied of STD risks.  No leg swelling or pain. Does not exercises. Social drinker but no drug use.  No problem with sleeping.  Feels safe at home and at work.  She lives with 2 foster children who are teenager.  No weight change and denied of being depressed.  No other concern today        Reviewed and updated as needed this visit by clinical staff  Tobacco  Allergies  Meds         Reviewed and updated as needed this visit by Provider        Social History     Tobacco Use     Smoking status: Current Every Day Smoker     Packs/day: 0.50     Years: 50.00     Pack years: 25.00     Types: Cigarettes     Last attempt to quit: 10/2/2017     Years since quittin.9     Smokeless tobacco: Never Used     Tobacco comment: 1 pack per week, started age 11   Substance Use Topics     Alcohol use: No     Alcohol/week: 0.0 oz     Comment: holidays only                           Current providers sharing in care for this patient include:   Patient Care Team:  Jovana Quinonez MD as PCP - General (Family Practice)  Jovana Quinonez MD as Assigned PCP    The following health maintenance items are reviewed in Epic and correct as of today:  Health Maintenance   Topic Date Due     TOBACCO CESSATION COUNSELING  1955     ZOSTER IMMUNIZATION (1 of 2) 2003     ADVANCE CARE PLANNING  2018     MEDICARE ANNUAL WELLNESS VISIT  2018     LIPID  2019     DAMIEN ASSESSMENT  2019     FALL RISK ASSESSMENT  2019     PHQ-9  2019     PNEUMOCOCCAL IMMUNIZATION 65+ LOW/MEDIUM RISK (2 of 2 - PCV13) 2019     INFLUENZA VACCINE (1) 2019     COLONOSCOPY  2020     HPV  2021     PAP  2021     DTAP/TDAP/TD IMMUNIZATION (4 - Td) 2026     DEXA  Completed     HEPATITIS C SCREENING  Completed     IPV IMMUNIZATION  Aged Out      MENINGITIS IMMUNIZATION  Aged Out     BP Readings from Last 3 Encounters:   19 134/84   19 118/69   19 100/62    Wt Readings from Last 3 Encounters:   19 52.9 kg (116 lb 9.6 oz)   19 54.4 kg (120 lb)   19 54 kg (119 lb)                  Patient Active Problem List   Diagnosis     Esophageal reflux     Lichen sclerosus et atrophicus of the vulva     Advanced directives, counseling/discussion     Hypothyroidism, unspecified type     Hyperlipidemia LDL goal <160     Osteoarthritis of carpometacarpal joint of right thumb, unspecified osteoarthritis type     Chronic obstructive pulmonary disease, unspecified COPD type (H)     Past Surgical History:   Procedure Laterality Date     C  DELIVERY ONLY       x2     C NONSPECIFIC PROCEDURE      Laparoscopic exam with adhesions     C NONSPECIFIC PROCEDURE      Mastectomy for cystic breast disease, breast implants     COLONOSCOPY  2007     COLONOSCOPY  2013    Procedure: COLONOSCOPY;  colonoscopy, Esophagoscopy, Gastroscopy, Duodenoscopy EGD, multiple biopsies;  Surgeon: Joe Benson MD;  Location: [facility]     COLONOSCOPY N/A 2017    Procedure: COMBINED COLONOSCOPY, SINGLE OR MULTIPLE BIOPSY/POLYPECTOMY BY BIOPSY;  colonoscopy with polypectomy by biopsy;  Surgeon: Tolu No MD;  Location: [facility]     ENDOSCOPY  2007    Sm hiatus hernia     ESOPHAGOSCOPY, GASTROSCOPY, DUODENOSCOPY (EGD), COMBINED  2013    Procedure: COMBINED ESOPHAGOSCOPY, GASTROSCOPY, DUODENOSCOPY (EGD), BIOPSY SINGLE OR MULTIPLE;  ESOPHAGOGASTRODUODENOSCOPY WITH MULTIPLE BIOPSIES;  Surgeon: Joe Benson MD;  Location: [facility]     MASTECTOMY, BILATERAL         Social History     Tobacco Use     Smoking status: Former Smoker     Packs/day: 0.50     Years: 50.00     Pack years: 25.00     Types: Cigarettes     Last attempt to quit: 10/2/2017     Years since quittin.9     Smokeless tobacco: Never Used     Tobacco comment:  1 pack per week, started age 11   Substance Use Topics     Alcohol use: No     Alcohol/week: 0.0 standard drinks     Comment: holidays only     Family History   Problem Relation Age of Onset     Diabetes Father      Hypertension Father      Alcohol/Drug Father      Eye Disorder Father      Obesity Father      Breast Cancer Mother      Osteoporosis Mother      Thyroid Disease Mother      Cancer Mother         Bladder     Cancer Sister         fallopian tube cancer     Obesity Sister      Alzheimer Disease Sister      Cancer Sister         Skin Cancer     Allergies Daughter      Cancer Maternal Grandmother         uterine cancer     Liver Disease Daughter         Biliary Atresia     Genitourinary Problems Brother      No Known Problems Daughter      Heart Disease Brother         Heart Murmur     Genitourinary Problems Brother         kidney disease (two brothers)         Current Outpatient Medications   Medication Sig Dispense Refill     albuterol (PROAIR HFA/PROVENTIL HFA/VENTOLIN HFA) 108 (90 Base) MCG/ACT inhaler Inhale 2 puffs into the lungs every 4 hours as needed for shortness of breath / dyspnea or wheezing 1 Inhaler 11     clobetasol (TEMOVATE) 0.05 % external ointment Apply a small pea size amount at bedtime couple times a week as needed 60 g 1     polyethylene glycol (MIRALAX/GLYCOLAX) packet Take 17 g by mouth daily as needed for constipation 100 packet 3     PREMARIN 0.625 MG/GM vaginal cream Place 0.5 g vaginally twice a week 42.5 g 10     levothyroxine (SYNTHROID/LEVOTHROID) 25 MCG tablet TAKE 1 TABLET BY MOUTH ONCE DAILY 90 tablet 1     Allergies   Allergen Reactions     No Known Drug Allergies      Recent Labs   Lab Test 09/16/19  1432 05/01/19  1438 05/29/18  1604 02/16/17  0839 02/12/17  1526 11/28/16  1605   A1C  --   --   --   --  5.6  --    *  --  172*  --   --  139*   HDL 59  --  57  --   --  58   TRIG 199*  --  146  --   --  139   ALT 28 21  --  27 22 19   CR 0.74 0.72  --   --  0.69 0.70  "  GFRESTIMATED 84 87  --   --  85 84   GFRESTBLACK >90 >90  --   --  >90   GFR Calc   >90   GFR Calc     POTASSIUM 3.6 3.8  --   --  3.7 3.6   TSH 1.73 2.37 2.26  --  2.50 5.26*      Pneumonia Vaccine: UTD  Mammogram Screening: Mammogram Screening: Patient over age 50, mutual decision to screen reflected in health maintenance.  Last 3 Pap and HPV Results:   PAP / HPV Latest Ref Rng & Units 11/28/2016 1/11/2013 7/1/2011   PAP - NIL NIL NIL   HPV 16 DNA NEG Negative - -   HPV 18 DNA NEG Negative - -   OTHER HR HPV NEG Negative - -       ROS:  Constitutional, HEENT, cardiovascular, pulmonary, GI, , musculoskeletal, neuro, skin, endocrine and psych systems are negative, except as otherwise noted.    OBJECTIVE:   /84   Pulse 83   Temp 97.9  F (36.6  C) (Temporal)   Resp 16   Wt 52.9 kg (116 lb 9.6 oz)   LMP 03/26/2003   SpO2 97%   BMI 21.33 kg/m   Estimated body mass index is 21.33 kg/m  as calculated from the following:    Height as of 5/1/19: 1.575 m (5' 2\").    Weight as of this encounter: 52.9 kg (116 lb 9.6 oz).  EXAM:   GENERAL: healthy, alert and no distress.  Speaking in full sentences.  EYES: Eyes grossly normal to inspection, PERRL and conjunctivae and sclerae normal.  No nystagmus.  All 4 visual fields intact.  HENT: ear canals and TM's normal.  Nares are non-congested. Oropharynx is pink and moist. No tender with palpation to the sinuses.   NECK: no adenopathy, supple, no lymphadenopathy or thyromegaly.  No tender with palpation to the cervical spine or its paraspinous muscle.  RESP: lungs clear to auscultation - no rales, rhonchi or wheezes.  Good respiratory effort throughout.  BREAST: Offered and recommended but she declined.  She has no concern.  CV: regular rate and rhythm, no murmur  ABDOMEN: soft, nondistended, no palpable masses organomegaly with normal culture.  No CVA tenderness.  Tender without guarding or rebound with palpation to the lower left " abdomen/groin area.   (female): Offered but she declined.  She has no concerns.  She is up today for her Pap smear.  MS: no gross musculoskeletal defects noted, no edema. All joints are in the normal range of motion and 4 extremities were equally in strength. Hips, knees, ankles, shoulders, elbows, wrists exams were normal. Fine motor skills of the fingers are intact. Back is straight, no tender with palpation to the spine.  SKIN: no suspicious lesions or rashes  NEURO: Normal strength and tone, mentation intact and speech normal.  Cranial nerve II through XII intact.  DTRs +2 throughout.  No focal neurological deficit.  PSYCH: mentation appears normal, affect normal/bright.  Thoughts intact, suicidal/homicidal ideation.  No hallucination.    Diagnostic Test Results:  Labs reviewed in Epic  Results for orders placed or performed in visit on 09/16/19   Comprehensive metabolic panel (BMP + Alb, Alk Phos, ALT, AST, Total. Bili, TP)   Result Value Ref Range    Sodium 144 133 - 144 mmol/L    Potassium 3.6 3.4 - 5.3 mmol/L    Chloride 107 94 - 109 mmol/L    Carbon Dioxide 29 20 - 32 mmol/L    Anion Gap 8 3 - 14 mmol/L    Glucose 109 (H) 70 - 99 mg/dL    Urea Nitrogen 11 7 - 30 mg/dL    Creatinine 0.74 0.52 - 1.04 mg/dL    GFR Estimate 84 >60 mL/min/[1.73_m2]    GFR Estimate If Black >90 >60 mL/min/[1.73_m2]    Calcium 8.9 8.5 - 10.1 mg/dL    Bilirubin Total 0.7 0.2 - 1.3 mg/dL    Albumin 3.9 3.4 - 5.0 g/dL    Protein Total 8.3 6.8 - 8.8 g/dL    Alkaline Phosphatase 71 40 - 150 U/L    ALT 28 0 - 50 U/L    AST 34 0 - 45 U/L   UA with Microscopic reflex to Culture ([facility]; [facility])   Result Value Ref Range    Color Urine Yellow     Appearance Urine Clear     Glucose Urine Negative NEG^Negative mg/dL    Bilirubin Urine Negative NEG^Negative    Ketones Urine Negative NEG^Negative mg/dL    Specific Gravity Urine 1.015 1.003 - 1.035    Blood Urine Negative NEG^Negative    pH Urine 5.0 5.0 - 7.0 pH    Protein  Albumin Urine Negative NEG^Negative mg/dL    Urobilinogen mg/dL 0.0 0.0 - 2.0 mg/dL    Nitrite Urine Negative NEG^Negative    Leukocyte Esterase Urine Large (A) NEG^Negative    Source Unspecified Urine     WBC Urine 15 (H) 0 - 5 /HPF    RBC Urine 6 (H) 0 - 2 /HPF    Bacteria Urine Few (A) NEG^Negative /HPF    Squamous Epithelial /HPF Urine 2 (H) 0 - 1 /HPF    Mucous Urine Present (A) NEG^Negative /LPF   CBC with platelets and differential   Result Value Ref Range    WBC 7.6 4.0 - 11.0 10e9/L    RBC Count 4.42 3.8 - 5.2 10e12/L    Hemoglobin 13.3 11.7 - 15.7 g/dL    Hematocrit 40.5 35.0 - 47.0 %    MCV 92 78 - 100 fl    MCH 30.1 26.5 - 33.0 pg    MCHC 32.8 31.5 - 36.5 g/dL    RDW 12.9 10.0 - 15.0 %    Platelet Count 268 150 - 450 10e9/L    Diff Method Automated Method     % Neutrophils 61.0 %    % Lymphocytes 27.3 %    % Monocytes 7.2 %    % Eosinophils 3.6 %    % Basophils 0.8 %    % Immature Granulocytes 0.1 %    Nucleated RBCs 0 0 /100    Absolute Neutrophil 4.6 1.6 - 8.3 10e9/L    Absolute Lymphocytes 2.1 0.8 - 5.3 10e9/L    Absolute Monocytes 0.6 0.0 - 1.3 10e9/L    Absolute Basophils 0.1 0.0 - 0.2 10e9/L    Abs Immature Granulocytes 0.0 0 - 0.4 10e9/L    Absolute Nucleated RBC 0.0    Lipid panel reflex to direct LDL Fasting   Result Value Ref Range    Cholesterol 227 (H) <200 mg/dL    Triglycerides 199 (H) <150 mg/dL    HDL Cholesterol 59 >49 mg/dL    LDL Cholesterol Calculated 128 (H) <100 mg/dL    Non HDL Cholesterol 168 (H) <130 mg/dL   TSH with free T4 reflex   Result Value Ref Range    TSH 1.73 0.40 - 4.00 mU/L   Vitamin D Deficiency   Result Value Ref Range    Vitamin D Deficiency screening 26 20 - 75 ug/L   ESR: Erythrocyte sedimentation rate   Result Value Ref Range    Sed Rate 29 0 - 30 mm/h   Urine Culture Aerobic Bacterial   Result Value Ref Range    Specimen Description Unspecified Urine     Special Requests Specimen received in preservative     Culture Micro No growth        ASSESSMENT / PLAN:   1.  Encounter for Medicare annual wellness exam  Overall Jackie is healthy and doing well.  Topics appropriate for her age discussed include safety issue and healthy lifestyle modification as well as substance/alcohol misuse, STD, falling and depression prevention. Recommended daily exercise for at least 30 minutes - more as tolerated.  Emphasized on healthy diet with adequate fluid intake and resting. Feels safe at home and at work..  Follow in 1 year, earlier as needed.  Labs as ordered below.  UTD for colonoscopy and mammogram.  All of her questions were answered.    - Comprehensive metabolic panel (BMP + Alb, Alk Phos, ALT, AST, Total. Bili, TP)  - Urine Culture Aerobic Bacterial    2. Atrophic vaginitis  Stable and is doing well.  She has been on Premarin and clobetasol cream for a while.  Will continue with the primary cream couple times a week and clobetasol cream as needed.      - PREMARIN 0.625 MG/GM vaginal cream; Place 0.5 g vaginally twice a week  Dispense: 42.5 g; Refill: 10  - clobetasol (TEMOVATE) 0.05 % external ointment; Apply a small pea size amount at bedtime couple times a week as needed  Dispense: 60 g; Refill: 1    3. Gastroesophageal reflux disease without esophagitis  Stable and is doing well.  Currently not on medication.  Emphasized on diet modification.  Recommend over-the-counter omeprazole or ranitidine as needed.  Symptoms do need to be seen or call in discussed.    4. Hypothyroidism, unspecified type  Stable on levothyroxine.  The TSH level is normal today.  No change on the levothyroxine dose.  Recheck in a year.    - TSH with free T4 reflex    5. Hyperlipidemia LDL goal <160  Not on any medication.  Emphasized on healthy lifestyle modification as above.  Her cholesterol level today slightly elevated.  Options of starting the medication for it versus working through lifestyle modification discussed.  She elected to hold off on the medication for now.  Recheck in 6 months, if remained to  be elevated, will consider medication.    - Comprehensive metabolic panel (BMP + Alb, Alk Phos, ALT, AST, Total. Bili, TP)  - Lipid panel reflex to direct LDL Fasting  - Vitamin D Deficiency    6. Lichen sclerosus et atrophicus of the vulva  Stable.  Continue with the Premarin and clobetasol cream as above.    7. Tobacco use disorder  Continues to smoke about a pack a day.  Shehase been smoking for more than 40 years.  Encouraged to stop smoking but she is not ready at this time. Discussed with her about the long and short term consequences of tobacco smoking.  Discussed with her about different options for smoking cessation aids.  She is not ready to quit smoking at this time.  Encourage to let me know when she is ready to quit.  In a mean time, I encourage her to reduce to amount of cigarrets smoke as much as possible.  She has albuterol at home to use as needed and uses it rarely.  Continue with the albuterol inhaler as needed for now.  All of her questions were answered.    - albuterol (PROAIR HFA/PROVENTIL HFA/VENTOLIN HFA) 108 (90 Base) MCG/ACT inhaler; Inhale 2 puffs into the lungs every 4 hours as needed for shortness of breath / dyspnea or wheezing  Dispense: 1 Inhaler; Refill: 11    8. LLQ abdominal pain  Chronic condition that she has had for at lest couple years.  She has had extensive work-up including cystoscopy, CT scans, pelvic ultrasound, and colonoscopy.  She been evaluated by urologist, GYN, GI specialist and general surgeon.  The most recent CT scan was 4 months ago was normal.  She has no history of kidney stone.  She had  with complicated with extensive adhesions and she had surgical release for it.  Overall the pain is about the same and it is affecting her life significantly.  Her GI specialist recommend to consider a colonoscopy again if her symptom is not getting much better; she had one about over a year ago which showed mild diverticulosis.  I am not sure if the colonoscopy at  this time will give much more information.  The last time she saw the general surgeon who recommended exploratory laparoscopy if GI work up is negative.  She is known to have adhesion from the  that required surgical release.  Certainly her pain could be due to adhesions.  Will refer her back to Dr. Spear, the general surgeon to look into exploratory laparoscopy option.  The pain has affected her life significantly.  Unlikely this radiculopathy pain as her lower spine exam was completely normal.    - UA with Microscopic reflex to Culture (Sena Levy; [facility])  - CBC with platelets and differential  - ESR: Erythrocyte sedimentation rate  - polyethylene glycol (MIRALAX/GLYCOLAX) packet; Take 17 g by mouth daily as needed for constipation  Dispense: 100 packet; Refill: 3  - GENERAL SURG ADULT REFERRAL    9. Personal history of tobacco use  Discussed with her about pros and cons of lung cancer screening with low dose CT scan.  She is interested and therefore she was referred.    - Prof fee: Shared Decisionmaking for Lung Cancer Screening  - CT Chest Lung Cancer Scrn Low Dose wo; Future  - Okay for Smoking Cessation Study (PLUTO) to Contact Patient    End of Life Planning:  Patient currently has an advanced directive: No.  I have verified the patient's ablity to prepare an advanced directive/make health care decisions.  Literature was provided to assist patient in preparing an advanced directive.    COUNSELING:  Reviewed preventive health counseling, as reflected in patient instructions       Regular exercise       Healthy diet/nutrition       Vision screening       Hearing screening       Dental care       Bladder control       Fall risk prevention       Aspirin Prophylaxsis       Alcohol Use       Osteoporosis Prevention/Bone Health       Safe sex practices/STD prevention       Consider lung cancer screening for ages 55-80 years and 30 pack-year smoking history        Colon cancer  "screening    Estimated body mass index is 21.33 kg/m  as calculated from the following:    Height as of 5/1/19: 1.575 m (5' 2\").    Weight as of this encounter: 52.9 kg (116 lb 9.6 oz).         reports that she has been smoking cigarettes.  She has a 25.00 pack-year smoking history. She has never used smokeless tobacco.  Tobacco Cessation Action Plan: Information offered: Patient not interested at this time    Appropriate preventive services were discussed with this patient, including applicable screening as appropriate for cardiovascular disease, diabetes, osteopenia/osteoporosis, and glaucoma.  As appropriate for age/gender, discussed screening for colorectal cancer, prostate cancer, breast cancer, and cervical cancer. Checklist reviewing preventive services available has been given to the patient.    Reviewed patients plan of care and provided an AVS. The Basic Care Plan (routine screening as documented in Health Maintenance) for Jackie meets the Care Plan requirement. This Care Plan has been established and reviewed with the Patient.    Counseling Resources:  ATP IV Guidelines  Pooled Cohorts Equation Calculator  Breast Cancer Risk Calculator  FRAX Risk Assessment  ICSI Preventive Guidelines  Dietary Guidelines for Americans, 2010  USDA's MyPlate  ASA Prophylaxis  Lung CA Screening    Jovana Wooten Mai, MD  [facility]        Lung Cancer Screening Shared Decision Making Visit     Jackie Siddiqi is eligible for lung cancer screening on the basis of the information provided in my signed lung cancer screening order.     I have discussed with patient the risks and benefits of screening for lung cancer with low-dose CT.     The risks include:  radiation exposure: one low dose chest CT has as much ionizing radiation as about 15 chest x-rays or 6 months of background radiation living in [place]    false positives: 96% of positive findings/nodules are NOT cancer, but some might still require additional " diagnostic evaluation, including biopsy  over-diagnosis: some slow growing cancers that might never have been clinically significant will be detected and treated unnecessarily     The benefit of early detection of lung cancer is contingent upon adherence to annual screening or more frequent follow up if indicated.     Furthermore, reaping the benefits of screening requires Jackie JENNIFFER Siddiqi to be willing and physically able to undergo diagnostic procedures, if indicated. Although no specific guide is available for determining severity of comorbidities, it is reasonable to withhold screening in patients who have greater mortality risk from other diseases.     We did discuss that the only way to prevent lung cancer is to not smoke. Smoking cessation assistance was offered.    I did not offer risk estimation using a calculator such as this one:    ShouldIScreen

## 2019-09-16 NOTE — PATIENT INSTRUCTIONS
Patient Education   Personalized Prevention Plan  You are due for the preventive services outlined below.  Your care team is available to assist you in scheduling these services.  If you have already completed any of these items, please share that information with your care team to update in your medical record.  Health Maintenance Due   Topic Date Due     Talk to your care team about options to quit tobacco use.  1955     Zoster (Shingles) Vaccine (1 of 2) 05/07/2003     Discuss Advance Care Planning  01/11/2018     Annual Wellness Visit  05/07/2018     Cholesterol Lab  05/29/2019     Anxiety Assessment  05/29/2019     FALL RISK ASSESSMENT  05/29/2019     Depression Assessment  05/29/2019     Pneumococcal Vaccine (2 of 2 - PCV13) 05/29/2019     Flu Vaccine (1) 09/01/2019        Lung Cancer Screening   Frequently Asked Questions  If you are at high-risk for lung cancer, getting screened with low-dose computed tomography (LDCT) every year can help save your life. This handout offers answers to some of the most common questions about lung cancer screening. If you have other questions, please call [phone] ([phone]).     What is it?  Lung cancer screening uses special X-ray technology to create an image of your lung tissue. The exam is quick and easy and takes less than 10 seconds. We don t give you any medicine or use any needles. You can eat before and after the exam. You don t need to change your clothes as long as the clothing on your chest doesn t contain metal. But, you do need to be able to hold your breath for at least 6 seconds during the exam.    What is the goal of lung cancer screening?  The goal of lung cancer screening is to save lives. Many times, lung cancer is not found until a person starts having physical symptoms. Lung cancer screening can help detect lung cancer in the earliest stages when it may be easier to treat.    Who should be screened for lung cancer?  We suggest lung  cancer screening for anyone who is at high-risk for lung cancer. You are in the high-risk group if you:      are between the ages of 55 and 79, and    have smoked at least 1 pack of cigarettes a day for 30 or more years, and    still smoke or have quit within the past 15 years.    However, if you have a new cough or shortness of breath, you should talk to your doctor before being screened.    Some national lung health advocacy groups also recommend screening for people ages 50 to 79 who have smoked an average of 1 pack of cigarettes a day for 20 years. They must also have at least 1 other risk factor for lung cancer, not including exposure to secondhand smoke. Other risk factors are having had cancer in the past, emphysema, pulmonary fibrosis, COPD, a family history of lung cancer, or exposure to certain materials such as arsenic, asbestos, beryllium, cadmium, chromium, diesel fumes, nickel, radon or silica. Your care team can help you know if you have one of these risk factors.     Why does it matter if I have symptoms?  Certain symptoms can be a sign that you have a condition in your lungs that should be checked and treated by your doctor. These symptoms include fever, chest pain, a new or changing cough, shortness of breath that you have never felt before, coughing up blood or unexplained weight loss. Having any of these symptoms can greatly affect the results of lung cancer screening.       Should all smokers get an LDCT lung cancer screening exam?  It depends. Lung cancer screening is for a very specific group of men and women who have a history of heavy smoking over a long period of time (see  Who should be screened for lung cancer  above).  I am in the high-risk group, but have been diagnosed with cancer in the past. Is LDCT lung cancer screening right for me?  In some cases, you should not have LDCT lung screening, such as when your doctor is already following your cancer with CT scan studies. Your doctor  will help you decide if LDCT lung screening is right for you.  Do I need to have a screening exam every year?  Yes. If you are in the high-risk group described earlier, you should get an LDCT lung cancer screening exam every year until you are 79, or are no longer willing or able to undergo screening and possible procedures to diagnose and treat lung cancer.  How effective is LDCT at preventing death from lung cancer?  Studies have shown that LDCT lung cancer screening can lower the risk of death from lung cancer by 20 percent in people who are at high-risk.  What are the risks?  There are some risks and limitations of LDCT lung cancer screening. We want to make sure you understand the risks and benefits, so please let us know if you have any questions. Your doctor may want to talk with you more about these risks.    Radiation exposure: As with any exam that uses radiation, there is a very small increased risk of cancer. The amount of radiation in LDCT is small--about the same amount a person would get from a mammogram. Your doctor orders the exam when he or she feels the potential benefits outweigh the risks.    False negatives: No test is perfect, including LDCT. It is possible that you may have a medical condition, including lung cancer, that is not found during your exam. This is called a false negative result.    False positives and more testing: LDCT very often finds something in the lung that could be cancer, but in fact is not. This is called a false positive result. False positive tests often cause anxiety. To make sure these findings are not cancer, you may need to have more tests. These tests will be done only if you give us permission. Sometimes patients need a treatment that can have side effects, such as a biopsy. For more information on false positives, see  What can I expect from the results?     Findings not related to lung cancer: Your LDCT exam also takes pictures of areas of your body next to  your lungs. In a very small number of cases, the CT scan will show an abnormal finding in one of these areas, such as your kidneys, adrenal glands, liver or thyroid. This finding may not be serious, but you may need more tests. Your doctor can help you decide what other tests you may need, if any.  What can I expect from the results?  About 1 out of 4 LDCT exams will find something that may need more tests. Most of the time, these findings are lung nodules. Lung nodules are very small collections of tissue in the lung. These nodules are very common, and the vast majority--more than 97 percent--are not cancer (benign). Most are normal lymph nodes or small areas of scarring from past infections.  But, if a small lung nodule is found to be cancer, the cancer can be cured more than 90 percent of the time. To know if the nodule is cancer, we may need to get more images before your next yearly screening exam. If the nodule has suspicious features (for example, it is large, has an odd shape or grows over time), we will refer you to a specialist for further testing.  Will my doctor also get the results?  Yes. Your doctor will get a copy of your results.  Is it okay to keep smoking now that there s a cancer screening exam?  No. Tobacco is one of the strongest cancer-causing agents. It causes not only lung cancer, but other cancers and cardiovascular (heart) diseases as well. The damage caused by smoking builds over time. This means that the longer you smoke, the higher your risk of disease. While it is never too late to quit, the sooner you quit, the better.  Where can I find help to quit smoking?  The best way to prevent lung cancer is to stop smoking. If you have already quit smoking, congratulations and keep it up! For help on quitting smoking, please call [employer] at 6-023-315-XGVJ (8011) or the American [employer] Society at 1-890.235.8275 to find local resources near you.  One-on-one health coaching:  If you d prefer to  work individually with a health care provider on tobacco cessation, we offer:      Medication Therapy Management:  Our specially trained pharmacists work closely with you and your doctor to help you quit smoking.  Call [phone] or [phone] (toll free).     Can Do: Health coaching offered by [facility].  [email or web address]

## 2019-09-16 NOTE — Clinical Note
1. Please refer her back to Dr. Spear for lower abdominal pain.  2.  Please let patient know that I refer her to the general surgeon for further evaluation her abdominal pain for possible exploratory laparoscopy.  This was mentioned and suggest by her surgeon at the last visit.  I think it is appropriate since the pain has been there for a while. 3.  Recheck if she get the Pneumovax 13 and flu vaccination at her apt. and not able to close the charge unless these administrations are documented.  If she has not received, please let patient know that I recommend her to consider getting them as discussed.  4.  Also recommend her to get the shingles vaccination through her pharmacy

## 2019-09-17 LAB
BACTERIA SPEC CULT: NO GROWTH
Lab: NORMAL
SPECIMEN SOURCE: NORMAL

## 2019-09-18 LAB — DEPRECATED CALCIDIOL+CALCIFEROL SERPL-MC: 26 UG/L (ref 20–75)

## 2019-09-19 DIAGNOSIS — E03.9 HYPOTHYROIDISM, UNSPECIFIED TYPE: ICD-10-CM

## 2019-09-19 RX ORDER — LEVOTHYROXINE SODIUM 25 UG/1
TABLET ORAL
Qty: 90 TABLET | Refills: 1 | Status: SHIPPED | OUTPATIENT
Start: 2019-09-19 | End: 2019-11-25

## 2019-09-19 NOTE — TELEPHONE ENCOUNTER
"Levothyroxine  Last Written Prescription Date:  03/11/2019  Last Fill Quantity: 90,  # refills: 1   Last office visit: 9/16/2019 with prescribing provider:  Devi   Future Office Visit:  None  Prescription approved per [facility] Refill Protocol.    Requested Prescriptions   Pending Prescriptions Disp Refills     levothyroxine (SYNTHROID/LEVOTHROID) 25 MCG tablet [Pharmacy Med Name: LEVOTHYROXINE 25 MCG TABLET] 90 tablet 1     Sig: TAKE 1 TABLET BY MOUTH ONCE DAILY       Thyroid Protocol Passed - 9/19/2019  1:59 AM        Passed - Patient is 12 years or older        Passed - Recent (12 mo) or future (30 days) visit within the authorizing provider's specialty     Patient had office visit in the last 12 months or has a visit in the next 30 days with authorizing provider or within the authorizing provider's specialty.  See \"Patient Info\" tab in inbasket, or \"Choose Columns\" in Meds & Orders section of the refill encounter.          Passed - Medication is active on med list        Passed - Normal TSH on file in past 12 months     Recent Labs   Lab Test 09/16/19  1432   TSH 1.73           Passed - No active pregnancy on record     If patient is pregnant or has had a positive pregnancy test, please check TSH.        Passed - No positive pregnancy test in past 12 months     If patient is pregnant or has had a positive pregnancy test, please check TSH.      Joann Blas RN   "

## 2019-09-26 ENCOUNTER — TELEPHONE (OUTPATIENT)
Dept: FAMILY MEDICINE | Facility: CLINIC | Age: 64
End: 2019-09-26

## 2019-09-26 PROBLEM — J44.9 CHRONIC OBSTRUCTIVE PULMONARY DISEASE, UNSPECIFIED COPD TYPE (H): Status: RESOLVED | Noted: 2019-09-16 | Resolved: 2019-09-26

## 2019-09-26 NOTE — TELEPHONE ENCOUNTER
Patient requested Iron to be checked wants to know why it wasn't. What is she suppose to do about stomach pains she knows you want her to write down foods but she wakes up in the am with pain and no food in her stomach. Medication for cholesterol she will start it.   Lynette Gutierrez MA 9/26/2019

## 2019-09-26 NOTE — TELEPHONE ENCOUNTER
These were sent to her through [facility]     Here are your recent results.  Your urine test is suggestive of contamination.  Pending for the urine culture and will treat if the culture is positive.  Your thyroid level was normal.  Your kidney function test, liver function test and electrolytes were also normal.  Your cholesterol remained to be high, overall it has improved slightly as compared to last year.  Please continue to work on diet and exercise.  Recommend to consider medication and please let me know if you are interested.  The sed rate was normal which suggest that you do not have active inflammation in your body.  Your CBC was also normal.  Unlikely your pain is due to diverticulitis.  Please let me know if you have any further question.  Thank you         Jovana Quinonez MD.   Dear Jackie,     Here are your recent results. Your urine culture was negative which means that you do not have a bladder infection.  thanks         Jovana Quinonez MD.   Deanoemi Mejia,     Here are your recent results.  Your vitamin D level was normal but is on the lower side.  I recommend to start over-the-counter vitamin D supplement, at 1000 units daily for now.  Please let me know if you have any further question.  Thank you         Jovana Quinonez MD.

## 2019-09-26 NOTE — TELEPHONE ENCOUNTER
Reason for Call:  Request for results:    Name of test or procedure: Labs     Date of test of procedure: 9.16    Location of the test or procedure: [facility]    OK to leave the result message on voice mail or with a family member? YES    Phone number Patient can be reached at:  Home number on file [phone] (home)    Additional comments: Please call pt with results.    Call taken on 9/26/2019 at 10:56 AM by Anette Trevizo

## 2019-09-26 NOTE — TELEPHONE ENCOUNTER
Jovana Quinonez MD  [facility]             1. Please refer her back to Dr. Spear for lower abdominal pain.     2.  Please let patient know that I refer her to the general surgeon for further evaluation her abdominal pain for possible exploratory laparoscopy.  This was mentioned and suggest by her surgeon at the last visit.  I think it is appropriate since the pain has been there for a while.   3.  Recheck if she get the Pneumovax 13 and flu vaccination at her apt. and not able to close the charge unless these administrations are documented.  If she has not received, please let patient know that I recommend her to consider getting them as discussed.     4.  Also recommend her to get the shingles vaccination through her pharmacy

## 2019-09-26 NOTE — TELEPHONE ENCOUNTER
All those recommendations from Dr. Quinonez are all completed from a TC aspect.  The order for the general surgeon Dr. Spear are placed.  She just needs to be asked the questions below about the injections and where to get the shingles injection. Also about what was mentioned in her previous visit with Dr. Spear.    If patient is in agreement with seeing Dr. Spear then after the questions are asked and completed the call can be sent to specialty to schedule with Dr. Spear.   Marii ORTIZ

## 2019-09-27 ENCOUNTER — HEALTH MAINTENANCE LETTER (OUTPATIENT)
Age: 64
End: 2019-09-27

## 2019-09-27 NOTE — TELEPHONE ENCOUNTER
I usually do not check the ferritin level unless the CBC showed that she is anemic.  The CBC showed that her hemoglobin was normal.  Please find out if there is any reason that she really want to get the ferritin checked.  If she insists, this is okay with me to check that.  Please send my apologized to her for inconvenience.        We had a long conversation about the low abdominal pain in the office.  She mentioned briefly about upper abdominal pain but it just started and did not seem to bother her much.  I did the lab work which showed normal CBC, CMP and sed rate.  Please find out more about her upper abdominal pain.  Her previous work-up showed that she has gallbladder stone and I want to be sure that it is not acting up.  Please let me know more about the upper abdominal pain that she has.  If she still has a concern, please have her come in to reevaluate.  For her lower abdominal pain, she was referred to Dr. Spear again for possible laparoscopic exploration.    thanks

## 2019-09-27 NOTE — TELEPHONE ENCOUNTER
Patient did not receive the vaccines that day and I informed her of providers message recommending her to.  Chandler Bennett, CMA

## 2019-09-27 NOTE — TELEPHONE ENCOUNTER
Please follow up with Dr. Spear as scheduled.  In a mean time, I encourage her to drink water, avoid spicy or greasy food. Also avoid gassy food.  Will have her try Omeprazole for heart burn if she wants.  Not mehul if it helps.  thanks

## 2019-09-27 NOTE — TELEPHONE ENCOUNTER
I informed patient of the message below.  No further questions at this time.  Bought OTC omeprazole/prilosec.  Chandler Bennett, CMA

## 2019-09-30 ASSESSMENT — ANXIETY QUESTIONNAIRES
2. NOT BEING ABLE TO STOP OR CONTROL WORRYING: NOT AT ALL
1. FEELING NERVOUS, ANXIOUS, OR ON EDGE: NOT AT ALL
GAD7 TOTAL SCORE: 0
5. BEING SO RESTLESS THAT IT IS HARD TO SIT STILL: NOT AT ALL
3. WORRYING TOO MUCH ABOUT DIFFERENT THINGS: NOT AT ALL
6. BECOMING EASILY ANNOYED OR IRRITABLE: NOT AT ALL
IF YOU CHECKED OFF ANY PROBLEMS ON THIS QUESTIONNAIRE, HOW DIFFICULT HAVE THESE PROBLEMS MADE IT FOR YOU TO DO YOUR WORK, TAKE CARE OF THINGS AT HOME, OR GET ALONG WITH OTHER PEOPLE: NOT DIFFICULT AT ALL
7. FEELING AFRAID AS IF SOMETHING AWFUL MIGHT HAPPEN: NOT AT ALL

## 2019-09-30 ASSESSMENT — PATIENT HEALTH QUESTIONNAIRE - PHQ9
SUM OF ALL RESPONSES TO PHQ QUESTIONS 1-9: 1
5. POOR APPETITE OR OVEREATING: NOT AT ALL

## 2019-10-01 ENCOUNTER — HOSPITAL ENCOUNTER (OUTPATIENT)
Dept: CT IMAGING | Facility: CLINIC | Age: 64
Discharge: HOME | End: 2019-10-01
Attending: FAMILY MEDICINE | Admitting: FAMILY MEDICINE
Payer: COMMERCIAL

## 2019-10-01 DIAGNOSIS — Z87.891 PERSONAL HISTORY OF TOBACCO USE: ICD-10-CM

## 2019-10-01 PROCEDURE — G0297 LDCT FOR LUNG CA SCREEN: HCPCS

## 2019-10-01 ASSESSMENT — ANXIETY QUESTIONNAIRES: GAD7 TOTAL SCORE: 0

## 2019-10-02 ENCOUNTER — TELEPHONE (OUTPATIENT)
Dept: FAMILY MEDICINE | Facility: CLINIC | Age: 64
End: 2019-10-02

## 2019-10-02 DIAGNOSIS — R91.8 ABNORMAL CT LUNG SCREENING: ICD-10-CM

## 2019-10-02 NOTE — TELEPHONE ENCOUNTER
[facility] Radiology Lung Cancer Screening CT result notification:     LDCT/Lung Cancer Screening CT Exam date: 10/1/19  Radiologist Impression AND Recommendations:   ACR Assessment Category:  Lung-RADS Category 3. Probably benign  finding(s)- short term follow up suggested with six month low dose CT  (please order exam code IMG 2163). Multiple scattered bilateral small  pulmonary nodules with the largest measure up to 0.7 cm in largest  dimension.   Pertinent Findings:  Lungs: There are multiple small noncalcified nodules in bilateral  lungs. Largest is in the left lung adjacent the hilum (image 139  series 6) measure up to 0.7 cm. The next largest is in the medial  right upper lobe posterior to the mediastinum (image 122 series 6).  These are highly likely benign. Lungs are otherwise clear. The other  nodules are noted on images 62, 88, 101, 105, 147, 180, 225, 247 and  269 series 6.     Ordering Provider: Jovana Quinonez MD  Jackie Siddiqi did not receive the remaining radiology results from her provider.     Lung Nodule Program Protocol recommendation [Pertaining to lung nodules]:    Lung RADS 3 Protocol: Results RN to notify Patient of results/recommendations and place order for 6 month CT ([id number]) - MD burgess   CT Chest order placed to be completed within 6 months (Yes/No):  Yes    RN communicated the lung nodule finding to the patient (Yes/No):  Yes  The patient had the following questions: if the nodules got larger, what would happen next  Correct letter sent as per Lung nodule protocol (Yes/No):  yes  Did Patient have any CT's of chest previous? (inquire only if no CT's were used for comparison) (Yes/No/NA) had CXR    If scheduling LDCT only, RN will contact Image Scheduling Team  Hours available (all sites below):  Mon-Fri 7A to 8P; Sat 7A to 3P.  No schedulers available on Sunday.    [facility] ([place] and [place]): [phone]    [place], [place]):  [phone]    [facility] region ([place] and [place]): [phone]    [facility] Result RN  Lung Nodule and Emergency Dept Lab Result F/u PHYLLIS  Ph# [phone]

## 2019-10-04 ENCOUNTER — TELEPHONE (OUTPATIENT)
Dept: FAMILY MEDICINE | Facility: CLINIC | Age: 64
End: 2019-10-04

## 2019-10-04 NOTE — TELEPHONE ENCOUNTER
Reason for Call:  Other prescription    Detailed comments: Pt was told her cholesterol is high. She was told to continue working on diet and exercise and to consider a cholesterol medicine. She does not feel educated enough to make that decision. She would like a call to discuss in further detail the diet changes vs. meds.      Phone Number Patient can be reached at: Home number on file [phone] (home)    Best Time: any     Can we leave a detailed message on this number? YES    Call taken on 10/4/2019 at 2:05 PM by Christina Baptiste

## 2019-10-21 ENCOUNTER — OFFICE VISIT (OUTPATIENT)
Dept: URGENT CARE | Facility: RETAIL CLINIC | Age: 64
End: 2019-10-21
Payer: COMMERCIAL

## 2019-10-21 VITALS
RESPIRATION RATE: 14 BRPM | HEART RATE: 61 BPM | TEMPERATURE: 97.7 F | OXYGEN SATURATION: 99 % | DIASTOLIC BLOOD PRESSURE: 73 MMHG | SYSTOLIC BLOOD PRESSURE: 117 MMHG

## 2019-10-21 DIAGNOSIS — J06.9 VIRAL UPPER RESPIRATORY INFECTION: Primary | ICD-10-CM

## 2019-10-21 DIAGNOSIS — R05.9 COUGH: ICD-10-CM

## 2019-10-21 PROCEDURE — 99213 OFFICE O/P EST LOW 20 MIN: CPT | Performed by: PHYSICIAN ASSISTANT

## 2019-10-21 ASSESSMENT — PAIN SCALES - GENERAL: PAINLEVEL: MILD PAIN (2)

## 2019-10-21 NOTE — PROGRESS NOTES
Chief Complaint   Patient presents with     Cough     x 1 week     Pharyngitis      SUBJECTIVE:   Jackie Siddiqi is a 64 year old female presenting with a chief complaint of congestion, runny nose, cough and sore throat  Onset of symptoms was 1 week(s) ago.  Course of illness is same.    Severity mild  Current and Associated symptoms: cough, runny nose, congestion, sore throat  Treatment measures tried include Fluids.  Predisposing factors include None.    Past Medical History:   Diagnosis Date     Abnormal Papanicolaou smear of cervix and cervical HPV     cryocautery     Breast cystic disease      Hiatal hernia      Motion sickness      Peptic ulcer, unspecified site, unspecified as acute or chronic, without mention of hemorrhage, perforation, or obstruction     Peptic ulcer disease     Personal history of colonic polyps      Current Outpatient Medications   Medication Sig Dispense Refill     albuterol (PROAIR HFA/PROVENTIL HFA/VENTOLIN HFA) 108 (90 Base) MCG/ACT inhaler Inhale 2 puffs into the lungs every 4 hours as needed for shortness of breath / dyspnea or wheezing 1 Inhaler 11     clobetasol (TEMOVATE) 0.05 % external ointment Apply a small pea size amount at bedtime couple times a week as needed 60 g 1     levothyroxine (SYNTHROID/LEVOTHROID) 25 MCG tablet TAKE 1 TABLET BY MOUTH ONCE DAILY 90 tablet 1     PREMARIN 0.625 MG/GM vaginal cream Place 0.5 g vaginally twice a week 42.5 g 10     polyethylene glycol (MIRALAX/GLYCOLAX) packet Take 17 g by mouth daily as needed for constipation (Patient not taking: Reported on 10/21/2019) 100 packet 3     Social History     Tobacco Use     Smoking status: Former Smoker     Packs/day: 0.50     Years: 50.00     Pack years: 25.00     Types: Cigarettes     Last attempt to quit: 10/2/2017     Years since quittin.0     Smokeless tobacco: Never Used     Tobacco comment: 1 pack per week, started age 11   Substance Use Topics     Alcohol use: No     Alcohol/week: 0.0  standard drinks     Comment: holidays only       ROS:  CONSTITUTIONAL:NEGATIVE for fever, chills  ENT/MOUTH: POSITIVE for nasal congestion, rhinorrhea-clear and sore throat and NEGATIVE for ear pain   RESP POSITIVE for cough NEGATIVE for wheezing    OBJECTIVE:  /73   Pulse 61   Temp 97.7  F (36.5  C) (Oral)   Resp 14   LMP 03/26/2003   SpO2 99%   GENERAL APPEARANCE: healthy, alert and no distress  EYES:  conjunctiva clear  HENT: ear canals and TM's normal.  Nose rhinorrhea. mouth without ulcers, erythema or lesions  NECK: supple, nontender, no lymphadenopathy  RESP: lungs clear to auscultation - no rales, rhonchi or wheezes  CV: regular rates and rhythm, normal S1 S2, no murmur noted  SKIN: no suspicious lesions or rashes    ASSESSMENT:  (J06.9) Viral upper respiratory infection  (primary encounter diagnosis)  (R05) Cough    PLAN:  No indication for antibiotics discussed.    Rest! Your body needs more rest to heal.  Drink plenty of fluids (warm fluids like tea or soup are soothing and reduce cough)  Sit in the bathroom with a hot shower running and breathe in the steam.  Honey may soothe your sore throat and help manage your cough- may take straight or in warm water with lemon juice.  Avoid smoke from cigarettes, fireplace, bonfire etc.  Take Tylenol  as needed for pain.  Saline nasal spray for congestion  Salt water gargles, throat lozenges for throat discomfort  Delsym (dextromethorphan) is a 12 hour over the counter cough medication    OR Mucinex or Robitussin (guiafenesin) thin mucus and may help it to loosen more quickly  Good handwashing is the best way to prevent spread of the common cold.  Present to emergency room if you develop trouble breathing, swallowing or cough-up blood.  Follow up with your primary care provider if symptoms worsen or fail to improve as expected    Patti Rodrigues PA-C  [facility]

## 2019-10-21 NOTE — PATIENT INSTRUCTIONS
No indication for antibiotics discussed.    Rest! Your body needs more rest to heal.  Drink plenty of fluids (warm fluids like tea or soup are soothing and reduce cough)  Sit in the bathroom with a hot shower running and breathe in the steam.  Honey may soothe your sore throat and help manage your cough- may take straight or in warm water with lemon juice.  Avoid smoke from cigarettes, fireplace, bonfire etc.  Take Tylenol  as needed for pain.  Saline nasal spray for congestion  Salt water gargles, throat lozenges for throat discomfort  Delsym (dextromethorphan) is a 12 hour over the counter cough medication    OR Mucinex or Robitussin (guiafenesin) thin mucus and may help it to loosen more quickly  Good handwashing is the best way to prevent spread of the common cold.  Present to emergency room if you develop trouble breathing, swallowing or cough-up blood.  Follow up with your primary care provider if symptoms worsen or fail to improve as expected

## 2019-11-15 ENCOUNTER — OFFICE VISIT (OUTPATIENT)
Dept: FAMILY MEDICINE | Facility: OTHER | Age: 64
End: 2019-11-15
Payer: COMMERCIAL

## 2019-11-15 VITALS
BODY MASS INDEX: 21.97 KG/M2 | WEIGHT: 124 LBS | SYSTOLIC BLOOD PRESSURE: 110 MMHG | DIASTOLIC BLOOD PRESSURE: 68 MMHG | HEART RATE: 62 BPM | HEIGHT: 63 IN | TEMPERATURE: 97.6 F | RESPIRATION RATE: 14 BRPM

## 2019-11-15 DIAGNOSIS — N89.8 VAGINAL DISCHARGE: Primary | ICD-10-CM

## 2019-11-15 LAB
SPECIMEN SOURCE: NORMAL
WET PREP SPEC: NORMAL

## 2019-11-15 PROCEDURE — 99213 OFFICE O/P EST LOW 20 MIN: CPT | Performed by: NURSE PRACTITIONER

## 2019-11-15 PROCEDURE — 87210 SMEAR WET MOUNT SALINE/INK: CPT | Performed by: NURSE PRACTITIONER

## 2019-11-15 ASSESSMENT — MIFFLIN-ST. JEOR: SCORE: 1065.2

## 2019-11-15 ASSESSMENT — PAIN SCALES - GENERAL: PAINLEVEL: NO PAIN (0)

## 2019-11-15 NOTE — PROGRESS NOTES
Subjective     Jackie Siddiqi is a 64 year old female who presents to clinic today for the following health issues:    HPI   Vaginal Symptoms  Onset: 1 week     Description:  Vaginal Discharge: white   Itching (Pruritis): no   Burning sensation:  no   Odor: YES    Accompanying Signs & Symptoms:  Pain with Urination: no   Abdominal Pain: YES- had it since   Fever: no     History:   Sexually active: no   New Partner: no   Possibility of Pregnancy:  No    Precipitating factors:   Recent Antibiotic Use: no     Alleviating factors:  nothing    Feels has had whitish discharge with an odor to is for a week.  No urinary symptoms.  No vaginal burning or itching.  No change in sexual partner.  Does not douche or use body washes in this area.      Therapies Tried and outcome: nothing      Patient Active Problem List   Diagnosis     Esophageal reflux     Lichen sclerosus et atrophicus of the vulva     Advanced directives, counseling/discussion     Hypothyroidism, unspecified type     Hyperlipidemia LDL goal <160     Osteoarthritis of carpometacarpal joint of right thumb, unspecified osteoarthritis type     Abnormal CT lung screening     Past Surgical History:   Procedure Laterality Date     C  DELIVERY ONLY       x2     C NONSPECIFIC PROCEDURE      Laparoscopic exam with adhesions     C NONSPECIFIC PROCEDURE      Mastectomy for cystic breast disease, breast implants     COLONOSCOPY  2007     COLONOSCOPY  2013    Procedure: COLONOSCOPY;  colonoscopy, Esophagoscopy, Gastroscopy, Duodenoscopy EGD, multiple biopsies;  Surgeon: Joe Benson MD;  Location:  GI     COLONOSCOPY N/A 2017    Procedure: COMBINED COLONOSCOPY, SINGLE OR MULTIPLE BIOPSY/POLYPECTOMY BY BIOPSY;  colonoscopy with polypectomy by biopsy;  Surgeon: Tolu No MD;  Location:  GI     ENDOSCOPY  2007    Sm hiatus hernia     ESOPHAGOSCOPY, GASTROSCOPY, DUODENOSCOPY (EGD), COMBINED  2013    Procedure:  COMBINED ESOPHAGOSCOPY, GASTROSCOPY, DUODENOSCOPY (EGD), BIOPSY SINGLE OR MULTIPLE;  ESOPHAGOGASTRODUODENOSCOPY WITH MULTIPLE BIOPSIES;  Surgeon: Joe Benson MD;  Location: [facility]     MASTECTOMY, BILATERAL         Social History     Tobacco Use     Smoking status: Former Smoker     Packs/day: 0.50     Years: 50.00     Pack years: 25.00     Types: Cigarettes     Last attempt to quit: 10/2/2017     Years since quittin.1     Smokeless tobacco: Never Used     Tobacco comment: 1 pack per week, started age 11   Substance Use Topics     Alcohol use: No     Alcohol/week: 0.0 standard drinks     Comment: holidays only     Family History   Problem Relation Age of Onset     Diabetes Father      Hypertension Father      Alcohol/Drug Father      Eye Disorder Father      Obesity Father      Breast Cancer Mother      Osteoporosis Mother      Thyroid Disease Mother      Cancer Mother         Bladder     Cancer Sister         fallopian tube cancer     Obesity Sister      Alzheimer Disease Sister      Cancer Sister         Skin Cancer     Allergies Daughter      Cancer Maternal Grandmother         uterine cancer     Liver Disease Daughter         Biliary Atresia     Genitourinary Problems Brother      No Known Problems Daughter      Heart Disease Brother         Heart Murmur     Genitourinary Problems Brother         kidney disease (two brothers)         Current Outpatient Medications   Medication Sig Dispense Refill     albuterol (PROAIR HFA/PROVENTIL HFA/VENTOLIN HFA) 108 (90 Base) MCG/ACT inhaler Inhale 2 puffs into the lungs every 4 hours as needed for shortness of breath / dyspnea or wheezing 1 Inhaler 11     clobetasol (TEMOVATE) 0.05 % external ointment Apply a small pea size amount at bedtime couple times a week as needed 60 g 1     levothyroxine (SYNTHROID/LEVOTHROID) 25 MCG tablet TAKE 1 TABLET BY MOUTH ONCE DAILY 90 tablet 1     PREMARIN 0.625 MG/GM vaginal cream Place 0.5 g vaginally twice a week 42.5 g 10  "    Allergies   Allergen Reactions     No Known Drug Allergies        Reviewed and updated as needed this visit by Provider  Tobacco  Allergies  Meds  Problems  Med Hx  Surg Hx  Fam Hx         Review of Systems   ROS COMP: Constitutional, HEENT, cardiovascular, pulmonary, gi and gu systems are negative, except as otherwise noted.      Objective    /68   Pulse 62   Temp 97.6  F (36.4  C)   Resp 14   Ht 1.59 m (5' 2.6\")   Wt 56.2 kg (124 lb)   LMP 03/26/2003   BMI 22.25 kg/m    Body mass index is 22.25 kg/m .  Physical Exam   GENERAL: healthy, alert and no distress  NECK: no adenopathy, no asymmetry, masses, or scars and thyroid normal to palpation  RESP: lungs clear to auscultation - no rales, rhonchi or wheezes  CV: regular rate and rhythm, normal S1 S2, no S3 or S4, no murmur, click or rub, no peripheral edema and peripheral pulses strong  ABDOMEN: soft, nontender, no hepatosplenomegaly, no masses and bowel sounds normal  MS: no gross musculoskeletal defects noted, no edema    Diagnostic Test Results:  Labs reviewed in Epic  Results for orders placed or performed in visit on 11/15/19 (from the past 24 hour(s))   Wet prep   Result Value Ref Range    Specimen Description Vagina     Wet Prep No yeast seen     Wet Prep No clue cells seen     Wet Prep No Trichomonas seen     Wet Prep Few  WBC'S seen              Assessment & Plan     1. Vaginal discharge  Wet prep is negative.  Discussed monitoring at this time.  If not improved through the weekend discussed we could obtain a new sample on Monday.    - Wet prep       Return in about 3 days (around 11/18/2019) for not improving or new concerns.    Yin Erwin NP  [facility]    "

## 2019-11-22 DIAGNOSIS — E03.9 HYPOTHYROIDISM, UNSPECIFIED TYPE: ICD-10-CM

## 2019-11-25 DIAGNOSIS — E03.9 HYPOTHYROIDISM, UNSPECIFIED TYPE: ICD-10-CM

## 2019-11-25 RX ORDER — LEVOTHYROXINE SODIUM 25 UG/1
25 TABLET ORAL DAILY
Qty: 90 TABLET | Refills: 1 | Status: SHIPPED | OUTPATIENT
Start: 2019-11-25 | End: 2020-08-17

## 2019-11-25 NOTE — TELEPHONE ENCOUNTER
Different pharmacy requesting this medication.  Mail service.  Chandler Bennett, [facility]

## 2019-12-05 ENCOUNTER — HOSPITAL ENCOUNTER (EMERGENCY)
Facility: CLINIC | Age: 64
Discharge: HOME | End: 2019-12-05
Attending: EMERGENCY MEDICINE | Admitting: EMERGENCY MEDICINE
Payer: COMMERCIAL

## 2019-12-05 ENCOUNTER — NURSE TRIAGE (OUTPATIENT)
Dept: NURSING | Facility: CLINIC | Age: 64
End: 2019-12-05

## 2019-12-05 ENCOUNTER — APPOINTMENT (OUTPATIENT)
Dept: CT IMAGING | Facility: CLINIC | Age: 64
End: 2019-12-05
Attending: EMERGENCY MEDICINE
Payer: COMMERCIAL

## 2019-12-05 VITALS
TEMPERATURE: 97.8 F | RESPIRATION RATE: 18 BRPM | SYSTOLIC BLOOD PRESSURE: 150 MMHG | OXYGEN SATURATION: 98 % | DIASTOLIC BLOOD PRESSURE: 86 MMHG | BODY MASS INDEX: 21.53 KG/M2 | WEIGHT: 120 LBS | HEART RATE: 72 BPM

## 2019-12-05 DIAGNOSIS — R51.9 NONINTRACTABLE HEADACHE, UNSPECIFIED CHRONICITY PATTERN, UNSPECIFIED HEADACHE TYPE: ICD-10-CM

## 2019-12-05 PROCEDURE — 99284 EMERGENCY DEPT VISIT MOD MDM: CPT | Mod: 25 | Performed by: EMERGENCY MEDICINE

## 2019-12-05 PROCEDURE — 99284 EMERGENCY DEPT VISIT MOD MDM: CPT | Mod: Z6 | Performed by: EMERGENCY MEDICINE

## 2019-12-05 PROCEDURE — 70450 CT HEAD/BRAIN W/O DYE: CPT

## 2019-12-05 RX ORDER — ASPIRIN 325 MG
325 TABLET ORAL ONCE
Status: DISCONTINUED | OUTPATIENT
Start: 2019-12-05 | End: 2019-12-06 | Stop reason: HOSPADM

## 2019-12-05 RX ORDER — FLUTICASONE PROPIONATE 50 MCG
1 SPRAY, SUSPENSION (ML) NASAL DAILY
Qty: 15.8 ML | Refills: 0 | Status: SHIPPED | OUTPATIENT
Start: 2019-12-05 | End: 2020-04-06

## 2019-12-05 NOTE — ED AVS SNAPSHOT
[facility] Emergency Department  [address] [place] [address]  Phone:  [phone]  Fax:  [phone]                                    Jackie Siddiqi   MRN: [id number]    Department:  [facility] Emergency Department   Date of Visit:  12/5/2019           After Visit Summary Signature Page    I have received my discharge instructions, and my questions have been answered. I have discussed any challenges I see with this plan with the nurse or doctor.    ..........................................................................................................................................  Patient/Patient Representative Signature      ..........................................................................................................................................  Patient Representative Print Name and Relationship to Patient    ..................................................               ................................................  Date                                   Time    ..........................................................................................................................................  Reviewed by Signature/Title    ...................................................              ..............................................  Date                                               Time          22EPIC Rev 08/18

## 2019-12-06 NOTE — ED PROVIDER NOTES
History     Chief Complaint   Patient presents with     Headache     HPI  Jackie Siddiqi is a 64 year old female who presents with complaints of headache.  Patient states that she has had about a week of upper respiratory symptoms including congestion and mild cough.  Today developed acute bilateral visual changes which she describes she could see her outline of her irises and they were yellow in color and square.  That last approximately 1/2-hour and has completely resolved.  Her vision is now back to normal.  She developed a headache with this which is now almost gone and very mild. She denies any problems with hearing currently but states over the last few days she had hyperacuity and tinnitus.  Denies vertiginous symptoms.  No problems with speech or swallowing.  He denies chest pain or shortness of breath.  She denies any fever or chills.  No abdominal pain or nausea.  No focal motor weakness or sensory changes.  No gait disturbance.  Did have a CT of the head to assess chronic sinus disease earlier this year which did show some mucosal thickening of the ethmoid sinuses.  Patient is a former smoker.  Denies history of migraine.  No recent fall or head injury.  No treatment for symptoms prior to arrival.  She is not on blood thinners.  No treatment for symptoms prior to arrival.    Allergies:  Allergies   Allergen Reactions     No Known Drug Allergies        Problem List:    Patient Active Problem List    Diagnosis Date Noted     Abnormal CT lung screening 10/02/2019     Priority: Medium     Currently managed with follow up imaging by [facility] [place] Results Team.         Hypothyroidism, unspecified type 11/29/2016     Priority: Medium     Hyperlipidemia LDL goal <160 11/29/2016     Priority: Medium     Osteoarthritis of carpometacarpal joint of right thumb, unspecified osteoarthritis type 11/29/2016     Priority: Medium     Advanced directives, counseling/discussion 01/11/2013     Priority: Medium      Discussed advance care planning with patient; information given to patient to review. 2013          Lichen sclerosus et atrophicus of the vulva 2012     Priority: Medium     Esophageal reflux 2005     Priority: Medium        Past Medical History:    Past Medical History:   Diagnosis Date     Abnormal Papanicolaou smear of cervix and cervical HPV      Breast cystic disease      Hiatal hernia      Motion sickness      Peptic ulcer, unspecified site, unspecified as acute or chronic, without mention of hemorrhage, perforation, or obstruction      Personal history of colonic polyps        Past Surgical History:    Past Surgical History:   Procedure Laterality Date     C  DELIVERY ONLY       x2     C NONSPECIFIC PROCEDURE      Laparoscopic exam with adhesions     C NONSPECIFIC PROCEDURE      Mastectomy for cystic breast disease, breast implants     COLONOSCOPY  2007     COLONOSCOPY  2013    Procedure: COLONOSCOPY;  colonoscopy, Esophagoscopy, Gastroscopy, Duodenoscopy EGD, multiple biopsies;  Surgeon: Joe Benson MD;  Location: [facility]     COLONOSCOPY N/A 2017    Procedure: COMBINED COLONOSCOPY, SINGLE OR MULTIPLE BIOPSY/POLYPECTOMY BY BIOPSY;  colonoscopy with polypectomy by biopsy;  Surgeon: Tolu No MD;  Location: [facility]     ENDOSCOPY  2007    Sm hiatus hernia     ESOPHAGOSCOPY, GASTROSCOPY, DUODENOSCOPY (EGD), COMBINED  2013    Procedure: COMBINED ESOPHAGOSCOPY, GASTROSCOPY, DUODENOSCOPY (EGD), BIOPSY SINGLE OR MULTIPLE;  ESOPHAGOGASTRODUODENOSCOPY WITH MULTIPLE BIOPSIES;  Surgeon: Joe Benson MD;  Location: [facility]     MASTECTOMY, BILATERAL         Family History:    Family History   Problem Relation Age of Onset     Diabetes Father      Hypertension Father      Alcohol/Drug Father      Eye Disorder Father      Obesity Father      Breast Cancer Mother      Osteoporosis Mother      Thyroid Disease Mother      Cancer Mother         Bladder      Cancer Sister         fallopian tube cancer     Obesity Sister      Alzheimer Disease Sister      Cancer Sister         Skin Cancer     Allergies Daughter      Cancer Maternal Grandmother         uterine cancer     Liver Disease Daughter         Biliary Atresia     Genitourinary Problems Brother      No Known Problems Daughter      Heart Disease Brother         Heart Murmur     Genitourinary Problems Brother         kidney disease (two brothers)       Social History:  Marital Status:  Single [1]  Social History     Tobacco Use     Smoking status: Former Smoker     Packs/day: 0.50     Years: 50.00     Pack years: 25.00     Types: Cigarettes     Last attempt to quit: 10/2/2017     Years since quittin.1     Smokeless tobacco: Never Used     Tobacco comment: 1 pack per week, started age 11   Substance Use Topics     Alcohol use: No     Alcohol/week: 0.0 standard drinks     Comment: holidays only     Drug use: No        Medications:    fluticasone (FLONASE) 50 MCG/ACT nasal spray  albuterol (PROAIR HFA/PROVENTIL HFA/VENTOLIN HFA) 108 (90 Base) MCG/ACT inhaler  clobetasol (TEMOVATE) 0.05 % external ointment  levothyroxine (SYNTHROID/LEVOTHROID) 25 MCG tablet  PREMARIN 0.625 MG/GM vaginal cream          Review of Systems all other systems reviewed and are negative.    Physical Exam   BP: (!) 150/86  Pulse: 72  Temp: 97.8  F (36.6  C)  Resp: 18  Weight: 54.4 kg (120 lb)  SpO2: 98 %  Lying Orthostatic BP: 142/80  Lying Orthostatic Pulse: 91 bpm  Sitting Orthostatic BP: 164/89  Sitting Orthostatic Pulse: 84 bpm  Standing Orthostatic BP: 131/75  Standing Orthostatic Pulse: 79 bpm      Physical Exam an alert cooperative female who does not look toxic or ill.  She is afebrile and vitally stable.  She not hypoxic.  HEENT shows pupils to be equal and reactive light accommodation.  Extraocular motions are intact.  Fundi grossly benign on nondilated exam.  Ears are clear bilaterally.  Nasal passages show slight deviation  with mucosal swelling.  No colored or bloody discharge.  No tenderness over the frontal or maxillary sinuses.  Patient sounds congested.  Orally no lesions and speech is clear.  She is handling secretions.  Neck is supple without bruits or stridor.  Lungs were clear without adventitious sounds.  Cardiac auscultation was normal.  Neurologically she had intact strength and sensation.  Gait was normal.  Negative Romberg.    ED Course        Procedures               Critical Care time:  none               Results for orders placed or performed during the hospital encounter of 12/05/19 (from the past 24 hour(s))   CT Head w/o Contrast    Narrative    EXAM: CT HEAD W/O CONTRAST  LOCATION: [facility]  DATE/TIME: 12/5/2019 10:28 PM    INDICATION: Visual change and subsequent headache.    COMPARISON: None.    TECHNIQUE: Routine without IV contrast. Multiplanar reformats. Dose reduction techniques were used.    FINDINGS:  INTRACRANIAL CONTENTS: No intracranial hemorrhage, extra-axial collection, or mass effect. No CT evidence of acute infarct. Normal parenchymal attenuation. Normal ventricles and sulci.     VISUALIZED ORBITS/SINUSES/MASTOIDS: No intraorbital abnormality. Moderate mucosal thickening throughout the ethmoid sinuses and mild mucosal thickening in the sphenoid sinuses. No middle ear or mastoid effusion.    BONES/SOFT TISSUES: No acute abnormality.      Impression    IMPRESSION:  1.  No acute intracranial process.  2.  Sinus disease.         Medications   aspirin (ASA) tablet 325 mg (has no administration in time range)     Orthostatic pulse and blood pressures were obtained.  Did not show worrisome or significant findings.  Please see nursing notes for details.  Head CT is ordered.  She was given aspirin after her head CT did not show any acute abnormality.  She does have mucosal thickening of both the ethmoid and sphenoid sinuses.  She is never been on nasal steroids by her report so we will add  that to her regime.  Assessments & Plan (with Medical Decision Making)   Jackie Siddiqi is a 64 year old female who presents with complaints of headache.  Patient states that she has had about a week of upper respiratory symptoms including congestion and mild cough.  Today developed acute bilateral visual changes which she describes she could see her outline of her irises and they were yellow in color and square.  That last approximately 1/2-hour and has completely resolved.  Her vision is now back to normal.  She developed a headache with this which is now almost gone and very mild. She denies any problems with hearing currently but states over the last few days she had hyperacuity and tinnitus.  Denies vertiginous symptoms.  No problems with speech or swallowing.  He denies chest pain or shortness of breath.  She denies any fever or chills.  No abdominal pain or nausea.  No focal motor weakness or sensory changes.  No gait disturbance.  Did have a CT of the head to assess chronic sinus disease earlier this year which did show some mucosal thickening of the ethmoid sinuses.  Patient is a former smoker.  Denies history of migraine.  No recent fall or head injury.  No treatment for symptoms prior to arrival.  She is not on blood thinners.  No treatment for symptoms prior to arrival.  On presentation patient was afebrile and vitally stable.  She did not have any significant orthostatic changes on check here tonight.  She had a benign funduscopic exam on nondilated exam.  Neurologic exam was unremarkable.  Head CT did not show any acute abnormality but did show mucosal thickening of the ethmoid and sphenoid sinuses.  The exact cause of her symptoms are unclear.  Consider atypical migraine but patient has never had history of this.  TIA is a possibility.  Patient is given an aspirin while here.  She will continue this at home.  She has recurrence and persistence of symptoms she should return to the emergency room.   Could be related to sinus disease I will give her Flonase to take on regular basis.  Information on sinus headache and unspecified headache is provided.  Reasons to return for reassessment discussed.  I have reviewed the nursing notes.    I have reviewed the findings, diagnosis, plan and need for follow up with the patient.       New Prescriptions    FLUTICASONE (FLONASE) 50 MCG/ACT NASAL SPRAY    Spray 1 spray into both nostrils daily       Final diagnoses:   Nonintractable headache, unspecified chronicity pattern, unspecified headache type       12/5/2019   [facility] EMERGENCY DEPARTMENT     Azael Machuca MD  12/05/19 1877

## 2019-12-06 NOTE — TELEPHONE ENCOUNTER
"She has been feeling achy, dizzy and seeing half of the iris of her eye, both of her eyes, couldn't see well  Lasted about a half an hour and then went away. Headache. She feels funny and weird enough that she doesn't feel comfortable going to sleep. Going in to [place] ED to be evaluated.    Lucia Yarbrough RN/ [facility]        Reason for Disposition    Loss of vision or double vision (Exception: same as prior migraines)    Additional Information    Negative: Difficult to awaken or acting confused (e.g., disoriented, slurred speech)    Negative: [1] Weakness of the face, arm or leg on one side of the body AND [2] new onset    Negative: [1] Numbness of the face, arm or leg on one side of the body AND [2] new onset    Negative: [1] Loss of speech or garbled speech AND [2] new onset    Negative: Passed out (i.e., lost consciousness, collapsed and was not responding)    Negative: Sounds like a life-threatening emergency to the triager    Negative: Unable to walk, or can only walk with assistance (e.g., requires support)    Negative: Stiff neck (can't touch chin to chest)    Negative: Severe pain in one eye    Negative: [1] Other family members (or roommates) with headaches AND [2] possibility of carbon monoxide exposure    Negative: [1] SEVERE headache (e.g., excruciating) AND [2] \"worst headache\" of life    Negative: [1] SEVERE headache AND [2] sudden-onset (i.e., reaching maximum intensity within seconds)    Negative: [1] SEVERE headache AND [2] fever    Protocols used: HEADACHE-A-AH      "

## 2019-12-06 NOTE — DISCHARGE INSTRUCTIONS
Try Flonase nasal spray daily on a regular basis.  This may take a week or more to take full effect.  You can take aspirin for its antiplatelet effects.  Return if you have recurrent and persistent symptoms or new concerning

## 2020-03-16 ENCOUNTER — HOSPITAL ENCOUNTER (OUTPATIENT)
Dept: CT IMAGING | Facility: CLINIC | Age: 65
Discharge: HOME | End: 2020-03-16
Attending: FAMILY MEDICINE | Admitting: FAMILY MEDICINE
Payer: COMMERCIAL

## 2020-03-16 DIAGNOSIS — R91.8 ABNORMAL CT LUNG SCREENING: ICD-10-CM

## 2020-03-16 PROCEDURE — 71250 CT THORAX DX C-: CPT

## 2020-04-06 ENCOUNTER — VIRTUAL VISIT (OUTPATIENT)
Dept: FAMILY MEDICINE | Facility: CLINIC | Age: 65
End: 2020-04-06
Payer: COMMERCIAL

## 2020-04-06 DIAGNOSIS — J30.2 SEASONAL ALLERGIC RHINITIS, UNSPECIFIED TRIGGER: Primary | ICD-10-CM

## 2020-04-06 DIAGNOSIS — K59.00 CONSTIPATION, UNSPECIFIED CONSTIPATION TYPE: ICD-10-CM

## 2020-04-06 DIAGNOSIS — K57.30 DIVERTICULOSIS OF COLON: ICD-10-CM

## 2020-04-06 DIAGNOSIS — J01.00 ACUTE NON-RECURRENT MAXILLARY SINUSITIS: ICD-10-CM

## 2020-04-06 PROCEDURE — 99213 OFFICE O/P EST LOW 20 MIN: CPT | Mod: TEL | Performed by: FAMILY MEDICINE

## 2020-04-06 RX ORDER — AMOXICILLIN 875 MG
875 TABLET ORAL 2 TIMES DAILY
Qty: 20 TABLET | Refills: 0 | Status: SHIPPED | OUTPATIENT
Start: 2020-04-06 | End: 2020-04-16

## 2020-04-06 RX ORDER — FLUTICASONE PROPIONATE 50 MCG
2 SPRAY, SUSPENSION (ML) NASAL DAILY
Qty: 15.8 ML | Refills: 1 | Status: SHIPPED | OUTPATIENT
Start: 2020-04-06 | End: 2021-01-25

## 2020-04-06 NOTE — PATIENT INSTRUCTIONS
For your sinuses, I recommend you stay on the Flonase nasal spray.  Use 2 sprays in each nostril every day.  This tends to work best if you use it at bedtime, so it does not drip out.  I am also enclosing some directions to use a nasal rinse or nasal lavage, such as a Pelham pot or other device.  This can help to clean out the sinuses as well.  If things do not get better in the next few days, I did call in a prescription for amoxicillin to the pharmacy in [place].  If you call them they will fill it for you.  If you start it, take the full course of 10 days.      For your digestion, make sure you are getting daily fiber in your diet.  Fruits and vegetables are the best way to get this.  You can also add a fiber supplement such as Metamucil.  I encourage you to use it every other day or every day, 1 heaping tablespoon in 8 ounces of water.  This can help make you go more regularly.  Also some natural probiotics found in yogurt or some supplements may be of benefit as well.  Acidophilus and lactobacillus are two popular kinds of probiotics.  These take a little while to work but they can help your digestion.  Try to cut back on processed foods, white flour, and heavy foods such as cheeses.  Make sure you are drinking 6 to 8 glasses of water every day.      Patient Education     Self-Care for Sinusitis     Drinking plenty of water can help sinuses drain.   Sinusitis can often be managed with self-care. Self-care can keep sinuses moist and make you feel more comfortable. Remember to follow your doctor's instructions closely. This can make a big difference in getting your sinus problem under control.  Drink fluids  Drinking extra fluids helps thin your mucus. This lets it drain from your sinuses more easily. Have a glass of water every hour or two. A humidifier helps in much the same way. Fluids can also offset the drying effects of certain medicines. If you use a humidifier, follow the product maker's instructions on  how to use it. Clean it on a regular schedule.  Use saltwater rinses  Rinses help keep your sinuses and nose moist. Mix a teaspoon of salt in 8 ounces of fresh, warm water. Use a bulb syringe to gently squirt the water into your nose a few times a day. You can also buy ready-made saline nasal sprays.  Apply hot or cold packs  Applying heat to the area surrounding your sinuses may make you feel more comfortable. Use a hot water bottle or a hand towel dipped in hot water. Some people also find ice packs effective for relieving pain.  Medicines  Your doctor may prescribe medications to help treat your sinusitis. If you have an infection, antibiotics can help clear it up. If you are prescribed antibiotics, take all pills on schedule until they are gone, even if you feel better. Decongestants help relieve swelling. Use decongestant sprays for short periods only under the direction of your doctor. If you have allergies, your doctor may prescribe medications to help relieve them.   Date Last Reviewed: 10/1/2016    1185-8692 [employer] [facility]. [address] [place] [address]. All rights reserved. This information is not intended as a substitute for professional medical care. Always follow your healthcare professional's instructions.           Patient Education     Sinusitis (Antibiotic Treatment)    The sinuses are air-filled spaces within the bones of the face. They connect to the inside of the nose. Sinusitis is an inflammation of the tissue that lines the sinuses. Sinusitis can occur during a cold. It can also happen due to allergies to pollens and other particles in the air. Sinusitis can cause symptoms of sinus congestion and a feeling of fullness. A sinus infection causes fever, headache, and facial pain. There is often green or yellow fluid draining from the nose or into the back of the throat (post-nasal drip). You have been given antibiotics to treat this condition.  Home care    Take the full  course of antibiotics as instructed. Do not stop taking them, even when you feel better.    Drink plenty of water, hot tea, and other liquids. This may help thin nasal mucus. It also may help your sinuses drain fluids.    Heat may help soothe painful areas of your face. Use a towel soaked in hot water. Or,  the shower and direct the warm spray onto your face. Using a vaporizer along with a menthol rub at night may also help soothe symptoms.     An expectorant with guaifenesin may help thin nasal mucus and help your sinuses drain fluids.    You can use an over-the-counter decongestant, unless a similar medicine was prescribed to you. Nasal sprays work the fastest. Use one that contains phenylephrine or oxymetazoline. First blow your nose gently. Then use the spray. Do not use these medicines more often than directed on the label. If you do, your symptoms may get worse. You may also take pills that contain pseudoephedrine. Don t use products that combine multiple medicines. This is because side effects may be increased. Read labels. You can also ask the pharmacist for help. (People with high blood pressure should not use decongestants. They can raise blood pressure.)    Over-the-counter antihistamines may help if allergies contributed to your sinusitis.      Do not use nasal rinses or irrigation during an acute sinus infection, unless your healthcare provider tells you to. Rinsing may spread the infection to other areas in your sinuses.    Use acetaminophen or ibuprofen to control pain, unless another pain medicine was prescribed to you. If you have chronic liver or kidney disease or ever had a stomach ulcer, talk with your healthcare provider before using these medicines. (Aspirin should never be taken by anyone under age 18 who is ill with a fever. It may cause severe liver damage.)    Don't smoke. This can make symptoms worse.  Follow-up care  Follow up with your healthcare provider or our staff if you are  not better in 1 week.  When to seek medical advice  Call your healthcare provider if any of these occur:    Facial pain or headache that gets worse    Stiff neck    Unusual drowsiness or confusion    Swelling of your forehead or eyelids    Vision problems, such as blurred or double vision    Fever of 100.4 F (38 C) or higher, or as directed by your healthcare provider    Seizure    Breathing problems    Symptoms don't go away in 10 days  Prevention  Here are steps you can take to help prevent an infection:    Keep good hand washing habits.    Don t have close contact with people who have sore throats, colds, or other upper respiratory infections.    Don t smoke, and stay away from secondhand smoke.    Stay up to date with of your vaccines.  Date Last Reviewed: 11/1/2017 2000-2019 [employer]. [address] [place] [address], [place] [address]. All rights reserved. This information is not intended as a substitute for professional medical care. Always follow your healthcare professional's instructions.           Patient Education     Constipation (Adult)  Constipation means that you have bowel movements that are less frequent than usual. Stools often become very hard and difficult to pass.  Constipation is very common. At some point in life, it affects almost everyone. Since everyone's bowel habits are different, what is constipation to one person may not be to another. Your healthcare provider may do tests to diagnose constipation. It depends on what he or she finds when evaluating you.    Symptoms of constipation include:    Abdominal pain    Bloating    Vomiting    Painful bowel movements    Itching, swelling, bleeding, or pain around the anus  Causes  Constipation can have many causes. These include:    Diet low in fiber    Too much dairy    Not drinking enough liquids    Lack of exercise or physical activity (especially true for older adults)    Changes in lifestyle or daily routine, including pregnancy,  aging, work, and travel    Frequent use or misuse of laxatives    Ignoring the urge to have a bowel movement or delaying it until later    Medicines, such as certain prescription pain medicines, iron supplements, antacids, certain antidepressants, and calcium supplements    Diseases like irritable bowel syndrome, bowel obstructions, stroke, diabetes, thyroid disease, Parkinson disease, hemorrhoids, and colon cancer  Complications  Potential complications of constipation can include:    Hemorrhoids    Rectal bleeding from hemorrhoids or anal fissures (skin tears)    Hernias    Dependency on laxatives    Chronic constipation    Fecal impaction, a severe form of constipation in which a large amount of hard stool is in your rectum that you can't pass    Bowel obstruction or perforation  Home care  All treatment should be done after talking with your healthcare provider. This is especially true if you have another medical problems, are taking prescription medicines, or are an older adult. Treatment most often involves lifestyle changes. You may also need medicines. Your healthcare provider will tell you which will work best for you. Follow the advice below to help avoid this problem in the future.  Lifestyle changes  These lifestyle changes can help prevent constipation:    Diet. Eat a high-fiber diet, with fresh fruit and vegetables, and reduce dairy intake, meats, and processed foods    Fluids. It's important to get enough fluids each day. Drink plenty of water when you eat more fiber. If you are on diet that limits the amount of fluid you can have, talk about this with your healthcare provider.    Regular exercise. Check with your healthcare provider first.  Medicines  Take any medicines as directed. Some laxatives are safe to use only every now and then. Others can be taken on a regular basis. While laxatives don't cause bowel dependence, they are treating the symptoms. So your constipation may return if you don't  make other changes. Talk with your healthcare provider or pharmacist if you have questions.  Prescription pain medicines can cause constipation. If you are taking this kind of medicine, ask your healthcare provider if you should also take a stool softener.  Medicines you may take to treat constipation include:    Fiber supplements    Stool softeners    Laxatives    Enemas    Rectal suppositories  Follow-up care  Follow up with your healthcare provider if symptoms don't get better in the next few days. You may need to have more tests or see a specialist.  Call 911  Call 911 if any of these occur:    Trouble breathing    Stiff, rigid abdomen that is severely painful to touch    Confusion    Fainting or loss of consciousness    Rapid heart rate    Chest pain  When to seek medical advice  Call your healthcare provider right away if any of these occur:    Fever of 100.4 F (38 C) or higher, or as directed by your healthcare provider    Failure to resume normal bowel movements    Pain in your abdomen or back gets worse    Nausea or vomiting    Swelling in your abdomen    Blood in the stool    Black, tarry stool    Involuntary weight loss    Weakness  Date Last Reviewed: 6/1/2018 2000-2019 [employer]. [address] [place] [address]. All rights reserved. This information is not intended as a substitute for professional medical care. Always follow your healthcare professional's instructions.           Patient Education   Coping with Constipation  What is constipation?  If you have hard, dry stools that are difficult to pass, you have constipation. You may also have bloating, gas and stomach cramps.  How is it treated?  If the problem is severe, your care team can suggest medicine to relieve it (a laxative, stool softener or enema). Do not use medicine unless your care team tells you to.  You should not use an enema (rectal wash) if your white blood cell or platelet count is low.  What else can I  do to treat or prevent constipation?    Eat at the same times each day.    Add fiber to your diet by eating:  ? Whole grain breads, cereals and pastas  ? Whole grains such as barley or brown rice  ? Raw vegetables  ? Fresh and dried fruits  ? Dried beans and peas  ? Nuts, seeds and popcorn.    Drink lots of fluids: at least eight to ten 8-ounce glasses each day. Try water, prune juice, warm juices, herbal teas and lemonade.    Have a hot drink with high-fiber foods for breakfast.    Eat the skins on fruits and potatoes. Wash them well before eating.    Add wheat bran to cereals, casseroles and homemade breads.    If gas is a problem:  ? Avoid gas-forming foods such carbonated (fizzy) drinks, broccoli, cabbage, cauliflower, dried beans and peas, peppers and onions.  ? Do not use a straw.  ? Do not chew gum.    Stay active. Simply getting out for a walk can help. Increase your exercise as you are able.    Try to use the toilet at the same times each day. Keep a record of your bowel movements.    If you take medicine that may cause constipation, your doctor may ask you to take a stool softener.  When should I call my care team?  Call your care team if:    You do not have a bowel movement for two or more days.    You have sudden, severe belly pain.    You notice blood in your stool.    You have severe hemorrhoids (swelling, itching and pain around the anus).  Comments:  __________________________________________  __________________________________________  __________________________________________  __________________________________________  __________________________________________  __________________________________________  __________________________________________  __________________________________________  __________________________________________  __________________________________________  For informational purposes only. Not to replace the advice of your health care provider. Copyright   2006 [facility]. All rights reserved. Clinically reviewed by [facility]. [employer] [id number] - REV 04/19.

## 2020-04-06 NOTE — PROGRESS NOTES
"Subjective     Jackie Siddiqi is a 64 year old female who is being evaluated via a billable telephone visit.      Telephone visit performed in lieu of an in-person visit due to current concerns regarding the current COVID-19 situation including social distancing and keeping at risk people safe.  Patient agreed to proceed with this visit due to these circumstances.    The patient has been notified of following:     \"This telephone visit will be conducted via a call between you and your physician/provider. We have found that certain health care needs can be provided without the need for a physical exam.  This service lets us provide the care you need with a short phone conversation.  If a prescription is necessary we can send it directly to your pharmacy.  If lab work is needed we can place an order for that and you can then stop by our lab to have the test done at a later time.    If during the course of the call the physician/provider feels a telephone visit is not appropriate, you will not be charged for this service.\"     Patient has given verbal consent for Telephone visit?  Yes    Jackie Siddiqi complains of   Chief Complaint   Patient presents with     Sinus Problem     diverticulitis flare up       ALLERGIES  No known drug allergies    RESPIRATORY SYMPTOMS      Duration: 2-3 weeks     Description  nasal congestion, rhinorrhea, facial pain/pressure, ear pain left, headache, fatigue/malaise and conjunctival irritation    Severity: moderate    Accompanying signs and symptoms: None    History (predisposing factors):  none    Precipitating or alleviating factors: None    Therapies tried and outcome:  rest and fluids antihistamine OTC NSAID nasal spray/wash - with no relief     She reports to me 1 to 1-1/2 weeks of sinus symptoms.  Her left ear hurts inside and goes down into her neck and the upper part of her nose feels like it is tight or full but she is not getting any drainage.  Voicebox feels scratchy.  " She denies cough or fever.  No facial or teeth pain.  She clears her throat a lot.  She has no history of seasonal allergies but she was here in December in the emergency room with a headache and they treated her for possible sinus headache.  She was given Flonase.  She has used it periodically since then.  In the past 2 weeks she has been using it off and on but consistently for the past 4 to 5 days.  She is an ex-smoker.  She most recently quit in September 2019.  She was told she might have a touch of COPD.    GI symptoms    Patient thinks she is having a flare up of her diverticulitis, with some diarrhea. Worried she has an infection. Patient started having pain in lower adbomdon on the left side a few years ago. A CT and ultra sound were completed. A colonoscopy showed she had a polyp and some colonic diverticulae. Patient states Dr. Quinonez told her in [facility] that the pain was from diverticulitis. The pain has now grown and her stomach groans a lot. She is not having normal bowel movements and is extremely painful. She take tylenol to reduce the pain but its not helping anymore since the pain is getting worse. Right now her pain is a 3/10 but can  to a 10/10 at times when having a bowel movement. Patient states she does sometimes throw up while having a bowel movement due to the pain.   She has taken Metamucil twice this week.  For the last 2 weeks she has not had a normal bowel movement.  She has lots of growling in the stomach and the painful area in her left side has grown larger.  This morning she did have a BM, it was soft and mushy but fairly painful.  She denies blood in her stool.  Her stools change a lot though.  Sometimes they are long and skinny and other times they are more normal.  She has not really paid attention to her diet and how it relates to her BMs.    Patient Active Problem List   Diagnosis     Esophageal reflux     Lichen sclerosus et atrophicus of the vulva     Advanced directives,  counseling/discussion     Hypothyroidism, unspecified type     Hyperlipidemia LDL goal <160     Osteoarthritis of carpometacarpal joint of right thumb, unspecified osteoarthritis type     Abnormal CT lung screening     Diverticulosis of colon     Past Surgical History:   Procedure Laterality Date     C  DELIVERY ONLY       x2     C NONSPECIFIC PROCEDURE      Laparoscopic exam with adhesions     C NONSPECIFIC PROCEDURE      Mastectomy for cystic breast disease, breast implants     COLONOSCOPY  2007     COLONOSCOPY  2013    Procedure: COLONOSCOPY;  colonoscopy, Esophagoscopy, Gastroscopy, Duodenoscopy EGD, multiple biopsies;  Surgeon: Joe Benson MD;  Location: [facility]     COLONOSCOPY N/A 2017    Procedure: COMBINED COLONOSCOPY, SINGLE OR MULTIPLE BIOPSY/POLYPECTOMY BY BIOPSY;  colonoscopy with polypectomy by biopsy;  Surgeon: Tolu No MD;  Location:  GI     ENDOSCOPY  2007    Sm hiatus hernia     ESOPHAGOSCOPY, GASTROSCOPY, DUODENOSCOPY (EGD), COMBINED  2013    Procedure: COMBINED ESOPHAGOSCOPY, GASTROSCOPY, DUODENOSCOPY (EGD), BIOPSY SINGLE OR MULTIPLE;  ESOPHAGOGASTRODUODENOSCOPY WITH MULTIPLE BIOPSIES;  Surgeon: Joe Benson MD;  Location:  GI     MASTECTOMY, BILATERAL         Social History     Tobacco Use     Smoking status: Former Smoker     Packs/day: 0.50     Years: 50.00     Pack years: 25.00     Types: Cigarettes     Last attempt to quit: 2019     Years since quittin.5     Smokeless tobacco: Never Used     Tobacco comment: 1 pack per week, started age 11   Substance Use Topics     Alcohol use: No     Alcohol/week: 0.0 standard drinks     Comment: holidays only     Family History   Problem Relation Age of Onset     Diabetes Father      Hypertension Father      Alcohol/Drug Father      Eye Disorder Father      Obesity Father      Breast Cancer Mother      Osteoporosis Mother      Thyroid Disease Mother      Cancer Mother         Bladder      Cancer Sister         fallopian tube cancer     Obesity Sister      Alzheimer Disease Sister      Cancer Sister         Skin Cancer     Allergies Daughter      Cancer Maternal Grandmother         uterine cancer     Liver Disease Daughter         Biliary Atresia     Genitourinary Problems Brother      No Known Problems Daughter      Heart Disease Brother         Heart Murmur     Genitourinary Problems Brother         kidney disease (two brothers)           Reviewed and updated as needed this visit by Provider  Tobacco  Allergies  Meds  Problems  Med Hx  Surg Hx  Fam Hx         Review of Systems    7 pt ROS is otherwise negative except as noted in HPI.         I advised her to treat supportively for the sinuses.  I encouraged her to continue on the Flonase, and she may use a decongestant and nasal saline rinse or lavage and other symptomatic care for allergies.  Congratulated her on quitting smoking and encouraged ongoing abstinence.  We talked about using antibiotics either now or waiting a few more days to see if it will start to get better.  I did send a prescription to be on hold at the pharmacy.    For her digestive concerns, I discussed the difference between diverticulosis and diverticulitis.  She has not had fever or blood in the stool so I doubt she is having acute diverticulitis.  We did discuss constipation issues and I suspect she has a little bit of IBS.  Encouraged more regular fiber intake and to pay attention to her diet is how it relates to her bowels.  Discussed ways to get fiber in the diet and cut back on processed foods.  She may use probiotics as well.    See patient instructions.     Assessment/Plan:    ICD-10-CM    1. Seasonal allergic rhinitis, unspecified trigger  J30.2 fluticasone (FLONASE) 50 MCG/ACT nasal spray   2. Acute non-recurrent maxillary sinusitis  J01.00 amoxicillin (AMOXIL) 875 MG tablet   3. Constipation, unspecified constipation type  K59.00    4. Diverticulosis of  colon  K57.30         No follow-ups on file.      Phone call duration:  18 minutes    Ama Michel MD

## 2020-05-15 ENCOUNTER — TELEPHONE (OUTPATIENT)
Dept: FAMILY MEDICINE | Facility: CLINIC | Age: 65
End: 2020-05-15

## 2020-05-15 DIAGNOSIS — Z12.11 SCREENING FOR COLON CANCER: Primary | ICD-10-CM

## 2020-05-15 NOTE — LETTER
[address] [address]  [phone]        Mary 3, 2020    Jackie Siddiiq  [address] [place] [address]

## 2020-05-15 NOTE — TELEPHONE ENCOUNTER
Reason for Call:  Other order for Colonoscopy     Detailed comments: Patient has issues and would like to have this done sooner then later     Phone Number Patient can be reached at: Home number on file [phone] (home)    Best Time: any     Can we leave a detailed message on this number? YES    Call taken on 5/15/2020 at 9:23 AM by Cecelia Navarro

## 2020-05-15 NOTE — LETTER

## 2020-05-20 NOTE — TELEPHONE ENCOUNTER
Do you want a diagnostic colonoscopy or can she wait for a screening colonoscopy?    Eula Perez, CMA

## 2020-05-20 NOTE — TELEPHONE ENCOUNTER
I left a call back message.    I am calling with the following per Dr. Quinonez:  Please find out if she has any concern or symptoms. Otherwise it will consider screening and in that case, will need to wait.  Thanks.

## 2020-05-20 NOTE — TELEPHONE ENCOUNTER
We are not currently doing screening colonoscopies. If this is diagnostic please place updated order.

## 2020-05-21 NOTE — TELEPHONE ENCOUNTER
Jackie is having a lot of lower left abdominal pain that will radiate to her entire abdomin, she is having irregular stools, she said they are soft and skinny but not really diarrhea and not consistently soft, she also said her rectal area is painful. Jackie would like to be contacted as soon as possible to set up the colonoscopy.

## 2020-05-21 NOTE — TELEPHONE ENCOUNTER
Left message for call back. See msg. below.  Eula Calderon CMA ([facility])

## 2020-05-22 NOTE — TELEPHONE ENCOUNTER
Tried to reach patient, left message for patient to call the clinic back. If patient calls back please inform her that a referral has been placed for GI to Dr. Quick and they should be calling her to schedule an appointment, if she does not hear from them within the next 24-48 business working days. She can call [phone] to schedule an appointment with him.    Mary Serrano, [facility]

## 2020-05-29 NOTE — TELEPHONE ENCOUNTER
Patient calling to schedule colonoscopy but this is ordered as a screening scope.. and we are not doing those at this time. Is there a way we can get this scheduled due to her symptoms.

## 2020-06-02 NOTE — TELEPHONE ENCOUNTER
Please place a new order for scope. This scope order is for a screening colonoscopy and we aren't scheduling those at this time. Patient is having problems and in pain right now.

## 2020-06-02 NOTE — TELEPHONE ENCOUNTER
The statement below is not accurate.  [facility] is approved by the system to resume screening colonoscopies at this time so the patient should be contacted and scheduled.   Electronically signed by Greg Schoen, MD

## 2020-06-03 DIAGNOSIS — Z11.59 ENCOUNTER FOR SCREENING FOR OTHER VIRAL DISEASES: Primary | ICD-10-CM

## 2020-06-03 NOTE — TELEPHONE ENCOUNTER
Date of colonoscopy/EGD: 6/10/20  Surgeon: Dr. Quick  Prep:Miralax  Packet:Colonoscopy/EGD instructions mailed to patient's home address.   Date: 6/3/2020      Surgery Scheduler

## 2020-06-21 DIAGNOSIS — Z11.59 ENCOUNTER FOR SCREENING FOR OTHER VIRAL DISEASES: ICD-10-CM

## 2020-06-21 LAB
SARS-COV-2 RNA SPEC QL NAA+PROBE: NOT DETECTED
SPECIMEN SOURCE: NORMAL

## 2020-06-21 PROCEDURE — 99000 SPECIMEN HANDLING OFFICE-LAB: CPT | Performed by: INTERNAL MEDICINE

## 2020-06-21 PROCEDURE — U0003 INFECTIOUS AGENT DETECTION BY NUCLEIC ACID (DNA OR RNA); SEVERE ACUTE RESPIRATORY SYNDROME CORONAVIRUS 2 (SARS-COV-2) (CORONAVIRUS DISEASE [COVID-19]), AMPLIFIED PROBE TECHNIQUE, MAKING USE OF HIGH THROUGHPUT TECHNOLOGIES AS DESCRIBED BY CMS-2020-01-R: HCPCS | Mod: 90 | Performed by: INTERNAL MEDICINE

## 2020-06-21 NOTE — LETTER
June 22, 2020        Jackie Siddiqi  [address] [place] [address]    This letter provides a written record that you were tested for COVID-19 on 6/21/20.       Your result was negative. This means that we didn t find the virus that causes COVID-19 in your sample. A test may show negative when you do actually have the virus. This can happen when the virus is in the early stages of infection, before you feel illness symptoms.    If you have symptoms   Stay home and away from others (self-isolate) until you meet ALL of the guidelines below:    You ve had no fever--and no medicine that reduces fever--for 3 full days (72 hours). And      Your other symptoms have gotten better. For example, your cough or breathing has improved. And     At least 10 days have passed since your symptoms started.    During this time:    Stay home. Don t go to work, school or anywhere else.     Stay in your own room, including for meals. Use your own bathroom if you can.    Stay away from others in your home. No hugging, kissing or shaking hands. No visitors.    Clean  high touch  surfaces often (doorknobs, counters, handles, etc.). Use a household cleaning spray or wipes. You can find a full list on the EPA website at www.epa.gov/pesticide-registration/list-n-disinfectants-use-against-sars-cov-2.    Cover your mouth and nose with a mask, tissue or washcloth to avoid spreading germs.    Wash your hands and face often with soap and water.    Going back to work  Check with your employer for any guidelines to follow for going back to work.    Employers: This document serves as formal notice that your employee tested negative for COVID-19, as of the testing date shown above.

## 2020-06-23 ENCOUNTER — TELEPHONE (OUTPATIENT)
Dept: FAMILY MEDICINE | Facility: CLINIC | Age: 65
End: 2020-06-23

## 2020-06-23 NOTE — TELEPHONE ENCOUNTER
Patient was concerned about being diarrheal before her colonoscopy tomorrow. She inisisted that she has been drinking more water than normal and if that could be a factor. After a consult with the oncall triage RN SUZIE, patient was informed that this is normal. No more questions were asked. Patient denied any fever or other symptoms.    Judi Machado CMA

## 2020-06-23 NOTE — TELEPHONE ENCOUNTER
Reason for Call:  Other call back     Detailed comments: Patient would like to ask a question regarding her apt tomorrow. Please call back ASAP.     Phone Number Patient can be reached at: Home number on file [phone] (home)    Best Time: any     Can we leave a detailed message on this number? YES    Call taken on 6/23/2020 at 3:35 PM by Cecelia Navarro

## 2020-06-24 ENCOUNTER — HOSPITAL ENCOUNTER (OUTPATIENT)
Facility: CLINIC | Age: 65
Discharge: HOME | End: 2020-06-24
Attending: INTERNAL MEDICINE | Admitting: INTERNAL MEDICINE
Payer: COMMERCIAL

## 2020-06-24 VITALS
TEMPERATURE: 97.7 F | SYSTOLIC BLOOD PRESSURE: 103 MMHG | DIASTOLIC BLOOD PRESSURE: 65 MMHG | RESPIRATION RATE: 1 BRPM | OXYGEN SATURATION: 94 % | HEART RATE: 73 BPM

## 2020-06-24 LAB — COLONOSCOPY: NORMAL

## 2020-06-24 PROCEDURE — 88305 TISSUE EXAM BY PATHOLOGIST: CPT | Mod: 26 | Performed by: INTERNAL MEDICINE

## 2020-06-24 PROCEDURE — G0500 MOD SEDAT ENDO SERVICE >5YRS: HCPCS | Performed by: INTERNAL MEDICINE

## 2020-06-24 PROCEDURE — 25000128 H RX IP 250 OP 636: Performed by: INTERNAL MEDICINE

## 2020-06-24 PROCEDURE — 45380 COLONOSCOPY AND BIOPSY: CPT | Mod: PT | Performed by: INTERNAL MEDICINE

## 2020-06-24 PROCEDURE — 25000125 ZZHC RX 250: Performed by: INTERNAL MEDICINE

## 2020-06-24 PROCEDURE — 45378 DIAGNOSTIC COLONOSCOPY: CPT | Performed by: INTERNAL MEDICINE

## 2020-06-24 PROCEDURE — 88305 TISSUE EXAM BY PATHOLOGIST: CPT | Performed by: INTERNAL MEDICINE

## 2020-06-24 RX ORDER — NALOXONE HYDROCHLORIDE 0.4 MG/ML
.1-.4 INJECTION, SOLUTION INTRAMUSCULAR; INTRAVENOUS; SUBCUTANEOUS
Status: CANCELLED | OUTPATIENT
Start: 2020-06-24 | End: 2020-06-25

## 2020-06-24 RX ORDER — LIDOCAINE 40 MG/G
CREAM TOPICAL
Status: DISCONTINUED | OUTPATIENT
Start: 2020-06-24 | End: 2020-06-24 | Stop reason: HOSPADM

## 2020-06-24 RX ORDER — FENTANYL CITRATE 50 UG/ML
INJECTION, SOLUTION INTRAMUSCULAR; INTRAVENOUS PRN
Status: DISCONTINUED | OUTPATIENT
Start: 2020-06-24 | End: 2020-06-24 | Stop reason: HOSPADM

## 2020-06-24 RX ORDER — ONDANSETRON 4 MG/1
4 TABLET, ORALLY DISINTEGRATING ORAL EVERY 6 HOURS PRN
Status: CANCELLED | OUTPATIENT
Start: 2020-06-24

## 2020-06-24 RX ORDER — FLUMAZENIL 0.1 MG/ML
0.2 INJECTION, SOLUTION INTRAVENOUS
Status: CANCELLED | OUTPATIENT
Start: 2020-06-24 | End: 2020-06-24

## 2020-06-24 RX ORDER — ONDANSETRON 2 MG/ML
4 INJECTION INTRAMUSCULAR; INTRAVENOUS EVERY 6 HOURS PRN
Status: CANCELLED | OUTPATIENT
Start: 2020-06-24

## 2020-06-24 RX ORDER — ONDANSETRON 2 MG/ML
4 INJECTION INTRAMUSCULAR; INTRAVENOUS
Status: DISCONTINUED | OUTPATIENT
Start: 2020-06-24 | End: 2020-06-24 | Stop reason: HOSPADM

## 2020-06-24 RX ADMIN — LIDOCAINE HYDROCHLORIDE 1 ML: 10 INJECTION, SOLUTION EPIDURAL; INFILTRATION; INTRACAUDAL; PERINEURAL at 09:27

## 2020-06-24 NOTE — LETTER
[address] [address]           Tel [phone]     Jackie ABAD Devang  [address] [place] [address]      June 26, 2020    Dear Jackie,    This letter is written to inform you of the results of your recent colonoscopy.  Your polyps were a combination of adenoma and hyperplastic polyps.    Adenomatous polyps are entirely benign (non-cancerous); however, patients who have developed these polyps are at an increased risk for developing additional polyps in the future. If these are not eventually removed, there is a risk of developing colon cancer. We will advise more frequent examinations with you because of the risk associated with this type of polyp.    A hyperplastic polyp is an overgrowth of mucus-producing tissue and does not have any cancerous potential.  These are insignificant.    Given these findings, I recommend that you undergo a repeat colonoscopy in 5 year(s) for surveillance. We will enter you into a recall system so you receive a reminder closer to the time that you are due for repeat examination.     Please remember that this recommendation is made with the understanding that you are not experiencing persistent changes in bowel function, bleeding per rectum, and/or significant abdominal pain. If you experience these symptoms, please contact your primary care provider for a further evaluation.     If you have any questions or concerns about the results of your colonoscopy or the appropriate follow-up, please contact my assistant at [phone].    Sincerely,        Obed Quick MD  Gastroenterology

## 2020-06-24 NOTE — DISCHARGE INSTRUCTIONS
[facility]    Home Care Following Endoscopy          Activity:    You have just undergone an endoscopic procedure usually performed with conscious sedation.  Do not work or operate machinery (including a car) for at least 12 hours.      I encourage you to walk and attempt to pass this air as soon as possible.    Diet:    Return to the diet you were on before your procedure but eat lightly for the first 12-24 hours.    Drink plenty of water.    Resume any regular medications unless otherwise advised by your physician.  Please begin any new medication prescribed as a result of your procedure as directed by your physician.     If you had any biopsy or polyp removed please refrain from aspirin or aspirin products for 2 days.  If on Coumadin please restart as instructed by your physician.   Pain:    You may take Tylenol as needed for pain.  Expected Recovery:    You can expect some mild abdominal fullness and/or discomfort due to the air used to inflate your intestinal tract. It is also normal to have a mild sore throat after upper endoscopy.      Call Your Physician if You Have:    After Colonoscopy:  o Worsening persisting abdominal pain which is worse with activity.  o Fevers (>101 degrees F), chills or shakes.  o Passage of continued blood with bowel movements.   Any questions or concerns about your recovery, please call [phone] or after hours [phone] Nurse Advice Line.    Follow-up Care:    You should receive a call or letter with your results within 1 week. Please call if you have not received a notification of your results.  If asked to return to clinic please make an appointment 1 week after your procedure.  Call [phone].

## 2020-06-24 NOTE — CONSULTS
[facility] Pre-Procedure Physical Assessment    Jackie Siddiqi MRN# [id number]   Age: 65 year old YOB: 1955      Date of Surgery: 2020  Location [facility]      Date of Exam 2020 Facility (Same day)       Home clinic: [facility]  Primary care provider: Jovana Quinonez         Active problem list:   Patient Active Problem List   Diagnosis     Esophageal reflux     Lichen sclerosus et atrophicus of the vulva     Advanced directives, counseling/discussion     Hypothyroidism, unspecified type     Hyperlipidemia LDL goal <160     Osteoarthritis of carpometacarpal joint of right thumb, unspecified osteoarthritis type     Abnormal CT lung screening     Diverticulosis of colon            Medications (include herbals and vitamins):   Any Plavix use in the last 7 days?  No     Current Facility-Administered Medications   Medication     lidocaine (LMX4) kit     lidocaine 1 % 0.1-1 mL     ondansetron (ZOFRAN) injection 4 mg     sodium chloride (PF) 0.9% PF flush 3 mL     sodium chloride (PF) 0.9% PF flush 3 mL             Allergies:      Allergies   Allergen Reactions     No Known Drug Allergies      Allergy to Latex?  No  Allergy to tape?    No          Social History:     Social History     Tobacco Use     Smoking status: Former Smoker     Packs/day: 0.50     Years: 50.00     Pack years: 25.00     Types: Cigarettes     Last attempt to quit: 2019     Years since quittin.8     Smokeless tobacco: Never Used     Tobacco comment: 1 pack per week, started age 11   Substance Use Topics     Alcohol use: No     Alcohol/week: 0.0 standard drinks     Comment: holidays only            Physical Exam:   All vitals have been reviewed  Blood pressure 125/58, temperature 97.7  F (36.5  C), temperature source Oral, resp. rate 16, last menstrual period 2003, SpO2 99 %, not currently breastfeeding.  Airway assessment:   Patient is able to open mouth wide  Patient is  able to stick out tongue      Lungs:   No increased work of breathing, good air exchange, clear to auscultation bilaterally, no crackles or wheezing      Cardiovascular:   Normal apical impulse, regular rate and rhythm, normal S1 and S2, no S3 or S4, and no murmur noted           Lab / Radiology Results:   All laboratory data reviewed          Assessment:   Appropriately NPO  Chief complaint or anatomic assessment of involved area: history of polyps         Plan:   Moderate (conscious) sedation     Patient's active problems diagnostically and therapeutically optimized for the planned procedure  Risks, benefits, alternatives to sedation and blood explained and consent obtained  Risks, benefits, alternatives to procedure explained and consent obtained  Orders and progress notes are in the chart  Discharge from Phase 1 and / or Phase 2 recovery when patient meets criteria    I have reviewed the history and physical, lab finding(s), diagnostic data, medicaitons, and the plan for sedation.  I have determined this patient to be an appropriate candidate for the planned sedation / procedure and have reassessed the patient immediately prior to sedation / procedure.    I have personally and medically directed the administration of medications used.    Obed Quick MD

## 2020-06-26 LAB — COPATH REPORT: NORMAL

## 2020-08-16 DIAGNOSIS — E03.9 HYPOTHYROIDISM, UNSPECIFIED TYPE: ICD-10-CM

## 2020-08-17 RX ORDER — LEVOTHYROXINE SODIUM 25 UG/1
TABLET ORAL
Qty: 90 TABLET | Refills: 1 | Status: SHIPPED | OUTPATIENT
Start: 2020-08-17 | End: 2021-02-25

## 2020-08-17 NOTE — TELEPHONE ENCOUNTER
Prescription approved per [facility] Refill Protocol.    Joann Blas RN

## 2020-11-06 ENCOUNTER — VIRTUAL VISIT (OUTPATIENT)
Dept: URGENT CARE | Facility: CLINIC | Age: 65
End: 2020-11-06
Payer: COMMERCIAL

## 2020-11-06 DIAGNOSIS — Z20.822 SUSPECTED COVID-19 VIRUS INFECTION: Primary | ICD-10-CM

## 2020-11-06 PROCEDURE — 99214 OFFICE O/P EST MOD 30 MIN: CPT | Mod: 95 | Performed by: NURSE PRACTITIONER

## 2020-11-06 ASSESSMENT — ENCOUNTER SYMPTOMS
SLEEP DISTURBANCE: 0
FATIGUE: 1
HEARTBURN: 0
MYALGIAS: 0
RHINORRHEA: 1
HEADACHES: 1
EYE ITCHING: 0
CHILLS: 1
PALPITATIONS: 0
DIZZINESS: 0
DIAPHORESIS: 0
CHEST TIGHTNESS: 0
WHEEZING: 0
SORE THROAT: 1
COUGH: 1
ADENOPATHY: 0
SINUS PAIN: 0
SHORTNESS OF BREATH: 0

## 2020-11-06 NOTE — PATIENT INSTRUCTIONS
COVID test ordered per request  Home isolation per CDC and MD  Push warm fluids and rest  If PCR test is negative could pursue covid antibody blood test in 2-6 weeks after illness since you had several covid like illnesses  Reach out to primary care provider for any concerns  ER if severe shortness of breath, chest pain, bloody sputum, dizziness

## 2020-11-06 NOTE — PROGRESS NOTES
Chief Complaint   Patient presents with     Covid 19 Testing     VIRTUAL VISIT    SUBJECTIVE:  Jackie Siddiqi is a 65 year old female with fatigue, chills, headache, runny stuffy nose, cough, sore throat for 6 days. Her symptoms have been mild and intermittent, except today she felt profound weakness pushing her grandchild in the stroller. She would like to pursue covid testing.    Past Medical History:   Diagnosis Date     Abnormal Papanicolaou smear of cervix and cervical HPV     cryocautery     Breast cystic disease      Hiatal hernia      Motion sickness      Peptic ulcer, unspecified site, unspecified as acute or chronic, without mention of hemorrhage, perforation, or obstruction     Peptic ulcer disease     Personal history of colonic polyps           albuterol (PROAIR HFA/PROVENTIL HFA/VENTOLIN HFA) 108 (90 Base) MCG/ACT inhaler, Inhale 2 puffs into the lungs every 4 hours as needed for shortness of breath / dyspnea or wheezing       clobetasol (TEMOVATE) 0.05 % external ointment, Apply a small pea size amount at bedtime couple times a week as needed       fluticasone (FLONASE) 50 MCG/ACT nasal spray, Spray 2 sprays into both nostrils daily       levothyroxine (SYNTHROID/LEVOTHROID) 25 MCG tablet, TAKE 1 TABLET BY MOUTH EVERY DAY       PREMARIN 0.625 MG/GM vaginal cream, Place 0.5 g vaginally twice a week    No current facility-administered medications on file prior to visit.     Social History     Tobacco Use     Smoking status: Former Smoker     Packs/day: 0.50     Years: 50.00     Pack years: 25.00     Types: Cigarettes     Quit date: 2019     Years since quittin.1     Smokeless tobacco: Never Used     Tobacco comment: 1 pack per week, started age 11   Substance Use Topics     Alcohol use: No     Alcohol/week: 0.0 standard drinks     Comment: holidays only     Allergies   Allergen Reactions     No Known Drug Allergies      Review of Systems   Constitutional: Positive for chills and fatigue.  Negative for diaphoresis.   HENT: Positive for congestion, rhinorrhea, sore throat and tinnitus. Negative for ear pain, postnasal drip and sinus pain.    Eyes: Negative for itching.   Respiratory: Positive for cough. Negative for chest tightness, shortness of breath and wheezing.    Cardiovascular: Negative for chest pain, palpitations and peripheral edema.   Gastrointestinal: Negative for heartburn.   Musculoskeletal: Negative for myalgias.   Allergic/Immunologic: Negative for environmental allergies.   Neurological: Positive for headaches. Negative for dizziness.   Hematological: Negative for adenopathy.   Psychiatric/Behavioral: Negative for sleep disturbance.     [facility] 03/26/2003     Physical Exam unable to complete virtually.    ASSESSMENT:    ICD-10-CM    1. Suspected COVID-19 virus infection  Z20.828 Symptomatic COVID-19 Virus (Coronavirus) by PCR     PLAN:   Patient Instructions   COVID test ordered per request  Home isolation per CDC and MD  Push warm fluids and rest  If PCR test is negative could pursue covid antibody blood test in 2-6 weeks after illness since you had several covid like illnesses  Reach out to primary care provider for any concerns  ER if severe shortness of breath, chest pain, bloody sputum, dizziness    Follow up with primary care provider with any problems, questions or concerns or if symptoms worsen or fail to improve. Patient agreed to plan and verbalized understanding.    Emani Wright, [facility]    Telephone 10 minutes, charting 5 minutes.

## 2020-11-13 ENCOUNTER — AMBULATORY - HEALTHEAST (OUTPATIENT)
Dept: FAMILY MEDICINE | Facility: CLINIC | Age: 65
End: 2020-11-13

## 2020-11-13 DIAGNOSIS — Z20.822 SUSPECTED COVID-19 VIRUS INFECTION: ICD-10-CM

## 2020-11-14 ENCOUNTER — AMBULATORY - HEALTHEAST (OUTPATIENT)
Dept: FAMILY MEDICINE | Facility: CLINIC | Age: 65
End: 2020-11-14

## 2020-11-14 DIAGNOSIS — Z20.822 SUSPECTED COVID-19 VIRUS INFECTION: ICD-10-CM

## 2020-11-16 ENCOUNTER — COMMUNICATION - HEALTHEAST (OUTPATIENT)
Dept: SCHEDULING | Facility: CLINIC | Age: 65
End: 2020-11-16

## 2020-12-04 ENCOUNTER — OFFICE VISIT (OUTPATIENT)
Dept: FAMILY MEDICINE | Facility: CLINIC | Age: 65
End: 2020-12-04
Payer: COMMERCIAL

## 2020-12-04 VITALS
HEART RATE: 66 BPM | DIASTOLIC BLOOD PRESSURE: 86 MMHG | SYSTOLIC BLOOD PRESSURE: 123 MMHG | TEMPERATURE: 98.5 F | OXYGEN SATURATION: 97 %

## 2020-12-04 DIAGNOSIS — R53.83 FATIGUE, UNSPECIFIED TYPE: Primary | ICD-10-CM

## 2020-12-04 PROCEDURE — 86769 SARS-COV-2 COVID-19 ANTIBODY: CPT | Performed by: PHYSICIAN ASSISTANT

## 2020-12-04 PROCEDURE — 99213 OFFICE O/P EST LOW 20 MIN: CPT | Performed by: PHYSICIAN ASSISTANT

## 2020-12-04 PROCEDURE — 36415 COLL VENOUS BLD VENIPUNCTURE: CPT | Performed by: PHYSICIAN ASSISTANT

## 2020-12-04 ASSESSMENT — ENCOUNTER SYMPTOMS
FATIGUE: 1
SORE THROAT: 0
SHORTNESS OF BREATH: 0
STRIDOR: 0
WHEEZING: 0
SINUS PAIN: 0
ADENOPATHY: 0
HEADACHES: 1
CHEST TIGHTNESS: 1
FEVER: 0
SINUS PRESSURE: 0
RHINORRHEA: 0
COUGH: 0

## 2020-12-04 NOTE — PATIENT INSTRUCTIONS
COVID antibody ordered. [facility]  Follow up with Dr Quinonez if symptoms persist.

## 2020-12-04 NOTE — PROGRESS NOTES
"Subjective     Jackie Siddiqi is a 65 year old female who presents to clinic today for the following health issues:    HPI           Concern for COVID-19  - tested negative for covid on 11/14/20     Patients symptoms started about 3 weeks ago, with loud ringing in ears and headaches, her head also feels \"full\" , some sinus drainage in back of throat.  she has experienced weakness, fatigue, trouble breathing, and her eyes feel sore.     She also has concerns about pain in her lower left abdomin. She has been seen for this several months  Ago and she is still having pain.     About how many days ago did these symptoms start? 3 weeks ago  Is this your first visit for this illness? No, tested 2 weeks weeks ago, negative  In the 14 days before your symptoms started, have you had close contact with someone with COVID-19 (Coronavirus)? Yes, I have been in contact with someone who has COVID-19/Coronavirus (confirmed by lab test). coworker  Do you have a fever or chills? No  Are you having new or worsening difficulty breathing? No  Do you have new or worsening cough? No  Have you had any new or unexplained body aches? YES , head feels full   Have you experienced any of the following NEW symptoms?    Headache: YES    Sore throat: No    Loss of taste or smell: No    Chest pain: No, lungs hurts    Diarrhea: YES, had it 3 days ago that lasted 2 days    Rash: No  What treatments have you tried?tylenol and ibuprofen  Who do you live with? With foster son  Are you, or a household member, a healthcare worker or a ? No  Do you live in a nursing home, group home, or shelter? No  Do you have a way to get food/medications if quarantined? Yes, I have a friend or family member who can help me.          Jackie presents for evaluation of fatigue and intermittent chest heaviness with a headache. She is not sure exactly when her symptoms started but she was seen virtually on November 6 for symptoms of fatigue chills headache " runny nose cough and sore throat and at that time her symptoms have been persistent for 6 days.  She has been exposed to Covid by a coworker. A Covid test was ordered that day but not completed.  She did have a Covid test on November 14, 2014 days after symptoms started.  This test was negative.  She states since then she has had periods of extreme fatigue.  She put her 1-year-old grandson in the stroller and try to take him for a walk but was not able to push the stroller because she was too tired and winded.  She went inside and slept but woke up still feeling tired.  She states she is hardly able to fold a load of laundry because she is too tired and needs to lay down and take a nap.  Sometimes the fatigue seems to improve but never completely resolves.   She does not have shortness of breath, but states that sometimes her chest feels heavy in her lungs hurt. 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  Her headache also waxes and wanes but is pretty persistent. She had been working out lifting weights 5 to 6 days a week before this started.     Review of Systems   Constitutional: Positive for fatigue (extreme at times). Negative for fever.   HENT: Negative for congestion, ear pain, rhinorrhea, sinus pressure, sinus pain and sore throat.    Respiratory: Positive for chest tightness. Negative for cough, shortness of breath, wheezing and stridor. Choking: intermittent.    Neurological: Positive for headaches.   Hematological: Negative for adenopathy.            Objective    /86   Pulse 66   Temp 98.5  F (36.9  C)   LMP 03/26/2003   SpO2 97%   There is no height or weight on file to calculate BMI.  Physical Exam   GENERAL: healthy, alert and no distress  NECK: no adenopathy, no asymmetry, masses, or scars and thyroid normal to palpation  RESP: lungs clear to auscultation - no rales, rhonchi or  wheezes  CV: regular rate and rhythm, normal S1 S2, no S3 or S4, no murmur, click or rub, no peripheral edema and peripheral pulses strong  MS: no gross musculoskeletal defects noted, no edema  NEURO: Normal strength and tone, mentation intact and speech normal    No results found for any visits on 12/04/20.        Assessment & Plan     Fatigue, unspecified type  - COVID-19 Virus (Coronavirus) Antibody & Titer Reflex        This is a 65-year-old female who presents with fatigue for the last month.  She was exposed to Covid at the beginning of her symptoms however was not tested until day 14 when the Covid test could have possibly been falsely negative.  Her symptoms of fatigue headache and chest tightness have been waxing and waning, however her fatigue is sometimes extreme and is disruptive to her life.  This sounds to me like a lingering Covid infection.  Covid antibody test ordered today to evaluate for past infection.  If symptoms persist, she will need to follow-up with her primary care provider for further evaluation.    Return in about 1 month (around 1/4/2021) for Recheck with primary care provider if not improved.    [facility]

## 2020-12-08 LAB
COVID-19 SPIKE RBD ABY TITER: NORMAL
COVID-19 SPIKE RBD ABY: NEGATIVE

## 2020-12-16 ENCOUNTER — OFFICE VISIT (OUTPATIENT)
Dept: OPTOMETRY | Facility: CLINIC | Age: 65
End: 2020-12-16
Payer: COMMERCIAL

## 2020-12-16 DIAGNOSIS — H25.13 NUCLEAR SCLEROTIC CATARACT OF BOTH EYES: ICD-10-CM

## 2020-12-16 DIAGNOSIS — H52.13 MYOPIA OF BOTH EYES: ICD-10-CM

## 2020-12-16 DIAGNOSIS — H52.222 REGULAR ASTIGMATISM OF LEFT EYE: ICD-10-CM

## 2020-12-16 DIAGNOSIS — H52.4 PRESBYOPIA: Primary | ICD-10-CM

## 2020-12-16 DIAGNOSIS — H25.043 POSTERIOR SUBCAPSULAR POLAR SENILE CATARACT OF BOTH EYES: ICD-10-CM

## 2020-12-16 PROCEDURE — 92015 DETERMINE REFRACTIVE STATE: CPT | Performed by: OPTOMETRIST

## 2020-12-16 PROCEDURE — 92004 COMPRE OPH EXAM NEW PT 1/>: CPT | Performed by: OPTOMETRIST

## 2020-12-16 ASSESSMENT — REFRACTION_MANIFEST
OD_SPHERE: -6.25
OD_CYLINDER: +1.25
OS_ADD: +2.50
OD_AXIS: 033
OD_SPHERE: -5.00
OS_SPHERE: -4.50
OS_CYLINDER: +1.50
OS_AXIS: 180
OD_ADD: +2.50
METHOD_AUTOREFRACTION: 1
OD_CYLINDER: SPHERE
OS_SPHERE: -5.75
OS_AXIS: 160
OS_CYLINDER: +1.75

## 2020-12-16 ASSESSMENT — KERATOMETRY
OS_K1POWER_DIOPTERS: 44.00
OD_AXISANGLE2_DEGREES: 156
OS_K2POWER_DIOPTERS: 44.50
OD_K2POWER_DIOPTERS: 44.75
OD_K1POWER_DIOPTERS: 44.25
OS_AXISANGLE2_DEGREES: 43

## 2020-12-16 ASSESSMENT — VISUAL ACUITY
OS_SC: CF @ 3'
METHOD: SNELLEN - LINEAR
OD_SC: 20/200
OS_SC: 20/40
OD_SC: CF @ 3'

## 2020-12-16 ASSESSMENT — CUP TO DISC RATIO
OS_RATIO: 0.2
OD_RATIO: 0.2

## 2020-12-16 ASSESSMENT — TONOMETRY
OS_IOP_MMHG: 18
OD_IOP_MMHG: 18
IOP_METHOD: APPLANATION

## 2020-12-16 ASSESSMENT — EXTERNAL EXAM - LEFT EYE: OS_EXAM: NORMAL

## 2020-12-16 ASSESSMENT — CONF VISUAL FIELD
OS_NORMAL: 1
OD_NORMAL: 1
METHOD: COUNTING FINGERS

## 2020-12-16 ASSESSMENT — SLIT LAMP EXAM - LIDS
COMMENTS: NORMAL
COMMENTS: NORMAL

## 2020-12-16 ASSESSMENT — EXTERNAL EXAM - RIGHT EYE: OD_EXAM: NORMAL

## 2020-12-16 NOTE — PATIENT INSTRUCTIONS
Wait to Fill glasses prescription  Moderate cataracts that affect vision  See [facility] at [place] for cataract evaluation   Return in 1 year for eye exam    Quynh Wilkinson, OD  [phone]

## 2020-12-16 NOTE — LETTER
12/16/2020         RE: Jackie Siddiqi  [address]  [place] [address]        Dear Colleague,    Thank you for referring your patient, Jackie Siddiqi, to the [facility]. Please see a copy of my visit note below.    Chief Complaint   Patient presents with     Annual Eye Exam     New to Eye Dept          Last Eye Exam: About 2 years ago , went to [facility] to check for Rh arthiritis - not diagnosed with it     Dilated Previously: Yes    What are you currently using to see?  Glasses, wears them to drive, states that they no longer work for her near vision any longer. They are out in the car.         Distance Vision Acuity: Noticed gradual change in both eyes, then a sudden change- hard to see when driving at night        Near Vision Acuity: Not satisfied, also feels like her near vision has changed a little bit      Eye Comfort: good  Do you use eye drops? 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       OBJECTIVE: See Ophthalmology exam    ASSESSMENT:    ICD-10-CM    1. Presbyopia  H52.4 EYE EXAM (SIMPLE-NONBILLABLE)     REFRACTION   2. Myopia of both eyes  H52.13 EYE EXAM (SIMPLE-NONBILLABLE)     REFRACTION   3. Regular astigmatism of left eye  H52.222 EYE EXAM (SIMPLE-NONBILLABLE)     REFRACTION   4. 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 Nuclear sclerotic cataract of both eyes  H25.13 EYE EXAM (SIMPLE-NONBILLABLE)     REFRACTION     EYE ADULT REFERRAL      PLAN:     Patient Instructions   Wait to Fill glasses prescription  Moderate cataracts that affect vision  See [facility] at [place] for cataract evaluation   Return in 1 year for eye exam    Quynh Wilkinson, OD  [phone]                       Again, thank you for allowing me to  participate in the care of your patient.        Sincerely,        Quynh Wilkinson, OD

## 2020-12-16 NOTE — PROGRESS NOTES
Chief Complaint   Patient presents with     Annual Eye Exam     New to Eye Dept          Last Eye Exam: About 2 years ago , went to [facility] to check for Rh arthiritis - not diagnosed with it     Dilated Previously: Yes    What are you currently using to see?  Glasses, wears them to drive, states that they no longer work for her near vision any longer. They are out in the car.         Distance Vision Acuity: Noticed gradual change in both eyes, then a sudden change- hard to see when driving at night        Near Vision Acuity: Not satisfied, also feels like her near vision has changed a little bit      Eye Comfort: good  Do you use eye drops? 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       OBJECTIVE: See Ophthalmology exam    ASSESSMENT:    ICD-10-CM    1. Presbyopia  H52.4 EYE EXAM (SIMPLE-NONBILLABLE)     REFRACTION   2. Myopia of both eyes  H52.13 EYE EXAM (SIMPLE-NONBILLABLE)     REFRACTION   3. Regular astigmatism of left eye  H52.222 EYE EXAM (SIMPLE-NONBILLABLE)     REFRACTION   4. 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 Nuclear sclerotic cataract of both eyes  H25.13 EYE EXAM (SIMPLE-NONBILLABLE)     REFRACTION     EYE ADULT REFERRAL      PLAN:     Patient Instructions   Wait to Fill glasses prescription  Moderate cataracts that affect vision  See [facility] at [place] for cataract evaluation   Return in 1 year for eye exam    Quynh Wilkinson, OD  [phone]

## 2021-01-09 ENCOUNTER — HEALTH MAINTENANCE LETTER (OUTPATIENT)
Age: 66
End: 2021-01-09

## 2021-01-25 ENCOUNTER — OFFICE VISIT (OUTPATIENT)
Dept: FAMILY MEDICINE | Facility: CLINIC | Age: 66
End: 2021-01-25
Payer: COMMERCIAL

## 2021-01-25 VITALS
HEIGHT: 63 IN | WEIGHT: 128.38 LBS | RESPIRATION RATE: 18 BRPM | SYSTOLIC BLOOD PRESSURE: 100 MMHG | BODY MASS INDEX: 22.75 KG/M2 | DIASTOLIC BLOOD PRESSURE: 70 MMHG | OXYGEN SATURATION: 96 % | HEART RATE: 62 BPM | TEMPERATURE: 97.6 F

## 2021-01-25 DIAGNOSIS — K58.2 IRRITABLE BOWEL SYNDROME WITH BOTH CONSTIPATION AND DIARRHEA: ICD-10-CM

## 2021-01-25 DIAGNOSIS — J44.1 COPD EXACERBATION (H): Primary | ICD-10-CM

## 2021-01-25 DIAGNOSIS — J32.9 CHRONIC SINUSITIS, UNSPECIFIED LOCATION: ICD-10-CM

## 2021-01-25 DIAGNOSIS — J30.2 SEASONAL ALLERGIC RHINITIS, UNSPECIFIED TRIGGER: ICD-10-CM

## 2021-01-25 PROCEDURE — 99214 OFFICE O/P EST MOD 30 MIN: CPT | Performed by: FAMILY MEDICINE

## 2021-01-25 RX ORDER — PREDNISONE 20 MG/1
20 TABLET ORAL DAILY
Qty: 7 TABLET | Refills: 0 | Status: SHIPPED | OUTPATIENT
Start: 2021-01-25 | End: 2021-02-01

## 2021-01-25 RX ORDER — MULTIVIT-MIN/IRON/FOLIC ACID/K 18-600-40
1000 CAPSULE ORAL
COMMUNITY
End: 2022-05-06

## 2021-01-25 RX ORDER — FLUTICASONE PROPIONATE 50 MCG
2 SPRAY, SUSPENSION (ML) NASAL DAILY
Qty: 15.8 ML | Refills: 1 | Status: SHIPPED | OUTPATIENT
Start: 2021-01-25 | End: 2022-05-06

## 2021-01-25 RX ORDER — AZITHROMYCIN 250 MG/1
TABLET, FILM COATED ORAL
Qty: 6 TABLET | Refills: 0 | Status: SHIPPED | OUTPATIENT
Start: 2021-01-25 | End: 2021-01-30

## 2021-01-25 ASSESSMENT — MIFFLIN-ST. JEOR: SCORE: 1078.5

## 2021-01-25 ASSESSMENT — PAIN SCALES - GENERAL: PAINLEVEL: MILD PAIN (2)

## 2021-01-25 NOTE — PROGRESS NOTES
Assessment & Plan     COPD exacerbation (H)  Acute on chronic. 50+ pack year smoker. Prior CT chest to follow nodules. Lungs clear but slightly diminished. Acute on chronic Bronchitis is an inflammation in the air passages in your lungs.  It is similar to the processes that occur in people with asthma.  Bronchitis is usually caused by viruses and, sometimes certain bacteria.  Antibiotics don't help the vast majority of people who have bronchitis recover any quicker even when it is caused by a bacteria.      For cough, dextromethorphan/guaifenesin combinations help loosen secretions and suppress cough safely without significant risk of sedation. Often an albuterol inhaler (2 puffs four times daily) will help with bronchitis. This is a prescription medicine.  If the cough is severe and interfering with sleep or function, prescription cough suppressants can be prescribed but these are often sedating.    The body needs to be treated well in order to help heal itself.  Rest as needed.  It is ok to reduce food intake if appetite is poor but it is quite important to maintain/increase fluid intake.    For nasal congestion and sinus pressure, pseudoephedrine (Sudafed) is often helpful but it can cause elevations in blood pressure and insomnia.  Short courses of a nasal decongestant spray (Afrin or Neosinephrine) can be appropriate but their use should be restricted to 3 days due to the high risk of nasal addiction.    For pain and fevers, acetaminophen (Tylenol) is most appropriate.  Ibuprofen (Advil) or naproxen (Aleve) are useful too and last longer but they can cause elevation of blood pressure or stomach problems.    Antihistamines (Benadryl, Dimetapp, etc.) cause sedation, confusion, bowel and urinary abnormalities and are of little use for infectious causes of cough and nasal congestion.  Their use should be reserved for allergic symptoms.      - predniSONE (DELTASONE) 20 MG tablet; Take 1 tablet (20 mg) by mouth  daily for 7 days  - azithromycin (ZITHROMAX) 250 MG tablet; Take 2 tablets (500 mg) by mouth daily for 1 day, THEN 1 tablet (250 mg) daily for 4 days.    Chronic sinusitis, unspecified location  Chronic sinusitis on prior CT head. Treated in past with nasal steroids. Nasal passages dry and inflamed. Sinus pressure is primarily a problem of drainage.  You can best help your body clear the sinus secretions and pressure by opening up the natural passageways which are often blocked by viral colds and allergies.      Short courses of a nasal decongestant spray (Afrin or Neosinephrine) are one of the most effective tools in opening sinus drainage passageways.  Their use should be restricted to 3 days though due to the high risk of nasal addiction.  Pseudoephedrine or phenylephrine (Sudafed) is often helpful but it can cause elevations in blood pressure and insomnia.     Sometimes a nasal saline spray will help rinse out the nasal passages and feel good.     Guaifenesin (Robitussin or Mucinex) helps loosen secretions and often help make the mucous more liquid and easier to clear.    For pain and fevers, acetaminophen (Tylenol) is most appropriate.  Ibuprofen (Advil) or naproxen (Aleve) are useful too and last longer but they can cause elevation of blood pressure or stomach problems.    Antihistamines (Benadryl, Dimetapp, etc.) cause sedation, confusion, bowel and urinary abnormalities and are of little use for infectious causes of cough and nasal congestion.  Their use should be reserved for allergic symptoms.    The body needs to be treated well in order to help heal itself.  Rest as needed.  It is ok to reduce food intake if appetite is poor but it is quite important to maintain/increase fluid intake.  Sinus pressure and infections usually go away on their own with appropriate care.  If symptoms worsen or persist beyond 10 days, an antibiotic might be worth trying to treat a possible bacterial component.    - predniSONE  (DELTASONE) 20 MG tablet; Take 1 tablet (20 mg) by mouth daily for 7 days  - azithromycin (ZITHROMAX) 250 MG tablet; Take 2 tablets (500 mg) by mouth daily for 1 day, THEN 1 tablet (250 mg) daily for 4 days.    Irritable bowel syndrome with both constipation and diarrhea  Chronic intermittent abdominal pain for years with normal colonoscopy and CT. A high fiber diet with plenty of fluids (up to 8 glasses of water daily) is suggested to relieve these symptoms.  Metamucil, 1 tablespoon once or twice daily can be used to keep bowels regular if needed.     Seasonal allergic rhinitis, unspecified trigger    - fluticasone (FLONASE) 50 MCG/ACT nasal spray; Spray 2 sprays into both nostrils daily                         Patient Instructions       Patient Education     Irritable Bowel Syndrome    Irritable bowel syndrome (IBS) is a disorder of the intestines. It is not a disease, but a group of symptoms caused by changes in the way the intestines work including how they move, secrete, absorb, and transit pain sensations. It is fairly common, but the cause is not well understood. There are other conditions that cause IBS symptoms, so make sure to speak with your provider to make sure that further evaluation isn't needed.  Symptoms of IBS include:    Belly pain, discomfort, and cramping    Diarrhea    Constipation or dry, hard stools    Mucous stool    Bloating    Feeling of incomplete bowel movements  It usually results in one of 3 patterns of symptoms:    Chronic belly pain and constipation    Recurring episodes of diarrhea, with belly pain or discomfort    Alternating diarrhea and constipation  Home care  The goal of treatment is to control and relieve your symptoms, so you can lead a full and active life. There is no cure for IBS. But it can be managed.  Diet  Your diet did not cause your IBS, but it can affect it. Diet and food choices may cause IBS symptoms in some people, but not everyone. No one diet works for  everyone. Finding the best foods for you may take trial and error. Keep a food log to help find what foods made your symptoms worse. Below are some tips that may help you.    Eat more slowly. Eat smaller amounts at a time, but more often. Remember, you can always eat more, but cannot eat less once you ve eaten too much.    In general, fiber is very helpful for both constipation and diarrhea. However, insoluble fiber can worsen bloating, cramping, and gas. Insoluble fiber can be found in wheat bran, certain vegetables, and whole-grains. Soluble fiber is in such foods as oat bran, barley, nuts, seeds, bean, lentils, peas, and some fruits and vegetables. Increase fiber slowly and choose a product that includes soluble fiber. It helps to keep a food diary and write down the symptoms you have with certain foods.    Try lactose-free dairy products. many people are intolerant to lactose.    Try cutting out foods that are high in fat and fatty meats.    You can control bloating or passing excess gas. Be careful with  gassy  vegetables and fruits like beans, cabbage, broccoli, and cauliflower.  Other foods called FODMAPs are a type of carbohydrate that can cause these symptoms and diarrhea. They are poorly digestible carbohydrates. Some FODMAP examples include honey, apples, pears, nectarines, lima beans, and cabbage. Talk with your provider about how to choose low FODMAP foods if you are sensitive to them.    Be careful with carbonated beverages and fruit juices. They can make your bloating and diarrhea worse.    Caffeine, alcohol, and stimulants can make symptoms worse. These include coffee, tea, sodas, energy drinks, and chocolate.    IBS diet guidelines can be confusing. Ask your provider for a referral to a registered dietitian. This professional can help you make sense of IBS diet suggestions.  Lifestyle    Look for factors that seem to worsen your symptoms. These include stress and emotions.     Although stress does  not cause IBS, it may trigger flare-ups. Counseling can help you learn to handle stress. So can self-help measures like exercise, yoga, and meditation.    Depression can occur along with IBS. Your healthcare provider may prescribe antidepressant medicine. This may help with diarrhea, constipation, and cramping, as well as with symptoms of depression.    Smoking doesn't cause IBS, but can make the symptoms worse.  Medicines  Your healthcare provider may prescribe medicines. Take them as directed. For acute flare-ups of your illness, your provider may give you prescription medicines.    Check with your healthcare provider before taking any medicines for diarrhea.    Don't take anti-inflammatory medicines like ibuprofen or naproxen.    Long-term medicines can be helpful for chronic symptoms    If you have lost enough weight to make you need nutritional supplements, talk to your provider. This may point to another more serious condition.  Follow-up care  Follow up with your healthcare provider, or as advised.  When to seek medical advice  Call your healthcare provider right away if any of these occur:    Belly pain gets worse or does not improve after a bowel movement    Constant belly pain moves to the right-lower belly    You can't keep liquids down because of vomiting    You have severe, persistent diarrhea    You have blood (red or black color) or mucus in your stool    You have lost significant weight    You feel very weak or dizzy, faint, or have extreme thirst    You have a fever of 100.4 F (38.0 C) or higher, or as directed by your healthcare provider  [employer] last reviewed this educational content on 6/1/2018 2000-2020 [employer]. [address] [place] [address], [place] [address]. All rights reserved. This information is not intended as a substitute for professional medical care. Always follow your healthcare professional's instructions.           Patient Education     Diet and Lifestyle Tips for  Irritable Bowel Syndrome (IBS)    Your healthcare professional may suggest some lifestyle changes to help control your IBS. Changing your diet and managing stress are 2 of the most important. Follow your healthcare provider s instructions and try some of the suggestions below.   Change your diet  Your diet may be an important cause of IBS symptoms. You may want to try the following:    Pay attention to what foods bother you, and stay away from them. For example, dairy products are hard for some people to digest. Lactose-free dairy products may be better for your symptoms.    Don't eat high FODMAP foods. Other common foods that can cause symptoms contain carbohydrates called FODMAPs. These are poorly digested by your body. High FODMAP foods include some fruits such as apples, vegetables such as cabbage, and some dairy. They also include certain sweeteners such as high-fructose corn syrup, sorbitol, and xylitol. Many people find that eating a diet low in FODMAPs can ease symptoms. Talk with your healthcare provider about a low FODMAP diet.    Drink 6 to 8 glasses of water a day.    Don't have caffeine or tobacco. These are muscle stimulants and can affect the working of your digestive tract.    Don't drink alcohol. It can irritate your digestive tract and make your symptoms worse.    Eat more fiber if constipation is a problem. Fiber makes the stool softer and easier to pass through the colon.    Eat more fiber if diarrhea is a problem. Fiber also helps to bind water. This can help to firm up loose stool.  Reduce stress  If stress or anxiety makes your IBS symptoms worse, learning how to manage stress may help you feel better. Try these tips:     Identify the causes of stress in your life.    Learn new ways to cope with them.    Regular exercise is a great way to relieve stress. It can also help ease constipation. For adults, the CDC recommends 150 minutes of moderate activity each week. It also recommends  muscle-strengthening activities 2 days a week. If this sounds like a lot of time, the CDC suggests breaking physical activity into 10-minute blocks and spreading it out over a week. Developing a schedule that works for you is the key to a successful exercise program.  [employer] last reviewed this educational content on 8/1/2018 2000-2020 [employer]. [address] [place] [address], [place] [id number]. All rights reserved. This information is not intended as a substitute for professional medical care. Always follow your healthcare professional's instructions.           Patient Education     What Is Irritable Bowel Syndrome (IBS)?     Muscle contraction moves food through the digestive tract.    People who have irritable bowel syndrome (IBS)  have digestive tracts that react abnormally to certain substances or to stress. This leads to symptoms like cramps, gas, bloating, pain, constipation, and diarrhea. IBS is sometimes called  spastic colon.  It is a common health problem. It is not a disease. But rather it's a group of symptoms that happen together.   IBS--a motility problem  The muscle movement that passes food through the digestive tract (especially the colon) is called motility.  When you have IBS, this movement is disrupted. Motility may speed up, slow down, or become irregular. If stool passes too quickly through the colon, not enough water is absorbed from it. You may have loose, watery stools (diarrhea). If stool passes through the colon too slowly, too much water is absorbed and the stool becomes hard and dry. You may then have constipation. Also, stool and gas may back up. This can cause painful pressure and cramping.   No single test can diagnose IBS. Your healthcare provider will ask about your symptoms. You may also have blood, stool, or radiologic tests. You may even have a colonoscopy. These tests are done mainly to rule out other illnesses.    What causes IBS?  Lots of research has been done  "on IBS. But the cause is still not known. Some of the possible factors are:      Smoking, eating certain foods, or drinking alcohol or caffeinated drinks can cause, or \"trigger,\" symptoms of IBS.    No one knows for sure. But IBS may be caused by a problem with the nerves or muscles in your digestive tract.    Some evidence suggests that certain bacteria found after a severe gastrointestinal infection in the small intestine and colon may cause IBS.    Stress and anxiety tend to worsen the symptoms of IBS. But it is not believed to be the cause.   What you can do  Medicine can t cure IBS. But it may help manage the symptoms. It may help your digestive tract work better. Your healthcare provider may prescribe one or more medicines for you. Because some medicines may make IBS worse, don t take any medicine, especially laxatives, unless your healthcare provider prescribes it for you.   Your healthcare provider may also suggest some lifestyle changes to help control your IBS. You may have to change your diet and learn to better manage your stress. If diet changes are advised, talk with a dietitian. He or she can help you keep a healthy nutritional balance in your food intake.    "[employer]" last reviewed this educational content on 6/1/2019 2000-2020 The [employer]. [address] [place] [address]. All rights reserved. This information is not intended as a substitute for professional medical care. Always follow your healthcare professional's instructions.           Patient Education     Chronic Sinusitis   Chronic sinusitis is a long-term swelling or infection of the sinuses. If sinusitis lasts more than 12 weeks (90 days), it is called chronic.    What is chronic sinusitis?  Sinuses are air-filled spaces in the skull behind the face. They are kept moist and clean by a lining of mucosa. Things such as pollen, smoke, and chemical fumes can bother the mucosa. Constant exposure to irritants can cause " ongoing swelling of this lining. It can also harm tiny hairlike cilia that cover the mucosa. Cilia help carry mucus toward the opening of the sinus. Damage to cilia keeps mucus from draining from the sinuses.  Causes of chronic sinusitis  Problems that irritate the mucosa or block drainage can lead to chronic sinusitis. These may include:    Infections    Chronic allergies    Nasal polyps, deviated septum, or other blockages    Ongoing exposure to irritants, such as cigarette smoke or fumes    Asthma    Acute sinusitis that keeps coming back  Common symptoms of chronic sinusitis  Symptoms may include:    Facial pain and pressure    Headache and sinus pain    Nasal congestion    Thick, colored drainage from the nose    Thick mucus draining down the back of the throat (postnasal drainage)    Loss of smell    Fever    Cough    Sore throat  Diagnosing chronic sinusitis  Your healthcare provider will ask about your symptoms and past health. He or she will look at your nose and face. You may have imaging studies such as an X-ray or CT scan of the sinuses. Your healthcare provider may also take a sample of the drainage to check for bacteria. You may also have an endoscopy. During this test, the healthcare provider puts a lighted tube up your nose to look at your sinuses.  Treating chronic sinusitis  The goal of treatment is to reduce irritation and swelling. Your plan may involve:    Taking medicines. Your healthcare provider may give you medicines to reduce the amount of mucus and swelling. These help unblock the sinuses and let them drain. You will need to take antibiotics if you have a bacterial infection.    Flushing your sinuses. Your healthcare provider may suggest sinus irrigation. This is flushing your sinuses with saltwater or saline solution. This helps to clear out mucus.    Controlling allergies. If you have allergies, you should have a plan to help control them. You may need to take medicines or get allergy  shots.    Controlling nasal irritants. If you smoke, ask your healthcare provider for help to stop.    Having surgery. In some cases, you may need surgery on the nose, sinuses, or both. This can improve sinus drainage or remove nasal blockages.  [employer]Well last reviewed this educational content on 11/1/2019 2000-2020 [employer]. [address] [place] [address], [place] [address]. All rights reserved. This information is not intended as a substitute for professional medical care. Always follow your healthcare professional's instructions.           Patient Education     Understanding Diverticulosis and Diverticulitis   The colon (large intestine) is the last part of the digestive tract. It absorbs water from stool and changes it from a liquid to a solid. In certain cases, small pouches called diverticula can form in the colon wall. This condition is called diverticulosis . It's very common as people get older. The pouches can become infected. If this happens, it becomes a more serious problem called diverticulitis . These problems can be painful. But they can be managed.      Pouches or diverticula usually occur in the lower part of the colon called the sigmoid.      Diverticulitis occurs when the pouches become infected or inflamed.   Managing your condition  Diet changes or medicines may be prescribed.    If you have diverticulosis  Recommendations include:    Diet changes are often enough to control symptoms. The main changes are adding fiber (roughage) and drinking more water. Fiber absorbs water as it travels through your colon. This helps your stool stay soft and move smoothly. Water helps this process.    If needed, you may be told to take over-the-counter stool softeners.    To help ease pain, antispasmodic medicines may be prescribed.    Watch for changes in your bowel movements. Tell your healthcare provider if you notice any changes.    Begin an exercise program. Ask your healthcare provider how to  get started.    Get plenty of rest and sleep.   If you have diverticulitis  Treatment depends on how bad your symptoms are.     For mild symptoms. You may be put on a liquid diet for a short time. Antibiotics are usually prescribed. If these 2 steps relieve your symptoms, you may then be prescribed a high-fiber diet. If you still have symptoms, your healthcare provider will discuss more treatment choices with you.    For severe symptoms. You may need to be admitted to the hospital. There, you can be given IV antibiotics and fluids. You will also be put on a low-fiber or liquid diet. Although not common, surgery is needed in some people with severe symptoms.  [place] to colon health  Help keep your colon healthy with a diet that includes plenty of high-fiber fruits, vegetables, and whole grains. Drink plenty of liquids like water and juice. Maintain a healthy lifestyle, including regular exercise, stress management, and adequate rest and sleep.    [employer] last reviewed this educational content on 4/1/2019 2000-2020 [employer]. [address] [place] [address]. All rights reserved. This information is not intended as a substitute for professional medical care. Always follow your healthcare professional's instructions.               Return in about 2 weeks (around 2/8/2021) for Follow up, with any available provider, in person, If symptoms persist or do not improve.    Merrick Reardon MD  [facility] Mejia is a 65 year old who presents to clinic today for the following health issues     HPI       Acute Illness  Acute illness concerns: Sinus  Onset/Duration: more than a year  Symptoms:  Fever: no  Chills/Sweats: YES- chills  Headache (location?): no  Sinus Pressure: YES, dizzy  Conjunctivitis:  no  Ear Pain: YES: left and ringing   Rhinorrhea: no  Congestion: YES  Sore Throat: no  Cough: YES-productive of clear sputum, with shortness of breath  Wheeze:  no  Decreased Appetite: YES  Nausea: YES  Vomiting: YES  Diarrhea: no  Dysuria/Freq.: no  Dysuria or Hematuria: no  Fatigue/Achiness: YES- past 3 weeks  Sick/Strep Exposure: no  Therapies tried and outcome: rx medications helps a little flonase. Inhaler did not help. Head feels full and numb. IBU and tylenol don't help.     Concern: Lungs.     Constipation  Onset/Duration: years  Description:  Frequency of bowel movements: on an almost daily basis  Consistency of stool: rock size, sometimes normal, sometimes soft, long and stringy.   Progression of Symptoms: worsening  Accompanying signs and symptoms:    Abdominal pain: YES- left side lower   Rectal pain: YES, really painful   Blood in stool: no   Nausea/Vomiting: YES   Weight loss or gain: YES, gaining weight. Has had protein shakes.   History:   Similar problems in past: no  History of abdominal surgery: YES- c sections and lap  Chronic laxative use: no  New medications: no  Precipitating or alleviating factors: probitotic  Therapies tried and outcome: probiotic and yogurt.         Patient Active Problem List   Diagnosis     Esophageal reflux     Lichen sclerosus et atrophicus of the vulva     Advanced directives, counseling/discussion     Hypothyroidism, unspecified type     Hyperlipidemia LDL goal <160     Osteoarthritis of carpometacarpal joint of right thumb, unspecified osteoarthritis type     Abnormal CT lung screening     Diverticulosis of colon     Current Outpatient Medications   Medication Sig Dispense Refill     Ascorbic Acid (VITAMIN C) 500 MG CAPS        Cholecalciferol (VITAMIN D3) 25 MCG (1000 UT) CAPS        clobetasol (TEMOVATE) 0.05 % external ointment Apply a small pea size amount at bedtime couple times a week as needed 60 g 1     fluticasone (FLONASE) 50 MCG/ACT nasal spray Spray 2 sprays into both nostrils daily 15.8 mL 1     Lactobacillus (PROBIOTIC ACIDOPHILUS PO)        levothyroxine (SYNTHROID/LEVOTHROID) 25 MCG tablet TAKE 1 TABLET BY  "MOUTH EVERY DAY 90 tablet 1     PREMARIN 0.625 MG/GM vaginal cream Place 0.5 g vaginally twice a week 42.5 g 10     albuterol (PROAIR HFA/PROVENTIL HFA/VENTOLIN HFA) 108 (90 Base) MCG/ACT inhaler Inhale 2 puffs into the lungs every 4 hours as needed for shortness of breath / dyspnea or wheezing (Patient not taking: Reported on 12/4/2020) 1 Inhaler 11         Review of Systems   CONSTITUTIONAL: NEGATIVE for fever, chills, change in weight  ENT/MOUTH: POSITIVE for ear pain left, nasal congestion, rhinorrhea-clear and sinus pressure and NEGATIVE for fever, hoarse voice, rhinorrhea-purulent, sore throat and vertigo  RESP:POSITIVE for cough-non productive, dyspnea on exertion and Hx COPD and NEGATIVE for exposure to COVID, hemoptysis and sputum   CV: NEGATIVE for chest pain, palpitations or peripheral edema  GI: POSITIVE for abdominal pain RLQ and LLQ, constipation, diarrhea and gas or bloating and NEGATIVE for hematochezia, Hx IBD, Hx stomach or duadenal ulcer, nausea, poor appetite, vomiting and weight loss  ROS otherwise negative      Objective    /70 (BP Location: Right arm, Patient Position: Chair, Cuff Size: Adult Regular)   Pulse 62   Temp 97.6  F (36.4  C) (Temporal)   Resp 18   Ht 1.588 m (5' 2.5\")   Wt 58.2 kg (128 lb 6 oz)   LMP 03/26/2003   SpO2 96%   BMI 23.11 kg/m    Body mass index is 23.11 kg/m .  Physical Exam   GENERAL: healthy, alert and no distress  HENT: normal cephalic/atraumatic, ear canals and TM's normal, nose and mouth without ulcers or lesions, rhinorrhea thick, oropharynx clear, oral mucous membranes moist and sinuses: maxillary, ethmoid, sphenoid tenderness on bilaterally  NECK: no adenopathy, no asymmetry, masses, or scars and thyroid normal to palpation  RESP: lungs clear to auscultation - no rales, rhonchi or wheezes  CV: regular rate and rhythm, normal S1 S2, no S3 or S4, no murmur, click or rub, no peripheral edema and peripheral pulses strong  ABDOMEN: tenderness LLQ, " bowel sounds normal and no palpable or pulsatile masses  MS: no gross musculoskeletal defects noted, no edema    Office Visit on 12/04/2020   Component Date Value Ref Range Status     COVID-19 Antibody Screen 12/04/2020 Negative   Final    Comment: No COVID-19 antibodies detected.  Patients within 10 days of symptom onset for   COVID-19 may not produce sufficient levels of detectable antibodies.    Immunocompromised COVID-19 patients may take longer to develop antibodies.       COVID-19 Antibody, IgG Titer 12/04/2020 Not Applicable   Final    Comment: Qualitative screen for total antibodies to COVID-19 (SARS-CoV-2) with   semi-quantitative measurement of IgG COVID-19 antibodies by endpoint titer.    COVID-19 antibodies may be elevated due to a past or current infection.  Negative results do not rule out COVID-19 infection.  Results from antibody   testing should not be used as the sole basis to diagnose or exclude SARS-CoV-2   infection or to inform infection status.  COVID-19 PCR test should be ordered   if current infection is suspected.  False positive results may occur in rare   cases due to cross-reacting antibodies.  This test was developed and its performance characteristics determined by the   [facility] ([facility]),   which is regulated under CLIA as qualified to perform high-complexity testing.    This test has not been reviewed by the FDA.  Testing performed by [facility], [facility], [address], [address], [place] [address]

## 2021-01-25 NOTE — PATIENT INSTRUCTIONS
Patient Education     Irritable Bowel Syndrome    Irritable bowel syndrome (IBS) is a disorder of the intestines. It is not a disease, but a group of symptoms caused by changes in the way the intestines work including how they move, secrete, absorb, and transit pain sensations. It is fairly common, but the cause is not well understood. There are other conditions that cause IBS symptoms, so make sure to speak with your provider to make sure that further evaluation isn't needed.  Symptoms of IBS include:    Belly pain, discomfort, and cramping    Diarrhea    Constipation or dry, hard stools    Mucous stool    Bloating    Feeling of incomplete bowel movements  It usually results in one of 3 patterns of symptoms:    Chronic belly pain and constipation    Recurring episodes of diarrhea, with belly pain or discomfort    Alternating diarrhea and constipation  Home care  The goal of treatment is to control and relieve your symptoms, so you can lead a full and active life. There is no cure for IBS. But it can be managed.  Diet  Your diet did not cause your IBS, but it can affect it. Diet and food choices may cause IBS symptoms in some people, but not everyone. No one diet works for everyone. Finding the best foods for you may take trial and error. Keep a food log to help find what foods made your symptoms worse. Below are some tips that may help you.    Eat more slowly. Eat smaller amounts at a time, but more often. Remember, you can always eat more, but cannot eat less once you ve eaten too much.    In general, fiber is very helpful for both constipation and diarrhea. However, insoluble fiber can worsen bloating, cramping, and gas. Insoluble fiber can be found in wheat bran, certain vegetables, and whole-grains. Soluble fiber is in such foods as oat bran, barley, nuts, seeds, bean, lentils, peas, and some fruits and vegetables. Increase fiber slowly and choose a product that includes soluble fiber. It helps to keep a food  diary and write down the symptoms you have with certain foods.    Try lactose-free dairy products. many people are intolerant to lactose.    Try cutting out foods that are high in fat and fatty meats.    You can control bloating or passing excess gas. Be careful with  gassy  vegetables and fruits like beans, cabbage, broccoli, and cauliflower.  Other foods called FODMAPs are a type of carbohydrate that can cause these symptoms and diarrhea. They are poorly digestible carbohydrates. Some FODMAP examples include honey, apples, pears, nectarines, lima beans, and cabbage. Talk with your provider about how to choose low FODMAP foods if you are sensitive to them.    Be careful with carbonated beverages and fruit juices. They can make your bloating and diarrhea worse.    Caffeine, alcohol, and stimulants can make symptoms worse. These include coffee, tea, sodas, energy drinks, and chocolate.    IBS diet guidelines can be confusing. Ask your provider for a referral to a registered dietitian. This professional can help you make sense of IBS diet suggestions.  Lifestyle    Look for factors that seem to worsen your symptoms. These include stress and emotions.     Although stress does not cause IBS, it may trigger flare-ups. Counseling can help you learn to handle stress. So can self-help measures like exercise, yoga, and meditation.    Depression can occur along with IBS. Your healthcare provider may prescribe antidepressant medicine. This may help with diarrhea, constipation, and cramping, as well as with symptoms of depression.    Smoking doesn't cause IBS, but can make the symptoms worse.  Medicines  Your healthcare provider may prescribe medicines. Take them as directed. For acute flare-ups of your illness, your provider may give you prescription medicines.    Check with your healthcare provider before taking any medicines for diarrhea.    Don't take anti-inflammatory medicines like ibuprofen or naproxen.    Long-term  medicines can be helpful for chronic symptoms    If you have lost enough weight to make you need nutritional supplements, talk to your provider. This may point to another more serious condition.  Follow-up care  Follow up with your healthcare provider, or as advised.  When to seek medical advice  Call your healthcare provider right away if any of these occur:    Belly pain gets worse or does not improve after a bowel movement    Constant belly pain moves to the right-lower belly    You can't keep liquids down because of vomiting    You have severe, persistent diarrhea    You have blood (red or black color) or mucus in your stool    You have lost significant weight    You feel very weak or dizzy, faint, or have extreme thirst    You have a fever of 100.4 F (38.0 C) or higher, or as directed by your healthcare provider  [employer] last reviewed this educational content on 6/1/2018 2000-2020 The [employer]. [address] [place] [id number]. All rights reserved. This information is not intended as a substitute for professional medical care. Always follow your healthcare professional's instructions.           Patient Education     Diet and Lifestyle Tips for Irritable Bowel Syndrome (IBS)    Your healthcare professional may suggest some lifestyle changes to help control your IBS. Changing your diet and managing stress are 2 of the most important. Follow your healthcare provider s instructions and try some of the suggestions below.   Change your diet  Your diet may be an important cause of IBS symptoms. You may want to try the following:    Pay attention to what foods bother you, and stay away from them. For example, dairy products are hard for some people to digest. Lactose-free dairy products may be better for your symptoms.    Don't eat high FODMAP foods. Other common foods that can cause symptoms contain carbohydrates called FODMAPs. These are poorly digested by your body. High FODMAP foods  include some fruits such as apples, vegetables such as cabbage, and some dairy. They also include certain sweeteners such as high-fructose corn syrup, sorbitol, and xylitol. Many people find that eating a diet low in FODMAPs can ease symptoms. Talk with your healthcare provider about a low FODMAP diet.    Drink 6 to 8 glasses of water a day.    Don't have caffeine or tobacco. These are muscle stimulants and can affect the working of your digestive tract.    Don't drink alcohol. It can irritate your digestive tract and make your symptoms worse.    Eat more fiber if constipation is a problem. Fiber makes the stool softer and easier to pass through the colon.    Eat more fiber if diarrhea is a problem. Fiber also helps to bind water. This can help to firm up loose stool.  Reduce stress  If stress or anxiety makes your IBS symptoms worse, learning how to manage stress may help you feel better. Try these tips:     Identify the causes of stress in your life.    Learn new ways to cope with them.    Regular exercise is a great way to relieve stress. It can also help ease constipation. For adults, the CDC recommends 150 minutes of moderate activity each week. It also recommends muscle-strengthening activities 2 days a week. If this sounds like a lot of time, the CDC suggests breaking physical activity into 10-minute blocks and spreading it out over a week. Developing a schedule that works for you is the key to a successful exercise program.  [employer] last reviewed this educational content on 8/1/2018 2000-2020 The [employer]. [address] [place] [address], [place] [address]. All rights reserved. This information is not intended as a substitute for professional medical care. Always follow your healthcare professional's instructions.           Patient Education     What Is Irritable Bowel Syndrome (IBS)?     Muscle contraction moves food through the digestive tract.    People who have irritable bowel syndrome (IBS)   "have digestive tracts that react abnormally to certain substances or to stress. This leads to symptoms like cramps, gas, bloating, pain, constipation, and diarrhea. IBS is sometimes called  spastic colon.  It is a common health problem. It is not a disease. But rather it's a group of symptoms that happen together.   IBS--a motility problem  The muscle movement that passes food through the digestive tract (especially the colon) is called motility.  When you have IBS, this movement is disrupted. Motility may speed up, slow down, or become irregular. If stool passes too quickly through the colon, not enough water is absorbed from it. You may have loose, watery stools (diarrhea). If stool passes through the colon too slowly, too much water is absorbed and the stool becomes hard and dry. You may then have constipation. Also, stool and gas may back up. This can cause painful pressure and cramping.   No single test can diagnose IBS. Your healthcare provider will ask about your symptoms. You may also have blood, stool, or radiologic tests. You may even have a colonoscopy. These tests are done mainly to rule out other illnesses.    What causes IBS?  Lots of research has been done on IBS. But the cause is still not known. Some of the possible factors are:      Smoking, eating certain foods, or drinking alcohol or caffeinated drinks can cause, or \"trigger,\" symptoms of IBS.    No one knows for sure. But IBS may be caused by a problem with the nerves or muscles in your digestive tract.    Some evidence suggests that certain bacteria found after a severe gastrointestinal infection in the small intestine and colon may cause IBS.    Stress and anxiety tend to worsen the symptoms of IBS. But it is not believed to be the cause.   What you can do  Medicine can t cure IBS. But it may help manage the symptoms. It may help your digestive tract work better. Your healthcare provider may prescribe one or more medicines for you. Because " some medicines may make IBS worse, don t take any medicine, especially laxatives, unless your healthcare provider prescribes it for you.   Your healthcare provider may also suggest some lifestyle changes to help control your IBS. You may have to change your diet and learn to better manage your stress. If diet changes are advised, talk with a dietitian. He or she can help you keep a healthy nutritional balance in your food intake.    [employer] last reviewed this educational content on 6/1/2019 2000-2020 The [employer], [employer]. [address] [place] [address]. All rights reserved. This information is not intended as a substitute for professional medical care. Always follow your healthcare professional's instructions.           Patient Education     Chronic Sinusitis   Chronic sinusitis is a long-term swelling or infection of the sinuses. If sinusitis lasts more than 12 weeks (90 days), it is called chronic.    What is chronic sinusitis?  Sinuses are air-filled spaces in the skull behind the face. They are kept moist and clean by a lining of mucosa. Things such as pollen, smoke, and chemical fumes can bother the mucosa. Constant exposure to irritants can cause ongoing swelling of this lining. It can also harm tiny hairlike cilia that cover the mucosa. Cilia help carry mucus toward the opening of the sinus. Damage to cilia keeps mucus from draining from the sinuses.  Causes of chronic sinusitis  Problems that irritate the mucosa or block drainage can lead to chronic sinusitis. These may include:    Infections    Chronic allergies    Nasal polyps, deviated septum, or other blockages    Ongoing exposure to irritants, such as cigarette smoke or fumes    Asthma    Acute sinusitis that keeps coming back  Common symptoms of chronic sinusitis  Symptoms may include:    Facial pain and pressure    Headache and sinus pain    Nasal congestion    Thick, colored drainage from the nose    Thick mucus draining down the  back of the throat (postnasal drainage)    Loss of smell    Fever    Cough    Sore throat  Diagnosing chronic sinusitis  Your healthcare provider will ask about your symptoms and past health. He or she will look at your nose and face. You may have imaging studies such as an X-ray or CT scan of the sinuses. Your healthcare provider may also take a sample of the drainage to check for bacteria. You may also have an endoscopy. During this test, the healthcare provider puts a lighted tube up your nose to look at your sinuses.  Treating chronic sinusitis  The goal of treatment is to reduce irritation and swelling. Your plan may involve:    Taking medicines. Your healthcare provider may give you medicines to reduce the amount of mucus and swelling. These help unblock the sinuses and let them drain. You will need to take antibiotics if you have a bacterial infection.    Flushing your sinuses. Your healthcare provider may suggest sinus irrigation. This is flushing your sinuses with saltwater or saline solution. This helps to clear out mucus.    Controlling allergies. If you have allergies, you should have a plan to help control them. You may need to take medicines or get allergy shots.    Controlling nasal irritants. If you smoke, ask your healthcare provider for help to stop.    Having surgery. In some cases, you may need surgery on the nose, sinuses, or both. This can improve sinus drainage or remove nasal blockages.  [employer] last reviewed this educational content on 11/1/2019 2000-2020 The [employer]. [address] [place] [address]. All rights reserved. This information is not intended as a substitute for professional medical care. Always follow your healthcare professional's instructions.           Patient Education     Understanding Diverticulosis and Diverticulitis   The colon (large intestine) is the last part of the digestive tract. It absorbs water from stool and changes it from a liquid to  a solid. In certain cases, small pouches called diverticula can form in the colon wall. This condition is called diverticulosis . It's very common as people get older. The pouches can become infected. If this happens, it becomes a more serious problem called diverticulitis . These problems can be painful. But they can be managed.      Pouches or diverticula usually occur in the lower part of the colon called the sigmoid.      Diverticulitis occurs when the pouches become infected or inflamed.   Managing your condition  Diet changes or medicines may be prescribed.    If you have diverticulosis  Recommendations include:    Diet changes are often enough to control symptoms. The main changes are adding fiber (roughage) and drinking more water. Fiber absorbs water as it travels through your colon. This helps your stool stay soft and move smoothly. Water helps this process.    If needed, you may be told to take over-the-counter stool softeners.    To help ease pain, antispasmodic medicines may be prescribed.    Watch for changes in your bowel movements. Tell your healthcare provider if you notice any changes.    Begin an exercise program. Ask your healthcare provider how to get started.    Get plenty of rest and sleep.   If you have diverticulitis  Treatment depends on how bad your symptoms are.     For mild symptoms. You may be put on a liquid diet for a short time. Antibiotics are usually prescribed. If these 2 steps relieve your symptoms, you may then be prescribed a high-fiber diet. If you still have symptoms, your healthcare provider will discuss more treatment choices with you.    For severe symptoms. You may need to be admitted to the hospital. There, you can be given IV antibiotics and fluids. You will also be put on a low-fiber or liquid diet. Although not common, surgery is needed in some people with severe symptoms.  [place] to colon health  Help keep your colon healthy with a diet that includes plenty of  high-fiber fruits, vegetables, and whole grains. Drink plenty of liquids like water and juice. Maintain a healthy lifestyle, including regular exercise, stress management, and adequate rest and sleep.    [employer]Well last reviewed this educational content on 4/1/2019 2000-2020 The [employer], [employer]. [address] [place] [address]. All rights reserved. This information is not intended as a substitute for professional medical care. Always follow your healthcare professional's instructions.

## 2021-01-28 DIAGNOSIS — N95.2 ATROPHIC VAGINITIS: ICD-10-CM

## 2021-01-28 NOTE — TELEPHONE ENCOUNTER
Dr. ACOSTA- covering provider,    Patient was into see Dr. Reardon on 1/25/21 and forgot to refill a couple of her medications.    meds and pharmacy pended.

## 2021-01-28 NOTE — LETTER
[facility] [address] [address]  Phone: [phone]  Fax: [phone]        February 2, 2021      Jackie Siddiqi                                                                                                                                [address] [facility] [address]            Dear MsRandy Siddiqi,    We are concerned about your health care.  We recently provided you with a medication refill.  Many medications require routine follow-up with your Doctor.      At this time we ask that: You schedule an appointment for your annual physical.    Your prescription: Has been refilled for 1 month so you may have time for the above noted follow-up.      Thank you,      [facility]

## 2021-01-28 NOTE — TELEPHONE ENCOUNTER
Routing refill request to provider for review/approval because:  No current mammogram on file.     Joann Blas RN

## 2021-01-29 ENCOUNTER — TELEPHONE (OUTPATIENT)
Dept: FAMILY MEDICINE | Facility: CLINIC | Age: 66
End: 2021-01-29

## 2021-01-29 DIAGNOSIS — N95.2 ATROPHIC VAGINITIS: Primary | ICD-10-CM

## 2021-01-29 RX ORDER — CONJUGATED ESTROGENS 0.62 MG/G
0.5 CREAM VAGINAL
Qty: 42.5 G | Refills: 10 | Status: SHIPPED | OUTPATIENT
Start: 2021-02-01 | End: 2021-03-09

## 2021-01-29 RX ORDER — CONJUGATED ESTROGENS 0.62 MG/G
CREAM VAGINAL
Qty: 30 G | Refills: 0 | Status: SHIPPED | OUTPATIENT
Start: 2021-01-29 | End: 2021-01-29

## 2021-01-29 RX ORDER — ESTRADIOL 0.1 MG/G
2 CREAM VAGINAL
Qty: 42.5 G | Refills: 1 | Status: SHIPPED | OUTPATIENT
Start: 2021-02-01 | End: 2022-09-02

## 2021-01-29 RX ORDER — CLOBETASOL PROPIONATE 0.5 MG/G
OINTMENT TOPICAL
Qty: 60 G | Refills: 1 | Status: SHIPPED | OUTPATIENT
Start: 2021-01-29 | End: 2022-09-02

## 2021-01-29 NOTE — TELEPHONE ENCOUNTER
Patients pharmacy called stating that insurance will not cover Premarin and they would like for you to prescribe Estradiol instead.   Please advise,  Rebecca Gtz CMA ([facility])

## 2021-02-02 ENCOUNTER — PREP FOR PROCEDURE (OUTPATIENT)
Dept: OPHTHALMOLOGY | Facility: CLINIC | Age: 66
End: 2021-02-02

## 2021-02-02 ENCOUNTER — OFFICE VISIT (OUTPATIENT)
Dept: OPHTHALMOLOGY | Facility: CLINIC | Age: 66
End: 2021-02-02
Payer: COMMERCIAL

## 2021-02-02 DIAGNOSIS — H25.812 COMBINED FORM OF AGE-RELATED CATARACT, LEFT EYE: ICD-10-CM

## 2021-02-02 DIAGNOSIS — H25.811 COMBINED FORMS OF AGE-RELATED CATARACT OF RIGHT EYE: Primary | ICD-10-CM

## 2021-02-02 DIAGNOSIS — H25.813 COMBINED FORMS OF AGE-RELATED CATARACT OF BOTH EYES: Primary | ICD-10-CM

## 2021-02-02 PROCEDURE — 92004 COMPRE OPH EXAM NEW PT 1/>: CPT | Performed by: STUDENT IN AN ORGANIZED HEALTH CARE EDUCATION/TRAINING PROGRAM

## 2021-02-02 ASSESSMENT — REFRACTION_MANIFEST
OS_AXIS: 002
OS_ADD: +2.50
OS_CYLINDER: +1.00
OD_SPHERE: -7.00
OD_ADD: +2.50
OS_SPHERE: -5.25
OD_CYLINDER: SPHERE

## 2021-02-02 ASSESSMENT — VISUAL ACUITY
OS_PH_CC: 20/40
METHOD: SNELLEN - LINEAR
OS_CC: 20/80
OS_CC+: -1
OS_PH_CC+: -1
CORRECTION_TYPE: GLASSES

## 2021-02-02 ASSESSMENT — TONOMETRY
IOP_METHOD: APPLANATION
OD_IOP_MMHG: 21
OS_IOP_MMHG: 20

## 2021-02-02 ASSESSMENT — SLIT LAMP EXAM - LIDS
COMMENTS: 1+ DERMATOCHALASIS
COMMENTS: 1+ DERMATOCHALASIS

## 2021-02-02 ASSESSMENT — CUP TO DISC RATIO
OD_RATIO: 0.3
OS_RATIO: 0.3

## 2021-02-02 ASSESSMENT — EXTERNAL EXAM - LEFT EYE: OS_EXAM: NORMAL

## 2021-02-02 ASSESSMENT — EXTERNAL EXAM - RIGHT EYE: OD_EXAM: NORMAL

## 2021-02-02 NOTE — PATIENT INSTRUCTIONS
Offered cataract surgery of the right eye then left eye at [facility] or the [facility]  We will call you to schedule if you wish to proceed     Foreign Snider MD  [phone]

## 2021-02-02 NOTE — LETTER
2/2/2021         RE: Jackie Siddiqi  [address]  [place] [address]        Dear Colleague,    Thank you for referring your patient, Jackie Siddiqi, to the [facility]. Please see a copy of my visit note below.     Current Eye Medications:  None.       Subjective:  Dr. Wilkinson recommended a cataract evaluation with Dr. Snider.  Patient complains of decreasing vision in each eye, especially when looking in the distance.  In the past, she wore over-the-counter readers only, until about 4 years ago.  Now she is able to read without correction.    Last eye exam:  December of 2020 with Dr. Wilkinson.  No history of eye injuries or surgery.     Objective:  See Ophthalmology Exam.      Assessment:  Jackie Siddiqi is a 65 year old female who presents with:     Combined forms of age-related cataract of both eyes Visually significant both eyes. Recommend cataract surgery for right eye then left eye 2 weeks later (due to myopia). Ok for MG or UC (patient prefers MG). Dil 7. Dense oil drop cataract both eyes. COPD/smoker, but says she is able to lay flat without issues and no cough.      Visually significant cataract that is interfering with daily activities of living. Plan for cataract extraction and intraocular lens implant right eye, then left eye.  Risks, benefits, complications, and alternatives discussed with patient including possibility of limitations from coexistent eye disease and loss of vision. Target refraction and lens options discussed.  Patient understands and wishes to proceed with surgery.     Plan:  Offered cataract surgery of the right eye then left eye at [facility] or the [facility]  We will call you to schedule if you wish to proceed     Foreign Snider MD  [phone]               Again, thank you for allowing me to participate in the care of your patient.        Sincerely,        Foreign Snider MD

## 2021-02-02 NOTE — PROGRESS NOTES
Current Eye Medications:  None.       Subjective:  Dr. Wilkinson recommended a cataract evaluation with Dr. Snider.  Patient complains of decreasing vision in each eye, especially when looking in the distance.  In the past, she wore over-the-counter readers only, until about 4 years ago.  Now she is able to read without correction.    Last eye exam:  December of 2020 with Dr. Wilkinson.  No history of eye injuries or surgery.     Objective:  See Ophthalmology Exam.      Assessment:  Jackie Siddiqi is a 65 year old female who presents with:     Combined forms of age-related cataract of both eyes Visually significant both eyes. Recommend cataract surgery for right eye then left eye 2 weeks later (due to myopia). Ok for [facility] or [facility] (patient prefers [facility]). Dil 7. Dense oil drop cataract both eyes. COPD/smoker, but says she is able to lay flat without issues and no cough.      Visually significant cataract that is interfering with daily activities of living. Plan for cataract extraction and intraocular lens implant right eye, then left eye.  Risks, benefits, complications, and alternatives discussed with patient including possibility of limitations from coexistent eye disease and loss of vision. Target refraction and lens options discussed.  Patient understands and wishes to proceed with surgery.     Plan:  Offered cataract surgery of the right eye then left eye at [facility] or the [facility]  We will call you to schedule if you wish to proceed     Foreign Snider MD  [phone]

## 2021-02-03 ENCOUNTER — TELEPHONE (OUTPATIENT)
Dept: OPHTHALMOLOGY | Facility: CLINIC | Age: 66
End: 2021-02-03

## 2021-02-09 PROBLEM — H25.811 COMBINED FORMS OF AGE-RELATED CATARACT OF RIGHT EYE: Status: ACTIVE | Noted: 2021-02-09

## 2021-02-09 PROBLEM — H25.812 COMBINED FORM OF AGE-RELATED CATARACT, LEFT EYE: Status: ACTIVE | Noted: 2021-02-09

## 2021-02-09 NOTE — TELEPHONE ENCOUNTER
Type of surgery: Right phacoemulsification cataract intraocular lens implant  CPT  10878   Combined forms of age-related cataract of both eyes H25.813    Location of surgery: [facility]  Date and time of surgery: 04/12/2021  Surgeon: Tylor  Pre-Op Appt Date: 04/07/2021  Post-Op Appt Date: 04/13/2021   Packet sent out: Yes  Pre-cert/Authorization completed:    Unable to check Availity until 45 days before surgery  Date: 02/09/2021    Thank you,   Sola Servin   Prior Authorization Department  [phone]

## 2021-02-09 NOTE — TELEPHONE ENCOUNTER
Type of surgery: left phacoemulsification cataract with intraocular lens implant  CPT 57962   Combined forms of age-related cataract of both eyes H25.813    Location of surgery: [facility]  Date and time of surgery: 04/26/2021  Surgeon: Tylor  Pre-Op Appt Date: 04/07/2021  Post-Op Appt Date: 04/27/2021   Packet sent out: Yes  Pre-cert/Authorization completed:    Unable to check Availity until 45 days before surgery date  Date: 02/09/2021    Per Availity:  Transaction ID: [id number] ID: [id number] Date: 2021-03-16  No Authorization Required  Group Number  [id number]  Line of Business  Com[employer]  Date of Service  2021-04-26  Procedure Code 1  82613    Reference Number  01  Status  NO AUTH REQUIRED  03/16/21    Thank you,   Sola Servin   Prior Authorization [facility]  [phone]

## 2021-02-25 DIAGNOSIS — E03.9 HYPOTHYROIDISM, UNSPECIFIED TYPE: ICD-10-CM

## 2021-02-25 RX ORDER — LEVOTHYROXINE SODIUM 25 UG/1
TABLET ORAL
Qty: 30 TABLET | Refills: 0 | Status: SHIPPED | OUTPATIENT
Start: 2021-02-25 | End: 2021-03-15

## 2021-02-25 NOTE — TELEPHONE ENCOUNTER
Routing refill request to provider for review/approval because:  Labs not current:  TSH    Joann Blas Rn

## 2021-02-26 NOTE — TELEPHONE ENCOUNTER
Left a detailed message on patient's voicemail informing the patient that her prescription was approved and that she is due for labs and physical before patient runs out of her medication

## 2021-03-02 NOTE — TELEPHONE ENCOUNTER
Per Availity:  Transaction ID: [id number] ID: [id number] Date: 2021-03-02  No Authorization Required  Group Number  [id number]  [employer]  Com[employer]  Date of Service  2021-04-12  Procedure Code 1  27745    Reference Number  01  Status  NO AUTH REQUIRED

## 2021-03-09 ENCOUNTER — OFFICE VISIT (OUTPATIENT)
Dept: FAMILY MEDICINE | Facility: CLINIC | Age: 66
End: 2021-03-09
Payer: COMMERCIAL

## 2021-03-09 VITALS
HEART RATE: 80 BPM | DIASTOLIC BLOOD PRESSURE: 76 MMHG | RESPIRATION RATE: 18 BRPM | WEIGHT: 133.8 LBS | OXYGEN SATURATION: 95 % | HEIGHT: 63 IN | SYSTOLIC BLOOD PRESSURE: 118 MMHG | BODY MASS INDEX: 23.71 KG/M2 | TEMPERATURE: 97.5 F

## 2021-03-09 DIAGNOSIS — M77.12 LEFT TENNIS ELBOW: ICD-10-CM

## 2021-03-09 DIAGNOSIS — R06.09 DYSPNEA ON EXERTION: ICD-10-CM

## 2021-03-09 DIAGNOSIS — Z00.00 ENCOUNTER FOR MEDICARE ANNUAL WELLNESS EXAM: Primary | ICD-10-CM

## 2021-03-09 DIAGNOSIS — H93.12 TINNITUS, LEFT: ICD-10-CM

## 2021-03-09 DIAGNOSIS — E78.5 HYPERLIPIDEMIA LDL GOAL <160: ICD-10-CM

## 2021-03-09 DIAGNOSIS — Z90.13 HX OF BILATERAL MASTECTOMY: ICD-10-CM

## 2021-03-09 DIAGNOSIS — R91.8 ABNORMAL CT LUNG SCREENING: ICD-10-CM

## 2021-03-09 DIAGNOSIS — K21.9 GASTROESOPHAGEAL REFLUX DISEASE WITHOUT ESOPHAGITIS: ICD-10-CM

## 2021-03-09 DIAGNOSIS — E03.9 HYPOTHYROIDISM, UNSPECIFIED TYPE: ICD-10-CM

## 2021-03-09 DIAGNOSIS — N90.4 LICHEN SCLEROSUS ET ATROPHICUS OF THE VULVA: ICD-10-CM

## 2021-03-09 DIAGNOSIS — R10.32 LLQ ABDOMINAL PAIN: ICD-10-CM

## 2021-03-09 LAB
ALBUMIN SERPL-MCNC: 4 G/DL (ref 3.4–5)
ALP SERPL-CCNC: 63 U/L (ref 40–150)
ALT SERPL W P-5'-P-CCNC: 25 U/L (ref 0–50)
ANION GAP SERPL CALCULATED.3IONS-SCNC: 3 MMOL/L (ref 3–14)
AST SERPL W P-5'-P-CCNC: 15 U/L (ref 0–45)
BILIRUB SERPL-MCNC: 1.3 MG/DL (ref 0.2–1.3)
BUN SERPL-MCNC: 14 MG/DL (ref 7–30)
CALCIUM SERPL-MCNC: 8.8 MG/DL (ref 8.5–10.1)
CHLORIDE SERPL-SCNC: 109 MMOL/L (ref 94–109)
CHOLEST SERPL-MCNC: 237 MG/DL
CO2 SERPL-SCNC: 29 MMOL/L (ref 20–32)
CREAT SERPL-MCNC: 0.74 MG/DL (ref 0.52–1.04)
GFR SERPL CREATININE-BSD FRML MDRD: 83 ML/MIN/{1.73_M2}
GLUCOSE SERPL-MCNC: 94 MG/DL (ref 70–99)
HDLC SERPL-MCNC: 73 MG/DL
LDLC SERPL CALC-MCNC: 137 MG/DL
NONHDLC SERPL-MCNC: 164 MG/DL
POTASSIUM SERPL-SCNC: 3.9 MMOL/L (ref 3.4–5.3)
PROT SERPL-MCNC: 7.6 G/DL (ref 6.8–8.8)
SODIUM SERPL-SCNC: 141 MMOL/L (ref 133–144)
TRIGL SERPL-MCNC: 137 MG/DL
TSH SERPL DL<=0.005 MIU/L-ACNC: 3.84 MU/L (ref 0.4–4)

## 2021-03-09 PROCEDURE — 80061 LIPID PANEL: CPT | Performed by: FAMILY MEDICINE

## 2021-03-09 PROCEDURE — 20551 NJX 1 TENDON ORIGIN/INSJ: CPT | Performed by: FAMILY MEDICINE

## 2021-03-09 PROCEDURE — 80053 COMPREHEN METABOLIC PANEL: CPT | Performed by: FAMILY MEDICINE

## 2021-03-09 PROCEDURE — 84443 ASSAY THYROID STIM HORMONE: CPT | Performed by: FAMILY MEDICINE

## 2021-03-09 PROCEDURE — 99397 PER PM REEVAL EST PAT 65+ YR: CPT | Mod: 25 | Performed by: FAMILY MEDICINE

## 2021-03-09 PROCEDURE — 99213 OFFICE O/P EST LOW 20 MIN: CPT | Mod: 25 | Performed by: FAMILY MEDICINE

## 2021-03-09 PROCEDURE — 36415 COLL VENOUS BLD VENIPUNCTURE: CPT | Performed by: FAMILY MEDICINE

## 2021-03-09 ASSESSMENT — ENCOUNTER SYMPTOMS
DIZZINESS: 1
SORE THROAT: 0
PALPITATIONS: 0
HEADACHES: 0
PARESTHESIAS: 0
MYALGIAS: 0
ARTHRALGIAS: 0
SHORTNESS OF BREATH: 1
DYSURIA: 0
HEMATURIA: 0
WEAKNESS: 0
HEMATOCHEZIA: 0
COUGH: 1
CHILLS: 1
FEVER: 0
EYE PAIN: 0
FREQUENCY: 1
HEARTBURN: 1
NERVOUS/ANXIOUS: 0
CONSTIPATION: 1
JOINT SWELLING: 0
NAUSEA: 0
DIARRHEA: 1
ABDOMINAL PAIN: 1

## 2021-03-09 ASSESSMENT — MIFFLIN-ST. JEOR: SCORE: 1103.1

## 2021-03-09 ASSESSMENT — PAIN SCALES - GENERAL: PAINLEVEL: EXTREME PAIN (8)

## 2021-03-09 ASSESSMENT — ACTIVITIES OF DAILY LIVING (ADL): CURRENT_FUNCTION: NO ASSISTANCE NEEDED

## 2021-03-09 NOTE — PROGRESS NOTES
"SUBJECTIVE:   Jackie Siddiqi is a 65 year old female who presents for Preventive Visit.      Patient has been advised of split billing requirements and indicates understanding: Yes   Are you in the first 12 months of your Medicare coverage?  No    Healthy Habits:     In general, how would you rate your overall health?  Fair    Frequency of exercise:  2-3 days/week    Duration of exercise:  45-60 minutes    Do you usually eat at least 4 servings of fruit and vegetables a day, include whole grains    & fiber and avoid regularly eating high fat or \"junk\" foods?  No    Taking medications regularly:  Yes    Medication side effects:  None, No muscle aches and No significant flushing    Ability to successfully perform activities of daily living:  No assistance needed    Home Safety:  No safety concerns identified    Hearing Impairment:  No hearing concerns    In the past 6 months, have you been bothered by leaking of urine?  No    In general, how would you rate your overall mental or emotional health?  Excellent      PHQ-2 Total Score: 0    Additional concerns today:  No    Do you feel safe in your environment? Yes    Have you ever done Advance Care Planning? (For example, a Health Directive, POLST, or a discussion with a medical provider or your loved ones about your wishes): No, advance care planning information given to patient to review.  Advanced care planning was discussed at today's visit.       Fall risk  Fallen 2 or more times in the past year?: No  Any fall with injury in the past year?: No    Cognitive Screening   1) Repeat 3 items (Leader, Season, Table)    2) Clock draw: NORMAL  3) 3 item recall: Recalls 3 objects  Results: 3 items recalled: COGNITIVE IMPAIRMENT LESS LIKELY    Mini-CogTM Copyright LUIS ALBERTO Irwin. Licensed by the author for use in [facility]; reprinted with permission (lexy@.[facility]). All rights reserved.      Do you have sleep apnea, excessive snoring or daytime drowsiness?: " no    Reviewed and updated as needed this visit by clinical staff  Tobacco  Allergies  Meds   Med Hx  Surg Hx  Fam Hx  Soc Hx        Reviewed and updated as needed this visit by Provider                Social History     Tobacco Use     Smoking status: Former Smoker     Packs/day: 0.50     Years: 50.00     Pack years: 25.00     Types: Cigarettes     Quit date: 2019     Years since quittin.5     Smokeless tobacco: Never Used     Tobacco comment: 1 pack per week, started age 11   Substance Use Topics     Alcohol use: No     Alcohol/week: 0.0 standard drinks     Comment: holidays only     If you drink alcohol do you typically have >3 drinks per day or >7 drinks per week? No    Alcohol Use 2016   Prescreen: >3 drinks/day or >7 drinks/week? The patient does not drink >3 drinks per day nor >7 drinks per week.   No flowsheet data found.      Jackie is here today for her general physical with several concerns.    1.  Follow-up on the chronic lower left quadrant abdominal pain.  Has had it for years.  Multiple colonoscopies and CT scan and they were all normal.  Overall it has gotten worse.  It comes and goes, but happens daily.  Unchanged in character.  Also been having irregular bowel form diarrhea and constipation.  Recently, been having episodes where she had no urgent for feeling of having a bowel movement and then it came; happened rarely.  No back pain.  No nausea or vomiting.  Been fairly gassy and at times, not always the pain would feel better with flatulence.  No mucus or blood in stool.  No no melena.  Very bothersome to her.  Never seen a GI specialist for this before.  She was told once that she has IBS.  Not interested in medication for it.  She been taking probiotic however.    2.  Chest congestion, she is concerned that she may have COPD.  Was a smoker for many years.  Never been evaluated for COPD.  Stopped smoking for years.  Stated she was told that she had COPD at one point.  Been  coughing on and off.  Also bee feeling bloated.  No wheezing but feel SOB easier, but she thinks it is due to the weight gain.  Has not been exercising much because of the leg pain the painful from the varicose.  She is seeing the specialist for it.  Also been having more acid reflux symptoms with acid taste in her throat.  Also has a globus sensation with something stuck on her back of her throat and been trying to clear her throat frequently.  Denied of unusual food.  No melena or coffee-ground emesis.    3.  Been having the left ear ringing in the last couple weeks.  Had it in the past and saw ENT for it.  It then went away.  About 3 weeks ago when she had a very bad cold for 5 days with a lot of sinus pressure and body ache.  The sinus pressure and body are gone, but the ringing persists, only on the left side.  No hearing loss.  No ear pain or drainage.  She think it may get a bit better slowly.    4.  Been having the pain on his left forearm that comes and goes.  Has had it for years.  Wrist brace has helped.  Been having it for couple weeks and is getting worse despite of using the brace.  Been carrying her grandson.  Her job involves with using her forearm constantly.  No swelling or redness.  No fever or chills.  When the pain is bad, it radiates to the shoulder and down to the wrist.  No neck pain or weakness.  No unusual activities or trauma.    5.  She also requested to be referred to plastic surgery for breast reconstruction.  She had a mastectomy many years ago with breast implant.  Stated that it was not done correctly; one breast is bigger than the other bigger, causing significant discomfort; bras do not fit well.  It is really bothersome to her.    6.  Otherwise, she is doing well. No major medical care or procedure done since the last physical couple years ago. No headache or dizziness.  No acute visual change. No running nose, nasal congestion, coughing, fever or chill. No CP/SOB. No N/V/D/C. No  problem with urination.  She is post-menopausal - no history of abnormal pap or STD.  Last pap was 4 years ago and it was normal with negative HR HPV.  No leg swelling or pain.  Social drinker but no drug use.  Quit smoking since 2019. No problem with sleeping.  Feels safe.  No depression. No other concern today      Current providers sharing in care for this patient include:   Patient Care Team:  Jovana Quinonez MD as PCP - General (Family Practice)  Merrick Reardon MD as Assigned PCP  Foreign Snider MD as Assigned Surgical Provider    The following health maintenance items are reviewed in Epic and correct as of today:  Health Maintenance   Topic Date Due     SPIROMETRY  Never done     COPD ACTION PLAN  Never done     COVID-19 Vaccine (1 of 2) Never done     ZOSTER IMMUNIZATION (1 of 2) Never done     ADVANCE CARE PLANNING  01/11/2018     INFLUENZA VACCINE (1) Never done     MEDICARE ANNUAL WELLNESS VISIT  09/16/2020     LIPID  09/16/2020     LUNG CANCER SCREENING ANNUAL  03/16/2021     FALL RISK ASSESSMENT  01/25/2022     COLORECTAL CANCER SCREENING  06/24/2023     DTAP/TDAP/TD IMMUNIZATION (4 - Td) 03/13/2026     DEXA  12/08/2031     HEPATITIS C SCREENING  Completed     PHQ-2  Completed     Pneumococcal Vaccine: 65+ Years  Completed     Pneumococcal Vaccine: Pediatrics (0 to 5 Years) and At-Risk Patients (6 to 64 Years)  Aged Out     IPV IMMUNIZATION  Aged Out     MENINGITIS IMMUNIZATION  Aged Out     HEPATITIS B IMMUNIZATION  Aged Out     Patient Active Problem List   Diagnosis     Esophageal reflux     Lichen sclerosus et atrophicus of the vulva     Advanced directives, counseling/discussion     Hypothyroidism, unspecified type     Hyperlipidemia LDL goal <160     Osteoarthritis of carpometacarpal joint of right thumb, unspecified osteoarthritis type     Abnormal CT lung screening     Diverticulosis of colon     Combined forms of age-related cataract of right eye     Combined form of age-related cataract,  left eye     Hx of bilateral mastectomy     LLQ abdominal pain     Past Surgical History:   Procedure Laterality Date     C  DELIVERY ONLY       x2     COLONOSCOPY  2007     COLONOSCOPY  2013    Procedure: COLONOSCOPY;  colonoscopy, Esophagoscopy, Gastroscopy, Duodenoscopy EGD, multiple biopsies;  Surgeon: Joe Benson MD;  Location: [facility]     COLONOSCOPY N/A 2017    Procedure: COMBINED COLONOSCOPY, SINGLE OR MULTIPLE BIOPSY/POLYPECTOMY BY BIOPSY;  colonoscopy with polypectomy by biopsy;  Surgeon: Tolu No MD;  Location: [facility]     COLONOSCOPY N/A 2020    Procedure: Colonoscopy with possible biopsy and/or polypectomy;  Surgeon: Obed Quick MD;  Location: [facility]     ENDOSCOPY  2007    Sm hiatus hernia     ESOPHAGOSCOPY, GASTROSCOPY, DUODENOSCOPY (EGD), COMBINED  2013    Procedure: COMBINED ESOPHAGOSCOPY, GASTROSCOPY, DUODENOSCOPY (EGD), BIOPSY SINGLE OR MULTIPLE;  ESOPHAGOGASTRODUODENOSCOPY WITH MULTIPLE BIOPSIES;  Surgeon: Joe Benson MD;  Location: [facility]     MASTECTOMY, BILATERAL       ZZC NONSPECIFIC PROCEDURE      Laparoscopic exam with adhesions     ZZC NONSPECIFIC PROCEDURE      Mastectomy for cystic breast disease, breast implants       Social History     Tobacco Use     Smoking status: Former Smoker     Packs/day: 0.50     Years: 50.00     Pack years: 25.00     Types: Cigarettes     Quit date: 2019     Years since quittin.5     Smokeless tobacco: Never Used     Tobacco comment: 1 pack per week, started age 11   Substance Use Topics     Alcohol use: No     Alcohol/week: 0.0 standard drinks     Comment: holidays only     Family History   Problem Relation Age of Onset     Diabetes Father      Hypertension Father      Alcohol/Drug Father      Eye Disorder Father      Obesity Father      Breast Cancer Mother      Osteoporosis Mother      Thyroid Disease Mother      Cancer Mother         Bladder     Cancer Sister         fallopian tube  cancer     Obesity Sister      Alzheimer Disease Sister      Cancer Sister         Skin Cancer     Allergies Daughter      Cancer Maternal Grandmother         uterine cancer     Liver Disease Daughter         Biliary Atresia     Genitourinary Problems Brother      No Known Problems Daughter      Heart Disease Brother         Heart Murmur     Glaucoma Maternal Grandfather      Genitourinary Problems Brother         kidney disease (two brothers)     Macular Degeneration No family hx of          Current Outpatient Medications   Medication Sig Dispense Refill     albuterol (PROAIR HFA/PROVENTIL HFA/VENTOLIN HFA) 108 (90 Base) MCG/ACT inhaler Inhale 2 puffs into the lungs every 4 hours as needed for shortness of breath / dyspnea or wheezing 1 Inhaler 11     Ascorbic Acid (VITAMIN C) 500 MG CAPS        Cholecalciferol (VITAMIN D3) 25 MCG (1000 UT) CAPS        clobetasol (TEMOVATE) 0.05 % external ointment Apply a small pea size amount at bedtime couple times a week as needed 60 g 1     estradiol (ESTRACE) 0.1 MG/GM vaginal cream Place 2 g vaginally twice a week Please stop the premarin cream 42.5 g 1     fluticasone (FLONASE) 50 MCG/ACT nasal spray Spray 2 sprays into both nostrils daily 15.8 mL 1     Lactobacillus (PROBIOTIC ACIDOPHILUS PO)        levothyroxine (SYNTHROID/LEVOTHROID) 25 MCG tablet TAKE 1 TABLET BY MOUTH EVERY DAY 30 tablet 0     omeprazole (PRILOSEC) 20 MG DR capsule Take 1 capsule (20 mg) by mouth daily 30 capsule 1     Allergies   Allergen Reactions     No Known Drug Allergies      Mammogram Screening - Patient over age 75, has elected to continue with screening.    Review of Systems   Constitutional: Positive for chills. Negative for fever.   HENT: Positive for congestion. Negative for ear pain, hearing loss and sore throat.    Eyes: Positive for visual disturbance. Negative for pain.   Respiratory: Positive for cough and shortness of breath.    Cardiovascular: Negative for chest pain, palpitations  "and peripheral edema.   Gastrointestinal: Positive for abdominal pain, constipation, diarrhea and heartburn. Negative for hematochezia and nausea.   Genitourinary: Positive for frequency. Negative for dysuria, genital sores, hematuria and urgency.   Musculoskeletal: Negative for arthralgias, joint swelling and myalgias.   Skin: Negative for rash.   Neurological: Positive for dizziness. Negative for weakness, headaches and paresthesias.   Psychiatric/Behavioral: Negative for mood changes. The patient is not nervous/anxious.          OBJECTIVE:   /76   Pulse 80   Temp 97.5  F (36.4  C) (Temporal)   Resp 18   Ht 1.588 m (5' 2.5\")   Wt 60.7 kg (133 lb 12.8 oz)   LMP 03/26/2003   SpO2 95%   BMI 24.08 kg/m   Estimated body mass index is 24.08 kg/m  as calculated from the following:    Height as of this encounter: 1.588 m (5' 2.5\").    Weight as of this encounter: 60.7 kg (133 lb 12.8 oz).  Physical Exam   GENERAL: healthy, alert and no distress.  Speaking in full sentences.  EYES: Eyes grossly normal to inspection, PERRL and conjunctivae and sclerae normal.  No nystagmus.  All 4 visual fields intact.  HENT: ear canals and TM's normal.  Nares are non-congested. Oropharynx is pink and moist. No tender with palpation to the sinuses.   NECK: no adenopathy, supple, no lymphadenopathy or thyromegaly.  No tender with palpation to the cervical spine or its paraspinous muscle.  RESP: lungs clear to auscultation - no rales, rhonchi or wheezes.  Good respiratory effort throughout.  BREAST: Offered but declined.  CV: regular rate and rhythm, no murmur  ABDOMEN: soft, nontender, nondistended, no palpable masses organomegaly with normal culture.  No CVA tenderness.   (female): Offered but declined.  MS: no gross musculoskeletal defects noted, no edema. All joints are in the normal range of motion and 4 extremities were equally in strength. Hips, knees, ankles, shoulders, elbows, wrists exams were normal. Fine motor " skills of the fingers are intact. Back is straight, no tender with palpation to the spine.  Left elbow with tender will present to the lateral epicondyle.  SKIN: no suspicious lesions or rashes  NEURO: Normal strength and tone, mentation intact and speech normal.  Cranial nerve II through XII intact.  DTRs +2 throughout.  No focal neurological deficit.  PSYCH: mentation appears normal, affect normal/bright.  Thoughts intact, suicidal/homicidal ideation.  No hallucination.      Diagnostic Test Results:  Labs reviewed in Epic  Results for orders placed or performed in visit on 03/09/21   Lipid panel reflex to direct LDL Fasting     Status: Abnormal   Result Value Ref Range    Cholesterol 237 (H) <200 mg/dL    Triglycerides 137 <150 mg/dL    HDL Cholesterol 73 >49 mg/dL    LDL Cholesterol Calculated 137 (H) <100 mg/dL    Non HDL Cholesterol 164 (H) <130 mg/dL   Comprehensive metabolic panel     Status: None   Result Value Ref Range    Sodium 141 133 - 144 mmol/L    Potassium 3.9 3.4 - 5.3 mmol/L    Chloride 109 94 - 109 mmol/L    Carbon Dioxide 29 20 - 32 mmol/L    Anion Gap 3 3 - 14 mmol/L    Glucose 94 70 - 99 mg/dL    Urea Nitrogen 14 7 - 30 mg/dL    Creatinine 0.74 0.52 - 1.04 mg/dL    GFR Estimate 83 >60 mL/min/[1.73_m2]    GFR Estimate If Black >90 >60 mL/min/[1.73_m2]    Calcium 8.8 8.5 - 10.1 mg/dL    Bilirubin Total 1.3 0.2 - 1.3 mg/dL    Albumin 4.0 3.4 - 5.0 g/dL    Protein Total 7.6 6.8 - 8.8 g/dL    Alkaline Phosphatase 63 40 - 150 U/L    ALT 25 0 - 50 U/L    AST 15 0 - 45 U/L   TSH with free T4 reflex     Status: None   Result Value Ref Range    TSH 3.84 0.40 - 4.00 mU/L       ASSESSMENT / PLAN:   1. Encounter for Medicare annual wellness exam  Overall Jackie is doing well.  UTD for immunization, declined Shingrix and influenza vaccination.  Discussed about safety issue, healthy diet, exercising, good sleeping hygiene, advanced directive care, falling prevention, and depression prevention. Recommended  daily exercise for at least 30 minutes.  Emphasized on healthy diet with adequate fluid intake and resting. Feels safe.  Follow in 1 year, earlier as needed.  Labs as ordered below.  Up-to-date for colon cancer screening with colonoscopy.  Mammogram and cervical cancer screening are no longer indicated due to her age.  All of her questions were answered.    - Lipid panel reflex to direct LDL Fasting  - Comprehensive metabolic panel  - TSH with free T4 reflex    2. LLQ abdominal pain  Chronic condition that he has had for years.  Extensive work-up including multiple and colonoscopy and CT scan were normal.  Also had abdominal exploratory endoscopic procedure with no help.  Been having problem with bowel movement with both diarrhea and constipation and flatulence.  Been having episodes of bowel incontinence with no feeling or having the urge.  Refer to GI specialist for further evaluation.  Informed her that she more likely has IBS.  Treatment options also discussed but she declined.  She is prefer to see the specialist first.    - GASTROENTEROLOGY ADULT REF CONSULT ONLY; Future    3. Gastroesophageal reflux disease without esophagitis  Her symptoms are suggestive of esophagitis.  Discussed with her about the nature of the condition.  Encouraged to avoid greasy/spicy food.  Also encouraged to avoid late meals.  Denied of excessive NSAID/alcohol intake.  Started omeprazole.  Consider upper endoscopy if not respond to the omeprazole in the next 3-4 weeks.  Follow-up as needed.    - omeprazole (PRILOSEC) 20 MG DR capsule; Take 1 capsule (20 mg) by mouth daily  Dispense: 30 capsule; Refill: 1    4. Hypothyroidism, unspecified type  Stable, controlled with therapeutic TSH level.  Will continue with the current dose of levothyroxine.    5. Hyperlipidemia LDL goal <160  Not on medication.  Cholesterol level today was mildly elevated.  Emphasized on healthy lifestyle modification as discussed above.  Will continue to hold off  on medication for now.  Recheck a level in a year.    6. Lichen sclerosus et atrophicus of the vulva  Stable and controlled.  Continue with the clobetasol cream as needed.    7. Abnormal CT lung screening  Has a history of tobacco smoking for years.  Sent for CT scan today.  She is no longer smoking and encouraged her to keep up the good work.    - CT Chest Low Dose Non Contrast; Future    8. Hx of bilateral mastectomy  Had breast reconstruction years ago after double mastectomy. Breast are asymmetrical and is bothersome to her.  Referred to plastic surgery for reevaluation and management.    - PLASTIC SURGERY REFERRAL    9. Left tennis elbow  Discussed with her about the nature of the condition.  Recommended steroid injection and she agreed.  Please see the procedure note for further details.    - triamcinolone (KENALOG-40) injection 40 mg  - lidocaine 1% with EPINEPHrine 1:100,000 injection 1 mL  - bupivacaine (MARCAINE) preservative free injection 0.5%  - Medium Joint/Bursa injection and/or drainage (Elbow, TM, Wrist, Ankle)    10. Tinnitus, left  Has had in the past - went away.  Been having it again a couple weeks after having a cold.  Will continue monitoring.  If not resolved the next several weeks, recommend ENT referral since it is unilateral tinnitus.    11. Dyspnea on exertion  Was a smoker for many years.  Was told that she has COPD in the past.  Never been fully evaluated.  Refer her for pulmonary function test.  Continue with albuterol inhaler as needed.  Symptoms that need to be seen or call in discussed.    - General PFT Lab (Please always keep checked); Future    Patient has been advised of split billing requirements and indicates understanding: No  COUNSELING:  Reviewed preventive health counseling, as reflected in patient instructions       Regular exercise       Healthy diet/nutrition       Vision screening       Hearing screening       Dental care       Bladder control       Fall risk  "prevention       Immunizations    Declined: Influenza and Zoster due to Conscientious objector               Aspirin prophylaxis        Osteoporosis prevention/bone health       Colon cancer screening       Hepatitis C screening       Advanced Planning     Estimated body mass index is 24.08 kg/m  as calculated from the following:    Height as of this encounter: 1.588 m (5' 2.5\").    Weight as of this encounter: 60.7 kg (133 lb 12.8 oz).        She reports that she quit smoking about 18 months ago. Her smoking use included cigarettes. She has a 25.00 pack-year smoking history. She has never used smokeless tobacco.      Appropriate preventive services were discussed with this patient, including applicable screening as appropriate for cardiovascular disease, diabetes, osteopenia/osteoporosis, and glaucoma.  As appropriate for age/gender, discussed screening for colorectal cancer, prostate cancer, breast cancer, and cervical cancer. Checklist reviewing preventive services available has been given to the patient.    Reviewed patients plan of care and provided an AVS. The Basic Care Plan (routine screening as documented in Health Maintenance) for Jackie meets the Care Plan requirement. This Care Plan has been established and reviewed with the Patient.    Counseling Resources:  ATP IV Guidelines  Pooled Cohorts Equation Calculator  Breast Cancer Risk Calculator  Breast Cancer: Medication to Reduce Risk  FRAX Risk Assessment  ICSI Preventive Guidelines  Dietary Guidelines for Americans, 2010  [employer]'s MyPlate  ASA Prophylaxis  Lung CA Screening    Jovana Wooten Mai, MD  [facility]    Identified Health Risks:  "

## 2021-03-09 NOTE — PROGRESS NOTES
"    The patient was provided with suggestions to help her develop a healthy physical lifestyle.  The patient was counseled and encouraged to consider modifying their diet and eating habits. She was provided with information on recommended healthy diet options.  Answers for HPI/ROS submitted by the patient on 3/9/2021   Annual Exam:  In general, how would you rate your overall physical health?: fair  Frequency of exercise:: 2-3 days/week  Do you usually eat at least 4 servings of fruit and vegetables a day, include whole grains & fiber, and avoid regularly eating high fat or \"junk\" foods? : No  Taking medications regularly:: Yes  Medication side effects:: None, No muscle aches, No significant flushing  Activities of Daily Living: no assistance needed  Home safety: no safety concerns identified  Hearing Impairment:: no hearing concerns  In the past 6 months, have you been bothered by leaking of urine?: No  abdominal pain: Yes  Blood in stool: No  Blood in urine: No  chest pain: No  chills: Yes  congestion: Yes  constipation: Yes  cough: Yes  diarrhea: Yes  dizziness: Yes  ear pain: No  eye pain: No  nervous/anxious: No  fever: No  frequency: Yes  genital sores: No  headaches: No  hearing loss: No  heartburn: Yes  arthralgias: No  joint swelling: No  peripheral edema: No  mood changes: No  myalgias: No  nausea: No  dysuria: No  palpitations: No  Skin sensation changes: No  sore throat: No  urgency: No  rash: No  shortness of breath: Yes  visual disturbance: Yes  weakness: No  In general, how would you rate your overall mental or emotional health?: excellent  Additional concerns today:: No  Duration of exercise:: 45-60 minutes    "

## 2021-03-09 NOTE — PATIENT INSTRUCTIONS
Patient Education   Personalized Prevention Plan  You are due for the preventive services outlined below.  Your care team is available to assist you in scheduling these services.  If you have already completed any of these items, please share that information with your care team to update in your medical record.  Health Maintenance Due   Topic Date Due     Breathing Capacity Test  Never done     COPD Action Plan  Never done     COVID-19 Vaccine (1 of 2) Never done     Zoster (Shingles) Vaccine (1 of 2) Never done     Flu Vaccine (1) Never done     Cholesterol Lab  09/16/2020     Lung Cancer Screening (CT Scan)  03/16/2021     Your Health Risk Assessment indicates you feel you are not in good health    A healthy lifestyle helps keep the body fit and the mind alert. It helps protect you from disease, helps you fight disease, and helps prevent chronic disease (disease that doesn't go away) from getting worse. This is important as you get older and begin to notice twinges in muscles and joints and a decline in the strength and stamina you once took for granted. A healthy lifestyle includes good healthcare, good nutrition, weight control, recreation, and regular exercise. Avoid harmful substances and do what you can to keep safe. Another part of a healthy lifestyle is stay mentally active and socially involved.    Good healthcare     Have a wellness visit every year.     If you have new symptoms, let us know right away. Don't wait until the next checkup.     Take medicines exactly as prescribed and keep your medicines in a safe place. Tell us if your medicine causes problems.   Healthy diet and weight control     Eat 3 or 4 small, nutritious, low-fat, high-fiber meals a day. Include a variety of fruits, vegetables, and whole-grain foods.     Make sure you get enough calcium in your diet. Calcium, vitamin D, and exercise help prevent osteoporosis (bone thinning).     If you live alone, try eating with others when you  can. That way you get a good meal and have company while you eat it.     Try to keep a healthy weight. If you eat more calories than your body uses for energy, it will be stored as fat and you will gain weight.     Recreation   Recreation is not limited to sports and team events. It includes any activity that provides relaxation, interest, enjoyment, and exercise. Recreation provides an outlet for physical, mental, and social energy. It can give a sense of worth and achievement. It can help you stay healthy.    Mental Exercise and Social Involvement  Mental and emotional health is as important as physical health. Keep in touch with friends and family. Stay as active as possible. Continue to learn and challenge yourself.   Things you can do to stay mentally active are:    Learn something new, like a foreign language or musical instrument.     Play SCRABBLE or do crossword puzzles. If you cannot find people to play these games with you at home, you can play them with others on your computer through the Internet.     Join a games club--anything from card games to chess or checkers or lawn bowling.     Start a new hobby.     Go back to school.     Volunteer.     Read.   Keep up with world events.    Understanding USDA MyPlate  The USDA has guidelines to help you make healthy food choices. These are called MyPlate. MyPlate shows the food groups that make up healthy meals using the image of a place setting. Before you eat, think about the healthiest choices for what to put on your plate or in your cup or bowl. To learn more about building a healthy plate, visit www.choosemyplate.gov.     The food groups    Fruits. Any fruit or 100% fruit juice counts as part of the Fruit Group. Fruits may be fresh, canned, frozen, or dried, and may be whole, cut-up, or pureed. Make 1/2 of your plate fruits and vegetables.    Vegetables. Any vegetable or 100% vegetable juice counts as a member of the Vegetable Group. Vegetables may be  fresh, frozen, canned, or dried. They can be served raw or cooked and may be whole, cut-up, or mashed. Make 1/2 of your plate fruits and vegetables.    Grains. All foods made from grains are part of the Grains Group. These include wheat, rice, oats, cornmeal, and barley. Grains are often used to make foods such as bread, pasta, oatmeal, cereal, tortillas, and grits. Grains should be no more than 1/4 of your plate. At least half of your grains should be whole grains.    Protein. This group includes meat, poultry, seafood, beans and peas, eggs, processed soy products (such as tofu), nuts (including nut butters), and seeds. Make protein choices no more than 1/4 of your plate. Meat and poultry choices should be lean or low fat.    Dairy. The Dairy Group includes all fluid milk products and foods made from milk that contain calcium, such as yogurt and cheese. (Foods that have little calcium, such as cream, butter, and cream cheese, are not part of this group.) Most dairy choices should be low-fat or fat-free.    Oils. Oils aren't a food group, but they do contain essential nutrients. However it's important to watch your intake of oils. These are fats that are liquid at room temperature. They include canola, corn, olive, soybean, vegetable, and sunflower oil. Foods that are mainly oil include mayonnaise, certain salad dressings, and soft margarines. You likely already get your daily oil allowance from the foods you eat.  Things to limit  Eating healthy also means limiting these things in your diet:    Salt (sodium). Many processed foods have a lot of sodium. To keep sodium intake down, eat fresh vegetables, meats, poultry, and seafood when possible. Purchase low-sodium, reduced-sodium, or no-salt-added food products at the store. And don't add salt to your meals at home. Instead, season them with herbs and spices such as dill, oregano, cumin, and paprika. Or try adding flavor with lemon or lime zest and  juice.    Saturated fat. Saturated fats are most often found in animal products such as beef, pork, and chicken. They are often solid at room temperature, such as butter. To reduce your saturated fat intake, choose leaner cuts of meat and poultry. And try healthier cooking methods such as grilling, broiling, roasting, or baking. For a simple lower-fat swap, use plain nonfat yogurt instead of mayonnaise when making potato salad or macaroni salad.    Added sugars. These are sugars added to foods. They are in foods such as ice cream, candy, soda, fruit drinks, sports drinks, energy drinks, cookies, pastries, jams, and syrups. Cut down on added sugars by sharing sweet treats with a family member or friend. You can also choose fruit for dessert, and drink water or other unsweetened beverages.  [employer] last reviewed this educational content on 6/1/2020 2000-2020 [employer]. All rights reserved. This information is not intended as a substitute for professional medical care. Always follow your healthcare professional's instructions.

## 2021-03-11 RX ORDER — BUPIVACAINE HYDROCHLORIDE 5 MG/ML
1 INJECTION, SOLUTION EPIDURAL; INTRACAUDAL ONCE
Status: DISCONTINUED | OUTPATIENT
Start: 2021-03-11 | End: 2021-04-12

## 2021-03-11 RX ORDER — LIDOCAINE HYDROCHLORIDE AND EPINEPHRINE 10; 10 MG/ML; UG/ML
1 INJECTION, SOLUTION INFILTRATION; PERINEURAL ONCE
Status: DISCONTINUED | OUTPATIENT
Start: 2021-03-11 | End: 2021-04-12

## 2021-03-11 RX ORDER — TRIAMCINOLONE ACETONIDE 40 MG/ML
40 INJECTION, SUSPENSION INTRA-ARTICULAR; INTRAMUSCULAR ONCE
Status: DISCONTINUED | OUTPATIENT
Start: 2021-03-11 | End: 2021-04-12

## 2021-03-12 ENCOUNTER — TELEPHONE (OUTPATIENT)
Dept: FAMILY MEDICINE | Facility: CLINIC | Age: 66
End: 2021-03-12

## 2021-03-12 NOTE — TELEPHONE ENCOUNTER
Patient calling to follow up on referrals that provider is recommending and imaging, CT scan. Patient stating she tried to get on her Mychart to access the information. Informed that Sena CAMPBELL will call her, number was given that she can call also to schedule. Informed that TC will be contacting next week with other scheduling of her plastic surgery referral. Informed that radiology will contact her with scheduling of her Chest CT next week.Adina Gibson LPN

## 2021-03-15 DIAGNOSIS — E03.9 HYPOTHYROIDISM, UNSPECIFIED TYPE: ICD-10-CM

## 2021-03-15 RX ORDER — LEVOTHYROXINE SODIUM 25 UG/1
25 TABLET ORAL DAILY
Qty: 90 TABLET | Refills: 3 | Status: SHIPPED | OUTPATIENT
Start: 2021-03-15 | End: 2021-11-10

## 2021-03-16 ENCOUNTER — TELEPHONE (OUTPATIENT)
Dept: FAMILY MEDICINE | Facility: CLINIC | Age: 66
End: 2021-03-16

## 2021-03-16 NOTE — TELEPHONE ENCOUNTER
Jovana Quinonez MD  [facility]             Please help with: GI consult, PFT, plastic surgery, chest CT - all ordered.

## 2021-03-16 NOTE — TELEPHONE ENCOUNTER
Medication filled in Dr. Quinonez's absence.    Electronically signed by:  Parth Levine M.D.  3/15/2021

## 2021-03-22 ENCOUNTER — HOSPITAL ENCOUNTER (OUTPATIENT)
Dept: CT IMAGING | Facility: CLINIC | Age: 66
Discharge: HOME | End: 2021-03-22
Attending: FAMILY MEDICINE | Admitting: FAMILY MEDICINE
Payer: COMMERCIAL

## 2021-03-22 DIAGNOSIS — Z11.59 ENCOUNTER FOR SCREENING FOR OTHER VIRAL DISEASES: ICD-10-CM

## 2021-03-22 DIAGNOSIS — R91.8 ABNORMAL CT LUNG SCREENING: ICD-10-CM

## 2021-03-22 PROCEDURE — 71250 CT THORAX DX C-: CPT

## 2021-03-23 ENCOUNTER — PATIENT OUTREACH (OUTPATIENT)
Dept: PLASTIC SURGERY | Facility: CLINIC | Age: 66
End: 2021-03-23

## 2021-03-30 ENCOUNTER — OFFICE VISIT (OUTPATIENT)
Dept: PLASTIC SURGERY | Facility: CLINIC | Age: 66
End: 2021-03-30
Attending: FAMILY MEDICINE
Payer: COMMERCIAL

## 2021-03-30 VITALS
WEIGHT: 128.4 LBS | HEART RATE: 80 BPM | BODY MASS INDEX: 23.11 KG/M2 | OXYGEN SATURATION: 97 % | DIASTOLIC BLOOD PRESSURE: 77 MMHG | TEMPERATURE: 98 F | RESPIRATION RATE: 18 BRPM | SYSTOLIC BLOOD PRESSURE: 161 MMHG

## 2021-03-30 DIAGNOSIS — T85.44XS CAPSULAR CONTRACTURE OF BREAST IMPLANT, SEQUELA: Primary | ICD-10-CM

## 2021-03-30 PROCEDURE — 99203 OFFICE O/P NEW LOW 30 MIN: CPT | Performed by: PLASTIC SURGERY

## 2021-03-30 RX ORDER — CEFAZOLIN SODIUM 2 G/50ML
2 SOLUTION INTRAVENOUS SEE ADMIN INSTRUCTIONS
Status: CANCELLED | OUTPATIENT
Start: 2021-03-30

## 2021-03-30 RX ORDER — CEFAZOLIN SODIUM 2 G/50ML
2 SOLUTION INTRAVENOUS
Status: CANCELLED | OUTPATIENT
Start: 2021-03-30

## 2021-03-30 ASSESSMENT — PAIN SCALES - GENERAL: PAINLEVEL: NO PAIN (0)

## 2021-03-30 NOTE — LETTER
3/30/2021         RE: Jackie Siddiqi  [address]  [facility] [address]        Dear Colleague,    Thank you for referring your patient, Jackie Siddiqi, to the [facility]. Please see a copy of my visit note below.    CONSULT NOTE      REFERRING PROVIDER:  Jovana LUND Mai, MD      PRESENTING COMPLAINT:  Consultation for breast reconstruction.      HISTORY OF PRESENTING COMPLAINT:  Ms. Siddiqi is 65 years old.  Back in the 1980s, she had a bilateral nipple-sparing mastectomy for prophylactic reasons and direct-to-implant reconstruction with silicone implants in the submuscular plane.  In the 1990s, she had capsular contracture requiring replacement of the implants with inframammary fold approach and then in the late 1990s had a similar procedure done.  She has never had surgery since then.  She has firm, high riding, painful breasts and does not like the aesthetic look of her breasts and is here to discuss options for improving this.      PAST MEDICAL HISTORY:  Hypothyroidism.      PAST SURGICAL HISTORY:  Mastectomy and reconstruction, 2 C-sections.      MEDICATIONS:  Albuterol, levothyroxine.      ALLERGIES:  Nil.      SOCIAL HISTORY:  Used to smoke about 1/2 pack to a pack a day for 30 years, quit in 2019, does not drink.  Lives in [place].  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      REVIEW OF SYSTEMS:  Has some shortness of breath.  No chest pain, no MI, CVA, DVT or PE.      PHYSICAL EXAMINATION:  Vital signs are stable.  She is afebrile, in no obvious distress.  She is 5 feet 2-1/2 inches, 128 pounds, BMI 24 kg/m2.  On examination of her breasts, she has distorted, high riding implants that are firm.  She has native nipples that are indented.  The overall contours are off.      ASSESSMENT AND PLAN:  Based on above findings, a diagnosis of bilateral prophylactic mastectomy with implant-based reconstruction, now with grade 4 capsular contracture and distortion of her breasts,  wanting reconstruction was made.  I had a zohaib discussion with the patient about her findings.  I think from an anatomic standpoint and technical standpoint, she would benefit from an inframammary fold approach, capsulectomy, implant removal, implant exchange, revision of reconstructed breasts to give the best aesthetic results.  She likes the overall volume and we will try and give her the same volume.  She may also require a second stage to do some touch touchup surgeries and fat grafting.  This was discussed in detail.  With regards to the next steps, we need to get prior authorization given the fact this is a prophylactic mastectomy and see if she gets approved and then proceed as indicated.  I will see her back once the approval is secured to go over details.  She understood.  All questions were answered.  She was happy with the visit.  All exam done in the presence of a chaperone.  Consent obtained.  Photographs obtained.      Total time spent with chart review, the visit itself and post-visit paperwork was 30 minutes.      cc:   Jovana LUND Mai, MD   [address] [place]  [address]           Again, thank you for allowing me to participate in the care of your patient.      Sincerely,    JENNIFFER Miner MD

## 2021-03-30 NOTE — PROGRESS NOTES
CONSULT NOTE      REFERRING PROVIDER:  Jovana LUND Mai, MD      PRESENTING COMPLAINT:  Consultation for breast reconstruction.      HISTORY OF PRESENTING COMPLAINT:  Ms. Siddiqi is 65 years old.  Back in the 1980s, she had a bilateral nipple-sparing mastectomy for prophylactic reasons and direct-to-implant reconstruction with silicone implants in the submuscular plane.  In the 1990s, she had capsular contracture requiring replacement of the implants with inframammary fold approach and then in the late 1990s had a similar procedure done.  She has never had surgery since then.  She has firm, high riding, painful breasts and does not like the aesthetic look of her breasts and is here to discuss options for improving this.      PAST MEDICAL HISTORY:  Hypothyroidism.      PAST SURGICAL HISTORY:  Mastectomy and reconstruction, 2 C-sections.      MEDICATIONS:  Albuterol, levothyroxine.      ALLERGIES:  Nil.      SOCIAL HISTORY:  Used to smoke about 1/2 pack to a pack a day for 30 years, quit in 2019, does not drink.  Lives in [place].  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      REVIEW OF SYSTEMS:  Has some shortness of breath.  No chest pain, no MI, CVA, DVT or PE.      PHYSICAL EXAMINATION:  Vital signs are stable.  She is afebrile, in no obvious distress.  She is 5 feet 2-1/2 inches, 128 pounds, BMI 24 kg/m2.  On examination of her breasts, she has distorted, high riding implants that are firm.  She has native nipples that are indented.  The overall contours are off.      ASSESSMENT AND PLAN:  Based on above findings, a diagnosis of bilateral prophylactic mastectomy with implant-based reconstruction, now with grade 4 capsular contracture and distortion of her breasts, wanting reconstruction was made.  I had a zohaib discussion with the patient about her findings.  I think from an anatomic standpoint and technical standpoint, she would benefit from an inframammary fold approach, capsulectomy, implant removal, implant exchange,  revision of reconstructed breasts to give the best aesthetic results.  She likes the overall volume and we will try and give her the same volume.  She may also require a second stage to do some touch touchup surgeries and fat grafting.  This was discussed in detail.  With regards to the next steps, we need to get prior authorization given the fact this is a prophylactic mastectomy and see if she gets approved and then proceed as indicated.  I will see her back once the approval is secured to go over details.  She understood.  All questions were answered.  She was happy with the visit.  All exam done in the presence of a chaperone.  Consent obtained.  Photographs obtained.      Total time spent with chart review, the visit itself and post-visit paperwork was 30 minutes.      cc:   Jovana LUND Mai, MD   [address] [place]  [address]

## 2021-03-30 NOTE — NURSING NOTE
"Oncology Rooming Note    March 30, 2021 11:22 AM   Jackie Siddiqi is a 65 year old female who presents for:    Chief Complaint   Patient presents with     Oncology Clinic Visit     NEW; HISTORY OD BILATERAL MASTECTOMY     Initial Vitals: BP (!) 166/78   Pulse 80   Resp 18   Wt 58.2 kg (128 lb 6.4 oz)   LMP 03/26/2003   SpO2 97%   BMI 23.11 kg/m   Estimated body mass index is 23.11 kg/m  as calculated from the following:    Height as of 3/9/21: 1.588 m (5' 2.5\").    Weight as of this encounter: 58.2 kg (128 lb 6.4 oz). Body surface area is 1.6 meters squared.  No Pain (0) Comment: Data Unavailable   Patient's last menstrual period was 03/26/2003.  Allergies reviewed: Yes  Medications reviewed: Yes    Medications: Medication refills not needed today.  Pharmacy name entered into [facility]:    [facility] [place], [place] - [address]  [facility] - [place], [place] - [address] [facility] [place] [address] [id number] [place] [address] [facility] [address] [place] [address] [address]  [facility]  [facility] [address] [facility] [employer] [place] [address] [place] [address] [id number] - [place] [address] [facility] - [place], [place] - [address] AT PORTAL TO [facility] [address]    Clinical concerns: New patient.       Karrie Che MA            "

## 2021-03-31 ENCOUNTER — TELEPHONE (OUTPATIENT)
Dept: OPHTHALMOLOGY | Facility: CLINIC | Age: 66
End: 2021-03-31

## 2021-03-31 NOTE — TELEPHONE ENCOUNTER
Called patient, she missed her preop visit today. Will need to reschedule. Left her a message to call me back.

## 2021-04-01 NOTE — TELEPHONE ENCOUNTER
Called patient to reschedule Preop. She gets off work at 7 am, coming to have measurements after that for her surgery at 830 am on 4-12-21 with Dr. Snider. Mentioned to me that she doesn't want the qtip shoved up to her brain, told her she can request to the lab to have them be more gentle. Can't not get the Covid test unfortunately when she is scheduled for cataract surgery. She will be here tomorrow for IOL calcs.

## 2021-04-02 ENCOUNTER — OFFICE VISIT (OUTPATIENT)
Dept: OPHTHALMOLOGY | Facility: CLINIC | Age: 66
End: 2021-04-02
Payer: COMMERCIAL

## 2021-04-02 DIAGNOSIS — H25.811 COMBINED FORMS OF AGE-RELATED CATARACT OF RIGHT EYE: Primary | ICD-10-CM

## 2021-04-02 PROCEDURE — 92136 OPHTHALMIC BIOMETRY: CPT | Mod: TC | Performed by: STUDENT IN AN ORGANIZED HEALTH CARE EDUCATION/TRAINING PROGRAM

## 2021-04-02 PROCEDURE — 92012 INTRM OPH EXAM EST PATIENT: CPT | Performed by: STUDENT IN AN ORGANIZED HEALTH CARE EDUCATION/TRAINING PROGRAM

## 2021-04-02 RX ORDER — PREDNISOLONE ACETATE 10 MG/ML
1 SUSPENSION/ DROPS OPHTHALMIC 4 TIMES DAILY
Qty: 10 ML | Refills: 0 | Status: SHIPPED | OUTPATIENT
Start: 2021-04-12 | End: 2021-06-03

## 2021-04-02 RX ORDER — KETOROLAC TROMETHAMINE 5 MG/ML
1 SOLUTION OPHTHALMIC 4 TIMES DAILY
Qty: 5 ML | Refills: 0 | Status: SHIPPED | OUTPATIENT
Start: 2021-04-11 | End: 2021-05-03

## 2021-04-02 RX ORDER — MOXIFLOXACIN 5 MG/ML
1 SOLUTION/ DROPS OPHTHALMIC 4 TIMES DAILY
Qty: 3 ML | Refills: 0 | Status: SHIPPED | OUTPATIENT
Start: 2021-04-11 | End: 2021-05-03

## 2021-04-02 ASSESSMENT — VISUAL ACUITY
OS_PH_SC: 20/50
OS_SC: 20/100
OS_SC+: -1
OS_PH_SC+: -1
METHOD: SNELLEN - LINEAR

## 2021-04-02 ASSESSMENT — SLIT LAMP EXAM - LIDS
COMMENTS: 1+ DERMATOCHALASIS
COMMENTS: 1+ DERMATOCHALASIS

## 2021-04-02 ASSESSMENT — EXTERNAL EXAM - RIGHT EYE: OD_EXAM: NORMAL

## 2021-04-02 ASSESSMENT — EXTERNAL EXAM - LEFT EYE: OS_EXAM: NORMAL

## 2021-04-02 NOTE — PATIENT INSTRUCTIONS
PRE-OP CATARACT INSTRUCTIONS    ** 3 eye drops from the pharmacy at least 3 days before surgery.    *Use the following drops in the right eye 4 times on the day before surgery and once the morning of surgery:                                   Vigamox or Ofloxacin (tan cap)   Ketorolac (gray cap)    **We will start the prednisolone (white or pink top) drops AFTER surgery.    *Please bring all your eyedrops to the surgery center.    *If taking more than one drop, wait five minutes between drops.    *No solid food or drink after midnight.     Foreign Snider MD  [phone]

## 2021-04-02 NOTE — PROGRESS NOTES
Current Eye Medications:       Subjective:  Patient here for pre-op cataract surgery, right eye, scheduled for 4-12-21.  History and Physical on the 7th.  She did not receive papers regarding her appointments (new schedule given to her today).    Patient complains of rapidly decreasing vision in both eyes - each day she is aware of a change in her vision.  Both eyes are red all the time, and she has floaters in both eyes.  Also, vision worsens in bright light.       Objective:  See Ophthalmology Exam.      Assessment:  Jackie Siddiqi is a 65 year old female who presents with:   Encounter Diagnosis   Name Primary?     Combined forms of age-related cataract of right eye Cataract pre-op right eye. Dil 7. Dense oil drop cataract both eyes. COPD/smoker, but says she is able to lay flat without issues and no cough. [employer] mostly distance both eyes. 3 drops.         Plan:  PRE-OP CATARACT INSTRUCTIONS    ** 3 eye drops from the pharmacy at least 3 days before surgery.    *Use the following drops in the right eye 4 times on the day before surgery and once the morning of surgery:                                   Vigamox or Ofloxacin (tan cap)   Ketorolac (gray cap)    **We will start the prednisolone (white or pink top) drops AFTER surgery.    *Please bring all your eyedrops to the surgery center.    *If taking more than one drop, wait five minutes between drops.    *No solid food or drink after midnight.     Foreign Snider MD  [phone]

## 2021-04-02 NOTE — LETTER
4/2/2021         RE: Jackie Siddiqi  [address]  [facility] [address]        Dear Colleague,    Thank you for referring your patient, Jackie Siddiqi, to the [facility]. Please see a copy of my visit note below.     Current Eye Medications:       Subjective:  Patient here for pre-op cataract surgery, right eye, scheduled for 4-12-21.  History and Physical on the 7th.  She did not receive papers regarding her appointments (new schedule given to her today).    Patient complains of rapidly decreasing vision in both eyes - each day she is aware of a change in her vision.  Both eyes are red all the time, and she has floaters in both eyes.  Also, vision worsens in bright light.       Objective:  See Ophthalmology Exam.      Assessment:  Jackie Siddiqi is a 65 year old female who presents with:   Encounter Diagnosis   Name Primary?     Combined forms of age-related cataract of right eye Cataract pre-op right eye. Dil 7. Dense oil drop cataract both eyes. COPD/smoker, but says she is able to lay flat without issues and no cough. Target mostly distance both eyes. 3 drops.         Plan:  PRE-OP CATARACT INSTRUCTIONS    ** 3 eye drops from the pharmacy at least 3 days before surgery.    *Use the following drops in the right eye 4 times on the day before surgery and once the morning of surgery:                                   Vigamox or Ofloxacin (tan cap)   Ketorolac (gray cap)    **We will start the prednisolone (white or pink top) drops AFTER surgery.    *Please bring all your eyedrops to the surgery center.    *If taking more than one drop, wait five minutes between drops.    *No solid food or drink after midnight.     Foreign Snider MD  [phone]            Again, thank you for allowing me to participate in the care of your patient.        Sincerely,        Foreign Snider MD

## 2021-04-07 ENCOUNTER — OFFICE VISIT (OUTPATIENT)
Dept: FAMILY MEDICINE | Facility: CLINIC | Age: 66
End: 2021-04-07
Payer: COMMERCIAL

## 2021-04-07 VITALS
OXYGEN SATURATION: 99 % | DIASTOLIC BLOOD PRESSURE: 66 MMHG | SYSTOLIC BLOOD PRESSURE: 108 MMHG | TEMPERATURE: 99.1 F | BODY MASS INDEX: 23.39 KG/M2 | HEIGHT: 63 IN | HEART RATE: 77 BPM | RESPIRATION RATE: 16 BRPM | WEIGHT: 132 LBS

## 2021-04-07 DIAGNOSIS — Z01.818 PREOP GENERAL PHYSICAL EXAM: Primary | ICD-10-CM

## 2021-04-07 DIAGNOSIS — H25.9 AGE-RELATED CATARACT OF BOTH EYES, UNSPECIFIED AGE-RELATED CATARACT TYPE: ICD-10-CM

## 2021-04-07 PROCEDURE — 99214 OFFICE O/P EST MOD 30 MIN: CPT | Performed by: FAMILY MEDICINE

## 2021-04-07 ASSESSMENT — PAIN SCALES - GENERAL: PAINLEVEL: NO PAIN (0)

## 2021-04-07 ASSESSMENT — MIFFLIN-ST. JEOR: SCORE: 1094.94

## 2021-04-07 NOTE — PATIENT INSTRUCTIONS

## 2021-04-07 NOTE — H&P (VIEW-ONLY)
[address] [address]  Phone: [phone]  Fax: [phone]  Primary Provider: Lobito Quinonez  Pre-op Performing Provider: LOBITO QUINONEZ      PREOPERATIVE EVALUATION:  Today's date: 4/7/2021    Jackie Siddiqi is a 65 year old female who presents for a preoperative evaluation.    Surgical Information:  Surgery/Procedure: RIGHT AND LEFT PHACOEMULSIFICATION, CATARACT, WITH INTRAOCULAR LENS IMPLANT  Surgery Location: [facility]  Surgeon: Tylor  Surgery Date: 04/12/21 and 04/26/21  Time of Surgery: 0830 and 0800  Where patient plans to recover: At home with family  Fax number for surgical facility: Note does not need to be faxed, will be available electronically in Epic.    Type of Anesthesia Anticipated: Local with MAC    Assessment & Plan     The proposed surgical procedure is considered LOW risk.      ICD-10-CM    1. Preop general physical exam  Z01.818    2. Age-related cataract of both eyes, unspecified age-related cataract type  H25.9            Risks and Recommendations:  The patient has the following additional risks and recommendations for perioperative complications:    Cardiovascular:  No cardiovascular risks identified.  No history of CAD, CVA, hypertension, or diabetes.    Diabetes:  She does not have diabetes.    Pulmonary:    -Consider incentive spirometry post-op due to COPD   - Consider Respiratory Therapy (Respiratory Care IP Consult) post-op as needed for wheezing, coughing, desaturation due to COPD.   -Oxygen as needed to maintain O2 sat above 92% during and after procedure.    Obstructive Sleep Apnea:   She has no history of sleep apnea.  No risk for sleep apnea identified.    Anemia/Bleeding/Clotting:   No history of bleeding disorder.  No history of PE/DVT.  PE/DVT prophylaxis per surgeon's recommendation/desire.  No history of anemia or blood transfusion and is okay to be transfused if necessary.     Social and Substance:   No social  or substance concerned identified.  Not on chronic pain meds, drugs or excessive alcohol intake.    Infection:   No history of MRSA  Prophylactic antibiotics per surgeon's recommendation/desire.  Covid screening test as scheduled  Shower with provided antimicrobial shampoo the day before and a.m. the procedure     Medication Instructions:  Patient is to take all scheduled medications on the day of surgery EXCEPT for modifications listed below:  Please take your prescribed medications as prescribed except.  No aspirin for 5 days before the procedure.    No NSAID (Ibuprofen, Aleve, Motrin, Naproxen, ect.) for 5 days before the procedure.  Ok to take Tylenol as needed  COVID screening as scheduled  No alcohol or smoking for 24 before the procedure  Nothing to eat for 8 hrs, but may drink clear fluid up to 2 hrs before the procedure.    RECOMMENDATION:  APPROVAL GIVEN to proceed with proposed procedure, without further diagnostic evaluation.          Subjective     HPI related to upcoming procedure:     Preop Questions 4/7/2021   1. Have you ever had a heart attack or stroke? No   2. Have you ever had surgery on your heart or blood vessels, such as a stent placement, a coronary artery bypass, or surgery on an artery in your head, neck, heart, or legs? No   3. Do you have chest pain with activity? No   4. Do you have a history of  heart failure? No   5. Do you currently have a cold, bronchitis or symptoms of other infection? No   6. Do you have a cough, shortness of breath, or wheezing? YES - due to CODP - mild and chronic.  Not worse than usual.   7. Do you or anyone in your family have previous history of blood clots? No   8. Do you or does anyone in your family have a serious bleeding problem such as prolonged bleeding following surgeries or cuts? No   9. Have you ever had problems with anemia or been told to take iron pills? No   10. Have you had any abnormal blood loss such as black, tarry or bloody stools, or  abnormal vaginal bleeding? No   11. Have you ever had a blood transfusion? No   12. Are you willing to have a blood transfusion if it is medically needed before, during, or after your surgery? Yes   13. Have you or any of your relatives ever had problems with anesthesia? No   14. Do you have sleep apnea, excessive snoring or daytime drowsiness? No   15. Do you have any artifical heart valves or other implanted medical devices like a pacemaker, defibrillator, or continuous glucose monitor? No   16. Do you have artificial joints? No   17. Are you allergic to latex? No       Health Care Directive:  Patient does not have a Health Care Directive or Living Will: Discussed advance care planning with patient; information given to patient to review.    Preoperative Review of :   reviewed - no record of controlled substances prescribed.      Jackie is here today for pre-op physical. She is scheduled for cataract surgeries on April 12 and April 26 with Dr. Snider in [place].  It is expected to be the same day procedure with MAC and local anesthesia. No personal or family history of anesthesia complication or pre-matured CAD or MI.  Has not been on steroid orally in the last 6 months.  Not taking Aspirin or other form of blood thinner.  Last dose of NSAID was weeks ago.     Active problems and medication reviewed and updated today.  No longer smoking.  COPD is well controlled, using the rescue inhaler rarely.  No coughing, wheezing or chest tightness sensation.    She generally is doing well and has no concern today. No headache or dizziness. No runny nose, nasal congestion, ST, coughing, fever or chill.  No chest pain or SOB.  No N/V/D/C and denied of having problem with urination.  Quit smoking since 2019 and denied of having breathing problem.  Again, her COPD is well controlled, using the rescue inhaler rarely.      Review of Systems  Constitutional, neuro, ENT, endocrine, pulmonary, cardiac, gastrointestinal,  genitourinary, musculoskeletal, integument and psychiatric systems are negative, except as otherwise noted.    Patient Active Problem List    Diagnosis Date Noted     Capsular contracture of breast implant, sequela 03/30/2021     Priority: Medium     Added automatically from request for surgery [id number]       Hx of bilateral mastectomy 03/09/2021     Priority: Medium     LLQ abdominal pain 03/09/2021     Priority: Medium     Combined forms of age-related cataract of right eye 02/09/2021     Priority: Medium     Added automatically from request for surgery [id number]       Combined form of age-related cataract, left eye 02/09/2021     Priority: Medium     Added automatically from request for surgery [id number]       Diverticulosis of colon 04/06/2020     Priority: Medium     Abnormal CT lung screening 10/02/2019     Priority: Medium     Currently managed with follow up imaging by [facility].         Hypothyroidism, unspecified type 11/29/2016     Priority: Medium     Hyperlipidemia LDL goal <160 11/29/2016     Priority: Medium     Osteoarthritis of carpometacarpal joint of right thumb, unspecified osteoarthritis type 11/29/2016     Priority: Medium     IMO Regulatory Load OCT 2020       Advanced directives, counseling/discussion 01/11/2013     Priority: Medium     Discussed advance care planning with patient; information given to patient to review. 1/11/2013          Lichen sclerosus et atrophicus of the vulva 01/09/2012     Priority: Medium     Esophageal reflux 05/16/2005     Priority: Medium      Past Medical History:   Diagnosis Date     Abnormal Papanicolaou smear of cervix and cervical HPV     cryocautery     Breast cystic disease      Hiatal hernia      Hypothyroidism      Motion sickness      Nonsenile cataract      Peptic ulcer, unspecified site, unspecified as acute or chronic, without mention of hemorrhage, perforation, or obstruction     Peptic ulcer disease     Personal history of  colonic polyps      Past Surgical History:   Procedure Laterality Date     C  DELIVERY ONLY       x2     COLONOSCOPY  2007     COLONOSCOPY  2013    Procedure: COLONOSCOPY;  colonoscopy, Esophagoscopy, Gastroscopy, Duodenoscopy EGD, multiple biopsies;  Surgeon: Joe Benson MD;  Location: [facility]     COLONOSCOPY N/A 2017    Procedure: COMBINED COLONOSCOPY, SINGLE OR MULTIPLE BIOPSY/POLYPECTOMY BY BIOPSY;  colonoscopy with polypectomy by biopsy;  Surgeon: Tolu No MD;  Location: [facility]     COLONOSCOPY N/A 2020    Procedure: Colonoscopy with possible biopsy and/or polypectomy;  Surgeon: Obed Quick MD;  Location: [facility]     ENDOSCOPY  2007    Sm hiatus hernia     ESOPHAGOSCOPY, GASTROSCOPY, DUODENOSCOPY (EGD), COMBINED  2013    Procedure: COMBINED ESOPHAGOSCOPY, GASTROSCOPY, DUODENOSCOPY (EGD), BIOPSY SINGLE OR MULTIPLE;  ESOPHAGOGASTRODUODENOSCOPY WITH MULTIPLE BIOPSIES;  Surgeon: Joe Benson MD;  Location: [facility]     MASTECTOMY, BILATERAL       ZZC NONSPECIFIC PROCEDURE      Laparoscopic exam with adhesions     Z NONSPECIFIC PROCEDURE      Mastectomy for cystic breast disease, breast implants     Current Outpatient Medications   Medication Sig Dispense Refill     albuterol (PROAIR HFA/PROVENTIL HFA/VENTOLIN HFA) 108 (90 Base) MCG/ACT inhaler Inhale 2 puffs into the lungs every 4 hours as needed for shortness of breath / dyspnea or wheezing 1 Inhaler 11     Ascorbic Acid (VITAMIN C) 500 MG CAPS        Cholecalciferol (VITAMIN D3) 25 MCG (1000 UT) CAPS        clobetasol (TEMOVATE) 0.05 % external ointment Apply a small pea size amount at bedtime couple times a week as needed 60 g 1     estradiol (ESTRACE) 0.1 MG/GM vaginal cream Place 2 g vaginally twice a week Please stop the premarin cream 42.5 g 1     [START ON 2021] ketorolac (ACULAR) 0.5 % ophthalmic solution Place 1 drop into the right eye 4 times daily 5 mL 0     Lactobacillus  (PROBIOTIC ACIDOPHILUS PO)        levothyroxine (SYNTHROID/LEVOTHROID) 25 MCG tablet Take 1 tablet (25 mcg) by mouth daily 90 tablet 3     [START ON 2021] moxifloxacin (VIGAMOX) 0.5 % ophthalmic solution Place 1 drop into the right eye 4 times daily 3 mL 0     omeprazole (PRILOSEC) 20 MG DR capsule Take 1 capsule (20 mg) by mouth daily 30 capsule 1     [START ON 2021] prednisoLONE acetate (PRED FORTE) 1 % ophthalmic suspension Place 1 drop into the right eye 4 times daily 10 mL 0     fluticasone (FLONASE) 50 MCG/ACT nasal spray Spray 2 sprays into both nostrils daily 15.8 mL 1       Allergies   Allergen Reactions     No Known Drug Allergies         Social History     Tobacco Use     Smoking status: Former Smoker     Packs/day: 0.50     Years: 50.00     Pack years: 25.00     Types: Cigarettes     Quit date: 2019     Years since quittin.6     Smokeless tobacco: Never Used     Tobacco comment: 1 pack per week, started age 11   Substance Use Topics     Alcohol use: No     Alcohol/week: 0.0 standard drinks     Comment: holidays only     Family History   Problem Relation Age of Onset     Diabetes Father      Hypertension Father      Alcohol/Drug Father      Eye Disorder Father      Obesity Father      Breast Cancer Mother      Osteoporosis Mother      Thyroid Disease Mother      Cancer Mother         Bladder     Cancer Sister         fallopian tube cancer     Obesity Sister      Alzheimer Disease Sister      Cancer Sister         Skin Cancer     Allergies Daughter      Cancer Maternal Grandmother         uterine cancer     Liver Disease Daughter         Biliary Atresia     Genitourinary Problems Brother      No Known Problems Daughter      Heart Disease Brother         Heart Murmur     Glaucoma Maternal Grandfather      Genitourinary Problems Brother         kidney disease (two brothers)     Macular Degeneration No family hx of      History   Drug Use No         Objective     /66   Pulse 77   Temp  "99.1  F (37.3  C) (Temporal)   Resp 16   Ht 1.588 m (5' 2.5\")   Wt 59.9 kg (132 lb)   LMP 03/26/2003   SpO2 99%   BMI 23.76 kg/m      Physical Exam    GENERAL APPEARANCE: healthy, alert and no distress     EYES: EOMI, PERRL     HENT: ear canals and TM's normal and nose and mouth without ulcers or lesions.  Nares are non-congested. Oropharynx is pink and moist. No tender with palpation to the sinuses.      NECK: no adenopathy, no asymmetry, masses, or scars and thyroid normal to palpation     RESP: lungs clear to auscultation - no rales, rhonchi or wheezes.   Good respiratory effort throughout     CV: regular rates and rhythm, normal S1 S2, no S3 or S4 and no murmur, click or rub     ABDOMEN:  soft, nontender, no HSM or masses and bowel sounds normal     MS: extremities normal- no gross deformities noted, no focal weakness     NEURO: Normal strength and tone, sensory exam grossly normal, mentation intact and speech normal     PSYCH: mentation appears normal. and affect normal/bright    Recent Labs   Lab Test 03/09/21  0948 09/16/19  1432 05/01/19  1438   HGB  --  13.3 13.2   PLT  --  268 294    144 141   POTASSIUM 3.9 3.6 3.8   CR 0.74 0.74 0.72        Diagnostics:  Recent Results (from the past 168 hour(s))   Asymptomatic COVID-19 Virus (Coronavirus) by PCR    Collection Time: 04/08/21  3:59 PM    Specimen: Nasopharyngeal   Result Value Ref Range    COVID-19 Virus PCR to [facility] - Source Nasopharyngeal     COVID-19 Virus PCR to [facility] - Result       Test received-See reflex to IDDL test SARS CoV2 (COVID-19) Virus RT-PCR   SARS-CoV-2 COVID-19 Virus (Coronavirus) by PCR    Collection Time: 04/08/21  3:59 PM    Specimen: Nasopharyngeal   Result Value Ref Range    SARS-CoV-2 Virus Specimen Source Nasopharyngeal     SARS-CoV-2 PCR Result NEGATIVE     SARS-CoV-2 PCR Comment       Testing was performed using the donavan SARS-CoV-2 assay on the donavan 6800 System.2      No EKG required for low risk surgery " (cataract, skin procedure, breast biopsy, etc).    Revised Cardiac Risk Index (RCRI):  The patient has the following serious cardiovascular risks for perioperative complications:   - No serious cardiac risks = 0 points     RCRI Interpretation: 0 points: Class I (very low risk - 0.4% complication rate)           Signed Electronically by: Jovana Wooten Mai, MD  Copy of this evaluation report is provided to requesting physician.

## 2021-04-07 NOTE — PROGRESS NOTES
[address] [address]  Phone: [phone]  Fax: [phone]  Primary Provider: Lobito Quinonez  Pre-op Performing Provider: LOBITO QUINONEZ      PREOPERATIVE EVALUATION:  Today's date: 4/7/2021    Jackie Siddiqi is a 65 year old female who presents for a preoperative evaluation.    Surgical Information:  Surgery/Procedure: RIGHT AND LEFT PHACOEMULSIFICATION, CATARACT, WITH INTRAOCULAR LENS IMPLANT  Surgery Location: [facility]  Surgeon: Tylor  Surgery Date: 04/12/21 and 04/26/21  Time of Surgery: 0830 and 0800  Where patient plans to recover: At home with family  Fax number for surgical facility: Note does not need to be faxed, will be available electronically in Epic.    Type of Anesthesia Anticipated: Local with MAC    Assessment & Plan     The proposed surgical procedure is considered LOW risk.      ICD-10-CM    1. Preop general physical exam  Z01.818    2. Age-related cataract of both eyes, unspecified age-related cataract type  H25.9            Risks and Recommendations:  The patient has the following additional risks and recommendations for perioperative complications:    Cardiovascular:  No cardiovascular risks identified.  No history of CAD, CVA, hypertension, or diabetes.    Diabetes:  She does not have diabetes.    Pulmonary:    -Consider incentive spirometry post-op due to COPD   - Consider Respiratory Therapy (Respiratory Care IP Consult) post-op as needed for wheezing, coughing, desaturation due to COPD.   -Oxygen as needed to maintain O2 sat above 92% during and after procedure.    Obstructive Sleep Apnea:   She has no history of sleep apnea.  No risk for sleep apnea identified.    Anemia/Bleeding/Clotting:   No history of bleeding disorder.  No history of PE/DVT.  PE/DVT prophylaxis per surgeon's recommendation/desire.  No history of anemia or blood transfusion and is okay to be transfused if necessary.     Social and Substance:   No social  or substance concerned identified.  Not on chronic pain meds, drugs or excessive alcohol intake.    Infection:   No history of MRSA  Prophylactic antibiotics per surgeon's recommendation/desire.  Covid screening test as scheduled  Shower with provided antimicrobial shampoo the day before and a.m. the procedure     Medication Instructions:  Patient is to take all scheduled medications on the day of surgery EXCEPT for modifications listed below:  Please take your prescribed medications as prescribed except.  No aspirin for 5 days before the procedure.    No NSAID (Ibuprofen, Aleve, Motrin, Naproxen, ect.) for 5 days before the procedure.  Ok to take Tylenol as needed  COVID screening as scheduled  No alcohol or smoking for 24 before the procedure  Nothing to eat for 8 hrs, but may drink clear fluid up to 2 hrs before the procedure.    RECOMMENDATION:  APPROVAL GIVEN to proceed with proposed procedure, without further diagnostic evaluation.          Subjective     HPI related to upcoming procedure:     Preop Questions 4/7/2021   1. Have you ever had a heart attack or stroke? No   2. Have you ever had surgery on your heart or blood vessels, such as a stent placement, a coronary artery bypass, or surgery on an artery in your head, neck, heart, or legs? No   3. Do you have chest pain with activity? No   4. Do you have a history of  heart failure? No   5. Do you currently have a cold, bronchitis or symptoms of other infection? No   6. Do you have a cough, shortness of breath, or wheezing? YES - due to CODP - mild and chronic.  Not worse than usual.   7. Do you or anyone in your family have previous history of blood clots? No   8. Do you or does anyone in your family have a serious bleeding problem such as prolonged bleeding following surgeries or cuts? No   9. Have you ever had problems with anemia or been told to take iron pills? No   10. Have you had any abnormal blood loss such as black, tarry or bloody stools, or  abnormal vaginal bleeding? No   11. Have you ever had a blood transfusion? No   12. Are you willing to have a blood transfusion if it is medically needed before, during, or after your surgery? Yes   13. Have you or any of your relatives ever had problems with anesthesia? No   14. Do you have sleep apnea, excessive snoring or daytime drowsiness? No   15. Do you have any artifical heart valves or other implanted medical devices like a pacemaker, defibrillator, or continuous glucose monitor? No   16. Do you have artificial joints? No   17. Are you allergic to latex? No       Health Care Directive:  Patient does not have a Health Care Directive or Living Will: Discussed advance care planning with patient; information given to patient to review.    Preoperative Review of :   reviewed - no record of controlled substances prescribed.      Jackie is here today for pre-op physical. She is scheduled for cataract surgeries on April 12 and April 26 with Dr. Snider in [place].  It is expected to be the same day procedure with MAC and local anesthesia. No personal or family history of anesthesia complication or pre-matured CAD or MI.  Has not been on steroid orally in the last 6 months.  Not taking Aspirin or other form of blood thinner.  Last dose of NSAID was weeks ago.     Active problems and medication reviewed and updated today.  No longer smoking.  COPD is well controlled, using the rescue inhaler rarely.  No coughing, wheezing or chest tightness sensation.    She generally is doing well and has no concern today. No headache or dizziness. No runny nose, nasal congestion, ST, coughing, fever or chill.  No chest pain or SOB.  No N/V/D/C and denied of having problem with urination.  Quit smoking since 2019 and denied of having breathing problem.  Again, her COPD is well controlled, using the rescue inhaler rarely.      Review of Systems  Constitutional, neuro, ENT, endocrine, pulmonary, cardiac, gastrointestinal,  genitourinary, musculoskeletal, integument and psychiatric systems are negative, except as otherwise noted.    Patient Active Problem List    Diagnosis Date Noted     Capsular contracture of breast implant, sequela 03/30/2021     Priority: Medium     Added automatically from request for surgery [id number]       Hx of bilateral mastectomy 03/09/2021     Priority: Medium     LLQ abdominal pain 03/09/2021     Priority: Medium     Combined forms of age-related cataract of right eye 02/09/2021     Priority: Medium     Added automatically from request for surgery [id number]       Combined form of age-related cataract, left eye 02/09/2021     Priority: Medium     Added automatically from request for surgery [id number]       Diverticulosis of colon 04/06/2020     Priority: Medium     Abnormal CT lung screening 10/02/2019     Priority: Medium     Currently managed with follow up imaging by [email or web address] Results [facility].         Hypothyroidism, unspecified type 11/29/2016     Priority: Medium     Hyperlipidemia LDL goal <160 11/29/2016     Priority: Medium     Osteoarthritis of carpometacarpal joint of right thumb, unspecified osteoarthritis type 11/29/2016     Priority: Medium     IMO Regulatory Load OCT 2020       Advanced directives, counseling/discussion 01/11/2013     Priority: Medium     Discussed advance care planning with patient; information given to patient to review. 1/11/2013          Lichen sclerosus et atrophicus of the vulva 01/09/2012     Priority: Medium     Esophageal reflux 05/16/2005     Priority: Medium      Past Medical History:   Diagnosis Date     Abnormal Papanicolaou smear of cervix and cervical HPV     cryocautery     Breast cystic disease      Hiatal hernia      Hypothyroidism      Motion sickness      Nonsenile cataract      Peptic ulcer, unspecified site, unspecified as acute or chronic, without mention of hemorrhage, perforation, or obstruction     Peptic ulcer disease     Personal history of  colonic polyps      Past Surgical History:   Procedure Laterality Date     C  DELIVERY ONLY       x2     COLONOSCOPY  2007     COLONOSCOPY  2013    Procedure: COLONOSCOPY;  colonoscopy, Esophagoscopy, Gastroscopy, Duodenoscopy EGD, multiple biopsies;  Surgeon: Joe Benson MD;  Location: [facility]     COLONOSCOPY N/A 2017    Procedure: COMBINED COLONOSCOPY, SINGLE OR MULTIPLE BIOPSY/POLYPECTOMY BY BIOPSY;  colonoscopy with polypectomy by biopsy;  Surgeon: Tolu No MD;  Location: [facility]     COLONOSCOPY N/A 2020    Procedure: Colonoscopy with possible biopsy and/or polypectomy;  Surgeon: Obed Quick MD;  Location: [facility]     ENDOSCOPY  2007    Sm hiatus hernia     ESOPHAGOSCOPY, GASTROSCOPY, DUODENOSCOPY (EGD), COMBINED  2013    Procedure: COMBINED ESOPHAGOSCOPY, GASTROSCOPY, DUODENOSCOPY (EGD), BIOPSY SINGLE OR MULTIPLE;  ESOPHAGOGASTRODUODENOSCOPY WITH MULTIPLE BIOPSIES;  Surgeon: Joe Benson MD;  Location: [facility]     MASTECTOMY, BILATERAL       ZZC NONSPECIFIC PROCEDURE      Laparoscopic exam with adhesions     Z NONSPECIFIC PROCEDURE      Mastectomy for cystic breast disease, breast implants     Current Outpatient Medications   Medication Sig Dispense Refill     albuterol (PROAIR HFA/PROVENTIL HFA/VENTOLIN HFA) 108 (90 Base) MCG/ACT inhaler Inhale 2 puffs into the lungs every 4 hours as needed for shortness of breath / dyspnea or wheezing 1 Inhaler 11     Ascorbic Acid (VITAMIN C) 500 MG CAPS        Cholecalciferol (VITAMIN D3) 25 MCG (1000 UT) CAPS        clobetasol (TEMOVATE) 0.05 % external ointment Apply a small pea size amount at bedtime couple times a week as needed 60 g 1     estradiol (ESTRACE) 0.1 MG/GM vaginal cream Place 2 g vaginally twice a week Please stop the premarin cream 42.5 g 1     [START ON 2021] ketorolac (ACULAR) 0.5 % ophthalmic solution Place 1 drop into the right eye 4 times daily 5 mL 0     Lactobacillus  (PROBIOTIC ACIDOPHILUS PO)        levothyroxine (SYNTHROID/LEVOTHROID) 25 MCG tablet Take 1 tablet (25 mcg) by mouth daily 90 tablet 3     [START ON 2021] moxifloxacin (VIGAMOX) 0.5 % ophthalmic solution Place 1 drop into the right eye 4 times daily 3 mL 0     omeprazole (PRILOSEC) 20 MG DR capsule Take 1 capsule (20 mg) by mouth daily 30 capsule 1     [START ON 2021] prednisoLONE acetate (PRED FORTE) 1 % ophthalmic suspension Place 1 drop into the right eye 4 times daily 10 mL 0     fluticasone (FLONASE) 50 MCG/ACT nasal spray Spray 2 sprays into both nostrils daily 15.8 mL 1       Allergies   Allergen Reactions     No Known Drug Allergies         Social History     Tobacco Use     Smoking status: Former Smoker     Packs/day: 0.50     Years: 50.00     Pack years: 25.00     Types: Cigarettes     Quit date: 2019     Years since quittin.6     Smokeless tobacco: Never Used     Tobacco comment: 1 pack per week, started age 11   Substance Use Topics     Alcohol use: No     Alcohol/week: 0.0 standard drinks     Comment: holidays only     Family History   Problem Relation Age of Onset     Diabetes Father      Hypertension Father      Alcohol/Drug Father      Eye Disorder Father      Obesity Father      Breast Cancer Mother      Osteoporosis Mother      Thyroid Disease Mother      Cancer Mother         Bladder     Cancer Sister         fallopian tube cancer     Obesity Sister      Alzheimer Disease Sister      Cancer Sister         Skin Cancer     Allergies Daughter      Cancer Maternal Grandmother         uterine cancer     Liver Disease Daughter         Biliary Atresia     Genitourinary Problems Brother      No Known Problems Daughter      Heart Disease Brother         Heart Murmur     Glaucoma Maternal Grandfather      Genitourinary Problems Brother         kidney disease (two brothers)     Macular Degeneration No family hx of      History   Drug Use No         Objective     /66   Pulse 77   Temp  "99.1  F (37.3  C) (Temporal)   Resp 16   Ht 1.588 m (5' 2.5\")   Wt 59.9 kg (132 lb)   LMP 03/26/2003   SpO2 99%   BMI 23.76 kg/m      Physical Exam    GENERAL APPEARANCE: healthy, alert and no distress     EYES: EOMI, PERRL     HENT: ear canals and TM's normal and nose and mouth without ulcers or lesions.  Nares are non-congested. Oropharynx is pink and moist. No tender with palpation to the sinuses.      NECK: no adenopathy, no asymmetry, masses, or scars and thyroid normal to palpation     RESP: lungs clear to auscultation - no rales, rhonchi or wheezes.   Good respiratory effort throughout     CV: regular rates and rhythm, normal S1 S2, no S3 or S4 and no murmur, click or rub     ABDOMEN:  soft, nontender, no HSM or masses and bowel sounds normal     MS: extremities normal- no gross deformities noted, no focal weakness     NEURO: Normal strength and tone, sensory exam grossly normal, mentation intact and speech normal     PSYCH: mentation appears normal. and affect normal/bright    Recent Labs   Lab Test 03/09/21  0948 09/16/19  1432 05/01/19  1438   HGB  --  13.3 13.2   PLT  --  268 294    144 141   POTASSIUM 3.9 3.6 3.8   CR 0.74 0.74 0.72        Diagnostics:  Recent Results (from the past 168 hour(s))   Asymptomatic COVID-19 Virus (Coronavirus) by PCR    Collection Time: 04/08/21  3:59 PM    Specimen: Nasopharyngeal   Result Value Ref Range    COVID-19 Virus PCR to [facility] - Source Nasopharyngeal     COVID-19 Virus PCR to [facility] - Result       Test received-See reflex to [facility] test SARS CoV2 (COVID-19) Virus RT-PCR   SARS-CoV-2 COVID-19 Virus (Coronavirus) by PCR    Collection Time: 04/08/21  3:59 PM    Specimen: Nasopharyngeal   Result Value Ref Range    SARS-CoV-2 Virus Specimen Source Nasopharyngeal     SARS-CoV-2 PCR Result NEGATIVE     SARS-CoV-2 PCR Comment       Testing was performed using the [employer] SARS-CoV-2 assay on the [employer]avan 6800 System.2      No EKG required for low risk surgery " (cataract, skin procedure, breast biopsy, etc).    Revised Cardiac Risk Index (RCRI):  The patient has the following serious cardiovascular risks for perioperative complications:   - No serious cardiac risks = 0 points     RCRI Interpretation: 0 points: Class I (very low risk - 0.4% complication rate)           Signed Electronically by: Jovana Wooten Mai, MD  Copy of this evaluation report is provided to requesting physician.

## 2021-04-07 NOTE — H&P (VIEW-ONLY)
[address] [address]  Phone: [phone]  Fax: [phone]  Primary Provider: Lobito Quinonez  Pre-op Performing Provider: LOBITO QUINONEZ      PREOPERATIVE EVALUATION:  Today's date: 4/7/2021    Jackie Siddiqi is a 65 year old female who presents for a preoperative evaluation.    Surgical Information:  Surgery/Procedure: RIGHT AND LEFT PHACOEMULSIFICATION, CATARACT, WITH INTRAOCULAR LENS IMPLANT  Surgery Location: [facility]  Surgeon: Tylor  Surgery Date: 04/12/21 and 04/26/21  Time of Surgery: 0830 and 0800  Where patient plans to recover: At home with family  Fax number for surgical facility: Note does not need to be faxed, will be available electronically in Epic.    Type of Anesthesia Anticipated: Local with MAC    Assessment & Plan     The proposed surgical procedure is considered LOW risk.      ICD-10-CM    1. Preop general physical exam  Z01.818    2. Age-related cataract of both eyes, unspecified age-related cataract type  H25.9            Risks and Recommendations:  The patient has the following additional risks and recommendations for perioperative complications:    Cardiovascular:  No cardiovascular risks identified.  No history of CAD, CVA, hypertension, or diabetes.    Diabetes:  She does not have diabetes.    Pulmonary:    -Consider incentive spirometry post-op due to COPD   - Consider Respiratory Therapy (Respiratory Care IP Consult) post-op as needed for wheezing, coughing, desaturation due to COPD.   -Oxygen as needed to maintain O2 sat above 92% during and after procedure.    Obstructive Sleep Apnea:   She has no history of sleep apnea.  No risk for sleep apnea identified.    Anemia/Bleeding/Clotting:   No history of bleeding disorder.  No history of PE/DVT.  PE/DVT prophylaxis per surgeon's recommendation/desire.  No history of anemia or blood transfusion and is okay to be transfused if necessary.     Social and Substance:   No social  or substance concerned identified.  Not on chronic pain meds, drugs or excessive alcohol intake.    Infection:   No history of MRSA  Prophylactic antibiotics per surgeon's recommendation/desire.  Covid screening test as scheduled  Shower with provided antimicrobial shampoo the day before and a.m. the procedure     Medication Instructions:  Patient is to take all scheduled medications on the day of surgery EXCEPT for modifications listed below:  Please take your prescribed medications as prescribed except.  No aspirin for 5 days before the procedure.    No NSAID (Ibuprofen, Aleve, Motrin, Naproxen, ect.) for 5 days before the procedure.  Ok to take Tylenol as needed  COVID screening as scheduled  No alcohol or smoking for 24 before the procedure  Nothing to eat for 8 hrs, but may drink clear fluid up to 2 hrs before the procedure.    RECOMMENDATION:  APPROVAL GIVEN to proceed with proposed procedure, without further diagnostic evaluation.          Subjective     HPI related to upcoming procedure:     Preop Questions 4/7/2021   1. Have you ever had a heart attack or stroke? No   2. Have you ever had surgery on your heart or blood vessels, such as a stent placement, a coronary artery bypass, or surgery on an artery in your head, neck, heart, or legs? No   3. Do you have chest pain with activity? No   4. Do you have a history of  heart failure? No   5. Do you currently have a cold, bronchitis or symptoms of other infection? No   6. Do you have a cough, shortness of breath, or wheezing? YES - due to CODP - mild and chronic.  Not worse than usual.   7. Do you or anyone in your family have previous history of blood clots? No   8. Do you or does anyone in your family have a serious bleeding problem such as prolonged bleeding following surgeries or cuts? No   9. Have you ever had problems with anemia or been told to take iron pills? No   10. Have you had any abnormal blood loss such as black, tarry or bloody stools, or  abnormal vaginal bleeding? No   11. Have you ever had a blood transfusion? No   12. Are you willing to have a blood transfusion if it is medically needed before, during, or after your surgery? Yes   13. Have you or any of your relatives ever had problems with anesthesia? No   14. Do you have sleep apnea, excessive snoring or daytime drowsiness? No   15. Do you have any artifical heart valves or other implanted medical devices like a pacemaker, defibrillator, or continuous glucose monitor? No   16. Do you have artificial joints? No   17. Are you allergic to latex? No       Health Care Directive:  Patient does not have a Health Care Directive or Living Will: Discussed advance care planning with patient; information given to patient to review.    Preoperative Review of :   reviewed - no record of controlled substances prescribed.      Jackie is here today for pre-op physical. She is scheduled for cataract surgeries on April 12 and April 26 with Dr. Snider in [place].  It is expected to be the same day procedure with MAC and local anesthesia. No personal or family history of anesthesia complication or pre-matured CAD or MI.  Has not been on steroid orally in the last 6 months.  Not taking Aspirin or other form of blood thinner.  Last dose of NSAID was weeks ago.     Active problems and medication reviewed and updated today.  No longer smoking.  COPD is well controlled, using the rescue inhaler rarely.  No coughing, wheezing or chest tightness sensation.    She generally is doing well and has no concern today. No headache or dizziness. No runny nose, nasal congestion, ST, coughing, fever or chill.  No chest pain or SOB.  No N/V/D/C and denied of having problem with urination.  Quit smoking since 2019 and denied of having breathing problem.  Again, her COPD is well controlled, using the rescue inhaler rarely.      Review of Systems  Constitutional, neuro, ENT, endocrine, pulmonary, cardiac, gastrointestinal,  genitourinary, musculoskeletal, integument and psychiatric systems are negative, except as otherwise noted.    Patient Active Problem List    Diagnosis Date Noted     Capsular contracture of breast implant, sequela 03/30/2021     Priority: Medium     Added automatically from request for surgery [id number]       Hx of bilateral mastectomy 03/09/2021     Priority: Medium     LLQ abdominal pain 03/09/2021     Priority: Medium     Combined forms of age-related cataract of right eye 02/09/2021     Priority: Medium     Added automatically from request for surgery [id number]       Combined form of age-related cataract, left eye 02/09/2021     Priority: Medium     Added automatically from request for surgery [id number]       Diverticulosis of colon 04/06/2020     Priority: Medium     Abnormal CT lung screening 10/02/2019     Priority: Medium     Currently managed with follow up imaging by [facility] Results Team.         Hypothyroidism, unspecified type 11/29/2016     Priority: Medium     Hyperlipidemia LDL goal <160 11/29/2016     Priority: Medium     Osteoarthritis of carpometacarpal joint of right thumb, unspecified osteoarthritis type 11/29/2016     Priority: Medium     IMO Regulatory Load OCT 2020       Advanced directives, counseling/discussion 01/11/2013     Priority: Medium     Discussed advance care planning with patient; information given to patient to review. 1/11/2013          Lichen sclerosus et atrophicus of the vulva 01/09/2012     Priority: Medium     Esophageal reflux 05/16/2005     Priority: Medium      Past Medical History:   Diagnosis Date     Abnormal Papanicolaou smear of cervix and cervical HPV     cryocautery     Breast cystic disease      Hiatal hernia      Hypothyroidism      Motion sickness      Nonsenile cataract      Peptic ulcer, unspecified site, unspecified as acute or chronic, without mention of hemorrhage, perforation, or obstruction     Peptic ulcer disease     Personal history of  colonic polyps      Past Surgical History:   Procedure Laterality Date     C  DELIVERY ONLY       x2     COLONOSCOPY  2007     COLONOSCOPY  2013    Procedure: COLONOSCOPY;  colonoscopy, Esophagoscopy, Gastroscopy, Duodenoscopy EGD, multiple biopsies;  Surgeon: Joe Benson MD;  Location: [facility]     COLONOSCOPY N/A 2017    Procedure: COMBINED COLONOSCOPY, SINGLE OR MULTIPLE BIOPSY/POLYPECTOMY BY BIOPSY;  colonoscopy with polypectomy by biopsy;  Surgeon: Tolu No MD;  Location: [facility]     COLONOSCOPY N/A 2020    Procedure: Colonoscopy with possible biopsy and/or polypectomy;  Surgeon: Obed Quick MD;  Location: [facility]     ENDOSCOPY  2007    Sm hiatus hernia     ESOPHAGOSCOPY, GASTROSCOPY, DUODENOSCOPY (EGD), COMBINED  2013    Procedure: COMBINED ESOPHAGOSCOPY, GASTROSCOPY, DUODENOSCOPY (EGD), BIOPSY SINGLE OR MULTIPLE;  ESOPHAGOGASTRODUODENOSCOPY WITH MULTIPLE BIOPSIES;  Surgeon: Joe Benson MD;  Location: [facility]     MASTECTOMY, BILATERAL       ZZC NONSPECIFIC PROCEDURE      Laparoscopic exam with adhesions     Z NONSPECIFIC PROCEDURE      Mastectomy for cystic breast disease, breast implants     Current Outpatient Medications   Medication Sig Dispense Refill     albuterol (PROAIR HFA/PROVENTIL HFA/VENTOLIN HFA) 108 (90 Base) MCG/ACT inhaler Inhale 2 puffs into the lungs every 4 hours as needed for shortness of breath / dyspnea or wheezing 1 Inhaler 11     Ascorbic Acid (VITAMIN C) 500 MG CAPS        Cholecalciferol (VITAMIN D3) 25 MCG (1000 UT) CAPS        clobetasol (TEMOVATE) 0.05 % external ointment Apply a small pea size amount at bedtime couple times a week as needed 60 g 1     estradiol (ESTRACE) 0.1 MG/GM vaginal cream Place 2 g vaginally twice a week Please stop the premarin cream 42.5 g 1     [START ON 2021] ketorolac (ACULAR) 0.5 % ophthalmic solution Place 1 drop into the right eye 4 times daily 5 mL 0     Lactobacillus  (PROBIOTIC ACIDOPHILUS PO)        levothyroxine (SYNTHROID/LEVOTHROID) 25 MCG tablet Take 1 tablet (25 mcg) by mouth daily 90 tablet 3     [START ON 2021] moxifloxacin (VIGAMOX) 0.5 % ophthalmic solution Place 1 drop into the right eye 4 times daily 3 mL 0     omeprazole (PRILOSEC) 20 MG DR capsule Take 1 capsule (20 mg) by mouth daily 30 capsule 1     [START ON 2021] prednisoLONE acetate (PRED FORTE) 1 % ophthalmic suspension Place 1 drop into the right eye 4 times daily 10 mL 0     fluticasone (FLONASE) 50 MCG/ACT nasal spray Spray 2 sprays into both nostrils daily 15.8 mL 1       Allergies   Allergen Reactions     No Known Drug Allergies         Social History     Tobacco Use     Smoking status: Former Smoker     Packs/day: 0.50     Years: 50.00     Pack years: 25.00     Types: Cigarettes     Quit date: 2019     Years since quittin.6     Smokeless tobacco: Never Used     Tobacco comment: 1 pack per week, started age 11   Substance Use Topics     Alcohol use: No     Alcohol/week: 0.0 standard drinks     Comment: holidays only     Family History   Problem Relation Age of Onset     Diabetes Father      Hypertension Father      Alcohol/Drug Father      Eye Disorder Father      Obesity Father      Breast Cancer Mother      Osteoporosis Mother      Thyroid Disease Mother      Cancer Mother         Bladder     Cancer Sister         fallopian tube cancer     Obesity Sister      Alzheimer Disease Sister      Cancer Sister         Skin Cancer     Allergies Daughter      Cancer Maternal Grandmother         uterine cancer     Liver Disease Daughter         Biliary Atresia     Genitourinary Problems Brother      No Known Problems Daughter      Heart Disease Brother         Heart Murmur     Glaucoma Maternal Grandfather      Genitourinary Problems Brother         kidney disease (two brothers)     Macular Degeneration No family hx of      History   Drug Use No         Objective     /66   Pulse 77   Temp  "99.1  F (37.3  C) (Temporal)   Resp 16   Ht 1.588 m (5' 2.5\")   Wt 59.9 kg (132 lb)   LMP 03/26/2003   SpO2 99%   BMI 23.76 kg/m      Physical Exam    GENERAL APPEARANCE: healthy, alert and no distress     EYES: EOMI, PERRL     HENT: ear canals and TM's normal and nose and mouth without ulcers or lesions.  Nares are non-congested. Oropharynx is pink and moist. No tender with palpation to the sinuses.      NECK: no adenopathy, no asymmetry, masses, or scars and thyroid normal to palpation     RESP: lungs clear to auscultation - no rales, rhonchi or wheezes.   Good respiratory effort throughout     CV: regular rates and rhythm, normal S1 S2, no S3 or S4 and no murmur, click or rub     ABDOMEN:  soft, nontender, no HSM or masses and bowel sounds normal     MS: extremities normal- no gross deformities noted, no focal weakness     NEURO: Normal strength and tone, sensory exam grossly normal, mentation intact and speech normal     PSYCH: mentation appears normal. and affect normal/bright    Recent Labs   Lab Test 03/09/21  0948 09/16/19  1432 05/01/19  1438   HGB  --  13.3 13.2   PLT  --  268 294    144 141   POTASSIUM 3.9 3.6 3.8   CR 0.74 0.74 0.72        Diagnostics:  Recent Results (from the past 168 hour(s))   Asymptomatic COVID-19 Virus (Coronavirus) by PCR    Collection Time: 04/08/21  3:59 PM    Specimen: Nasopharyngeal   Result Value Ref Range    COVID-19 Virus PCR to [facility] - Source Nasopharyngeal     COVID-19 Virus PCR to [facility] - Result       Test received-See reflex to IDDL test SARS CoV2 (COVID-19) Virus RT-PCR   SARS-CoV-2 COVID-19 Virus (Coronavirus) by PCR    Collection Time: 04/08/21  3:59 PM    Specimen: Nasopharyngeal   Result Value Ref Range    SARS-CoV-2 Virus Specimen Source Nasopharyngeal     SARS-CoV-2 PCR Result NEGATIVE     SARS-CoV-2 PCR Comment       Testing was performed using the donavan SARS-CoV-2 assay on the donavan 6800 System.2      No EKG required for low risk surgery " (cataract, skin procedure, breast biopsy, etc).    Revised Cardiac Risk Index (RCRI):  The patient has the following serious cardiovascular risks for perioperative complications:   - No serious cardiac risks = 0 points     RCRI Interpretation: 0 points: Class I (very low risk - 0.4% complication rate)           Signed Electronically by: Jovana Wooten Mai, MD  Copy of this evaluation report is provided to requesting physician.

## 2021-04-08 DIAGNOSIS — Z11.59 ENCOUNTER FOR SCREENING FOR OTHER VIRAL DISEASES: ICD-10-CM

## 2021-04-08 LAB
SARS-COV-2 RNA RESP QL NAA+PROBE: NORMAL
SPECIMEN SOURCE: NORMAL

## 2021-04-08 PROCEDURE — U0003 INFECTIOUS AGENT DETECTION BY NUCLEIC ACID (DNA OR RNA); SEVERE ACUTE RESPIRATORY SYNDROME CORONAVIRUS 2 (SARS-COV-2) (CORONAVIRUS DISEASE [COVID-19]), AMPLIFIED PROBE TECHNIQUE, MAKING USE OF HIGH THROUGHPUT TECHNOLOGIES AS DESCRIBED BY CMS-2020-01-R: HCPCS | Performed by: STUDENT IN AN ORGANIZED HEALTH CARE EDUCATION/TRAINING PROGRAM

## 2021-04-08 PROCEDURE — U0005 INFEC AGEN DETEC AMPLI PROBE: HCPCS | Performed by: STUDENT IN AN ORGANIZED HEALTH CARE EDUCATION/TRAINING PROGRAM

## 2021-04-09 ENCOUNTER — ANESTHESIA EVENT (OUTPATIENT)
Dept: SURGERY | Facility: AMBULATORY SURGERY CENTER | Age: 66
End: 2021-04-09

## 2021-04-09 LAB
LABORATORY COMMENT REPORT: NORMAL
SARS-COV-2 RNA RESP QL NAA+PROBE: NEGATIVE
SPECIMEN SOURCE: NORMAL

## 2021-04-12 ENCOUNTER — ANESTHESIA (OUTPATIENT)
Dept: SURGERY | Facility: AMBULATORY SURGERY CENTER | Age: 66
End: 2021-04-12

## 2021-04-12 ENCOUNTER — HOSPITAL ENCOUNTER (OUTPATIENT)
Facility: AMBULATORY SURGERY CENTER | Age: 66
Discharge: HOME | End: 2021-04-12
Attending: STUDENT IN AN ORGANIZED HEALTH CARE EDUCATION/TRAINING PROGRAM | Admitting: STUDENT IN AN ORGANIZED HEALTH CARE EDUCATION/TRAINING PROGRAM
Payer: COMMERCIAL

## 2021-04-12 VITALS
RESPIRATION RATE: 16 BRPM | DIASTOLIC BLOOD PRESSURE: 61 MMHG | SYSTOLIC BLOOD PRESSURE: 96 MMHG | TEMPERATURE: 97.6 F | OXYGEN SATURATION: 95 %

## 2021-04-12 DIAGNOSIS — H25.811 COMBINED FORMS OF AGE-RELATED CATARACT OF RIGHT EYE: ICD-10-CM

## 2021-04-12 DIAGNOSIS — Z11.59 ENCOUNTER FOR SCREENING FOR OTHER VIRAL DISEASES: ICD-10-CM

## 2021-04-12 PROCEDURE — 66984 XCAPSL CTRC RMVL W/O ECP: CPT | Mod: RT | Performed by: STUDENT IN AN ORGANIZED HEALTH CARE EDUCATION/TRAINING PROGRAM

## 2021-04-12 PROCEDURE — G8907 PT DOC NO EVENTS ON DISCHARG: HCPCS

## 2021-04-12 PROCEDURE — G8918 PT W/O PREOP ORDER IV AB PRO: HCPCS

## 2021-04-12 PROCEDURE — 66984 XCAPSL CTRC RMVL W/O ECP: CPT | Mod: RT

## 2021-04-12 DEVICE — EYE IMP IOL AMO PCL TECNIS ZCB00 23.5: Type: IMPLANTABLE DEVICE | Site: EYE | Status: FUNCTIONAL

## 2021-04-12 RX ORDER — FENTANYL CITRATE 50 UG/ML
INJECTION, SOLUTION INTRAMUSCULAR; INTRAVENOUS PRN
Status: DISCONTINUED | OUTPATIENT
Start: 2021-04-12 | End: 2021-04-12

## 2021-04-12 RX ORDER — SODIUM CHLORIDE, SODIUM LACTATE, POTASSIUM CHLORIDE, CALCIUM CHLORIDE 600; 310; 30; 20 MG/100ML; MG/100ML; MG/100ML; MG/100ML
INJECTION, SOLUTION INTRAVENOUS CONTINUOUS
Status: DISCONTINUED | OUTPATIENT
Start: 2021-04-12 | End: 2021-04-13 | Stop reason: HOSPADM

## 2021-04-12 RX ORDER — NALOXONE HYDROCHLORIDE 0.4 MG/ML
0.4 INJECTION, SOLUTION INTRAMUSCULAR; INTRAVENOUS; SUBCUTANEOUS
Status: DISCONTINUED | OUTPATIENT
Start: 2021-04-12 | End: 2021-04-13 | Stop reason: HOSPADM

## 2021-04-12 RX ORDER — TROPICAMIDE 10 MG/ML
1 SOLUTION/ DROPS OPHTHALMIC
Status: COMPLETED | OUTPATIENT
Start: 2021-04-12 | End: 2021-04-12

## 2021-04-12 RX ORDER — CYCLOPENTOLATE HYDROCHLORIDE 10 MG/ML
1 SOLUTION/ DROPS OPHTHALMIC
Status: COMPLETED | OUTPATIENT
Start: 2021-04-12 | End: 2021-04-12

## 2021-04-12 RX ORDER — MEPERIDINE HYDROCHLORIDE 25 MG/ML
12.5 INJECTION INTRAMUSCULAR; INTRAVENOUS; SUBCUTANEOUS
Status: DISCONTINUED | OUTPATIENT
Start: 2021-04-12 | End: 2021-04-13 | Stop reason: HOSPADM

## 2021-04-12 RX ORDER — NALOXONE HYDROCHLORIDE 0.4 MG/ML
0.2 INJECTION, SOLUTION INTRAMUSCULAR; INTRAVENOUS; SUBCUTANEOUS
Status: DISCONTINUED | OUTPATIENT
Start: 2021-04-12 | End: 2021-04-13 | Stop reason: HOSPADM

## 2021-04-12 RX ORDER — ONDANSETRON 4 MG/1
4 TABLET, ORALLY DISINTEGRATING ORAL EVERY 30 MIN PRN
Status: DISCONTINUED | OUTPATIENT
Start: 2021-04-12 | End: 2021-04-13 | Stop reason: HOSPADM

## 2021-04-12 RX ORDER — LIDOCAINE 40 MG/G
CREAM TOPICAL
Status: DISCONTINUED | OUTPATIENT
Start: 2021-04-12 | End: 2021-04-13 | Stop reason: HOSPADM

## 2021-04-12 RX ORDER — TETRACAINE HYDROCHLORIDE 5 MG/ML
SOLUTION OPHTHALMIC PRN
Status: DISCONTINUED | OUTPATIENT
Start: 2021-04-12 | End: 2021-04-12 | Stop reason: HOSPADM

## 2021-04-12 RX ORDER — ONDANSETRON 2 MG/ML
4 INJECTION INTRAMUSCULAR; INTRAVENOUS EVERY 30 MIN PRN
Status: DISCONTINUED | OUTPATIENT
Start: 2021-04-12 | End: 2021-04-13 | Stop reason: HOSPADM

## 2021-04-12 RX ORDER — PHENYLEPHRINE HYDROCHLORIDE 25 MG/ML
1 SOLUTION/ DROPS OPHTHALMIC
Status: COMPLETED | OUTPATIENT
Start: 2021-04-12 | End: 2021-04-12

## 2021-04-12 RX ORDER — TETRACAINE HYDROCHLORIDE 5 MG/ML
1-2 SOLUTION OPHTHALMIC ONCE
Status: COMPLETED | OUTPATIENT
Start: 2021-04-12 | End: 2021-04-12

## 2021-04-12 RX ORDER — MOXIFLOXACIN 5 MG/ML
SOLUTION/ DROPS OPHTHALMIC PRN
Status: DISCONTINUED | OUTPATIENT
Start: 2021-04-12 | End: 2021-04-12 | Stop reason: HOSPADM

## 2021-04-12 RX ADMIN — PHENYLEPHRINE HYDROCHLORIDE 1 DROP: 25 SOLUTION/ DROPS OPHTHALMIC at 07:36

## 2021-04-12 RX ADMIN — PHENYLEPHRINE HYDROCHLORIDE 1 DROP: 25 SOLUTION/ DROPS OPHTHALMIC at 07:29

## 2021-04-12 RX ADMIN — TROPICAMIDE 1 DROP: 10 SOLUTION/ DROPS OPHTHALMIC at 07:40

## 2021-04-12 RX ADMIN — PHENYLEPHRINE HYDROCHLORIDE 1 DROP: 25 SOLUTION/ DROPS OPHTHALMIC at 07:40

## 2021-04-12 RX ADMIN — CYCLOPENTOLATE HYDROCHLORIDE 1 DROP: 10 SOLUTION/ DROPS OPHTHALMIC at 07:36

## 2021-04-12 RX ADMIN — FENTANYL CITRATE 25 MCG: 50 INJECTION, SOLUTION INTRAMUSCULAR; INTRAVENOUS at 08:29

## 2021-04-12 RX ADMIN — SODIUM CHLORIDE, SODIUM LACTATE, POTASSIUM CHLORIDE, CALCIUM CHLORIDE: 600; 310; 30; 20 INJECTION, SOLUTION INTRAVENOUS at 07:32

## 2021-04-12 RX ADMIN — TROPICAMIDE 1 DROP: 10 SOLUTION/ DROPS OPHTHALMIC at 07:29

## 2021-04-12 RX ADMIN — TETRACAINE HYDROCHLORIDE 1 DROP: 5 SOLUTION OPHTHALMIC at 07:30

## 2021-04-12 RX ADMIN — CYCLOPENTOLATE HYDROCHLORIDE 1 DROP: 10 SOLUTION/ DROPS OPHTHALMIC at 07:40

## 2021-04-12 RX ADMIN — TROPICAMIDE 1 DROP: 10 SOLUTION/ DROPS OPHTHALMIC at 07:36

## 2021-04-12 RX ADMIN — CYCLOPENTOLATE HYDROCHLORIDE 1 DROP: 10 SOLUTION/ DROPS OPHTHALMIC at 07:29

## 2021-04-12 ASSESSMENT — LIFESTYLE VARIABLES: TOBACCO_USE: 1

## 2021-04-12 NOTE — ANESTHESIA POSTPROCEDURE EVALUATION
Patient: Jackie Siddiqi    Procedure(s):  RIGHT PHACOEMULSIFICATION, CATARACT, WITH INTRAOCULAR LENS IMPLANT    Diagnosis:Combined forms of age-related cataract of right eye [H25.811]  Diagnosis Additional Information: No value filed.    Anesthesia Type:  MAC    Note:  Disposition: Outpatient   Postop Pain Control: Uneventful            Sign Out: ONGOING pain issues   PONV: No   Neuro/Psych: Uneventful            Sign Out: Acceptable/Baseline neuro status   Airway/Respiratory: Uneventful            Sign Out: Acceptable/Baseline resp. status   CV/Hemodynamics: Uneventful            Sign Out: Acceptable CV status   Other NRE: NONE   DID A NON-ROUTINE EVENT OCCUR? No         Last vitals:  Vitals:    04/12/21 0727 04/12/21 0852 04/12/21 0907   BP: 131/68 91/55 96/61   Resp: 16 16 16   Temp: 97  F (36.1  C) 97.6  F (36.4  C)    SpO2: 96% 98% 95%       Last vitals prior to Anesthesia Care Transfer:  CRNA VITALS  4/12/2021 0820 - 4/12/2021 0910      4/12/2021             Pulse:  69    SpO2:  99 %          Electronically Signed By: Joe Acosta MD  April 12, 2021  9:10 AM

## 2021-04-12 NOTE — ANESTHESIA PREPROCEDURE EVALUATION
Anesthesia Pre-Procedure Evaluation    Patient: Jackie Siddiqi   MRN: [id number] : 1955        Preoperative Diagnosis: Combined forms of age-related cataract of right eye [H25.811]   Procedure : Procedure(s):  RIGHT PHACOEMULSIFICATION, CATARACT, WITH INTRAOCULAR LENS IMPLANT     Past Medical History:   Diagnosis Date     Abnormal Papanicolaou smear of cervix and cervical HPV     cryocautery     Breast cystic disease      Hiatal hernia      Hypothyroidism      Motion sickness      Nonsenile cataract      Peptic ulcer, unspecified site, unspecified as acute or chronic, without mention of hemorrhage, perforation, or obstruction     Peptic ulcer disease     Personal history of colonic polyps       Past Surgical History:   Procedure Laterality Date     C  DELIVERY ONLY       x2     COLONOSCOPY  2007     COLONOSCOPY  2013    Procedure: COLONOSCOPY;  colonoscopy, Esophagoscopy, Gastroscopy, Duodenoscopy EGD, multiple biopsies;  Surgeon: Joe Benson MD;  [facility]     COLONOSCOPY N/A 2017    Procedure: COMBINED COLONOSCOPY, SINGLE OR MULTIPLE BIOPSY/POLYPECTOMY BY BIOPSY;  colonoscopy with polypectomy by biopsy;  Surgeon: Tolu No MD;  Location:  [facility]     COLONOSCOPY N/A 2020    Procedure: Colonoscopy with possible biopsy and/or polypectomy;  Surgeon: Obed Quick MD;  Location:  [facility]     ENDOSCOPY  2007    Sm hiatus hernia     ESOPHAGOSCOPY, GASTROSCOPY, DUODENOSCOPY (EGD), COMBINED  2013    Procedure: COMBINED ESOPHAGOSCOPY, GASTROSCOPY, DUODENOSCOPY (EGD), BIOPSY SINGLE OR MULTIPLE;  ESOPHAGOGASTRODUODENOSCOPY WITH MULTIPLE BIOPSIES;  Surgeon: Joe Benson MD;  Location:  GI     MASTECTOMY, BILATERAL       ZZC NONSPECIFIC PROCEDURE      Laparoscopic exam with adhesions     ZZC NONSPECIFIC PROCEDURE      Mastectomy for cystic breast disease, breast implants      Allergies   Allergen Reactions     No Known Drug Allergies        Social History     Tobacco Use     Smoking status: Former Smoker     Packs/day: 0.50     Years: 50.00     Pack years: 25.00     Types: Cigarettes     Quit date: 2019     Years since quittin.6     Smokeless tobacco: Never Used     Tobacco comment: 1 pack per week, started age 11   Substance Use Topics     Alcohol use: No     Alcohol/week: 0.0 standard drinks     Comment: holidays only      Wt Readings from Last 1 Encounters:   21 59.9 kg (132 lb)        Anesthesia Evaluation   Pt has had prior anesthetic. Type: General and MAC.    History of anesthetic complications  - motion sickness.      ROS/MED HX  ENT/Pulmonary:     (+) tobacco use, Past use, Intermittent, asthma Treatment: Inhaler prn,      Neurologic:  - neg neurologic ROS     Cardiovascular:  - neg cardiovascular ROS     METS/Exercise Tolerance:     Hematologic:  - neg hematologic  ROS     Musculoskeletal:  - neg musculoskeletal ROS     GI/Hepatic:     (+) GERD, Asymptomatic on medication,     Renal/Genitourinary:  - neg Renal ROS     Endo:  - neg endo ROS   (+) thyroid problem, hypothyroidism,     Psychiatric/Substance Use:  - neg psychiatric ROS     Infectious Disease:  - neg infectious disease ROS     Malignancy:  - neg malignancy ROS     Other:  - neg other ROS          Physical Exam    Airway  airway exam normal           Respiratory Devices and Support         Dental  no notable dental history         Cardiovascular   cardiovascular exam normal          Pulmonary   pulmonary exam normal                OUTSIDE LABS:  CBC:   Lab Results   Component Value Date    WBC 7.6 2019    WBC 7.7 2019    HGB 13.3 2019    HGB 13.2 2019    HCT 40.5 2019    HCT 39.6 2019     2019     2019     BMP:   Lab Results   Component Value Date     2021     2019    POTASSIUM 3.9 2021    POTASSIUM 3.6 2019    CHLORIDE 109 2021    CHLORIDE 107 2019    CO2 29  03/09/2021    CO2 29 09/16/2019    BUN 14 03/09/2021    BUN 11 09/16/2019    CR 0.74 03/09/2021    CR 0.74 09/16/2019    GLC 94 03/09/2021     (H) 09/16/2019     COAGS:   Lab Results   Component Value Date    INR 1.01 04/19/2016     POC: No results found for: BGM, HCG, HCGS  HEPATIC:   Lab Results   Component Value Date    ALBUMIN 4.0 03/09/2021    PROTTOTAL 7.6 03/09/2021    ALT 25 03/09/2021    AST 15 03/09/2021    ALKPHOS 63 03/09/2021    BILITOTAL 1.3 03/09/2021     OTHER:   Lab Results   Component Value Date    PH 5.0 03/28/2003    A1C 5.6 02/12/2017    SALLY 8.8 03/09/2021    LIPASE 130 02/16/2017    AMYLASE 33 02/16/2017    TSH 3.84 03/09/2021    T4 0.97 01/11/2013    SED 29 09/16/2019       Anesthesia Plan    ASA Status:  3   NPO Status:  NPO Appropriate    Anesthesia Type: MAC.     - Reason for MAC: chronic cardiopulmonary disease              Consents    Anesthesia Plan(s) and associated risks, benefits, and realistic alternatives discussed. Questions answered and patient/representative(s) expressed understanding.     - Discussed with:  Patient      - Extended Intubation/Ventilatory Support Discussed: No.      - Patient is DNR/DNI Status: No    Use of blood products discussed: No .     Postoperative Care    Pain management: Oral pain medications.   PONV prophylaxis: Ondansetron (or other 5HT-3), Dexamethasone or Solumedrol     Comments:                Joe Acosta MD

## 2021-04-12 NOTE — DISCHARGE INSTRUCTIONS
CATARACT SURGERY POST-OP INSTRUCTIONS  Dr. Foreign Snider  [phone]        Start using all three eye drops today, including   - Vigamox (tan top)   - Ketolorac (grey top)   - Prednisolone (white or pink top)    You should get 3 doses in today and 4 doses daily starting tomorrow.  Wait a few minutes in between putting each drop in.      Keep the eye shield taped in place unless putting drops in. We will remove it for you in the office tomorrow.      Light sensitivity may be noticed. Sunglasses may be worn for comfort.      Do not rub the operated eye.      Keep the operated eye dry. You may wash your hair, bathe or shower, but keep the operated eye closed while doing so.       No swimming, hot tub, or sauna for 2 weeks.      No make up around eye for 5 days.      No bending at the waist or lifting more than 10 pounds for one week.      May take Tylenol (per directions on bottle) for mild pain.      Call the office at [phone] and ask to speak to the on-call ophthalmologist  if any of the following should occur:  o Any sudden vision changes  o Nausea or severe headache  o Increase in pain not controlled  o Or signs of infection (pus, increasing redness or tenderness)      [facility]   Adult Discharge Orders & Instructions     For 24 hours after surgery    1. Get plenty of rest.  A responsible adult must stay with you for at least 24 hours after you leave the hospital.   2. Do not drive or use heavy equipment.  If you have weakness or tingling, don't drive or use heavy equipment until this feeling goes away.  3. Do not drink alcohol.  4. Avoid strenuous or risky activities.  Ask for help when climbing stairs.   5. You may feel lightheaded.  IF so, sit for a few minutes before standing.  Have someone help you get up.   6. If you have nausea (feel sick to your stomach): Drink only clear liquids such as apple juice, ginger ale, broth or 7-Up.  Rest may also help.  Be sure to drink enough fluids.   Move to a regular diet as you feel able.  7. You may have a slight fever. Call the doctor if your fever is over 100 F (37.7 C) (taken under the tongue) or lasts longer than 24 hours.  8. You may have a dry mouth, a sore throat, muscle aches or trouble sleeping.  These should go away after 24 hours.  9. Do not make important or legal decisions.     Call your doctor for any of the followin.  Signs of infection (fever, growing tenderness at the surgery site, a large amount of drainage or bleeding, severe pain, foul-smelling drainage, redness, swelling).    2. It has been over 8 to 10 hours since surgery and you are still not able to urinate (pass water).    3.  Headache for over 24 hours.    4.  Numbness, tingling or weakness the day after surgery (if you had spinal anesthesia).                  5. Signs of Covid-19 infection (temperature over 100 degrees, shortness of breath, cough, loss of taste/smell, generalized body aches, persistent headache,                  chills, sore throat, nausea/vomiting/diarrhea).    To contact a doctor, call _____[phone]__________________________________

## 2021-04-12 NOTE — OP NOTE
PreOp Diagnosis: Visually significant nuclear sclerotic cataract right eye  PostOp Diagnosis: Same  Surgeon: Foreign Snider MD  Implant: Tecnis [id number] 23.5D    Procedures:   1. Review of intraocular lens calculations, both eyes   2. Phacoemulsification and extraction of lens  right eye   3. Intraocular lens implantation right eye  Anesthesia: MAC/topical  Complications: None  EBL: <1cc    Jackie Siddiqi suffers from a visually significant cataract of the right eye. This has caused problems with distance and reading vision, including glare. After discussing the risks, benefits, and alternatives, the patient wishes to proceed with cataract surgery.    The patient was identified in the pre-op area where the right eye was marked. The patient was then brought to the operating room where a time out was called, identifying the patient, the procedure, and the correct site. Tetracaine drops were applied to the operative eye. The operative eye was then prepped and draped in the usual sterile ophthalmic fashion. An eyelid speculum was placed into the operative eye. Additional tetracaine drops were applied. A paracentesis was made superior with a side port blade. 1% preservative-free lidocaine and epinephrine was injected into the anterior chamber. Due to poor red reflex, trypan blue was irrigated into the anterior chamber to enhance capsular visualization.  This was irrigated from the anterior chamber after 60 seconds with balanced salt solution.   Endocoat was injected to deepen the anterior chamber. A 2.4mm clear corneal wound was created with a keratome blade temporally.  A continuous curvilinear capsulorrhexis was started with a bent cystitome and completed with Utrada forceps. Hydrodissection of the lens nucleus was performed with BSS on a cannula. The lens nucleus was rotated. Phacoemulsification of the lens nucleus was accomplished in a phacoemulsification stop and chop technique. Remaining cortex was removed with  irrigation and aspiration. The lens capsule was noted to be intact. Healon was used to inflate the capsular bag.  The lens was injected into the capsular bag. Remaining viscoelastic was removed with irrigation and aspiration. The wounds were checked and found to be watertight after hydration. The eyelid speculum was removed and Vigamox drops were placed into the operative eye. The patient tolerated the procedure well and was in stable condition on the way to the recovery area.     Foreign Snider MD

## 2021-04-12 NOTE — ANESTHESIA CARE TRANSFER NOTE
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    Anesthesia Type:   MAC     Note:    Oropharynx: oropharynx clear of all foreign objects  Level of Consciousness: awake  Oxygen Supplementation: room air    Independent Airway: airway patency satisfactory and stable  Dentition: dentition unchanged  Vital Signs Stable: post-procedure vital signs reviewed and stable  Report to RN Given: handoff report given  Patient transferred to: Phase II  Comments: To Phase II. Report to RN.  VSS Resp status stable.  Handoff Report: Identifed the Patient, Identified the Reponsible Provider, Reviewed the pertinent medical history, Discussed the surgical course, Reviewed Intra-OP anesthesia mangement and issues during anesthesia, Set expectations for post-procedure period and Allowed opportunity for questions and acknowledgement of understanding      Vitals: (Last set prior to Anesthesia Care Transfer)  CRNA VITALS  4/12/2021 0820 - 4/12/2021 0853      4/12/2021             Pulse:  69    SpO2:  99 %        Electronically Signed By: TEDDY Berman CRNA  April 12, 2021  8:53 AM

## 2021-04-13 ENCOUNTER — OFFICE VISIT (OUTPATIENT)
Dept: OPHTHALMOLOGY | Facility: CLINIC | Age: 66
End: 2021-04-13
Payer: COMMERCIAL

## 2021-04-13 DIAGNOSIS — Z96.1 PSEUDOPHAKIA: Primary | ICD-10-CM

## 2021-04-13 PROCEDURE — 99024 POSTOP FOLLOW-UP VISIT: CPT | Performed by: STUDENT IN AN ORGANIZED HEALTH CARE EDUCATION/TRAINING PROGRAM

## 2021-04-13 RX ORDER — DORZOLAMIDE HCL 20 MG/ML
1 SOLUTION/ DROPS OPHTHALMIC 2 TIMES DAILY
Qty: 10 ML | Refills: 0 | Status: SHIPPED | OUTPATIENT
Start: 2021-04-13 | End: 2021-05-03

## 2021-04-13 ASSESSMENT — TONOMETRY
IOP_METHOD: APPLANATION
OD_IOP_MMHG: 37

## 2021-04-13 ASSESSMENT — SLIT LAMP EXAM - LIDS
COMMENTS: 1+ DERMATOCHALASIS
COMMENTS: 1+ DERMATOCHALASIS

## 2021-04-13 ASSESSMENT — EXTERNAL EXAM - RIGHT EYE: OD_EXAM: NORMAL

## 2021-04-13 ASSESSMENT — VISUAL ACUITY
METHOD: SNELLEN - LINEAR
OD_SC: 20/40+2
OD_PH_SC: 20/25

## 2021-04-13 ASSESSMENT — EXTERNAL EXAM - LEFT EYE: OS_EXAM: NORMAL

## 2021-04-13 NOTE — LETTER
4/13/2021         RE: Jackie Siddiqi  [address]  [place] [address]        Dear Colleague,    Thank you for referring your patient, Jackie Siddiqi, to the [facility]. Please see a copy of my visit note below.     Current Eye Medications:  Vigamox, ketorolac and prednisolone four times a day right eye       Subjective:  PO right eye   Patient reports that she is seeing well and this eye feels fine.     Objective:  See Ophthalmology Exam.      Assessment:  Jackie Siddiqi is a 65 year old female who presents with:   Encounter Diagnosis   Name Primary?     Pseudophakia - Right Eye POD1 s/p CE/IOL right eye. Intraocular pressure 37 right eye - add dorzolamide twice a day right eye (avoid BB - on albuterol/COPD).         Plan:  POST-OP CATARACT INSTRUCTIONS    *   Use the following drop(s) in the RIGHT EYE four times a day:        continue Vigamox or ofloxacin (tan top), ketorolac (grey top) and prednisolone (white or pink top) four times a day until the bottles run out.    *   Add dorzolamide twice a day in the right eye     *   Wear eye shield when sleeping for one week. Do not rub the operated eye.     *   No bending or lifting more than 10 pounds for one week.    *   Keep water out of eye for two weeks.    *   OK to resume aspirin and/or other blood thinners if you stopped.     *   Return as scheduled in about one week.    *   If your vision worsens, eye becomes increasingly red, or becomes painful, call [phone].     Foreign Snider M.D.               Again, thank you for allowing me to participate in the care of your patient.        Sincerely,        Foreign Snider MD

## 2021-04-13 NOTE — PATIENT INSTRUCTIONS
POST-OP CATARACT INSTRUCTIONS    *   Use the following drop(s) in the RIGHT EYE four times a day:        continue Vigamox or ofloxacin (tan top), ketorolac (grey top) and prednisolone (white or pink top) four times a day until the bottles run out.    *   Add dorzolamide twice a day in the right eye     *   Wear eye shield when sleeping for one week. Do not rub the operated eye.     *   No bending or lifting more than 10 pounds for one week.    *   Keep water out of eye for two weeks.    *   OK to resume aspirin and/or other blood thinners if you stopped.     *   Return as scheduled in about one week.    *   If your vision worsens, eye becomes increasingly red, or becomes painful, call [phone].     Foreign Snider M.D.

## 2021-04-13 NOTE — PROGRESS NOTES
Current Eye Medications:  Vigamox, ketorolac and prednisolone four times a day right eye       Subjective:  PO right eye   Patient reports that she is seeing well and this eye feels fine.     Objective:  See Ophthalmology Exam.      Assessment:  Jackie Siddiqi is a 65 year old female who presents with:   Encounter Diagnosis   Name Primary?     Pseudophakia - Right Eye POD1 s/p CE/IOL right eye. Intraocular pressure 37 right eye - add dorzolamide twice a day right eye (avoid BB - on albuterol/COPD).         Plan:  POST-OP CATARACT INSTRUCTIONS    *   Use the following drop(s) in the RIGHT EYE four times a day:        continue Vigamox or ofloxacin (tan top), ketorolac (grey top) and prednisolone (white or pink top) four times a day until the bottles run out.    *   Add dorzolamide twice a day in the right eye     *   Wear eye shield when sleeping for one week. Do not rub the operated eye.     *   No bending or lifting more than 10 pounds for one week.    *   Keep water out of eye for two weeks.    *   OK to resume aspirin and/or other blood thinners if you stopped.     *   Return as scheduled in about one week.    *   If your vision worsens, eye becomes increasingly red, or becomes painful, call [phone].     Foreign Snider M.D.

## 2021-04-15 ENCOUNTER — TELEPHONE (OUTPATIENT)
Dept: OPHTHALMOLOGY | Facility: CLINIC | Age: 66
End: 2021-04-15

## 2021-04-15 NOTE — TELEPHONE ENCOUNTER
Spoke with patient - last evening she accidentally rubbed her right eye, and wonders if she needs to be seen right away.  Vision is good, and she is not having any pain or discomfort.  Reassured an appointment is not needed (she is seeing Dr. Snider on 4-19-21).  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  I advised her to do her best to keep dirt and debris out of her eye.

## 2021-04-15 NOTE — TELEPHONE ENCOUNTER
Patient called stating that she has a few questions that she would like to get addressed since she had post cataract surgery and she would like a call back to discuss as soon as possible at: [phone] as soon as possible.    Thank You,    Chivo Turner  Patient Rep

## 2021-04-19 ENCOUNTER — OFFICE VISIT (OUTPATIENT)
Dept: OPHTHALMOLOGY | Facility: CLINIC | Age: 66
End: 2021-04-19
Payer: COMMERCIAL

## 2021-04-19 DIAGNOSIS — H25.812 COMBINED FORM OF AGE-RELATED CATARACT, LEFT EYE: ICD-10-CM

## 2021-04-19 DIAGNOSIS — Z96.1 PSEUDOPHAKIA: Primary | ICD-10-CM

## 2021-04-19 PROCEDURE — 99024 POSTOP FOLLOW-UP VISIT: CPT | Performed by: STUDENT IN AN ORGANIZED HEALTH CARE EDUCATION/TRAINING PROGRAM

## 2021-04-19 PROCEDURE — 92136 OPHTHALMIC BIOMETRY: CPT | Mod: 26 | Performed by: STUDENT IN AN ORGANIZED HEALTH CARE EDUCATION/TRAINING PROGRAM

## 2021-04-19 RX ORDER — MOXIFLOXACIN 5 MG/ML
1 SOLUTION/ DROPS OPHTHALMIC 4 TIMES DAILY
Qty: 3 ML | Refills: 0 | Status: SHIPPED | OUTPATIENT
Start: 2021-04-25 | End: 2021-06-03

## 2021-04-19 RX ORDER — PREDNISOLONE ACETATE 10 MG/ML
1 SUSPENSION/ DROPS OPHTHALMIC 4 TIMES DAILY
Qty: 5 ML | Refills: 0 | Status: SHIPPED | OUTPATIENT
Start: 2021-04-26 | End: 2021-11-10

## 2021-04-19 RX ORDER — KETOROLAC TROMETHAMINE 5 MG/ML
1 SOLUTION OPHTHALMIC 4 TIMES DAILY
Qty: 5 ML | Refills: 0 | Status: SHIPPED | OUTPATIENT
Start: 2021-04-25 | End: 2021-06-03

## 2021-04-19 ASSESSMENT — VISUAL ACUITY
OD_SC: 20/40
OS_PH_SC+: -1
OD_SC+: -2
OD_PH_SC: 20/25
OS_PH_SC: 20/70
OD_PH_SC+: +2
OS_SC: 20/200
METHOD: SNELLEN - LINEAR

## 2021-04-19 ASSESSMENT — REFRACTION_MANIFEST
OD_SPHERE: PLANO
OD_CYLINDER: +0.75
OD_AXIS: 037

## 2021-04-19 ASSESSMENT — SLIT LAMP EXAM - LIDS
COMMENTS: 1+ DERMATOCHALASIS
COMMENTS: 1+ DERMATOCHALASIS

## 2021-04-19 ASSESSMENT — TONOMETRY
IOP_METHOD: APPLANATION
OD_IOP_MMHG: 18

## 2021-04-19 ASSESSMENT — EXTERNAL EXAM - LEFT EYE: OS_EXAM: NORMAL

## 2021-04-19 ASSESSMENT — EXTERNAL EXAM - RIGHT EYE: OD_EXAM: NORMAL

## 2021-04-19 NOTE — PATIENT INSTRUCTIONS
POST-OP CATARACT INSTRUCTIONS for the RIGHT EYE:    *   Use the following eye drop(s) four times a day until the bottle runs out:   Continue moxifloxacin (tan top), ketorolac (grey top) and prednisolone (white or pink top) four times a day until the bottles run out.    *  Continue dorzolamide (orange top) twice a day in the right eye     *   Stop wearing eye shield.    *   No eye rubbing. May resume heavy lifting.    *   If your vision worsens, eye becomes increasingly red, or becomes painful, call [phone].      PRE-OP CATARACT INSTRUCTIONS for the LEFT EYE:    ** 3 eye drops from the pharmacy at least 3 days before surgery.     *Use the following drops in the left eye 4 times on the day before surgery and once the morning of surgery:                                               Vigamox or Ofloxacin (tan cap)              Ketorolac (gray cap)     **We will start the prednisolone (white or pink top) drops AFTER surgery.     *Please bring all your eyedrops to the surgery center.     *If taking more than one drop, wait five minutes between drops.     *No solid food or drink after midnight.     Foreign Snider MD  [phone]

## 2021-04-19 NOTE — PROGRESS NOTES
Current Eye Medications:  Dorzolamide right eye twice a day.  Last drops:  6am.  Vigamox, Ketorolac, and Prednisolone 1% right eye four times a day.     Subjective:  Status/Post Kelman Phacoemulsification with Posterior Chamber Lens right eye:  4-12-21.  She accidentally rubbed her eye twice, but her vision is good, and right eye is comfortable.  She is aware of a few floaters in her right eye, but no flashing lights.      Patient here for pre-op cataract surgery, left eye, scheduled for 4-26-21.  History and Physical done on 4-7-21.  Covid test is this Thursday.      Objective:  See Ophthalmology Exam.       Assessment:  Jackie Siddiqi is a 65 year old female who presents with:   Encounter Diagnoses   Name Primary?     Pseudophakia - Right Eye POW1 s/p CE/IOL right eye - doing well.       Combined form of age-related cataract, left eye Cataract pre-op left eye. Dil 7. Dense oil drop cataract both eyes. COPD/smoker, but says she is able to lay flat without issues and no cough. Target mostly distance left eye. 3 drops.       Plan:  POST-OP CATARACT INSTRUCTIONS for the RIGHT EYE:    *   Use the following eye drop(s) four times a day until the bottle runs out:   Continue moxifloxacin (tan top), ketorolac (grey top) and prednisolone (white or pink top) four times a day until the bottles run out.    *  Continue dorzolamide (orange top) twice a day in the right eye     *   Stop wearing eye shield.    *   No eye rubbing. May resume heavy lifting.    *   If your vision worsens, eye becomes increasingly red, or becomes painful, call [phone].      PRE-OP CATARACT INSTRUCTIONS for the LEFT EYE:    ** 3 eye drops from the pharmacy at least 3 days before surgery.     *Use the following drops in the left eye 4 times on the day before surgery and once the morning of surgery:                                               Vigamox or Ofloxacin (tan cap)              Ketorolac (gray cap)     **We will start the  prednisolone (white or pink top) drops AFTER surgery.     *Please bring all your eyedrops to the surgery center.     *If taking more than one drop, wait five minutes between drops.     *No solid food or drink after midnight.     Foreign Snider MD  [phone]

## 2021-04-19 NOTE — LETTER
4/19/2021         RE: Jackie Siddiqi  [address]  [place] [address]        Dear Colleague,    Thank you for referring your patient, Jackie Siddiqi, to the [facility]. Please see a copy of my visit note below.     Current Eye Medications:  Dorzolamide right eye twice a day.  Last drops:  6am.  Vigamox, Ketorolac, and Prednisolone 1% right eye four times a day.     Subjective:  Status/Post Kelman Phacoemulsification with Posterior Chamber Lens right eye:  4-12-21.  She accidentally rubbed her eye twice, but her vision is good, and right eye is comfortable.  She is aware of a few floaters in her right eye, but no flashing lights.      Patient here for pre-op cataract surgery, left eye, scheduled for 4-26-21.  History and Physical done on 4-7-21.  Covid test is this Thursday.      Objective:  See Ophthalmology Exam.       Assessment:  Jackie Siddiqi is a 65 year old female who presents with:   Encounter Diagnoses   Name Primary?     Pseudophakia - Right Eye POW1 s/p CE/IOL right eye - doing well.       Combined form of age-related cataract, left eye Cataract pre-op left eye. Dil 7. Dense oil drop cataract both eyes. COPD/smoker, but says she is able to lay flat without issues and no cough. Target mostly distance left eye. 3 drops.       Plan:  POST-OP CATARACT INSTRUCTIONS for the RIGHT EYE:    *   Use the following eye drop(s) four times a day until the bottle runs out:   Continue moxifloxacin (tan top), ketorolac (grey top) and prednisolone (white or pink top) four times a day until the bottles run out.    *  Continue dorzolamide (orange top) twice a day in the right eye     *   Stop wearing eye shield.    *   No eye rubbing. May resume heavy lifting.    *   If your vision worsens, eye becomes increasingly red, or becomes painful, call [phone].      PRE-OP CATARACT INSTRUCTIONS for the LEFT EYE:    ** 3 eye drops from the pharmacy at least 3 days before  surgery.     *Use the following drops in the left eye 4 times on the day before surgery and once the morning of surgery:                                               Vigamox or Ofloxacin (tan cap)              Ketorolac (gray cap)     **We will start the prednisolone (white or pink top) drops AFTER surgery.     *Please bring all your eyedrops to the surgery center.     *If taking more than one drop, wait five minutes between drops.     *No solid food or drink after midnight.     Foreign Snider MD  [phone]          Again, thank you for allowing me to participate in the care of your patient.        Sincerely,        Foreign Snider MD

## 2021-04-20 ENCOUNTER — TELEPHONE (OUTPATIENT)
Dept: SURGERY | Facility: CLINIC | Age: 66
End: 2021-04-20

## 2021-04-20 NOTE — TELEPHONE ENCOUNTER
I contacted the patient via phone and left a voicemail to confirm the scheduled dates and provide the following information:     Surgeon/surgery date/location:  Dr. Miner on 6/3 at [facility].  Arrival:   8:45 AM   Pre-op consult:   Dr. Miner on 5/25.   Pre-op physical with:   PCP.  COVID-19 test:   6/1.  Post-op:   6/15.    The surgery packet was provided via letter in the mail.

## 2021-04-22 DIAGNOSIS — Z11.59 ENCOUNTER FOR SCREENING FOR OTHER VIRAL DISEASES: ICD-10-CM

## 2021-04-22 LAB
SARS-COV-2 RNA RESP QL NAA+PROBE: NORMAL
SPECIMEN SOURCE: NORMAL

## 2021-04-22 PROCEDURE — U0005 INFEC AGEN DETEC AMPLI PROBE: HCPCS | Performed by: STUDENT IN AN ORGANIZED HEALTH CARE EDUCATION/TRAINING PROGRAM

## 2021-04-22 PROCEDURE — U0003 INFECTIOUS AGENT DETECTION BY NUCLEIC ACID (DNA OR RNA); SEVERE ACUTE RESPIRATORY SYNDROME CORONAVIRUS 2 (SARS-COV-2) (CORONAVIRUS DISEASE [COVID-19]), AMPLIFIED PROBE TECHNIQUE, MAKING USE OF HIGH THROUGHPUT TECHNOLOGIES AS DESCRIBED BY CMS-2020-01-R: HCPCS | Performed by: STUDENT IN AN ORGANIZED HEALTH CARE EDUCATION/TRAINING PROGRAM

## 2021-04-23 ENCOUNTER — ANESTHESIA EVENT (OUTPATIENT)
Dept: SURGERY | Facility: AMBULATORY SURGERY CENTER | Age: 66
End: 2021-04-23

## 2021-04-26 ENCOUNTER — ANESTHESIA (OUTPATIENT)
Dept: SURGERY | Facility: AMBULATORY SURGERY CENTER | Age: 66
End: 2021-04-26

## 2021-04-26 ENCOUNTER — HOSPITAL ENCOUNTER (OUTPATIENT)
Facility: AMBULATORY SURGERY CENTER | Age: 66
Discharge: HOME | End: 2021-04-26
Attending: STUDENT IN AN ORGANIZED HEALTH CARE EDUCATION/TRAINING PROGRAM | Admitting: STUDENT IN AN ORGANIZED HEALTH CARE EDUCATION/TRAINING PROGRAM
Payer: COMMERCIAL

## 2021-04-26 VITALS
RESPIRATION RATE: 16 BRPM | OXYGEN SATURATION: 95 % | DIASTOLIC BLOOD PRESSURE: 54 MMHG | SYSTOLIC BLOOD PRESSURE: 100 MMHG | TEMPERATURE: 97.3 F

## 2021-04-26 DIAGNOSIS — H25.812 COMBINED FORM OF AGE-RELATED CATARACT, LEFT EYE: ICD-10-CM

## 2021-04-26 PROCEDURE — 66984 XCAPSL CTRC RMVL W/O ECP: CPT | Mod: LT | Performed by: STUDENT IN AN ORGANIZED HEALTH CARE EDUCATION/TRAINING PROGRAM

## 2021-04-26 PROCEDURE — G8907 PT DOC NO EVENTS ON DISCHARG: HCPCS

## 2021-04-26 PROCEDURE — 66984 XCAPSL CTRC RMVL W/O ECP: CPT | Mod: LT

## 2021-04-26 PROCEDURE — G8918 PT W/O PREOP ORDER IV AB PRO: HCPCS

## 2021-04-26 DEVICE — EYE IMP IOL AMO PCL TECNIS ZCB00 24.0: Type: IMPLANTABLE DEVICE | Site: EYE | Status: FUNCTIONAL

## 2021-04-26 RX ORDER — LIDOCAINE 40 MG/G
CREAM TOPICAL
Status: DISCONTINUED | OUTPATIENT
Start: 2021-04-26 | End: 2021-04-27 | Stop reason: HOSPADM

## 2021-04-26 RX ORDER — PHYSOSTIGMINE SALICYLATE 1 MG/ML
1.2 INJECTION INTRAVENOUS
Status: DISCONTINUED | OUTPATIENT
Start: 2021-04-26 | End: 2021-04-27 | Stop reason: HOSPADM

## 2021-04-26 RX ORDER — ONDANSETRON 4 MG/1
4 TABLET, ORALLY DISINTEGRATING ORAL EVERY 30 MIN PRN
Status: DISCONTINUED | OUTPATIENT
Start: 2021-04-26 | End: 2021-04-27 | Stop reason: HOSPADM

## 2021-04-26 RX ORDER — FENTANYL CITRATE 50 UG/ML
25-50 INJECTION, SOLUTION INTRAMUSCULAR; INTRAVENOUS
Status: DISCONTINUED | OUTPATIENT
Start: 2021-04-26 | End: 2021-04-27 | Stop reason: HOSPADM

## 2021-04-26 RX ORDER — ONDANSETRON 2 MG/ML
4 INJECTION INTRAMUSCULAR; INTRAVENOUS EVERY 30 MIN PRN
Status: DISCONTINUED | OUTPATIENT
Start: 2021-04-26 | End: 2021-04-27 | Stop reason: HOSPADM

## 2021-04-26 RX ORDER — MEPERIDINE HYDROCHLORIDE 25 MG/ML
12.5 INJECTION INTRAMUSCULAR; INTRAVENOUS; SUBCUTANEOUS
Status: DISCONTINUED | OUTPATIENT
Start: 2021-04-26 | End: 2021-04-27 | Stop reason: HOSPADM

## 2021-04-26 RX ORDER — TETRACAINE HYDROCHLORIDE 5 MG/ML
1-2 SOLUTION OPHTHALMIC ONCE
Status: COMPLETED | OUTPATIENT
Start: 2021-04-26 | End: 2021-04-26

## 2021-04-26 RX ORDER — SODIUM CHLORIDE, SODIUM LACTATE, POTASSIUM CHLORIDE, CALCIUM CHLORIDE 600; 310; 30; 20 MG/100ML; MG/100ML; MG/100ML; MG/100ML
INJECTION, SOLUTION INTRAVENOUS CONTINUOUS
Status: DISCONTINUED | OUTPATIENT
Start: 2021-04-26 | End: 2021-04-27 | Stop reason: HOSPADM

## 2021-04-26 RX ORDER — NALOXONE HYDROCHLORIDE 0.4 MG/ML
0.4 INJECTION, SOLUTION INTRAMUSCULAR; INTRAVENOUS; SUBCUTANEOUS
Status: DISCONTINUED | OUTPATIENT
Start: 2021-04-26 | End: 2021-04-27 | Stop reason: HOSPADM

## 2021-04-26 RX ORDER — CYCLOPENTOLATE HYDROCHLORIDE 10 MG/ML
1 SOLUTION/ DROPS OPHTHALMIC
Status: COMPLETED | OUTPATIENT
Start: 2021-04-26 | End: 2021-04-26

## 2021-04-26 RX ORDER — NALOXONE HYDROCHLORIDE 0.4 MG/ML
0.2 INJECTION, SOLUTION INTRAMUSCULAR; INTRAVENOUS; SUBCUTANEOUS
Status: DISCONTINUED | OUTPATIENT
Start: 2021-04-26 | End: 2021-04-27 | Stop reason: HOSPADM

## 2021-04-26 RX ORDER — TETRACAINE HYDROCHLORIDE 5 MG/ML
SOLUTION OPHTHALMIC PRN
Status: DISCONTINUED | OUTPATIENT
Start: 2021-04-26 | End: 2021-04-26 | Stop reason: HOSPADM

## 2021-04-26 RX ORDER — METOPROLOL TARTRATE 1 MG/ML
1-2 INJECTION, SOLUTION INTRAVENOUS EVERY 5 MIN PRN
Status: DISCONTINUED | OUTPATIENT
Start: 2021-04-26 | End: 2021-04-27 | Stop reason: HOSPADM

## 2021-04-26 RX ORDER — ACETAMINOPHEN 325 MG/1
975 TABLET ORAL ONCE
Status: COMPLETED | OUTPATIENT
Start: 2021-04-26 | End: 2021-04-26

## 2021-04-26 RX ORDER — TROPICAMIDE 10 MG/ML
1 SOLUTION/ DROPS OPHTHALMIC
Status: COMPLETED | OUTPATIENT
Start: 2021-04-26 | End: 2021-04-26

## 2021-04-26 RX ORDER — SODIUM CHLORIDE, SODIUM LACTATE, POTASSIUM CHLORIDE, CALCIUM CHLORIDE 600; 310; 30; 20 MG/100ML; MG/100ML; MG/100ML; MG/100ML
500 INJECTION, SOLUTION INTRAVENOUS CONTINUOUS
Status: DISCONTINUED | OUTPATIENT
Start: 2021-04-26 | End: 2021-04-27 | Stop reason: HOSPADM

## 2021-04-26 RX ORDER — PHENYLEPHRINE HYDROCHLORIDE 25 MG/ML
1 SOLUTION/ DROPS OPHTHALMIC
Status: COMPLETED | OUTPATIENT
Start: 2021-04-26 | End: 2021-04-26

## 2021-04-26 RX ORDER — FENTANYL CITRATE 50 UG/ML
25-50 INJECTION, SOLUTION INTRAMUSCULAR; INTRAVENOUS EVERY 5 MIN PRN
Status: DISCONTINUED | OUTPATIENT
Start: 2021-04-26 | End: 2021-04-27 | Stop reason: HOSPADM

## 2021-04-26 RX ORDER — MOXIFLOXACIN 5 MG/ML
SOLUTION/ DROPS OPHTHALMIC PRN
Status: DISCONTINUED | OUTPATIENT
Start: 2021-04-26 | End: 2021-04-26 | Stop reason: HOSPADM

## 2021-04-26 RX ORDER — ALBUTEROL SULFATE 0.83 MG/ML
2.5 SOLUTION RESPIRATORY (INHALATION) EVERY 4 HOURS PRN
Status: DISCONTINUED | OUTPATIENT
Start: 2021-04-26 | End: 2021-04-27 | Stop reason: HOSPADM

## 2021-04-26 RX ORDER — HYDRALAZINE HYDROCHLORIDE 20 MG/ML
2.5-5 INJECTION INTRAMUSCULAR; INTRAVENOUS EVERY 10 MIN PRN
Status: DISCONTINUED | OUTPATIENT
Start: 2021-04-26 | End: 2021-04-27 | Stop reason: HOSPADM

## 2021-04-26 RX ADMIN — PHENYLEPHRINE HYDROCHLORIDE 1 DROP: 25 SOLUTION/ DROPS OPHTHALMIC at 07:18

## 2021-04-26 RX ADMIN — TROPICAMIDE 1 DROP: 10 SOLUTION/ DROPS OPHTHALMIC at 07:18

## 2021-04-26 RX ADMIN — SODIUM CHLORIDE, SODIUM LACTATE, POTASSIUM CHLORIDE, CALCIUM CHLORIDE: 600; 310; 30; 20 INJECTION, SOLUTION INTRAVENOUS at 07:56

## 2021-04-26 RX ADMIN — TROPICAMIDE 1 DROP: 10 SOLUTION/ DROPS OPHTHALMIC at 07:12

## 2021-04-26 RX ADMIN — CYCLOPENTOLATE HYDROCHLORIDE 1 DROP: 10 SOLUTION/ DROPS OPHTHALMIC at 07:23

## 2021-04-26 RX ADMIN — CYCLOPENTOLATE HYDROCHLORIDE 1 DROP: 10 SOLUTION/ DROPS OPHTHALMIC at 07:12

## 2021-04-26 RX ADMIN — PHENYLEPHRINE HYDROCHLORIDE 1 DROP: 25 SOLUTION/ DROPS OPHTHALMIC at 07:12

## 2021-04-26 RX ADMIN — TROPICAMIDE 1 DROP: 10 SOLUTION/ DROPS OPHTHALMIC at 07:23

## 2021-04-26 RX ADMIN — ACETAMINOPHEN 975 MG: 325 TABLET ORAL at 07:11

## 2021-04-26 RX ADMIN — TETRACAINE HYDROCHLORIDE 1 DROP: 5 SOLUTION OPHTHALMIC at 07:12

## 2021-04-26 RX ADMIN — PHENYLEPHRINE HYDROCHLORIDE 1 DROP: 25 SOLUTION/ DROPS OPHTHALMIC at 07:23

## 2021-04-26 RX ADMIN — CYCLOPENTOLATE HYDROCHLORIDE 1 DROP: 10 SOLUTION/ DROPS OPHTHALMIC at 07:18

## 2021-04-26 ASSESSMENT — LIFESTYLE VARIABLES: TOBACCO_USE: 1

## 2021-04-26 NOTE — OP NOTE
PreOp Diagnosis: Visually significant nuclear sclerotic cataract left eye  PostOp Diagnosis: Same  Surgeon: Foreign Snider MD  Implant: Tecnis [id number] 24.0D    Procedures:   1. Review of intraocular lens calculations, both eyes   2. Phacoemulsification and extraction of lens  left eye   3. Intraocular lens implantation left eye  Anesthesia: MAC/topical  Complications: None  EBL: <1cc    Jackie Siddiqi suffers from a visually significant cataract of the left eye. This has caused problems with distance and reading vision, including glare. After discussing the risks, benefits, and alternatives, the patient wishes to proceed with cataract surgery.    The patient was identified in the pre-op area where the left eye was marked. The patient was then brought to the operating room where a time out was called, identifying the patient, the procedure, and the correct site. Tetracaine drops were applied to the operative eye. The operative eye was then prepped and draped in the usual sterile ophthalmic fashion. An eyelid speculum was placed into the operative eye. Additional tetracaine drops were applied. A paracentesis was made inferior with a side port blade. 1% preservative-free lidocaine and epinephrine was injected into the anterior chamber.  Endocoat was injected to deepen the anterior chamber. A 2.4mm clear corneal wound was created with a keratome blade temporally. A continuous curvilinear capsulorrhexis was started with a bent cystitome and completed with Utrada forceps. Hydrodissection of the lens nucleus was performed with BSS on a cannula. The lens nucleus was rotated. Phacoemulsification of the lens nucleus was accomplished in a phacoemulsification stop and chop technique. Remaining cortex was removed with irrigation and aspiration. The lens capsule was noted to be intact. Healon was used to inflate the capsular bag.  The lens was injected into the capsular bag. Remaining viscoelastic was removed with irrigation  and aspiration. The wounds were checked and found to be watertight after hydration. The eyelid speculum was removed and Vigamox drops were placed into the operative eye. The patient tolerated the procedure well and was in stable condition on the way to the recovery area.     Foreign Snider MD

## 2021-04-26 NOTE — DISCHARGE INSTRUCTIONS
CATARACT SURGERY POST-OP INSTRUCTIONS  Dr. Foreign Snider  [phone]        Start using all three eye drops today, including   - Vigamox (tan top)   - Ketolorac (grey top)   - Prednisolone (white or pink top)    You should get 3 doses in today and 4 doses daily starting tomorrow.  Wait a few minutes in between putting each drop in.      Keep the eye shield taped in place unless putting drops in. We will remove it for you in the office tomorrow.      Light sensitivity may be noticed. Sunglasses may be worn for comfort.      Do not rub the operated eye.      Keep the operated eye dry. You may wash your hair, bathe or shower, but keep the operated eye closed while doing so.       No swimming, hot tub, or sauna for 2 weeks.      No make up around eye for 5 days.      No bending at the waist or lifting more than 10 pounds for one week.      May take Tylenol (per directions on bottle) for mild pain.      Call the office at [phone] and ask to speak to the on-call ophthalmologist  if any of the following should occur:  o Any sudden vision changes  o Nausea or severe headache  o Increase in pain not controlled  o Or signs of infection (pus, increasing redness or tenderness)      [facility]   Adult Discharge Orders & Instructions     For 24 hours after surgery    1. Get plenty of rest.  A responsible adult must stay with you for at least 24 hours after you leave the hospital.   2. Do not drive or use heavy equipment.  If you have weakness or tingling, don't drive or use heavy equipment until this feeling goes away.  3. Do not drink alcohol.  4. Avoid strenuous or risky activities.  Ask for help when climbing stairs.   5. You may feel lightheaded.  IF so, sit for a few minutes before standing.  Have someone help you get up.   6. If you have nausea (feel sick to your stomach): Drink only clear liquids such as apple juice, ginger ale, broth or 7-Up.  Rest may also help.  Be sure to drink enough fluids.   Move to a regular diet as you feel able.  7. You may have a slight fever. Call the doctor if your fever is over 100 F (37.7 C) (taken under the tongue) or lasts longer than 24 hours.  8. You may have a dry mouth, a sore throat, muscle aches or trouble sleeping.  These should go away after 24 hours.  9. Do not make important or legal decisions.     Call your doctor for any of the followin.  Signs of infection (fever, growing tenderness at the surgery site, a large amount of drainage or bleeding, severe pain, foul-smelling drainage, redness, swelling).    2. It has been over 8 to 10 hours since surgery and you are still not able to urinate (pass water).    3.  Headache for over 24 hours.    4.  Numbness, tingling or weakness the day after surgery (if you had spinal anesthesia).                  5. Signs of Covid-19 infection (temperature over 100 degrees, shortness of breath, cough, loss of taste/smell, generalized body aches, persistent headache,                  chills, sore throat, nausea/vomiting/diarrhea).    To contact a doctor, call __[phone]______________________________________    Tylenol was given at 7:15 AM

## 2021-04-26 NOTE — PROGRESS NOTES
"Iv d/c'ed.    Discharge instructions reviewed on phone with friend Evans.      Verified with Evans he will be with pt until foster son gets home.    Pt and Friend informed \"No driving today\".  "

## 2021-04-26 NOTE — ANESTHESIA POSTPROCEDURE EVALUATION
Patient: Jackie Siddiqi    Procedure(s):  LEFT PHACOEMULSIFICATION, CATARACT, WITH INTRAOCULAR LENS IMPLANT    Diagnosis:Combined form of age-related cataract, left eye [H25.812]  Diagnosis Additional Information: No value filed.    Anesthesia Type:  MAC    Note:  Disposition: Outpatient   Postop Pain Control: Uneventful            Sign Out: Well controlled pain   PONV: No   Neuro/Psych: Uneventful            Sign Out: Acceptable/Baseline neuro status   Airway/Respiratory: Uneventful            Sign Out: Acceptable/Baseline resp. status   CV/Hemodynamics: Uneventful            Sign Out: Acceptable CV status; No obvious hypovolemia; No obvious fluid overload   Other NRE: NONE   DID A NON-ROUTINE EVENT OCCUR? No           Last vitals:  Vitals:    04/26/21 0831 04/26/21 0844 04/26/21 0900   BP: 99/48 101/59 97/58   Resp: 16 16 16   Temp: 97.3  F (36.3  C)     SpO2: 96% 95% 94%       Last vitals prior to Anesthesia Care Transfer:  CRNA VITALS  4/26/2021 0756 - 4/26/2021 0856      4/26/2021             Resp Rate (observed):  (!) 1          Electronically Signed By: Joe Acosta MD  April 26, 2021  9:17 AM

## 2021-04-26 NOTE — ANESTHESIA CARE TRANSFER NOTE
Patient: Jackie Siddiqi    Procedure(s):  LEFT PHACOEMULSIFICATION, CATARACT, WITH INTRAOCULAR LENS IMPLANT    Diagnosis: Combined form of age-related cataract, left eye [H25.812]  Diagnosis Additional Information: No value filed.    Anesthesia Type:   MAC     Note:    Oropharynx: oropharynx clear of all foreign objects and spontaneously breathing  Level of Consciousness: drowsy  Oxygen Supplementation: face mask    Independent Airway: airway patency satisfactory and stable  Dentition: dentition unchanged  Vital Signs Stable: post-procedure vital signs reviewed and stable  Report to RN Given: handoff report given  Patient transferred to: Phase II    Handoff Report: Identifed the Patient, Identified the Reponsible Provider, Reviewed the pertinent medical history, Discussed the surgical course, Reviewed Intra-OP anesthesia mangement and issues during anesthesia, Set expectations for post-procedure period and Allowed opportunity for questions and acknowledgement of understanding      Vitals: (Last set prior to Anesthesia Care Transfer)  CRNA VITALS  4/26/2021 0756 - 4/26/2021 0826      4/26/2021             Resp Rate (observed):  (!) 1        Electronically Signed By: TEDDY Whitman CRNA  April 26, 2021  8:26 AM

## 2021-04-27 ENCOUNTER — OFFICE VISIT (OUTPATIENT)
Dept: OPHTHALMOLOGY | Facility: CLINIC | Age: 66
End: 2021-04-27
Payer: COMMERCIAL

## 2021-04-27 DIAGNOSIS — Z96.1 PSEUDOPHAKIA: Primary | ICD-10-CM

## 2021-04-27 PROCEDURE — 99024 POSTOP FOLLOW-UP VISIT: CPT | Performed by: STUDENT IN AN ORGANIZED HEALTH CARE EDUCATION/TRAINING PROGRAM

## 2021-04-27 RX ORDER — KETOROLAC TROMETHAMINE 5 MG/ML
1 SOLUTION OPHTHALMIC 4 TIMES DAILY
Qty: 5 ML | Refills: 0 | Status: CANCELLED | OUTPATIENT
Start: 2021-04-27

## 2021-04-27 ASSESSMENT — SLIT LAMP EXAM - LIDS
COMMENTS: 1+ DERMATOCHALASIS
COMMENTS: 1+ DERMATOCHALASIS

## 2021-04-27 ASSESSMENT — EXTERNAL EXAM - LEFT EYE: OS_EXAM: NORMAL

## 2021-04-27 ASSESSMENT — VISUAL ACUITY
OS_SC+: -1
OS_SC: 20/25
METHOD: SNELLEN - LINEAR

## 2021-04-27 ASSESSMENT — TONOMETRY
IOP_METHOD: APPLANATION
OS_IOP_MMHG: 28

## 2021-04-27 ASSESSMENT — EXTERNAL EXAM - RIGHT EYE: OD_EXAM: NORMAL

## 2021-04-27 NOTE — PROGRESS NOTES
Current Eye Medications:  Right eye:  Dorzolamide twice a day, Vigamox, Ketorolac and Prednisolone 1% four times a day.    Left eye:  Vigamox, Ketorolac, and Prednisolone 1% four  times yesterday, and once today.     Subjective:  Status/Post Kelman Phacoemulsification with Posterior Chamber Lens left eye:  4-26-21.  Some scratchiness and aching after the surgery, but comfortable.  Vision is blurry, left eye.    The Ketorolac bottle (for right eye) accidentally was opened and dripped out, out so the bottle is empty.     Objective:  See Ophthalmology Exam.       Assessment:  Jackie Siddiqi is a 65 year old female who presents with:   Encounter Diagnosis   Name Primary?     Pseudophakia - Both Eyes POW2 s/p CE/IOL right eye and POD1 s/p CE/IOL left eye - both doing well. Ran out of ketorolac right eye - ok to continue with just prednisolone and moxifloxacin that remains. Intraocular pressure 28 left eye - asked her to add dorzolamide twice a day to the left eye as well until the bottle runs out.        Plan:  POST-OP CATARACT INSTRUCTIONS    *   Use the following drop(s) in the EACH EYE four times a day:        Vigamox (tan top), ketorolac (grey top), and prednisolone (white or pink top)     *   Continue dorzolamide (orange top) twice a day in the right eye and add to the left eye twice a day     *   Wear eye shield when sleeping for one week. Do not rub the operated eye.     *   No bending or lifting more than 10 pounds for one week.    *   Keep water out of eye for two weeks.    *   OK to resume aspirin and/or other blood thinners if you stopped.     *   Return as scheduled in about one week.    *   If your vision worsens, eye becomes increasingly red, or becomes painful, call [phone].     Foreign Snider M.D.

## 2021-04-27 NOTE — PATIENT INSTRUCTIONS
POST-OP CATARACT INSTRUCTIONS    *   Use the following drop(s) in the EACH EYE four times a day:        Vigamox (tan top), ketorolac (grey top), and prednisolone (white or pink top)     *   Continue dorzolamide (orange top) twice a day in the right eye and add to the left eye twice a day     *   Wear eye shield when sleeping for one week. Do not rub the operated eye.     *   No bending or lifting more than 10 pounds for one week.    *   Keep water out of eye for two weeks.    *   OK to resume aspirin and/or other blood thinners if you stopped.     *   Return as scheduled in about one week.    *   If your vision worsens, eye becomes increasingly red, or becomes painful, call [phone].     Foreign Snider M.D.

## 2021-04-27 NOTE — LETTER
4/27/2021         RE: Jackie Siddiqi  [address]  [place] [address]        Dear Colleague,    Thank you for referring your patient, Jackie Siddiqi, to the [facility]. Please see a copy of my visit note below.     Current Eye Medications:  Right eye:  Dorzolamide twice a day, Vigamox, Ketorolac and Prednisolone 1% four times a day.    Left eye:  Vigamox, Ketorolac, and Prednisolone 1% four  times yesterday, and once today.     Subjective:  Status/Post Kelman Phacoemulsification with Posterior Chamber Lens left eye:  4-26-21.  Some scratchiness and aching after the surgery, but comfortable.  Vision is blurry, left eye.    The Ketorolac bottle (for right eye) accidentally was opened and dripped out, out so the bottle is empty.     Objective:  See Ophthalmology Exam.       Assessment:  Jackie Siddiqi is a 65 year old female who presents with:   Encounter Diagnosis   Name Primary?     Pseudophakia - Both Eyes POW2 s/p CE/IOL right eye and POD1 s/p CE/IOL left eye - both doing well. Ran out of ketorolac right eye - ok to continue with just prednisolone and moxifloxacin that remains. Intraocular pressure 28 left eye - asked her to add dorzolamide twice a day to the left eye as well until the bottle runs out.        Plan:  POST-OP CATARACT INSTRUCTIONS    *   Use the following drop(s) in the EACH EYE four times a day:        Vigamox (tan top), ketorolac (grey top), and prednisolone (white or pink top)     *   Continue dorzolamide (orange top) twice a day in the right eye and add to the left eye twice a day     *   Wear eye shield when sleeping for one week. Do not rub the operated eye.     *   No bending or lifting more than 10 pounds for one week.    *   Keep water out of eye for two weeks.    *   OK to resume aspirin and/or other blood thinners if you stopped.     *   Return as scheduled in about one week.    *   If your vision worsens, eye becomes increasingly red, or becomes  painful, call [phone].     Foreign Snider M.D.            Again, thank you for allowing me to participate in the care of your patient.        Sincerely,        Foreign Snider MD

## 2021-05-03 ENCOUNTER — OFFICE VISIT (OUTPATIENT)
Dept: OPHTHALMOLOGY | Facility: CLINIC | Age: 66
End: 2021-05-03
Payer: COMMERCIAL

## 2021-05-03 DIAGNOSIS — Z96.1 PSEUDOPHAKIA: Primary | ICD-10-CM

## 2021-05-03 PROCEDURE — 99024 POSTOP FOLLOW-UP VISIT: CPT | Performed by: STUDENT IN AN ORGANIZED HEALTH CARE EDUCATION/TRAINING PROGRAM

## 2021-05-03 RX ORDER — DORZOLAMIDE HCL 20 MG/ML
1 SOLUTION/ DROPS OPHTHALMIC 2 TIMES DAILY
COMMUNITY
End: 2021-11-10

## 2021-05-03 ASSESSMENT — SLIT LAMP EXAM - LIDS
COMMENTS: 1+ DERMATOCHALASIS
COMMENTS: 1+ DERMATOCHALASIS

## 2021-05-03 ASSESSMENT — VISUAL ACUITY
METHOD: SNELLEN - LINEAR
OS_SC: 20/25
OS_SC+: -2
OD_SC+: +1
OD_SC: 20/25

## 2021-05-03 ASSESSMENT — EXTERNAL EXAM - RIGHT EYE: OD_EXAM: NORMAL

## 2021-05-03 ASSESSMENT — TONOMETRY
IOP_METHOD: APPLANATION
OD_IOP_MMHG: 16
OS_IOP_MMHG: 17

## 2021-05-03 ASSESSMENT — REFRACTION_MANIFEST
OS_AXIS: 151
OS_CYLINDER: +1.00
OS_SPHERE: PLANO

## 2021-05-03 ASSESSMENT — EXTERNAL EXAM - LEFT EYE: OS_EXAM: NORMAL

## 2021-05-03 NOTE — PROGRESS NOTES
Current Eye Medications:  Vigamox, ketorolac and prednisolone QID left eye. Prednisolone QID right eye.     Dorzolamide twice a day in both eyes     Subjective: Here for PO2 left eye. Almost started crying after surgery she was so happy she could see. She was scraping paint from her flower boxes right before appt and having some aching in the left eye     Objective:  See Ophthalmology Exam.      Assessment:  Jackie Siddiqi is a 66 year old female who presents with:   Encounter Diagnosis   Name Primary?     Pseudophakia - Both Eyes POW1 s/p CE/IOL left eye and POW3 s/p CE/IOL right eye - both doing well. Intraocular pressure 16/17 today.      Plan:  *   For the left eye, Vigamox (tan top), ketorolac (grey top), and prednisolone (white or pink top) four times a day until the bottles run out      For the right eye, continue prednisolone (white or pink top) eyedrops four times a day until it runs out      For both eyes, use dorzolamide (orange top) twice a day until it runs out    *   Stop wearing eye shield.    *   No eye rubbing. May resume heavy lifting.    *   Return in about one month for final exam with glasses check (as scheduled).    *   If your vision worsens, eye becomes increasingly red, or becomes painful, call [phone].    Foreign Snider M.D.

## 2021-05-03 NOTE — LETTER
5/3/2021         RE: Jackie Siddiqi  [address]  [facility] [address]        Dear Colleague,    Thank you for referring your patient, Jackie Siddiqi, to the [facility]. Please see a copy of my visit note below.     Current Eye Medications:  Vigamox, ketorolac and prednisolone QID left eye. Prednisolone QID right eye.     Dorzolamide twice a day in both eyes     Subjective: Here for PO2 left eye. Almost started crying after surgery she was so happy she could see. She was scraping paint from her flower boxes right before appt and having some aching in the left eye     Objective:  See Ophthalmology Exam.      Assessment:  Jackie Siddiqi is a 66 year old female who presents with:   Encounter Diagnosis   Name Primary?     Pseudophakia - Both Eyes POW1 s/p CE/IOL left eye and POW3 s/p CE/IOL right eye - both doing well. Intraocular pressure 16/17 today.      Plan:  *   For the left eye, Vigamox (tan top), ketorolac (grey top), and prednisolone (white or pink top) four times a day until the bottles run out      For the right eye, continue prednisolone (white or pink top) eyedrops four times a day until it runs out      For both eyes, use dorzolamide (orange top) twice a day until it runs out    *   Stop wearing eye shield.    *   No eye rubbing. May resume heavy lifting.    *   Return in about one month for final exam with glasses check (as scheduled).    *   If your vision worsens, eye becomes increasingly red, or becomes painful, call [phone].    Foreign Snider M.D.            Again, thank you for allowing me to participate in the care of your patient.        Sincerely,        Foreign Snider MD

## 2021-05-03 NOTE — PATIENT INSTRUCTIONS
*   For the left eye, Vigamox (tan top), ketorolac (grey top), and prednisolone (white or pink top) four times a day until the bottles run out      For the right eye, continue prednisolone (white or pink top) eyedrops four times a day until it runs out      For both eyes, use dorzolamide (orange top) twice a day until it runs out    *   Stop wearing eye shield.    *   No eye rubbing. May resume heavy lifting.    *   Return in about one month for final exam with glasses check (as scheduled).    *   If your vision worsens, eye becomes increasingly red, or becomes painful, call [phone].    Foreign Snider M.D.

## 2021-05-18 DIAGNOSIS — Z11.59 ENCOUNTER FOR SCREENING FOR OTHER VIRAL DISEASES: ICD-10-CM

## 2021-05-20 ENCOUNTER — TELEPHONE (OUTPATIENT)
Dept: FAMILY MEDICINE | Facility: CLINIC | Age: 66
End: 2021-05-20

## 2021-05-20 NOTE — TELEPHONE ENCOUNTER
Patient calling and stating she is scheduled for surgery on 6/3 and is wondering if her last pre-op would cover for this surgery? Informed that appointment was back on 4/7/21 so that is more than 30 days, so she will need a new pre op. Patient is scheduled with Lenny Bruce on 6/2 at 3:00 pm due to no availability with her primary. Informed to bring the surgery information with to the appointment. Adina Gibson LPN

## 2021-05-24 ENCOUNTER — OFFICE VISIT (OUTPATIENT)
Dept: OPHTHALMOLOGY | Facility: CLINIC | Age: 66
End: 2021-05-24
Payer: COMMERCIAL

## 2021-05-24 DIAGNOSIS — Z96.1 PSEUDOPHAKIA: Primary | ICD-10-CM

## 2021-05-24 PROCEDURE — 99024 POSTOP FOLLOW-UP VISIT: CPT | Performed by: STUDENT IN AN ORGANIZED HEALTH CARE EDUCATION/TRAINING PROGRAM

## 2021-05-24 ASSESSMENT — REFRACTION_MANIFEST
OD_CYLINDER: +1.25
OS_ADD: +2.75
OS_CYLINDER: +1.00
OD_ADD: +2.75
OS_SPHERE: -0.25
OS_AXIS: 155
OD_AXIS: 022
OD_SPHERE: PLANO

## 2021-05-24 ASSESSMENT — TONOMETRY
OS_IOP_MMHG: 13
OD_IOP_MMHG: 15
IOP_METHOD: APPLANATION

## 2021-05-24 ASSESSMENT — EXTERNAL EXAM - RIGHT EYE: OD_EXAM: NORMAL

## 2021-05-24 ASSESSMENT — VISUAL ACUITY
METHOD: SNELLEN - LINEAR
OD_SC: 20/25
OS_SC: 20/20

## 2021-05-24 ASSESSMENT — SLIT LAMP EXAM - LIDS
COMMENTS: 1+ DERMATOCHALASIS
COMMENTS: 1+ DERMATOCHALASIS

## 2021-05-24 ASSESSMENT — CUP TO DISC RATIO
OS_RATIO: 0.3
OD_RATIO: 0.3

## 2021-05-24 ASSESSMENT — EXTERNAL EXAM - LEFT EYE: OS_EXAM: NORMAL

## 2021-05-24 NOTE — PROCEDURES
Procedure: Elbow steroid injection.    Indication:  Lateral epicondyitis with increasing pain    The whole procedure discussed with patient in details.  Risks associated with the procedure was also explained which include but not limitted to pain, infection and rupture of the tendons.  Patient agreed to proceed the procedure and he consent obtained    Time out performed.  Patient was identified.  Name and location of the procedure was also identified.    The whole procedure was done in a usual sterile manner.  The left lateral epicondyle was identified.  The area was then cleaned with alcohol swaps.  A mixture of 1 cc of kenolog (40 mg) and 2 cc of Lidocain with epi injected directly into the ellow where the most tenderous area.  Patient tolerated the procedure well and feel signifiicant relieve immediately.  Educate symptoms that need to be seen or call in as well.  EBL was zero.    Jovana Quinonez MD.

## 2021-05-24 NOTE — LETTER
5/24/2021         RE: Jackie Siddiqi  [address]  [place] [place] [facility] [address]        Dear Colleague,    Thank you for referring your patient, Jackie Siddiqi, to the [facility]. Please see a copy of my visit note below.     Current Eye Medications:  Trusopt twice a day both eye and pred both eyes four times a day, nearly out for right eye     Subjective:  Final mr both eyes   Pt reports sees very well each eye and eyes feel fine.     Objective:  See Ophthalmology Exam.       Assessment:  Jackie Siddiqi is a 66 year old female who presents with:   Encounter Diagnosis   Name Primary?     Pseudophakia - Both Eyes Final MR both eyes - doing well.        Plan:  Finish out any remaining post-operative eyedrops (prednisolone four times a day and dorzolamide (orange top) twice a day in the right eye)    Fill prescription for new glasses given or can use over the counter readers    Return to clinic in 1 year for a complete eye exam or earlier if problems should arise.    Foreign Snider M.D.  [phone]        Again, thank you for allowing me to participate in the care of your patient.        Sincerely,        Foreign Snider MD

## 2021-05-24 NOTE — PATIENT INSTRUCTIONS
Finish out any remaining post-operative eyedrops (prednisolone four times a day and dorzolamide (orange top) twice a day in the right eye)    Fill prescription for new glasses given or can use over the counter readers    Return to clinic in 1 year for a complete eye exam or earlier if problems should arise.    Foreign Snider M.D.  [phone]

## 2021-05-24 NOTE — PROGRESS NOTES
Current Eye Medications:  Trusopt twice a day both eye and pred both eyes four times a day, nearly out for right eye     Subjective:  Final mr both eyes   Pt reports sees very well each eye and eyes feel fine.     Objective:  See Ophthalmology Exam.       Assessment:  Jackie Siddiqi is a 66 year old female who presents with:   Encounter Diagnosis   Name Primary?     Pseudophakia - Both Eyes Final MR both eyes - doing well.        Plan:  Finish out any remaining post-operative eyedrops (prednisolone four times a day and dorzolamide (orange top) twice a day in the right eye)    Fill prescription for new glasses given or can use over the counter readers    Return to clinic in 1 year for a complete eye exam or earlier if problems should arise.    Foreign Snider M.D.  [phone]

## 2021-05-25 ENCOUNTER — OFFICE VISIT (OUTPATIENT)
Dept: PLASTIC SURGERY | Facility: CLINIC | Age: 66
End: 2021-05-25
Attending: PLASTIC SURGERY
Payer: COMMERCIAL

## 2021-05-25 VITALS
TEMPERATURE: 98.1 F | BODY MASS INDEX: 23.34 KG/M2 | OXYGEN SATURATION: 97 % | DIASTOLIC BLOOD PRESSURE: 72 MMHG | WEIGHT: 129.7 LBS | SYSTOLIC BLOOD PRESSURE: 116 MMHG | HEART RATE: 65 BPM

## 2021-05-25 DIAGNOSIS — T85.44XS CAPSULAR CONTRACTURE OF BREAST IMPLANT, SEQUELA: Primary | ICD-10-CM

## 2021-05-25 PROCEDURE — G0463 HOSPITAL OUTPT CLINIC VISIT: HCPCS

## 2021-05-25 PROCEDURE — 99213 OFFICE O/P EST LOW 20 MIN: CPT | Performed by: PLASTIC SURGERY

## 2021-05-25 ASSESSMENT — PAIN SCALES - GENERAL: PAINLEVEL: NO PAIN (0)

## 2021-05-25 NOTE — NURSING NOTE
"Oncology Rooming Note    May 25, 2021 10:30 AM   Jackie Siddiqi is a 66 year old female who presents for:    Chief Complaint   Patient presents with     Oncology Clinic Visit     Capsular contracture of breast implant, sequela     Initial Vitals: /72   Pulse 65   Temp 98.1  F (36.7  C) (Oral)   Wt 58.8 kg (129 lb 11.2 oz)   LMP 03/26/2003   SpO2 97%   BMI 23.34 kg/m   Estimated body mass index is 23.34 kg/m  as calculated from the following:    Height as of 4/7/21: 1.588 m (5' 2.5\").    Weight as of this encounter: 58.8 kg (129 lb 11.2 oz). Body surface area is 1.61 meters squared.  No Pain (0) Comment: Data Unavailable   Patient's last menstrual period was 03/26/2003.  Allergies reviewed: Yes  Medications reviewed: Yes    Medications: Medication refills not needed today.  Pharmacy name entered into [facility]:    [facility] [place], [place] - [address]  [id number] - [place], [place] - [address] [facility] [place] [address] [id number] [place] [address] [facility] [address] [place] [address] [address]  [facility]  [facility] [address] [facility] [place] [address] [address] [place] [address] [id number] - [place], [place] - [address] [facility] - [place], [place] - [address] AT [address]AL TO [address]    Clinical concerns: none       Marianna Sarkar CMA            "

## 2021-05-25 NOTE — LETTER
5/25/2021         RE: Jackie Siddiqi  [address]  [place] [address]        Dear Colleague,    Thank you for referring your patient, Jackie Siddiqi, to the [facility]. Please see a copy of my visit note below.    PREOPERATIVE VISIT NOTE    PRESENTING COMPLAINT:  Preoperative visit for upcoming bilateral breast implant exchange, capsulectomy and revisional surgery scheduled for 06/2021.    HISTORY OF PRESENTING COMPLAINT:  Ms. Siddiqi is 66 years old, a couple of weeks out from her surgery.  She has had no change in her history and physical exam.  She wants to be a little larger than she currently is.  Her last surgery was 25 years ago.  We do not know what implants are in there.    ASSESSMENT AND PLAN:  Based on findings, a diagnosis of bilateral breast reconstruction for breast cancer with grade 4 capsular contracture, implant rupture was made.  Plan is to do bilateral implant exchanges, capsulectomies and capsulotomies, revision of reconstructed breasts and then proceed with a second stage fat grafting revisional surgery as needed.  Went over the planned procedure with the patient in detail.  She understood and agreed.  She wants to be larger than she currently is.  I cannot promise a cup size.  I cannot promise a volume but will try and fill out the skin as mush as we can.  She understood.  All risks, benefits and alternatives including pain, infection, bleeding, scarring, asymmetry, seromas, hematomas, wound breakdown, wound dehiscence, implant problems like visibility, palpability, rippling, capsular contracture, requirement of further surgeries in the future, infections of the deep pocket requiring implant removal, DVT, PE, MI, CVA, pneumonia, renal failure and death were explained.  She understood them all and wants to proceed.  Look forward to helping her out in the near future.    Total time spent with chart review, visit itself and post-visit paperwork  was 20 minutes.            Again, thank you for allowing me to participate in the care of your patient.        Sincerely,        JENNIFFER Miner MD

## 2021-05-25 NOTE — PROGRESS NOTES
PREOPERATIVE VISIT NOTE    PRESENTING COMPLAINT:  Preoperative visit for upcoming bilateral breast implant exchange, capsulectomy and revisional surgery scheduled for 06/2021.    HISTORY OF PRESENTING COMPLAINT:  Ms. Siddiqi is 66 years old, a couple of weeks out from her surgery.  She has had no change in her history and physical exam.  She wants to be a little larger than she currently is.  Her last surgery was 25 years ago.  We do not know what implants are in there.    ASSESSMENT AND PLAN:  Based on findings, a diagnosis of bilateral breast reconstruction for breast cancer with grade 4 capsular contracture, implant rupture was made.  Plan is to do bilateral implant exchanges, capsulectomies and capsulotomies, revision of reconstructed breasts and then proceed with a second stage fat grafting revisional surgery as needed.  Went over the planned procedure with the patient in detail.  She understood and agreed.  She wants to be larger than she currently is.  I cannot promise a cup size.  I cannot promise a volume but will try and fill out the skin as mush as we can.  She understood.  All risks, benefits and alternatives including pain, infection, bleeding, scarring, asymmetry, seromas, hematomas, wound breakdown, wound dehiscence, implant problems like visibility, palpability, rippling, capsular contracture, requirement of further surgeries in the future, infections of the deep pocket requiring implant removal, DVT, PE, MI, CVA, pneumonia, renal failure and death were explained.  She understood them all and wants to proceed.  Look forward to helping her out in the near future.    Total time spent with chart review, visit itself and post-visit paperwork was 20 minutes.

## 2021-05-31 ENCOUNTER — ANESTHESIA EVENT (OUTPATIENT)
Dept: SURGERY | Facility: AMBULATORY SURGERY CENTER | Age: 66
End: 2021-05-31
Payer: COMMERCIAL

## 2021-06-01 ENCOUNTER — PATIENT OUTREACH (OUTPATIENT)
Dept: PLASTIC SURGERY | Facility: CLINIC | Age: 66
End: 2021-06-01

## 2021-06-01 DIAGNOSIS — Z11.59 ENCOUNTER FOR SCREENING FOR OTHER VIRAL DISEASES: ICD-10-CM

## 2021-06-01 LAB
LABORATORY COMMENT REPORT: NORMAL
SARS-COV-2 RNA RESP QL NAA+PROBE: NEGATIVE
SARS-COV-2 RNA RESP QL NAA+PROBE: NORMAL
SPECIMEN SOURCE: NORMAL
SPECIMEN SOURCE: NORMAL

## 2021-06-01 PROCEDURE — U0003 INFECTIOUS AGENT DETECTION BY NUCLEIC ACID (DNA OR RNA); SEVERE ACUTE RESPIRATORY SYNDROME CORONAVIRUS 2 (SARS-COV-2) (CORONAVIRUS DISEASE [COVID-19]), AMPLIFIED PROBE TECHNIQUE, MAKING USE OF HIGH THROUGHPUT TECHNOLOGIES AS DESCRIBED BY CMS-2020-01-R: HCPCS | Performed by: PLASTIC SURGERY

## 2021-06-01 PROCEDURE — U0005 INFEC AGEN DETEC AMPLI PROBE: HCPCS | Performed by: PLASTIC SURGERY

## 2021-06-01 NOTE — PATIENT INSTRUCTIONS
Spoke with pt regarding upcoming surgery. Explained that this is being cancelled and apologized for the inconvenience. Pt states she has 2 weeks of vacation for this surgery. Expresses frustration at cancellation as she may not be able to recoup this time. Pt would like to reschedule and requests call to discuss date. Aspen MONTANEZ RN, BSN

## 2021-06-02 ENCOUNTER — APPOINTMENT (OUTPATIENT)
Dept: CT IMAGING | Facility: CLINIC | Age: 66
End: 2021-06-02
Attending: EMERGENCY MEDICINE
Payer: COMMERCIAL

## 2021-06-02 ENCOUNTER — HOSPITAL ENCOUNTER (EMERGENCY)
Facility: CLINIC | Age: 66
Discharge: HOME | End: 2021-06-02
Attending: EMERGENCY MEDICINE | Admitting: EMERGENCY MEDICINE
Payer: COMMERCIAL

## 2021-06-02 ENCOUNTER — APPOINTMENT (OUTPATIENT)
Dept: GENERAL RADIOLOGY | Facility: CLINIC | Age: 66
End: 2021-06-02
Attending: EMERGENCY MEDICINE
Payer: COMMERCIAL

## 2021-06-02 VITALS
BODY MASS INDEX: 23.22 KG/M2 | TEMPERATURE: 98.1 F | WEIGHT: 129 LBS | DIASTOLIC BLOOD PRESSURE: 71 MMHG | SYSTOLIC BLOOD PRESSURE: 131 MMHG | RESPIRATION RATE: 16 BRPM | HEART RATE: 72 BPM | OXYGEN SATURATION: 97 %
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
## 2021-06-02 DIAGNOSIS — S05.01XA ABRASION OF RIGHT CORNEA, INITIAL ENCOUNTER: ICD-10-CM

## 2021-06-02 DIAGNOSIS — S39.012A BACK STRAIN, INITIAL ENCOUNTER: ICD-10-CM

## 2021-06-02 PROCEDURE — 71046 X-RAY EXAM CHEST 2 VIEWS: CPT

## 2021-06-02 PROCEDURE — 72128 CT CHEST SPINE W/O DYE: CPT

## 2021-06-02 PROCEDURE — 99284 EMERGENCY DEPT VISIT MOD MDM: CPT | Mod: 25 | Performed by: EMERGENCY MEDICINE

## 2021-06-02 PROCEDURE — 72125 CT NECK SPINE W/O DYE: CPT

## 2021-06-02 PROCEDURE — 250N000013 HC RX MED GY IP 250 OP 250 PS 637: Performed by: EMERGENCY MEDICINE

## 2021-06-02 PROCEDURE — 99285 EMERGENCY DEPT VISIT HI MDM: CPT | Mod: 25 | Performed by: EMERGENCY MEDICINE

## 2021-06-02 PROCEDURE — 65205 REMOVE FOREIGN BODY FROM EYE: CPT | Mod: RT | Performed by: EMERGENCY MEDICINE

## 2021-06-02 PROCEDURE — 70450 CT HEAD/BRAIN W/O DYE: CPT

## 2021-06-02 PROCEDURE — 72131 CT LUMBAR SPINE W/O DYE: CPT

## 2021-06-02 PROCEDURE — 250N000009 HC RX 250: Performed by: EMERGENCY MEDICINE

## 2021-06-02 RX ORDER — TETRACAINE HYDROCHLORIDE 5 MG/ML
2 SOLUTION OPHTHALMIC ONCE
Status: COMPLETED | OUTPATIENT
Start: 2021-06-02 | End: 2021-06-02

## 2021-06-02 RX ORDER — POLYMYXIN B SULFATE AND TRIMETHOPRIM 1; 10000 MG/ML; [USP'U]/ML
1 SOLUTION OPHTHALMIC 4 TIMES DAILY
Qty: 1 ML | Refills: 0 | Status: SHIPPED | OUTPATIENT
Start: 2021-06-02 | End: 2021-06-05

## 2021-06-02 RX ORDER — ACETAMINOPHEN 325 MG/1
975 TABLET ORAL ONCE
Status: COMPLETED | OUTPATIENT
Start: 2021-06-02 | End: 2021-06-02

## 2021-06-02 RX ADMIN — TETRACAINE HYDROCHLORIDE 2 DROP: 5 SOLUTION OPHTHALMIC at 16:02

## 2021-06-02 RX ADMIN — ACETAMINOPHEN 975 MG: 325 TABLET, FILM COATED ORAL at 14:44

## 2021-06-02 NOTE — LETTER
June 2, 2021      To Whom It May Concern:      Jackie Siddiqi was seen in our Emergency Department today, 06/02/21.  I expect her condition to improve over the next 5 days.  She may return to work/school when improved.    Sincerely,        Yony Zendejas MD

## 2021-06-03 ENCOUNTER — ANESTHESIA (OUTPATIENT)
Dept: SURGERY | Facility: AMBULATORY SURGERY CENTER | Age: 66
End: 2021-06-03
Payer: COMMERCIAL

## 2021-06-03 ENCOUNTER — TELEPHONE (OUTPATIENT)
Dept: OPHTHALMOLOGY | Facility: CLINIC | Age: 66
End: 2021-06-03

## 2021-06-03 ENCOUNTER — OFFICE VISIT (OUTPATIENT)
Dept: OPHTHALMOLOGY | Facility: CLINIC | Age: 66
End: 2021-06-03

## 2021-06-03 DIAGNOSIS — Z96.1 PSEUDOPHAKIA: ICD-10-CM

## 2021-06-03 DIAGNOSIS — S05.01XA ABRASION OF RIGHT CORNEA, INITIAL ENCOUNTER: Primary | ICD-10-CM

## 2021-06-03 PROCEDURE — 99024 POSTOP FOLLOW-UP VISIT: CPT | Mod: 24 | Performed by: STUDENT IN AN ORGANIZED HEALTH CARE EDUCATION/TRAINING PROGRAM

## 2021-06-03 ASSESSMENT — VISUAL ACUITY
OS_SC+: -1
OD_PH_SC: 20/25
OS_SC: 20/20
OD_SC: 20/30
METHOD: SNELLEN - LINEAR

## 2021-06-03 ASSESSMENT — EXTERNAL EXAM - LEFT EYE: OS_EXAM: NORMAL

## 2021-06-03 ASSESSMENT — SLIT LAMP EXAM - LIDS
COMMENTS: 1+ DERMATOCHALASIS
COMMENTS: 1+ DERMATOCHALASIS

## 2021-06-03 ASSESSMENT — EXTERNAL EXAM - RIGHT EYE: OD_EXAM: NORMAL

## 2021-06-03 NOTE — PATIENT INSTRUCTIONS
Continue Polytrim four times a day for 3 more days    Stop prednisolone (white or pink top) eyedrops for 3 days, then resume four times a day in the right eye when you stop the Polytrim (and use the prednisolone until the bottle runs out)    Continue dorzolamide (orange top) twice a day in the right eye until the bottle runs out    Foreign Snider MD  [phone]
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
## 2021-06-04 ENCOUNTER — TELEPHONE (OUTPATIENT)
Dept: SURGERY | Facility: CLINIC | Age: 66
End: 2021-06-04

## 2021-06-04 NOTE — TELEPHONE ENCOUNTER
I contacted the patient via phone and spoke with her to confirm the scheduled dates and provide the following information:     Surgeon/surgery date/location:  Dr. Miner on 7/28 at [facility].  Arrival:   12:30 PM   Pre-op consult:   Patient declined  Pre-op physical with:   PCP. Patient is aware this needs to be completed within 30 days of surgery.  COVID-19 test:   7/25. Central scheduling to schedule.  Post-op:   8/10    The surgery packet was provided via letter per patient request.

## 2021-06-07 ENCOUNTER — OFFICE VISIT (OUTPATIENT)
Dept: FAMILY MEDICINE | Facility: CLINIC | Age: 66
End: 2021-06-07
Payer: COMMERCIAL

## 2021-06-07 ENCOUNTER — HOSPITAL ENCOUNTER (OUTPATIENT)
Dept: GENERAL RADIOLOGY | Facility: CLINIC | Age: 66
Discharge: HOME | End: 2021-06-07
Attending: FAMILY MEDICINE | Admitting: FAMILY MEDICINE
Payer: COMMERCIAL

## 2021-06-07 VITALS
HEIGHT: 63 IN | WEIGHT: 129.2 LBS | OXYGEN SATURATION: 97 % | DIASTOLIC BLOOD PRESSURE: 68 MMHG | HEART RATE: 84 BPM | SYSTOLIC BLOOD PRESSURE: 126 MMHG | BODY MASS INDEX: 22.89 KG/M2 | RESPIRATION RATE: 18 BRPM | TEMPERATURE: 97.7 F

## 2021-06-07 DIAGNOSIS — F07.81 POST CONCUSSION SYNDROME: ICD-10-CM
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
## 2021-06-07 DIAGNOSIS — M79.10 MYALGIA: ICD-10-CM
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
## 2021-06-07 DIAGNOSIS — G44.209 TENSION HEADACHE: ICD-10-CM

## 2021-06-07 DIAGNOSIS — M54.50 ACUTE BILATERAL LOW BACK PAIN WITHOUT SCIATICA: ICD-10-CM

## 2021-06-07 DIAGNOSIS — S20.219D CONTUSION OF CHEST WALL, UNSPECIFIED LATERALITY, SUBSEQUENT ENCOUNTER: ICD-10-CM

## 2021-06-07 LAB
ANION GAP SERPL CALCULATED.3IONS-SCNC: 6 MMOL/L (ref 3–14)
BUN SERPL-MCNC: 16 MG/DL (ref 7–30)
CALCIUM SERPL-MCNC: 9 MG/DL (ref 8.5–10.1)
CHLORIDE SERPL-SCNC: 107 MMOL/L (ref 94–109)
CK SERPL-CCNC: 52 U/L (ref 30–225)
CO2 SERPL-SCNC: 29 MMOL/L (ref 20–32)
CREAT SERPL-MCNC: 0.67 MG/DL (ref 0.52–1.04)
ERYTHROCYTE [DISTWIDTH] IN BLOOD BY AUTOMATED COUNT: 13.6 % (ref 10–15)
GFR SERPL CREATININE-BSD FRML MDRD: >90 ML/MIN/{1.73_M2}
GLUCOSE SERPL-MCNC: 86 MG/DL (ref 70–99)
HCT VFR BLD AUTO: 39.8 % (ref 35–47)
HGB BLD-MCNC: 12.7 G/DL (ref 11.7–15.7)
MCH RBC QN AUTO: 29.9 PG (ref 26.5–33)
MCHC RBC AUTO-ENTMCNC: 31.9 G/DL (ref 31.5–36.5)
MCV RBC AUTO: 94 FL (ref 78–100)
PLATELET # BLD AUTO: 354 10E9/L (ref 150–450)
POTASSIUM SERPL-SCNC: 3.6 MMOL/L (ref 3.4–5.3)
RBC # BLD AUTO: 4.25 10E12/L (ref 3.8–5.2)
SODIUM SERPL-SCNC: 142 MMOL/L (ref 133–144)
WBC # BLD AUTO: 5.1 10E9/L (ref 4–11)

## 2021-06-07 PROCEDURE — 85027 COMPLETE CBC AUTOMATED: CPT | Performed by: FAMILY MEDICINE

## 2021-06-07 PROCEDURE — 72100 X-RAY EXAM L-S SPINE 2/3 VWS: CPT

## 2021-06-07 PROCEDURE — 99214 OFFICE O/P EST MOD 30 MIN: CPT | Performed by: FAMILY MEDICINE

## 2021-06-07 PROCEDURE — 36415 COLL VENOUS BLD VENIPUNCTURE: CPT | Performed by: FAMILY MEDICINE

## 2021-06-07 PROCEDURE — 82550 ASSAY OF CK (CPK): CPT | Performed by: FAMILY MEDICINE

## 2021-06-07 PROCEDURE — 80048 BASIC METABOLIC PNL TOTAL CA: CPT | Performed by: FAMILY MEDICINE

## 2021-06-07 RX ORDER — HYDROCODONE BITARTRATE AND ACETAMINOPHEN 5; 325 MG/1; MG/1
1 TABLET ORAL EVERY 6 HOURS PRN
Qty: 15 TABLET | Refills: 0 | Status: SHIPPED | OUTPATIENT
Start: 2021-06-07 | End: 2021-11-10

## 2021-06-07 RX ORDER — CYCLOBENZAPRINE HCL 10 MG
5-10 TABLET ORAL 3 TIMES DAILY PRN
Qty: 60 TABLET | Refills: 0 | Status: SHIPPED | OUTPATIENT
Start: 2021-06-07 | End: 2021-11-10

## 2021-06-07 ASSESSMENT — MIFFLIN-ST. JEOR: SCORE: 1077.24

## 2021-06-07 ASSESSMENT — PAIN SCALES - GENERAL: PAINLEVEL: MODERATE PAIN (5)

## 2021-06-07 NOTE — LETTER
[address] [address]  Phone: [phone]  Fax: [phone]    June 7, 2021        Jackie Siddiqi  [address] [facility] [address]  [place] [facility] [place] [address]          To whom it may concern:    RE: Jackie Siddiqi    Patient was seen and treated today at our clinic.  Patient may return to work 06/18 with the following:  Restrictions to be determined.    Please contact me for questions or concerns.      Sincerely,        Jovana Wooten Mai, MD
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
## 2021-06-11 ENCOUNTER — TELEPHONE (OUTPATIENT)
Dept: FAMILY MEDICINE | Facility: CLINIC | Age: 66
End: 2021-06-11

## 2021-06-11 DIAGNOSIS — Z11.59 ENCOUNTER FOR SCREENING FOR OTHER VIRAL DISEASES: ICD-10-CM

## 2021-06-11 NOTE — TELEPHONE ENCOUNTER
Patient stating she is having difficulty getting into her Mychart to check her results. Patient informed of normal results and no changes. Adina Gibson LPN

## 2021-06-14 ENCOUNTER — TELEPHONE (OUTPATIENT)
Dept: FAMILY MEDICINE | Facility: CLINIC | Age: 66
End: 2021-06-14

## 2021-06-14 NOTE — TELEPHONE ENCOUNTER
Reason for Call:  Back to work date needs to be extended    Detailed comments: Pt is supposed to go back on the 18th, isn't feeling well enough to start. Still having headaches, back pain, fatigued.     Phone Number Patient can be reached at: [phone]    Best Time: Anytime    Can we leave a detailed message on this number? Yes    Call taken on 6/14/2021 at 5:28 PM by Fabrizio Torres

## 2021-06-16 ENCOUNTER — OFFICE VISIT (OUTPATIENT)
Dept: FAMILY MEDICINE | Facility: CLINIC | Age: 66
End: 2021-06-16
Payer: COMMERCIAL

## 2021-06-16 VITALS
TEMPERATURE: 97.7 F | RESPIRATION RATE: 14 BRPM | OXYGEN SATURATION: 98 % | SYSTOLIC BLOOD PRESSURE: 126 MMHG | DIASTOLIC BLOOD PRESSURE: 72 MMHG | HEART RATE: 74 BPM | BODY MASS INDEX: 23.29 KG/M2 | WEIGHT: 129.4 LBS

## 2021-06-16 DIAGNOSIS — M62.830 BACK MUSCLE SPASM: ICD-10-CM

## 2021-06-16 DIAGNOSIS — F07.81 POST CONCUSSION SYNDROME: Primary | ICD-10-CM

## 2021-06-16 DIAGNOSIS — G44.209 TENSION HEADACHE: ICD-10-CM

## 2021-06-16 PROCEDURE — 99214 OFFICE O/P EST MOD 30 MIN: CPT | Performed by: FAMILY MEDICINE

## 2021-06-16 RX ORDER — PREDNISONE 20 MG/1
20 TABLET ORAL DAILY
Qty: 7 TABLET | Refills: 0 | Status: SHIPPED | OUTPATIENT
Start: 2021-06-16 | End: 2021-06-23

## 2021-06-16 ASSESSMENT — PAIN SCALES - GENERAL: PAINLEVEL: SEVERE PAIN (7)

## 2021-06-16 NOTE — TELEPHONE ENCOUNTER
Neck and back along shoulder blade into neck getting worse  concentratiing  Ringing in Left ear  Fatigue  dizzy

## 2021-06-16 NOTE — TELEPHONE ENCOUNTER
Please find out more about her symptoms and how this would affect her work.  Also how much more time does she want to be off.  Thank you
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
## 2021-06-16 NOTE — LETTER
[address] [address]  Phone: [phone]  Fax: [phone]    June 16, 2021        Jackie Siddiqi  [address] [address] [facility] [address]  [place] [place] [facility] [address]          To whom it may concern:    RE: Jackie Siddiqi    Patient was seen and treated today at our clinic.  Patient may return to work 06/30/21      Please contact me for questions or concerns.      Sincerely,        Jovana Wooten Mai, MD

## 2021-06-18 ENCOUNTER — HOSPITAL ENCOUNTER (OUTPATIENT)
Dept: OCCUPATIONAL THERAPY | Facility: CLINIC | Age: 66
Setting detail: THERAPIES SERIES
End: 2021-06-18
Attending: FAMILY MEDICINE
Payer: COMMERCIAL

## 2021-06-18 ENCOUNTER — TELEPHONE (OUTPATIENT)
Dept: FAMILY MEDICINE | Facility: CLINIC | Age: 66
End: 2021-06-18
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
## 2021-06-18 DIAGNOSIS — F07.81 POST CONCUSSION SYNDROME: ICD-10-CM

## 2021-06-18 PROCEDURE — 97166 OT EVAL MOD COMPLEX 45 MIN: CPT | Mod: GO | Performed by: OCCUPATIONAL THERAPIST

## 2021-06-18 NOTE — TELEPHONE ENCOUNTER
Patient calling to follow up on form that she dropped off that was to be faxed to release of information. Informed of no document being scanned in to her chart. Did give number to [employer] that she can call to follow up if they received a fax to release information. Adina Gibson LPN

## 2021-06-18 NOTE — TELEPHONE ENCOUNTER
Our goal is to have forms completed with 72 hours, however some forms may require a visit or additional information.    Who is the form from?: Patient  Where the form came from: Patient or family brought in     What clinic location was the form placed at?: [place]  Where the form was placed: [facility] Box/Folder  What number is listed as a contact on the form?: [phone]    Phone call message- patient request for a letter, form or note:    Date needed: as soon as possible  Please fax to [employer] @ [phone]  Has the patient signed a consent form for release of information? Not Applicable    Additional comments: Insurance company needs by June 25th otherwise the claim will be denied.     Call taken on 6/18/2021 at 2:14 PM by Patricia Ramesh    Type of letter, form or note: disability

## 2021-06-19 NOTE — TELEPHONE ENCOUNTER
Not sure what form she is talking about.  The disability form was filled out on her behalf and please address it as soon as possible.  Thank you
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
## 2021-06-21 NOTE — TELEPHONE ENCOUNTER
Form was completed by provider, faxed to [email or web address] and medical records. Medical records to fax records.   Rebecca HARRISON)

## 2021-06-22 ENCOUNTER — HOSPITAL ENCOUNTER (OUTPATIENT)
Dept: PHYSICAL THERAPY | Facility: CLINIC | Age: 66
Setting detail: THERAPIES SERIES
End: 2021-06-22
Attending: FAMILY MEDICINE
Payer: COMMERCIAL

## 2021-06-22 PROCEDURE — 97162 PT EVAL MOD COMPLEX 30 MIN: CPT | Mod: GP | Performed by: PHYSICAL THERAPIST

## 2021-06-22 PROCEDURE — 97530 THERAPEUTIC ACTIVITIES: CPT | Mod: GP | Performed by: PHYSICAL THERAPIST

## 2021-06-22 PROCEDURE — 97140 MANUAL THERAPY 1/> REGIONS: CPT | Mod: GP | Performed by: PHYSICAL THERAPIST
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
## 2021-06-22 NOTE — TELEPHONE ENCOUNTER
2nd set of forms received,left message for Patient informed that forms were faxed and are now in her chart.

## 2021-06-28 ENCOUNTER — HOSPITAL ENCOUNTER (OUTPATIENT)
Dept: PHYSICAL THERAPY | Facility: CLINIC | Age: 66
Setting detail: THERAPIES SERIES
End: 2021-06-28
Attending: FAMILY MEDICINE
Payer: COMMERCIAL

## 2021-06-28 PROCEDURE — 97140 MANUAL THERAPY 1/> REGIONS: CPT | Mod: GP | Performed by: PHYSICAL THERAPIST

## 2021-06-28 PROCEDURE — 97110 THERAPEUTIC EXERCISES: CPT | Mod: GP | Performed by: PHYSICAL THERAPIST

## 2021-06-30 NOTE — TELEPHONE ENCOUNTER
Chart reviewed, she had a colonoscopy done on 12/2017 and recommended to repeat colonoscopy in 3-5 years.    She has this lower abdominal pain for awhile and work up so far has not indicate an obvious cause of the pin.   Her last GI specialist note suggested that it can be done earlier if develops concerned symptoms, rectal bleeding, or symptoms not improved. If she feel the abdominal pain is that as it has been then will refer for colonscopy. If it is different then please follow up for further evaluation. We now have a GI specialist, if interested, ok to refer to Dr. Quick.  thanks     Detail Level: Detailed Anesthesia Volume In Cc: 0.5 Post-Care Instructions: I reviewed with the patient in detail post-care instructions. Patient is to wear sunprotection, and avoid picking at any of the treated lesions. Pt may apply Vaseline to crusted or scabbing areas. Price (Use Numbers Only, No Special Characters Or $): 100 Topical Anesthesia?: EMLA Consent: The patient's consent was obtained including but not limited to risks of crusting, scabbing, blistering, scarring, darker or lighter pigmentary change, recurrence, incomplete removal and infection. Price (Use Numbers Only, No Special Characters Or $): 50

## 2021-07-02 ENCOUNTER — HOSPITAL ENCOUNTER (OUTPATIENT)
Dept: PHYSICAL THERAPY | Facility: CLINIC | Age: 66
Setting detail: THERAPIES SERIES
End: 2021-07-02
Attending: FAMILY MEDICINE
Payer: COMMERCIAL

## 2021-07-02 ENCOUNTER — HOSPITAL ENCOUNTER (OUTPATIENT)
Dept: OCCUPATIONAL THERAPY | Facility: CLINIC | Age: 66
Setting detail: THERAPIES SERIES
End: 2021-07-02
Attending: FAMILY MEDICINE
Payer: COMMERCIAL

## 2021-07-02 PROCEDURE — 97140 MANUAL THERAPY 1/> REGIONS: CPT | Mod: GP | Performed by: PHYSICAL THERAPIST

## 2021-07-02 PROCEDURE — 97535 SELF CARE MNGMENT TRAINING: CPT | Mod: GO | Performed by: OCCUPATIONAL THERAPIST

## 2021-07-06 ENCOUNTER — HOSPITAL ENCOUNTER (OUTPATIENT)
Dept: OCCUPATIONAL THERAPY | Facility: CLINIC | Age: 66
Setting detail: THERAPIES SERIES
End: 2021-07-06
Attending: FAMILY MEDICINE
Payer: COMMERCIAL

## 2021-07-06 ENCOUNTER — HOSPITAL ENCOUNTER (OUTPATIENT)
Dept: PHYSICAL THERAPY | Facility: CLINIC | Age: 66
Setting detail: THERAPIES SERIES
End: 2021-07-06
Attending: FAMILY MEDICINE
Payer: COMMERCIAL

## 2021-07-06 PROCEDURE — 97110 THERAPEUTIC EXERCISES: CPT | Mod: GP | Performed by: PHYSICAL THERAPIST

## 2021-07-06 PROCEDURE — 97140 MANUAL THERAPY 1/> REGIONS: CPT | Mod: GP | Performed by: PHYSICAL THERAPIST

## 2021-07-06 PROCEDURE — 97535 SELF CARE MNGMENT TRAINING: CPT | Mod: GO | Performed by: OCCUPATIONAL THERAPIST

## 2021-07-13 ENCOUNTER — HOSPITAL ENCOUNTER (OUTPATIENT)
Dept: OCCUPATIONAL THERAPY | Facility: CLINIC | Age: 66
Setting detail: THERAPIES SERIES
End: 2021-07-13
Attending: FAMILY MEDICINE
Payer: COMMERCIAL

## 2021-07-13 ENCOUNTER — HOSPITAL ENCOUNTER (OUTPATIENT)
Dept: PHYSICAL THERAPY | Facility: CLINIC | Age: 66
Setting detail: THERAPIES SERIES
End: 2021-07-13
Attending: FAMILY MEDICINE
Payer: COMMERCIAL

## 2021-07-13 PROCEDURE — 97535 SELF CARE MNGMENT TRAINING: CPT | Mod: GO | Performed by: OCCUPATIONAL THERAPIST

## 2021-07-13 PROCEDURE — 97140 MANUAL THERAPY 1/> REGIONS: CPT | Mod: GP | Performed by: PHYSICAL THERAPIST

## 2021-07-13 PROCEDURE — 97110 THERAPEUTIC EXERCISES: CPT | Mod: GP | Performed by: PHYSICAL THERAPIST

## 2021-07-16 ENCOUNTER — HOSPITAL ENCOUNTER (OUTPATIENT)
Dept: PHYSICAL THERAPY | Facility: CLINIC | Age: 66
Setting detail: THERAPIES SERIES
End: 2021-07-16
Attending: FAMILY MEDICINE
Payer: COMMERCIAL

## 2021-07-16 PROCEDURE — 97140 MANUAL THERAPY 1/> REGIONS: CPT | Mod: GP | Performed by: PHYSICAL THERAPIST

## 2021-07-16 PROCEDURE — 97530 THERAPEUTIC ACTIVITIES: CPT | Mod: GP | Performed by: PHYSICAL THERAPIST

## 2021-07-16 PROCEDURE — 97110 THERAPEUTIC EXERCISES: CPT | Mod: GP | Performed by: PHYSICAL THERAPIST

## 2021-07-20 ENCOUNTER — HOSPITAL ENCOUNTER (OUTPATIENT)
Dept: PHYSICAL THERAPY | Facility: CLINIC | Age: 66
Setting detail: THERAPIES SERIES
End: 2021-07-20
Attending: FAMILY MEDICINE
Payer: COMMERCIAL

## 2021-07-20 ENCOUNTER — HOSPITAL ENCOUNTER (OUTPATIENT)
Dept: OCCUPATIONAL THERAPY | Facility: CLINIC | Age: 66
Setting detail: THERAPIES SERIES
End: 2021-07-20
Attending: FAMILY MEDICINE
Payer: COMMERCIAL

## 2021-07-20 ENCOUNTER — TELEPHONE (OUTPATIENT)
Dept: FAMILY MEDICINE | Facility: CLINIC | Age: 66
End: 2021-07-20

## 2021-07-20 PROCEDURE — 97110 THERAPEUTIC EXERCISES: CPT | Mod: GP | Performed by: PHYSICAL THERAPIST

## 2021-07-20 PROCEDURE — 97535 SELF CARE MNGMENT TRAINING: CPT | Mod: GO

## 2021-07-20 NOTE — TELEPHONE ENCOUNTER
Our goal is to have forms completed with 72 hours, however some forms may require a visit or additional information.    Who is the form from?: [facility] (if other please explain)  Where the form came from: form was faxed in  What clinic location was the form placed at?: [place]  Where the form was placed: Dr. Quinonez Box/Folder  What number is listed as a contact on the form?:     Phone call message- patient request for a letter, form or note:    Date needed: as soon as possible  Please call the patient when completed  Has the patient signed a consent form for release of information? Not Applicable    Additional comments: This form has been completed and faxed previously. Dr. Quinonez is wondering what is currently keeping patient from working?     Call taken on 7/20/2021 at 2:00 PM by Rebecca Gtz CMA    Type of letter, form or note: [facility]

## 2021-07-20 NOTE — LETTER
July 22, 2021      Jackie Siddiqi  [address]  [place] [address]        Dear Jackie,     Please give us a call regarding your FMLA forms.      Sincerely,        Jovana Wooten Mai, MD

## 2021-07-20 NOTE — TELEPHONE ENCOUNTER
LMTC: Please see message below.  Rebecca Gtz CMA ([facility])    This form has been completed and faxed previously.Previous forms are in patients chart. Dr. Quinonez is wondering what is currently keeping patient from working?

## 2021-07-21 NOTE — TELEPHONE ENCOUNTER
Had pain in lower abdominal like stinging nagging pain and seems to have gotten worse spread up just a little bit higher in abdominal area and across hip area pain and feels like almost in hip bone too and sometimes spreads across whole abdominal area.  Doesn't go higher than right above her belly button.  She doesn't think she has a separate upper abdominal pain just her lower abdominal pain spreading getting larger.  Having lots of gas daily and feels like ab pain is growing, feels sick all the time, when acting up she feels sick shaky weak sometimes it's not there and sometimes it's there really bad where she can't stand it and she wants to cry, not a needle like pain but just feels sick.  Rectal area feels like it's sick or something, bowel movement it'll hurt, doesn't feel healthy, not sharp pain but super uncomfortable.  Worried about it because she has lots of cancer in her family.    I did transfer her to specialty scheduling to get an appt set up with Dr. Spear.  Told her she should be able to speak with him about this.    Chandler Bennett, CMA     Patient Specific Counseling (Will Not Stick From Patient To Patient): -\\nRemoval via an excision was thoroughly discussed with the patient. Detail Level: Detailed

## 2021-07-22 ENCOUNTER — HOSPITAL ENCOUNTER (OUTPATIENT)
Dept: PHYSICAL THERAPY | Facility: CLINIC | Age: 66
Setting detail: THERAPIES SERIES
End: 2021-07-22
Attending: FAMILY MEDICINE
Payer: COMMERCIAL

## 2021-07-22 PROCEDURE — 97110 THERAPEUTIC EXERCISES: CPT | Mod: GP | Performed by: PHYSICAL THERAPIST

## 2021-07-22 NOTE — PROGRESS NOTES
[address]  [facility] [address]  Phone: [phone]  Primary Provider: Jovana Quinonez  Pre-op Performing Provider: KIMO CASTRO      PREOPERATIVE EVALUATION:  Today's date: 7/27/2021    Jackie Siddiqi is a 66 year old female who presents for a preoperative evaluation.    Surgical Information:  Surgery/Procedure: Bilateral breast revision and implant exchange  Surgery Location: [facility]  Surgeon: Dr. Miner  Surgery Date: 7/28/21  Time of Surgery: 210pm  Where patient plans to recover: At home with family  Fax number for surgical facility: Note does not need to be faxed, will be available electronically in Epic.    Type of Anesthesia Anticipated: Combined general with block    Assessment & Plan     The proposed surgical procedure is considered INTERMEDIATE risk.    Preop general physical exam  Capsular contracture of breast implant, sequela  Hx of bilateral mastectomy      Chronic obstructive pulmonary disease, unspecified COPD type (H)  Stable, uses albuterol inhaler as needed  - EKG 12-lead complete w/read - Clinics    Hyperlipidemia LDL goal <160  Currently managed with diet and exercise  - EKG 12-lead complete w/read - Clinics    Hypothyroidism, unspecified type  Stable on levothyroxine    Advanced directives, counseling/discussion  Patient has advanced care directive paperwork at home that she plans to fill out.      Risks and Recommendations:  The patient has the following additional risks and recommendations for perioperative complications:   - No identified additional risk factors other than previously addressed    Medication Instructions:  Take levothyroxine as prescribed    RECOMMENDATION:  APPROVAL GIVEN to proceed with proposed procedure, without further diagnostic evaluation.    Review of external notes as documented above     Subjective     HPI related to upcoming procedure:   Initial breast implant 1985. Has had subsequent  surgeries with the most recent being 25 years ago. A diagnosis of bilateral breast reconstruction for breast cancer with grade 4 capsular contracture, implant rupture was made by plastic surgery.  Plan is to do bilateral implant exchanges and revision of reconstructed breasts.    Preop Questions 7/27/2021   1. Have you ever had a heart attack or stroke? No   2. Have you ever had surgery on your heart or blood vessels, such as a stent placement, a coronary artery bypass, or surgery on an artery in your head, neck, heart, or legs? No   3. Do you have chest pain with activity? No   4. Do you have a history of  heart failure? No   5. Do you currently have a cold, bronchitis or symptoms of other infection? No   6. Do you have a cough, shortness of breath, or wheezing? No   7. Do you or anyone in your family have previous history of blood clots? No   8. Do you or does anyone in your family have a serious bleeding problem such as prolonged bleeding following surgeries or cuts? No   9. Have you ever had problems with anemia or been told to take iron pills? No   10. Have you had any abnormal blood loss such as black, tarry or bloody stools, or abnormal vaginal bleeding? No   11. Have you ever had a blood transfusion? No   12. Are you willing to have a blood transfusion if it is medically needed before, during, or after your surgery? Yes   13. Have you or any of your relatives ever had problems with anesthesia? No   14. Do you have sleep apnea, excessive snoring or daytime drowsiness? No   15. Do you have any artifical heart valves or other implanted medical devices like a pacemaker, defibrillator, or continuous glucose monitor? No   16. Do you have artificial joints? No   17. Are you allergic to latex? No     Health Care Directive:  Patient does not have a Health Care Directive or Living Will: Discussed advance care planning with patient; information given to patient to review.    Preoperative Review of :   reviewed -  controlled substances reflected in medication list.      Status of Chronic Conditions:  Patient has well controlled COPD managed with albuterol inhaler as needed. Condition has significantly improved since quitting smoking.         Review of Systems  CONSTITUTIONAL: NEGATIVE for fever, chills, change in weight  INTEGUMENTARY/SKIN: NEGATIVE for worrisome rashes, moles or lesions  EYES: NEGATIVE for vision changes or irritation  ENT/MOUTH: NEGATIVE for ear, mouth and throat problems  RESP: NEGATIVE for significant cough or SOB  CV: NEGATIVE for chest pain, palpitations or peripheral edema  GI: NEGATIVE for nausea, abdominal pain, heartburn, or change in bowel habits  : NEGATIVE for frequency, dysuria, or hematuria  MUSCULOSKELETAL: NEGATIVE for significant arthralgias or myalgia  NEURO: NEGATIVE for weakness, dizziness or paresthesias  ENDOCRINE: NEGATIVE for temperature intolerance, skin/hair changes  HEME: NEGATIVE for bleeding problems  PSYCHIATRIC: NEGATIVE for changes in mood or affect    Patient Active Problem List    Diagnosis Date Noted     Capsular contracture of breast implant, sequela 03/30/2021     Priority: Medium     Added automatically from request for surgery [id number]       Hx of bilateral mastectomy 03/09/2021     Priority: Medium     LLQ abdominal pain 03/09/2021     Priority: Medium     Combined forms of age-related cataract of right eye 02/09/2021     Priority: Medium     Added automatically from request for surgery [id number]       Combined form of age-related cataract, left eye 02/09/2021     Priority: Medium     Added automatically from request for surgery [id number]       Diverticulosis of colon 04/06/2020     Priority: Medium     Abnormal CT lung screening 10/02/2019     Priority: Medium     [facility]         Hypothyroidism, unspecified type 11/29/2016     Priority: Medium     Hyperlipidemia LDL goal <160 11/29/2016      Priority: Medium     Osteoarthritis of carpometacarpal joint of right thumb, unspecified osteoarthritis type 2016     Priority: Medium     IMO Regulatory Load OCT 2020       Advanced directives, counseling/discussion 2013     Priority: Medium     Discussed advance care planning with patient; information given to patient to review. 2013          Lichen sclerosus et atrophicus of the vulva 2012     Priority: Medium     Esophageal reflux 2005     Priority: Medium      Past Medical History:   Diagnosis Date     Abnormal Papanicolaou smear of cervix and cervical HPV     cryocautery     Breast cystic disease      Hiatal hernia      Hypothyroidism      Motion sickness      Nonsenile cataract      Peptic ulcer, unspecified site, unspecified as acute or chronic, without mention of hemorrhage, perforation, or obstruction     Peptic ulcer disease     Personal history of colonic polyps      Past Surgical History:   Procedure Laterality Date     C  DELIVERY ONLY       x2     CATARACT IOL, RT/LT       COLONOSCOPY  2007     COLONOSCOPY  2013    Procedure: COLONOSCOPY;  colonoscopy, Esophagoscopy, Gastroscopy, Duodenoscopy EGD, multiple biopsies;  Surgeon: Joe Benson MD;  Location:  GI     COLONOSCOPY N/A 2017    Procedure: COMBINED COLONOSCOPY, SINGLE OR MULTIPLE BIOPSY/POLYPECTOMY BY BIOPSY;  colonoscopy with polypectomy by biopsy;  Surgeon: Tolu No MD;  Location:  GI     COLONOSCOPY N/A 2020    Procedure: Colonoscopy with possible biopsy and/or polypectomy;  Surgeon: Obed Quick MD;  Location:  GI     ENDOSCOPY  2007    Sm hiatus hernia     ESOPHAGOSCOPY, GASTROSCOPY, DUODENOSCOPY (EGD), COMBINED  2013    Procedure: COMBINED ESOPHAGOSCOPY, GASTROSCOPY, DUODENOSCOPY (EGD), BIOPSY SINGLE OR MULTIPLE;  ESOPHAGOGASTRODUODENOSCOPY WITH MULTIPLE BIOPSIES;  Surgeon: Joe Benson MD;  Location:  GI     MASTECTOMY, BILATERAL        PHACOEMULSIFICATION CLEAR CORNEA WITH STANDARD INTRAOCULAR LENS IMPLANT Left 4/26/2021    Procedure: LEFT PHACOEMULSIFICATION, CATARACT, WITH INTRAOCULAR LENS IMPLANT;  Surgeon: Foreign Snider MD;  Location: [facility] OR     PHACOEMULSIFICATION CLEAR CORNEA WITH STANDARD INTRAOCULAR LENS IMPLANT Right 4/12/2021    Procedure: RIGHT PHACOEMULSIFICATION, CATARACT, WITH INTRAOCULAR LENS IMPLANT;  Surgeon: Foreign Snider MD;  Location: [facility] OR     ZZC NONSPECIFIC PROCEDURE      Laparoscopic exam with adhesions     ZZC NONSPECIFIC PROCEDURE      Mastectomy for cystic breast disease, breast implants     Current Outpatient Medications   Medication Sig Dispense Refill     albuterol (PROAIR HFA/PROVENTIL HFA/VENTOLIN HFA) 108 (90 Base) MCG/ACT inhaler Inhale 2 puffs into the lungs every 4 hours as needed for shortness of breath / dyspnea or wheezing 1 Inhaler 11     Ascorbic Acid (VITAMIN C) 500 MG CAPS        Cholecalciferol (VITAMIN D3) 25 MCG (1000 UT) CAPS        dorzolamide (TRUSOPT) 2 % ophthalmic solution Place 1 drop into both eyes 2 times daily       estradiol (ESTRACE) 0.1 MG/GM vaginal cream Place 2 g vaginally twice a week Please stop the premarin cream 42.5 g 1     fluticasone (FLONASE) 50 MCG/ACT nasal spray Spray 2 sprays into both nostrils daily 15.8 mL 1     Lactobacillus (PROBIOTIC ACIDOPHILUS PO)        levothyroxine (SYNTHROID/LEVOTHROID) 25 MCG tablet Take 1 tablet (25 mcg) by mouth daily 90 tablet 3     omeprazole (PRILOSEC) 20 MG DR capsule Take 1 capsule (20 mg) by mouth daily 30 capsule 1     clobetasol (TEMOVATE) 0.05 % external ointment Apply a small pea size amount at bedtime couple times a week as needed (Patient not taking: Reported on 7/27/2021) 60 g 1     cyclobenzaprine (FLEXERIL) 10 MG tablet Take 0.5-1 tablets (5-10 mg) by mouth 3 times daily as needed for muscle spasms (Patient not taking: Reported on 7/27/2021) 60 tablet 0     HYDROcodone-acetaminophen (NORCO) 5-325 MG tablet Take 1  "tablet by mouth every 6 hours as needed for severe pain (Patient not taking: Reported on 2021) 15 tablet 0     prednisoLONE acetate (PRED FORTE) 1 % ophthalmic suspension Place 1 drop Into the left eye 4 times daily (Patient not taking: Reported on 2021) 5 mL 0       Allergies   Allergen Reactions     No Known Drug Allergies         Social History     Tobacco Use     Smoking status: Former Smoker     Packs/day: 0.50     Years: 50.00     Pack years: 25.00     Types: Cigarettes     Quit date: 2019     Years since quittin.9     Smokeless tobacco: Never Used     Tobacco comment: 1 pack per week, started age 11   Substance Use Topics     Alcohol use: No     Alcohol/week: 0.0 standard drinks     Comment: holidays only     Family History   Problem Relation Age of Onset     Diabetes Father      Hypertension Father      Alcohol/Drug Father      Eye Disorder Father      Obesity Father      Breast Cancer Mother      Osteoporosis Mother      Thyroid Disease Mother      Cancer Mother         Bladder     Cancer Sister         fallopian tube cancer     Obesity Sister      Alzheimer Disease Sister      Cancer Sister         Skin Cancer     Allergies Daughter      Cancer Maternal Grandmother         uterine cancer     Liver Disease Daughter         Biliary Atresia     Genitourinary Problems Brother      No Known Problems Daughter      Heart Disease Brother         Heart Murmur     Glaucoma Maternal Grandfather      Genitourinary Problems Brother         kidney disease (two brothers)     Macular Degeneration No family hx of      History   Drug Use No         Objective     Pulse 66   Temp 98.5  F (36.9  C) (Temporal)   Resp 14   Ht 1.588 m (5' 2.5\")   Wt 58.3 kg (128 lb 8 oz)   LMP 2003   SpO2 95%   BMI 23.13 kg/m      Physical Exam    GENERAL APPEARANCE: healthy, alert and no distress     EYES: EOMI, PERRL     HENT: ear canals and TM's normal and nose and mouth without ulcers or lesions     NECK: no " adenopathy, no asymmetry, masses, or scars and thyroid normal to palpation     RESP: lungs clear to auscultation - no rales, rhonchi or wheezes     CV: regular rates and rhythm, normal S1 S2, no S3 or S4 and no murmur, click or rub     ABDOMEN:  soft, nontender, no HSM or masses and bowel sounds normal     MS: extremities normal- no gross deformities noted, no evidence of inflammation in joints, FROM in all extremities.     SKIN: no suspicious lesions or rashes     NEURO: Normal strength and tone, sensory exam grossly normal, mentation intact and speech normal     PSYCH: mentation appears normal. and affect normal/bright     LYMPHATICS: No cervical adenopathy    Recent Labs   Lab Test 06/07/21  1451 03/09/21  0948 09/16/19  1432   HGB 12.7  --  13.3     --  268    141 144   POTASSIUM 3.6 3.9 3.6   CR 0.67 0.74 0.74        Diagnostics:  No labs were ordered during this visit.   EKG: appears normal, NSR, normal axis, normal intervals, no acute ST/T changes c/w ischemia, no LVH by voltage criteria, unchanged from previous tracings    Revised Cardiac Risk Index (RCRI):  The patient has the following serious cardiovascular risks for perioperative complications:   - No serious cardiac risks = 0 points     RCRI Interpretation: 0 points: Class I (very low risk - 0.4% complication rate)           I, Karla Beck PA-C, was present with the Physician Assistant student who participated in the service and in the documentation of the note.  I have verified the history and personally performed the physical exam and medical decision making.  I agree with the assessment and plan of care as documented in the note.     SANDRO VerdinS2  Signed Electronically by: SANDRO PickardC  Copy of this evaluation report is provided to requesting physician.

## 2021-07-22 NOTE — PATIENT INSTRUCTIONS

## 2021-07-22 NOTE — H&P (VIEW-ONLY)
[address]  [facility] [address]  Phone: [phone]  Primary Provider: Jovana Quinonez  Pre-op Performing Provider: KIMO CASTRO      PREOPERATIVE EVALUATION:  Today's date: 7/27/2021    Jackie Siddiqi is a 66 year old female who presents for a preoperative evaluation.    Surgical Information:  Surgery/Procedure: Bilateral breast revision and implant exchange  Surgery Location: [facility]  Surgeon: Dr. Miner  Surgery Date: 7/28/21  Time of Surgery: 210pm  Where patient plans to recover: At home with family  Fax number for surgical facility: Note does not need to be faxed, will be available electronically in Epic.    Type of Anesthesia Anticipated: Combined general with block    Assessment & Plan     The proposed surgical procedure is considered INTERMEDIATE risk.    Preop general physical exam  Capsular contracture of breast implant, sequela  Hx of bilateral mastectomy      Chronic obstructive pulmonary disease, unspecified COPD type (H)  Stable, uses albuterol inhaler as needed  - EKG 12-lead complete w/read - Clinics    Hyperlipidemia LDL goal <160  Currently managed with diet and exercise  - EKG 12-lead complete w/read - Clinics    Hypothyroidism, unspecified type  Stable on levothyroxine    Advanced directives, counseling/discussion  Patient has advanced care directive paperwork at home that she plans to fill out.      Risks and Recommendations:  The patient has the following additional risks and recommendations for perioperative complications:   - No identified additional risk factors other than previously addressed    Medication Instructions:  Take levothyroxine as prescribed    RECOMMENDATION:  APPROVAL GIVEN to proceed with proposed procedure, without further diagnostic evaluation.    Review of external notes as documented above     Subjective     HPI related to upcoming procedure:   Initial breast implant 1985. Has had subsequent  surgeries with the most recent being 25 years ago. A diagnosis of bilateral breast reconstruction for breast cancer with grade 4 capsular contracture, implant rupture was made by plastic surgery.  Plan is to do bilateral implant exchanges and revision of reconstructed breasts.    Preop Questions 7/27/2021   1. Have you ever had a heart attack or stroke? No   2. Have you ever had surgery on your heart or blood vessels, such as a stent placement, a coronary artery bypass, or surgery on an artery in your head, neck, heart, or legs? No   3. Do you have chest pain with activity? No   4. Do you have a history of  heart failure? No   5. Do you currently have a cold, bronchitis or symptoms of other infection? No   6. Do you have a cough, shortness of breath, or wheezing? No   7. Do you or anyone in your family have previous history of blood clots? No   8. Do you or does anyone in your family have a serious bleeding problem such as prolonged bleeding following surgeries or cuts? No   9. Have you ever had problems with anemia or been told to take iron pills? No   10. Have you had any abnormal blood loss such as black, tarry or bloody stools, or abnormal vaginal bleeding? No   11. Have you ever had a blood transfusion? No   12. Are you willing to have a blood transfusion if it is medically needed before, during, or after your surgery? Yes   13. Have you or any of your relatives ever had problems with anesthesia? No   14. Do you have sleep apnea, excessive snoring or daytime drowsiness? No   15. Do you have any artifical heart valves or other implanted medical devices like a pacemaker, defibrillator, or continuous glucose monitor? No   16. Do you have artificial joints? No   17. Are you allergic to latex? No     Health Care Directive:  Patient does not have a Health Care Directive or Living Will: Discussed advance care planning with patient; information given to patient to review.    Preoperative Review of :   reviewed -  controlled substances reflected in medication list.      Status of Chronic Conditions:  Patient has well controlled COPD managed with albuterol inhaler as needed. Condition has significantly improved since quitting smoking.         Review of Systems  CONSTITUTIONAL: NEGATIVE for fever, chills, change in weight  INTEGUMENTARY/SKIN: NEGATIVE for worrisome rashes, moles or lesions  EYES: NEGATIVE for vision changes or irritation  ENT/MOUTH: NEGATIVE for ear, mouth and throat problems  RESP: NEGATIVE for significant cough or SOB  CV: NEGATIVE for chest pain, palpitations or peripheral edema  GI: NEGATIVE for nausea, abdominal pain, heartburn, or change in bowel habits  : NEGATIVE for frequency, dysuria, or hematuria  MUSCULOSKELETAL: NEGATIVE for significant arthralgias or myalgia  NEURO: NEGATIVE for weakness, dizziness or paresthesias  ENDOCRINE: NEGATIVE for temperature intolerance, skin/hair changes  HEME: NEGATIVE for bleeding problems  PSYCHIATRIC: NEGATIVE for changes in mood or affect    Patient Active Problem List    Diagnosis Date Noted     Capsular contracture of breast implant, sequela 03/30/2021     Priority: Medium     Added automatically from request for surgery [id number]       Hx of bilateral mastectomy 03/09/2021     Priority: Medium     LLQ abdominal pain 03/09/2021     Priority: Medium     Combined forms of age-related cataract of right eye 02/09/2021     Priority: Medium     Added automatically from request for surgery [id number]       Combined form of age-related cataract, left eye 02/09/2021     Priority: Medium     Added automatically from request for surgery [id number]       Diverticulosis of colon 04/06/2020     Priority: Medium     Abnormal CT lung screening 10/02/2019     Priority: Medium     [facility] [place] [facility]         Hypothyroidism, unspecified type 11/29/2016     Priority: Medium     Hyperlipidemia LDL goal <160 11/29/2016      Priority: Medium     Osteoarthritis of carpometacarpal joint of right thumb, unspecified osteoarthritis type 2016     Priority: Medium     IMO Regulatory Load OCT 2020       Advanced directives, counseling/discussion 2013     Priority: Medium     Discussed advance care planning with patient; information given to patient to review. 2013          Lichen sclerosus et atrophicus of the vulva 2012     Priority: Medium     Esophageal reflux 2005     Priority: Medium      Past Medical History:   Diagnosis Date     Abnormal Papanicolaou smear of cervix and cervical HPV     cryocautery     Breast cystic disease      Hiatal hernia      Hypothyroidism      Motion sickness      Nonsenile cataract      Peptic ulcer, unspecified site, unspecified as acute or chronic, without mention of hemorrhage, perforation, or obstruction     Peptic ulcer disease     Personal history of colonic polyps      Past Surgical History:   Procedure Laterality Date     C  DELIVERY ONLY       x2     CATARACT IOL, RT/LT       COLONOSCOPY  2007     COLONOSCOPY  2013    Procedure: COLONOSCOPY;  colonoscopy, Esophagoscopy, Gastroscopy, Duodenoscopy EGD, multiple biopsies;  Surgeon: Joe Benson MD;  Location:  GI     COLONOSCOPY N/A 2017    Procedure: COMBINED COLONOSCOPY, SINGLE OR MULTIPLE BIOPSY/POLYPECTOMY BY BIOPSY;  colonoscopy with polypectomy by biopsy;  Surgeon: Tolu No MD;  Location:  GI     COLONOSCOPY N/A 2020    Procedure: Colonoscopy with possible biopsy and/or polypectomy;  Surgeon: Obed Quick MD;  Location:  GI     ENDOSCOPY  2007    Sm hiatus hernia     ESOPHAGOSCOPY, GASTROSCOPY, DUODENOSCOPY (EGD), COMBINED  2013    Procedure: COMBINED ESOPHAGOSCOPY, GASTROSCOPY, DUODENOSCOPY (EGD), BIOPSY SINGLE OR MULTIPLE;  ESOPHAGOGASTRODUODENOSCOPY WITH MULTIPLE BIOPSIES;  Surgeon: Joe Benson MD;  Location:  GI     MASTECTOMY, BILATERAL        PHACOEMULSIFICATION CLEAR CORNEA WITH STANDARD INTRAOCULAR LENS IMPLANT Left 4/26/2021    Procedure: LEFT PHACOEMULSIFICATION, CATARACT, WITH INTRAOCULAR LENS IMPLANT;  Surgeon: Foreign Snider MD;  Location: [facility]     PHACOEMULSIFICATION CLEAR CORNEA WITH STANDARD INTRAOCULAR LENS IMPLANT Right 4/12/2021    Procedure: RIGHT PHACOEMULSIFICATION, CATARACT, WITH INTRAOCULAR LENS IMPLANT;  Surgeon: Foreign Snider MD;  Location: [facility] OR     ZZC NONSPECIFIC PROCEDURE      Laparoscopic exam with adhesions     ZZC NONSPECIFIC PROCEDURE      Mastectomy for cystic breast disease, breast implants     Current Outpatient Medications   Medication Sig Dispense Refill     albuterol (PROAIR HFA/PROVENTIL HFA/VENTOLIN HFA) 108 (90 Base) MCG/ACT inhaler Inhale 2 puffs into the lungs every 4 hours as needed for shortness of breath / dyspnea or wheezing 1 Inhaler 11     Ascorbic Acid (VITAMIN C) 500 MG CAPS        Cholecalciferol (VITAMIN D3) 25 MCG (1000 UT) CAPS        dorzolamide (TRUSOPT) 2 % ophthalmic solution Place 1 drop into both eyes 2 times daily       estradiol (ESTRACE) 0.1 MG/GM vaginal cream Place 2 g vaginally twice a week Please stop the premarin cream 42.5 g 1     fluticasone (FLONASE) 50 MCG/ACT nasal spray Spray 2 sprays into both nostrils daily 15.8 mL 1     Lactobacillus (PROBIOTIC ACIDOPHILUS PO)        levothyroxine (SYNTHROID/LEVOTHROID) 25 MCG tablet Take 1 tablet (25 mcg) by mouth daily 90 tablet 3     omeprazole (PRILOSEC) 20 MG DR capsule Take 1 capsule (20 mg) by mouth daily 30 capsule 1     clobetasol (TEMOVATE) 0.05 % external ointment Apply a small pea size amount at bedtime couple times a week as needed (Patient not taking: Reported on 7/27/2021) 60 g 1     cyclobenzaprine (FLEXERIL) 10 MG tablet Take 0.5-1 tablets (5-10 mg) by mouth 3 times daily as needed for muscle spasms (Patient not taking: Reported on 7/27/2021) 60 tablet 0     HYDROcodone-acetaminophen (NORCO) 5-325 MG tablet Take 1  "tablet by mouth every 6 hours as needed for severe pain (Patient not taking: Reported on 2021) 15 tablet 0     prednisoLONE acetate (PRED FORTE) 1 % ophthalmic suspension Place 1 drop Into the left eye 4 times daily (Patient not taking: Reported on 2021) 5 mL 0       Allergies   Allergen Reactions     No Known Drug Allergies         Social History     Tobacco Use     Smoking status: Former Smoker     Packs/day: 0.50     Years: 50.00     Pack years: 25.00     Types: Cigarettes     Quit date: 2019     Years since quittin.9     Smokeless tobacco: Never Used     Tobacco comment: 1 pack per week, started age 11   Substance Use Topics     Alcohol use: No     Alcohol/week: 0.0 standard drinks     Comment: holidays only     Family History   Problem Relation Age of Onset     Diabetes Father      Hypertension Father      Alcohol/Drug Father      Eye Disorder Father      Obesity Father      Breast Cancer Mother      Osteoporosis Mother      Thyroid Disease Mother      Cancer Mother         Bladder     Cancer Sister         fallopian tube cancer     Obesity Sister      Alzheimer Disease Sister      Cancer Sister         Skin Cancer     Allergies Daughter      Cancer Maternal Grandmother         uterine cancer     Liver Disease Daughter         Biliary Atresia     Genitourinary Problems Brother      No Known Problems Daughter      Heart Disease Brother         Heart Murmur     Glaucoma Maternal Grandfather      Genitourinary Problems Brother         kidney disease (two brothers)     Macular Degeneration No family hx of      History   Drug Use No         Objective     Pulse 66   Temp 98.5  F (36.9  C) (Temporal)   Resp 14   Ht 1.588 m (5' 2.5\")   Wt 58.3 kg (128 lb 8 oz)   LMP 2003   SpO2 95%   BMI 23.13 kg/m      Physical Exam    GENERAL APPEARANCE: healthy, alert and no distress     EYES: EOMI, PERRL     HENT: ear canals and TM's normal and nose and mouth without ulcers or lesions     NECK: no " adenopathy, no asymmetry, masses, or scars and thyroid normal to palpation     RESP: lungs clear to auscultation - no rales, rhonchi or wheezes     CV: regular rates and rhythm, normal S1 S2, no S3 or S4 and no murmur, click or rub     ABDOMEN:  soft, nontender, no HSM or masses and bowel sounds normal     MS: extremities normal- no gross deformities noted, no evidence of inflammation in joints, FROM in all extremities.     SKIN: no suspicious lesions or rashes     NEURO: Normal strength and tone, sensory exam grossly normal, mentation intact and speech normal     PSYCH: mentation appears normal. and affect normal/bright     LYMPHATICS: No cervical adenopathy    Recent Labs   Lab Test 06/07/21  1451 03/09/21  0948 09/16/19  1432   HGB 12.7  --  13.3     --  268    141 144   POTASSIUM 3.6 3.9 3.6   CR 0.67 0.74 0.74        Diagnostics:  No labs were ordered during this visit.   EKG: appears normal, NSR, normal axis, normal intervals, no acute ST/T changes c/w ischemia, no LVH by voltage criteria, unchanged from previous tracings    Revised Cardiac Risk Index (RCRI):  The patient has the following serious cardiovascular risks for perioperative complications:   - No serious cardiac risks = 0 points     RCRI Interpretation: 0 points: Class I (very low risk - 0.4% complication rate)           I, Karla Beck PA-C, was present with the Physician Assistant student who participated in the service and in the documentation of the note.  I have verified the history and personally performed the physical exam and medical decision making.  I agree with the assessment and plan of care as documented in the note.     SANDRO VerdinS2  Signed Electronically by: SANDRO PickardC  Copy of this evaluation report is provided to requesting physician.

## 2021-07-22 NOTE — TELEPHONE ENCOUNTER
Patient returning call. Informed of message below. Patient is unsure of what these forms are for and there is nothing new scanned into chart. Patient stating provider had cleared her to return to work and her first day back to work was July 2nd. Adina Gibson LPN

## 2021-07-25 ENCOUNTER — LAB (OUTPATIENT)
Dept: LAB | Facility: CLINIC | Age: 66
End: 2021-07-25
Payer: COMMERCIAL

## 2021-07-25 DIAGNOSIS — Z11.59 ENCOUNTER FOR SCREENING FOR OTHER VIRAL DISEASES: ICD-10-CM

## 2021-07-25 PROCEDURE — U0003 INFECTIOUS AGENT DETECTION BY NUCLEIC ACID (DNA OR RNA); SEVERE ACUTE RESPIRATORY SYNDROME CORONAVIRUS 2 (SARS-COV-2) (CORONAVIRUS DISEASE [COVID-19]), AMPLIFIED PROBE TECHNIQUE, MAKING USE OF HIGH THROUGHPUT TECHNOLOGIES AS DESCRIBED BY CMS-2020-01-R: HCPCS

## 2021-07-25 PROCEDURE — U0005 INFEC AGEN DETEC AMPLI PROBE: HCPCS

## 2021-07-26 LAB — SARS-COV-2 RNA RESP QL NAA+PROBE: NEGATIVE

## 2021-07-27 ENCOUNTER — HOSPITAL ENCOUNTER (OUTPATIENT)
Dept: OCCUPATIONAL THERAPY | Facility: CLINIC | Age: 66
Setting detail: THERAPIES SERIES
End: 2021-07-27
Attending: FAMILY MEDICINE
Payer: COMMERCIAL

## 2021-07-27 ENCOUNTER — OFFICE VISIT (OUTPATIENT)
Dept: FAMILY MEDICINE | Facility: OTHER | Age: 66
End: 2021-07-27
Payer: COMMERCIAL

## 2021-07-27 VITALS
OXYGEN SATURATION: 95 % | HEART RATE: 66 BPM | HEIGHT: 63 IN | TEMPERATURE: 98.5 F | WEIGHT: 128.5 LBS | RESPIRATION RATE: 14 BRPM | BODY MASS INDEX: 22.77 KG/M2

## 2021-07-27 DIAGNOSIS — T85.44XS CAPSULAR CONTRACTURE OF BREAST IMPLANT, SEQUELA: ICD-10-CM

## 2021-07-27 DIAGNOSIS — Z01.818 PREOP GENERAL PHYSICAL EXAM: Primary | ICD-10-CM

## 2021-07-27 DIAGNOSIS — Z71.89 ADVANCED DIRECTIVES, COUNSELING/DISCUSSION: ICD-10-CM

## 2021-07-27 DIAGNOSIS — E78.5 HYPERLIPIDEMIA LDL GOAL <160: ICD-10-CM

## 2021-07-27 DIAGNOSIS — Z90.13 HX OF BILATERAL MASTECTOMY: ICD-10-CM

## 2021-07-27 DIAGNOSIS — E03.9 HYPOTHYROIDISM, UNSPECIFIED TYPE: ICD-10-CM

## 2021-07-27 DIAGNOSIS — J44.9 CHRONIC OBSTRUCTIVE PULMONARY DISEASE, UNSPECIFIED COPD TYPE (H): ICD-10-CM

## 2021-07-27 PROCEDURE — 99214 OFFICE O/P EST MOD 30 MIN: CPT | Performed by: PHYSICIAN ASSISTANT

## 2021-07-27 PROCEDURE — 93000 ELECTROCARDIOGRAM COMPLETE: CPT | Performed by: PHYSICIAN ASSISTANT

## 2021-07-27 PROCEDURE — 97535 SELF CARE MNGMENT TRAINING: CPT | Mod: GO | Performed by: OCCUPATIONAL THERAPIST

## 2021-07-27 RX ORDER — NALOXONE HYDROCHLORIDE 0.4 MG/ML
0.4 INJECTION, SOLUTION INTRAMUSCULAR; INTRAVENOUS; SUBCUTANEOUS
Status: DISCONTINUED | OUTPATIENT
Start: 2021-07-27 | End: 2021-07-29 | Stop reason: HOSPADM

## 2021-07-27 RX ORDER — NALOXONE HYDROCHLORIDE 0.4 MG/ML
0.2 INJECTION, SOLUTION INTRAMUSCULAR; INTRAVENOUS; SUBCUTANEOUS
Status: DISCONTINUED | OUTPATIENT
Start: 2021-07-27 | End: 2021-07-29 | Stop reason: HOSPADM

## 2021-07-27 ASSESSMENT — MIFFLIN-ST. JEOR: SCORE: 1074.06

## 2021-07-27 ASSESSMENT — PAIN SCALES - GENERAL: PAINLEVEL: NO PAIN (0)

## 2021-07-28 ENCOUNTER — HOSPITAL ENCOUNTER (OUTPATIENT)
Facility: AMBULATORY SURGERY CENTER | Age: 66
End: 2021-07-28
Attending: PLASTIC SURGERY
Payer: COMMERCIAL

## 2021-07-28 VITALS
SYSTOLIC BLOOD PRESSURE: 106 MMHG | OXYGEN SATURATION: 94 % | BODY MASS INDEX: 23.55 KG/M2 | HEIGHT: 62 IN | TEMPERATURE: 97.8 F | HEART RATE: 80 BPM | DIASTOLIC BLOOD PRESSURE: 55 MMHG | WEIGHT: 128 LBS | RESPIRATION RATE: 12 BRPM

## 2021-07-28 DIAGNOSIS — T85.44XS CAPSULAR CONTRACTURE OF BREAST IMPLANT, SEQUELA: ICD-10-CM

## 2021-07-28 PROCEDURE — 19380 REVJ RECONSTRUCTED BREAST: CPT | Mod: 50 | Performed by: PLASTIC SURGERY

## 2021-07-28 PROCEDURE — 19380 REVJ RECONSTRUCTED BREAST: CPT | Mod: 50

## 2021-07-28 PROCEDURE — 88305 TISSUE EXAM BY PATHOLOGIST: CPT | Performed by: PATHOLOGY

## 2021-07-28 DEVICE — IMPLANTABLE DEVICE: Type: IMPLANTABLE DEVICE | Site: BREAST | Status: FUNCTIONAL

## 2021-07-28 RX ORDER — HYDROMORPHONE HYDROCHLORIDE 1 MG/ML
.2-.5 INJECTION, SOLUTION INTRAMUSCULAR; INTRAVENOUS; SUBCUTANEOUS EVERY 5 MIN PRN
Status: DISCONTINUED | OUTPATIENT
Start: 2021-07-28 | End: 2021-07-28 | Stop reason: HOSPADM

## 2021-07-28 RX ORDER — HYDRALAZINE HYDROCHLORIDE 20 MG/ML
5 INJECTION INTRAMUSCULAR; INTRAVENOUS EVERY 10 MIN PRN
Status: DISCONTINUED | OUTPATIENT
Start: 2021-07-28 | End: 2021-07-28 | Stop reason: HOSPADM

## 2021-07-28 RX ORDER — AMOXICILLIN 250 MG
1-2 CAPSULE ORAL 2 TIMES DAILY
Qty: 30 TABLET | Refills: 0 | Status: SHIPPED | OUTPATIENT
Start: 2021-07-28 | End: 2021-11-10

## 2021-07-28 RX ORDER — OXYCODONE HYDROCHLORIDE 5 MG/1
5 TABLET ORAL ONCE
Status: COMPLETED | OUTPATIENT
Start: 2021-07-28 | End: 2021-07-28

## 2021-07-28 RX ORDER — FENTANYL CITRATE 50 UG/ML
25-50 INJECTION, SOLUTION INTRAMUSCULAR; INTRAVENOUS EVERY 5 MIN PRN
Status: DISCONTINUED | OUTPATIENT
Start: 2021-07-28 | End: 2021-07-28 | Stop reason: HOSPADM

## 2021-07-28 RX ORDER — OXYCODONE HCL 5 MG/5 ML
5 SOLUTION, ORAL ORAL EVERY 4 HOURS PRN
Status: DISCONTINUED | OUTPATIENT
Start: 2021-07-28 | End: 2021-07-29 | Stop reason: HOSPADM

## 2021-07-28 RX ORDER — ONDANSETRON 4 MG/1
4 TABLET, ORALLY DISINTEGRATING ORAL EVERY 30 MIN PRN
Status: DISCONTINUED | OUTPATIENT
Start: 2021-07-28 | End: 2021-07-29 | Stop reason: HOSPADM

## 2021-07-28 RX ORDER — PROPOFOL 10 MG/ML
INJECTION, EMULSION INTRAVENOUS PRN
Status: DISCONTINUED | OUTPATIENT
Start: 2021-07-28 | End: 2021-07-28

## 2021-07-28 RX ORDER — FENTANYL CITRATE 50 UG/ML
INJECTION, SOLUTION INTRAMUSCULAR; INTRAVENOUS PRN
Status: DISCONTINUED | OUTPATIENT
Start: 2021-07-28 | End: 2021-07-28

## 2021-07-28 RX ORDER — SODIUM CHLORIDE, SODIUM LACTATE, POTASSIUM CHLORIDE, CALCIUM CHLORIDE 600; 310; 30; 20 MG/100ML; MG/100ML; MG/100ML; MG/100ML
INJECTION, SOLUTION INTRAVENOUS CONTINUOUS
Status: DISCONTINUED | OUTPATIENT
Start: 2021-07-28 | End: 2021-07-28 | Stop reason: HOSPADM

## 2021-07-28 RX ORDER — ONDANSETRON 4 MG/1
4-8 TABLET, ORALLY DISINTEGRATING ORAL EVERY 8 HOURS PRN
Qty: 4 TABLET | Refills: 0 | Status: SHIPPED | OUTPATIENT
Start: 2021-07-28 | End: 2021-11-10

## 2021-07-28 RX ORDER — EPHEDRINE SULFATE 50 MG/ML
INJECTION, SOLUTION INTRAMUSCULAR; INTRAVENOUS; SUBCUTANEOUS PRN
Status: DISCONTINUED | OUTPATIENT
Start: 2021-07-28 | End: 2021-07-28

## 2021-07-28 RX ORDER — FLUMAZENIL 0.1 MG/ML
0.2 INJECTION, SOLUTION INTRAVENOUS
Status: DISCONTINUED | OUTPATIENT
Start: 2021-07-28 | End: 2021-07-28 | Stop reason: HOSPADM

## 2021-07-28 RX ORDER — PROPOFOL 10 MG/ML
INJECTION, EMULSION INTRAVENOUS CONTINUOUS PRN
Status: DISCONTINUED | OUTPATIENT
Start: 2021-07-28 | End: 2021-07-28

## 2021-07-28 RX ORDER — DEXAMETHASONE SODIUM PHOSPHATE 4 MG/ML
INJECTION, SOLUTION INTRA-ARTICULAR; INTRALESIONAL; INTRAMUSCULAR; INTRAVENOUS; SOFT TISSUE PRN
Status: DISCONTINUED | OUTPATIENT
Start: 2021-07-28 | End: 2021-07-28

## 2021-07-28 RX ORDER — FENTANYL CITRATE 50 UG/ML
25-50 INJECTION, SOLUTION INTRAMUSCULAR; INTRAVENOUS
Status: DISCONTINUED | OUTPATIENT
Start: 2021-07-28 | End: 2021-07-28 | Stop reason: HOSPADM

## 2021-07-28 RX ORDER — ACETAMINOPHEN 325 MG/1
975 TABLET ORAL ONCE
Status: COMPLETED | OUTPATIENT
Start: 2021-07-28 | End: 2021-07-28

## 2021-07-28 RX ORDER — MEPERIDINE HYDROCHLORIDE 25 MG/ML
12.5 INJECTION INTRAMUSCULAR; INTRAVENOUS; SUBCUTANEOUS
Status: DISCONTINUED | OUTPATIENT
Start: 2021-07-28 | End: 2021-07-29 | Stop reason: HOSPADM

## 2021-07-28 RX ORDER — LIDOCAINE 40 MG/G
CREAM TOPICAL
Status: DISCONTINUED | OUTPATIENT
Start: 2021-07-28 | End: 2021-07-28 | Stop reason: HOSPADM

## 2021-07-28 RX ORDER — CEFAZOLIN SODIUM 2 G/50ML
2 SOLUTION INTRAVENOUS
Status: COMPLETED | OUTPATIENT
Start: 2021-07-28 | End: 2021-07-28

## 2021-07-28 RX ORDER — ONDANSETRON 2 MG/ML
INJECTION INTRAMUSCULAR; INTRAVENOUS PRN
Status: DISCONTINUED | OUTPATIENT
Start: 2021-07-28 | End: 2021-07-28

## 2021-07-28 RX ORDER — BUPIVACAINE HYDROCHLORIDE AND EPINEPHRINE 2.5; 5 MG/ML; UG/ML
INJECTION, SOLUTION INFILTRATION; PERINEURAL PRN
Status: DISCONTINUED | OUTPATIENT
Start: 2021-07-28 | End: 2021-07-28 | Stop reason: HOSPADM

## 2021-07-28 RX ORDER — SODIUM CHLORIDE, SODIUM LACTATE, POTASSIUM CHLORIDE, CALCIUM CHLORIDE 600; 310; 30; 20 MG/100ML; MG/100ML; MG/100ML; MG/100ML
INJECTION, SOLUTION INTRAVENOUS CONTINUOUS
Status: DISCONTINUED | OUTPATIENT
Start: 2021-07-28 | End: 2021-07-29 | Stop reason: HOSPADM

## 2021-07-28 RX ORDER — OXYCODONE HYDROCHLORIDE 5 MG/1
5-10 TABLET ORAL EVERY 6 HOURS PRN
Qty: 15 TABLET | Refills: 0 | Status: SHIPPED | OUTPATIENT
Start: 2021-07-28 | End: 2021-07-31

## 2021-07-28 RX ORDER — LABETALOL HYDROCHLORIDE 5 MG/ML
5 INJECTION, SOLUTION INTRAVENOUS
Status: DISCONTINUED | OUTPATIENT
Start: 2021-07-28 | End: 2021-07-28 | Stop reason: HOSPADM

## 2021-07-28 RX ORDER — CEFAZOLIN SODIUM 2 G/50ML
2 SOLUTION INTRAVENOUS SEE ADMIN INSTRUCTIONS
Status: DISCONTINUED | OUTPATIENT
Start: 2021-07-28 | End: 2021-07-28 | Stop reason: HOSPADM

## 2021-07-28 RX ORDER — ONDANSETRON 2 MG/ML
4 INJECTION INTRAMUSCULAR; INTRAVENOUS EVERY 30 MIN PRN
Status: DISCONTINUED | OUTPATIENT
Start: 2021-07-28 | End: 2021-07-29 | Stop reason: HOSPADM

## 2021-07-28 RX ORDER — LIDOCAINE HYDROCHLORIDE 20 MG/ML
INJECTION, SOLUTION INFILTRATION; PERINEURAL PRN
Status: DISCONTINUED | OUTPATIENT
Start: 2021-07-28 | End: 2021-07-28

## 2021-07-28 RX ADMIN — PROPOFOL 200 MCG/KG/MIN: 10 INJECTION, EMULSION INTRAVENOUS at 14:29

## 2021-07-28 RX ADMIN — ONDANSETRON 4 MG: 2 INJECTION INTRAMUSCULAR; INTRAVENOUS at 14:22

## 2021-07-28 RX ADMIN — FENTANYL CITRATE 50 MCG: 50 INJECTION, SOLUTION INTRAMUSCULAR; INTRAVENOUS at 14:55

## 2021-07-28 RX ADMIN — PROPOFOL 150 MG: 10 INJECTION, EMULSION INTRAVENOUS at 14:29

## 2021-07-28 RX ADMIN — ACETAMINOPHEN 975 MG: 325 TABLET ORAL at 12:44

## 2021-07-28 RX ADMIN — LIDOCAINE HYDROCHLORIDE 60 MG: 20 INJECTION, SOLUTION INFILTRATION; PERINEURAL at 14:29

## 2021-07-28 RX ADMIN — SODIUM CHLORIDE, SODIUM LACTATE, POTASSIUM CHLORIDE, CALCIUM CHLORIDE: 600; 310; 30; 20 INJECTION, SOLUTION INTRAVENOUS at 12:53

## 2021-07-28 RX ADMIN — FENTANYL CITRATE 50 MCG: 50 INJECTION, SOLUTION INTRAMUSCULAR; INTRAVENOUS at 16:23

## 2021-07-28 RX ADMIN — FENTANYL CITRATE 50 MCG: 50 INJECTION, SOLUTION INTRAMUSCULAR; INTRAVENOUS at 16:31

## 2021-07-28 RX ADMIN — OXYCODONE HYDROCHLORIDE 5 MG: 5 TABLET ORAL at 16:25

## 2021-07-28 RX ADMIN — EPHEDRINE SULFATE 10 MG: 50 INJECTION, SOLUTION INTRAMUSCULAR; INTRAVENOUS; SUBCUTANEOUS at 15:16

## 2021-07-28 RX ADMIN — EPHEDRINE SULFATE 10 MG: 50 INJECTION, SOLUTION INTRAMUSCULAR; INTRAVENOUS; SUBCUTANEOUS at 14:44

## 2021-07-28 RX ADMIN — CEFAZOLIN SODIUM 2 G: 2 SOLUTION INTRAVENOUS at 14:22

## 2021-07-28 RX ADMIN — DEXAMETHASONE SODIUM PHOSPHATE 4 MG: 4 INJECTION, SOLUTION INTRA-ARTICULAR; INTRALESIONAL; INTRAMUSCULAR; INTRAVENOUS; SOFT TISSUE at 14:29

## 2021-07-28 RX ADMIN — FENTANYL CITRATE 50 MCG: 50 INJECTION, SOLUTION INTRAMUSCULAR; INTRAVENOUS at 14:29

## 2021-07-28 ASSESSMENT — MIFFLIN-ST. JEOR: SCORE: 1063.85

## 2021-07-28 NOTE — BRIEF OP NOTE
[facility] [place]    Brief Operative Note    Pre-operative diagnosis: Capsular contracture of breast implant, sequela [T85.44XS]  Post-operative diagnosis Same as pre-operative diagnosis    Procedure: Procedure(s):  Bilateral breast revision and implant exchange  Surgeon: Surgeon(s) and Role:     * JENNIFFER Miner MD - Primary   Byron Sarmiento MD - Resident  Anesthesia: Combined General with Block   Estimated blood loss: Less than 50 ml  Drains: None  Specimens:   ID Type Source Tests Collected by Time Destination   1 : Left Breast Capsule Tissue Breast, Left SURGICAL PATHOLOGY EXAM JENNIFFER Miner MD 7/28/2021  3:13 PM    2 : Right Breast Capsule Tissue Breast, Right SURGICAL PATHOLOGY EXAM JENNIFFER Miner MD 7/28/2021  3:13 PM    3 : Left Breast Tissue Tissue Breast, Left SURGICAL PATHOLOGY EXAM JENNIFFER Miner MD 7/28/2021  3:41 PM    4 : Right Breast Tissue Tissue Breast, Right SURGICAL PATHOLOGY EXAM JENNIFFER Miner MD 7/28/2021  3:41 PM      Findings:   None.  Complications: None.  Implants:   Implant Name Type Inv. Item Serial No.  Lot No. LRB No. Used Action   IMP BREAST GEL ROUND HP XTRA 415ML [id number] - [id number] Breast Implant/Tissue Expander IMP BREAST GEL ROUND HP XTRA 415ML [id number] [employer]  Left 1 Implanted   IMP BREAST GEL ROUND MOD+ PRO XTRA 350ML [id number] - [id number] Breast Implant/Tissue Expander IMP BREAST GEL ROUND MOD+ PRO XTRA 350ML [id number] [employer]  Right 1 Implanted

## 2021-07-28 NOTE — ANESTHESIA CARE TRANSFER NOTE
Patient: Jackie Siddiqi    Procedure(s):  Bilateral breast revision and implant exchange    Diagnosis: Capsular contracture of breast implant, sequela [T85.44XS]  Diagnosis Additional Information: No value filed.    Anesthesia Type:   No value filed.     Note:    Oropharynx: spontaneously breathing  Level of Consciousness: awake  Oxygen Supplementation: room air      Dentition: dentition unchanged  Vital Signs Stable: post-procedure vital signs reviewed and stable  Report to RN Given: handoff report given  Patient transferred to: PACU    Handoff Report: Identifed the Patient, Identified the Reponsible Provider, Reviewed the pertinent medical history, Discussed the surgical course, Reviewed Intra-OP anesthesia mangement and issues during anesthesia, Set expectations for post-procedure period and Allowed opportunity for questions and acknowledgement of understanding      Vitals:  Vitals Value Taken Time   BP     Temp     Pulse     Resp     SpO2         Electronically Signed By: TEDDY Kaye CRNA  July 28, 2021  4:06 PM

## 2021-07-28 NOTE — ANESTHESIA PREPROCEDURE EVALUATION
Anesthesia Pre-Procedure Evaluation    Patient: Jackie Siddiqi   MRN: [id number] : 1955        Preoperative Diagnosis: Capsular contracture of breast implant, sequela [T85.44XS]   Procedure : Procedure(s):  Bilateral breast revision and implant exchange     Past Medical History:   Diagnosis Date     Abnormal Papanicolaou smear of cervix and cervical HPV     cryocautery     Breast cystic disease      Hiatal hernia      Hypothyroidism      Motion sickness      Nonsenile cataract      Peptic ulcer, unspecified site, unspecified as acute or chronic, without mention of hemorrhage, perforation, or obstruction     Peptic ulcer disease     Personal history of colonic polyps       Past Surgical History:   Procedure Laterality Date     C  DELIVERY ONLY       x2     CATARACT IOL, RT/LT       COLONOSCOPY  2007     COLONOSCOPY  2013    Procedure: COLONOSCOPY;  colonoscopy, Esophagoscopy, Gastroscopy, Duodenoscopy EGD, multiple biopsies;  Surgeon: Joe Benson MD;  Location:  GI     COLONOSCOPY N/A 2017    Procedure: COMBINED COLONOSCOPY, SINGLE OR MULTIPLE BIOPSY/POLYPECTOMY BY BIOPSY;  colonoscopy with polypectomy by biopsy;  Surgeon: Tolu No MD;  Location:  GI     COLONOSCOPY N/A 2020    Procedure: Colonoscopy with possible biopsy and/or polypectomy;  Surgeon: Obed Quick MD;  Location:  GI     ENDOSCOPY  2007    Sm hiatus hernia     ESOPHAGOSCOPY, GASTROSCOPY, DUODENOSCOPY (EGD), COMBINED  2013    Procedure: COMBINED ESOPHAGOSCOPY, GASTROSCOPY, DUODENOSCOPY (EGD), BIOPSY SINGLE OR MULTIPLE;  ESOPHAGOGASTRODUODENOSCOPY WITH MULTIPLE BIOPSIES;  Surgeon: Joe Benson MD;  Location: [facility]     MASTECTOMY, BILATERAL       PHACOEMULSIFICATION CLEAR CORNEA WITH STANDARD INTRAOCULAR LENS IMPLANT Left 2021    Procedure: LEFT PHACOEMULSIFICATION, CATARACT, WITH INTRAOCULAR LENS IMPLANT;  Surgeon: Foreign Snider MD;  Location: [facility]      PHACOEMULSIFICATION CLEAR CORNEA WITH STANDARD INTRAOCULAR LENS IMPLANT Right 2021    Procedure: RIGHT PHACOEMULSIFICATION, CATARACT, WITH INTRAOCULAR LENS IMPLANT;  Surgeon: Foreign Snider MD;  Location:  OR     [facility] NONSPECIFIC PROCEDURE      Laparoscopic exam with adhesions     [facility] NONSPECIFIC PROCEDURE      Mastectomy for cystic breast disease, breast implants      Allergies   Allergen Reactions     No Known Drug Allergies       Social History     Tobacco Use     Smoking status: Former Smoker     Packs/day: 0.50     Years: 50.00     Pack years: 25.00     Types: Cigarettes     Quit date: 2019     Years since quittin.9     Smokeless tobacco: Never Used     Tobacco comment: 1 pack per week, started age 11   Substance Use Topics     Alcohol use: No     Alcohol/week: 0.0 standard drinks     Comment: holidays only      Wt Readings from Last 1 Encounters:   21 58.1 kg (128 lb)        Anesthesia Evaluation            ROS/MED HX  ENT/Pulmonary:  - neg pulmonary ROS     Neurologic:  - neg neurologic ROS     Cardiovascular:  - neg cardiovascular ROS   (+) Dyslipidemia -----    METS/Exercise Tolerance:     Hematologic:  - neg hematologic  ROS     Musculoskeletal:  - neg musculoskeletal ROS     GI/Hepatic:  - neg GI/hepatic ROS   (+) GERD, hiatal hernia,     Renal/Genitourinary:  - neg Renal ROS     Endo:  - neg endo ROS   (+) thyroid problem, hypothyroidism,     Psychiatric/Substance Use:  - neg psychiatric ROS     Infectious Disease:  - neg infectious disease ROS     Malignancy:  - neg malignancy ROS     Other:  - neg other ROS          Physical Exam    Airway  airway exam normal      Mallampati: II   TM distance: > 3 FB   Neck ROM: full   Mouth opening: > 3 cm    Respiratory Devices and Support         Dental  no notable dental history         Cardiovascular          Rhythm and rate: regular and normal     Pulmonary   pulmonary exam normal        breath sounds clear to auscultation            OUTSIDE LABS:  CBC:   Lab Results   Component Value Date    WBC 5.1 06/07/2021    WBC 7.6 09/16/2019    HGB 12.7 06/07/2021    HGB 13.3 09/16/2019    HCT 39.8 06/07/2021    HCT 40.5 09/16/2019     06/07/2021     09/16/2019     BMP:   Lab Results   Component Value Date     06/07/2021     03/09/2021    POTASSIUM 3.6 06/07/2021    POTASSIUM 3.9 03/09/2021    CHLORIDE 107 06/07/2021    CHLORIDE 109 03/09/2021    CO2 29 06/07/2021    CO2 29 03/09/2021    BUN 16 06/07/2021    BUN 14 03/09/2021    CR 0.67 06/07/2021    CR 0.74 03/09/2021    GLC 86 06/07/2021    GLC 94 03/09/2021     COAGS:   Lab Results   Component Value Date    INR 1.01 04/19/2016     POC: No results found for: BGM, HCG, HCGS  HEPATIC:   Lab Results   Component Value Date    ALBUMIN 4.0 03/09/2021    PROTTOTAL 7.6 03/09/2021    ALT 25 03/09/2021    AST 15 03/09/2021    ALKPHOS 63 03/09/2021    BILITOTAL 1.3 03/09/2021     OTHER:   Lab Results   Component Value Date    PH 5.0 03/28/2003    A1C 5.6 02/12/2017    SALLY 9.0 06/07/2021    LIPASE 130 02/16/2017    AMYLASE 33 02/16/2017    TSH 3.84 03/09/2021    T4 0.97 01/11/2013    SED 29 09/16/2019       Anesthesia Plan    ASA Status:  3   NPO Status:  NPO Appropriate    Anesthesia Type: General.     - Airway: LMA   Induction: Intravenous.   Maintenance: Balanced.        Consents    Anesthesia Plan(s) and associated risks, benefits, and realistic alternatives discussed. Questions answered and patient/representative(s) expressed understanding.     - Discussed with:  Patient      - Extended Intubation/Ventilatory Support Discussed: No.      - Patient is DNR/DNI Status: No    Use of blood products discussed: No .     Postoperative Care    Pain management: IV analgesics, Oral pain medications, Multi-modal analgesia.   PONV prophylaxis: Ondansetron (or other 5HT-3), Dexamethasone or Solumedrol, Background Propofol Infusion     Comments:                Daniel Wallace MD

## 2021-07-28 NOTE — ANESTHESIA POSTPROCEDURE EVALUATION
Patient: Jackie Siddiqi    Procedure(s):  Bilateral breast revision and implant exchange    Diagnosis:Capsular contracture of breast implant, sequela [T85.44XS]  Diagnosis Additional Information: No value filed.    Anesthesia Type:  General    Note:  Disposition: Outpatient   Postop Pain Control: Uneventful            Sign Out: Well controlled pain   PONV: No   Neuro/Psych: Uneventful            Sign Out: Acceptable/Baseline neuro status   Airway/Respiratory: Uneventful            Sign Out: Acceptable/Baseline resp. status   CV/Hemodynamics: Uneventful            Sign Out: Acceptable CV status; No obvious hypovolemia; No obvious fluid overload   Other NRE: NONE   DID A NON-ROUTINE EVENT OCCUR? No           Last vitals:  Vitals Value Taken Time   /74 07/28/21 1612   Temp 35.7  C (96.2  F) 07/28/21 1612   Pulse 93 07/28/21 1613   Resp 20 07/28/21 1614   SpO2 95 % 07/28/21 1615   Vitals shown include unvalidated device data.    Electronically Signed By: Isrrael Guy MD  July 28, 2021  4:16 PM

## 2021-07-29 NOTE — OP NOTE
Procedure Date: 07/28/2021    PREOPERATIVE DIAGNOSIS:  Bilateral breast reconstruction for breast cancer with grade 4 capsular contracture and misshapen, asymmetric breasts.    POSTOPERATIVE DIAGNOSIS:  Bilateral breast reconstruction for breast cancer with grade 4 capsular contracture and misshapen, asymmetric breasts.    PROCEDURE:  Revision of reconstructed breasts with implant exchange (240 mL saline implants textured removed and instead replaced with, on the right side, Cylinder Xtra Moderate Plus Profile 350 gel implant, and on the left side, Cylinder Xtra smooth round high-profile gel implant, 410 mL implant), extensive capsulotomy bilaterally, capsulectomy, and reinforcement of inframammary fold with excision of skin and subcutaneous tissue in the lower and lateral poles to get a rounded, symmetric shape of the breast.    SURGEON:  Bismark Miner MD    RESIDENT:  Byron Sarmiento MD    ANESTHESIA:  General anesthesia with endotracheal intubation.    ESTIMATED BLOOD LOSS:  Nil.    DRAINS:  Nil.    SPECIMENS:  Right breast tissue and capsule and left breast tissue and capsule.    BLOOD LOSS:  25 mL.    DESCRIPTION OF PROCEDURE:  After informed consent was taken from the patient, the proper site and procedure were ascertained with her, and she was appropriately marked and taken to the operating room.  She was placed in supine position with the knees comfortably flexed, pillows underneath then and pneumoboots placed and running prior to induction of anesthesia.  Preoperative antibiotics were given in the OR.  All pressure points were appropriately padded.  General anesthesia was administered without any complications.  She was prepped and draped in the standard surgical fashion.  I began by first marking the proposed inframammary folds on each side that were symmetric and based on the preoperative markings, then made an inframammary fold incision on each side, dissected down through the subcutaneous tissue to the  capsule.  I then opened the capsule.  The implants that were in there were 240 mL saline textured implants.  These were removed, and then extensive capsulotomy was carried out on each side given her grade 4 capsular contracture.  Partial capsulectomy was also carried out.  Hemostasis was ensured.  Once this was completed, I then went ahead and reinforced the inframammary fold with 0 PDS suture in the proper place.  I then placed different sized sizers on each side and sat the patient up.  I then came to the conclusion that the right side had more native tissue as well as more projection than the left side.  Therefore, a high-profile implant would be placed on the left and a moderate plus profile on the right, and therefore, a 350 mL on the right and a 410 mL on the left were ultimately chosen.  I thoroughly washed out the surgical sites with antibiotic irrigation and Betadine, changed my gloves, and then placed the implants in the proper plane, and then closed the deep tissues with running 2-0 Monocryl suture, and then closed the deep dermal closure with 2-0 Monocryl suture in deep dermal layer.  I then excised some of the excess skin and subcutaneous tissue in the lower lateral aspect of both breasts to give her the best shape and closed in a similar manner; 3-0 Stratafix suture was used to close the skin on each side.  Surgical tape and glue were placed on each side.  The patient was wrapped in an Ace wrap.  The patient tolerated the procedure well.  All counts were correct at the end of the case.  The patient was extubated and sent to recovery room in stable condition.    JENNIFFER Miner MD        D: 2021   T: 2021   MT: [facility]    Name:     YVETTE CALLE  MRN:      [id number]        Account:        [id number]   :      1955           Procedure Date: 2021     Document: [id number]

## 2021-08-02 ENCOUNTER — HOSPITAL ENCOUNTER (OUTPATIENT)
Dept: PHYSICAL THERAPY | Facility: CLINIC | Age: 66
Setting detail: THERAPIES SERIES
End: 2021-08-02
Attending: FAMILY MEDICINE
Payer: COMMERCIAL

## 2021-08-02 PROCEDURE — 97530 THERAPEUTIC ACTIVITIES: CPT | Mod: GP | Performed by: PHYSICAL THERAPIST

## 2021-08-02 PROCEDURE — 97110 THERAPEUTIC EXERCISES: CPT | Mod: GP | Performed by: PHYSICAL THERAPIST

## 2021-08-07 LAB
PATH REPORT.COMMENTS IMP SPEC: NORMAL
PATH REPORT.COMMENTS IMP SPEC: NORMAL
PATH REPORT.FINAL DX SPEC: NORMAL
PATH REPORT.GROSS SPEC: NORMAL
PATH REPORT.MICROSCOPIC SPEC OTHER STN: NORMAL
PATH REPORT.RELEVANT HX SPEC: NORMAL
PHOTO IMAGE: NORMAL

## 2021-08-09 ENCOUNTER — TELEPHONE (OUTPATIENT)
Dept: FAMILY MEDICINE | Facility: CLINIC | Age: 66
End: 2021-08-09

## 2021-08-09 NOTE — TELEPHONE ENCOUNTER
I have contacted the pt and informed her that [facility] is full. I informed her she could do an e-visit if she has access to [employer]. She said she will either try get logged in to [employer] for an e-visit or go to an urgent care. Luna Mclain, [facility] [address]

## 2021-08-09 NOTE — TELEPHONE ENCOUNTER
Patient is wondering if anyone could see her today for poss uti.. onset 2 days, she states that there is odor to her urine, pain pressure and has to urinate frequently,,, Please advise. Today so she can either go to an UC if needed  And Okay to lm...

## 2021-08-10 ENCOUNTER — OFFICE VISIT (OUTPATIENT)
Dept: PLASTIC SURGERY | Facility: CLINIC | Age: 66
End: 2021-08-10
Attending: PLASTIC SURGERY
Payer: COMMERCIAL

## 2021-08-10 ENCOUNTER — LAB (OUTPATIENT)
Dept: LAB | Facility: CLINIC | Age: 66
End: 2021-08-10
Payer: COMMERCIAL

## 2021-08-10 ENCOUNTER — VIRTUAL VISIT (OUTPATIENT)
Dept: FAMILY MEDICINE | Facility: CLINIC | Age: 66
End: 2021-08-10
Payer: COMMERCIAL

## 2021-08-10 VITALS
BODY MASS INDEX: 23.74 KG/M2 | OXYGEN SATURATION: 91 % | RESPIRATION RATE: 18 BRPM | DIASTOLIC BLOOD PRESSURE: 82 MMHG | SYSTOLIC BLOOD PRESSURE: 127 MMHG | TEMPERATURE: 98.2 F | HEART RATE: 57 BPM | WEIGHT: 129.8 LBS

## 2021-08-10 DIAGNOSIS — T85.44XS CAPSULAR CONTRACTURE OF BREAST IMPLANT, SEQUELA: Primary | ICD-10-CM

## 2021-08-10 DIAGNOSIS — R10.2 PELVIC PAIN IN FEMALE: ICD-10-CM

## 2021-08-10 DIAGNOSIS — R30.0 DYSURIA: ICD-10-CM

## 2021-08-10 DIAGNOSIS — R30.0 DYSURIA: Primary | ICD-10-CM

## 2021-08-10 LAB
ALBUMIN UR-MCNC: NEGATIVE MG/DL
APPEARANCE UR: ABNORMAL
BACTERIA #/AREA URNS HPF: ABNORMAL /HPF
BILIRUB UR QL STRIP: NEGATIVE
CLUE CELLS: ABNORMAL
COLOR UR AUTO: YELLOW
GLUCOSE UR STRIP-MCNC: NEGATIVE MG/DL
HGB UR QL STRIP: NEGATIVE
HYALINE CASTS: 1 /LPF
KETONES UR STRIP-MCNC: NEGATIVE MG/DL
LEUKOCYTE ESTERASE UR QL STRIP: ABNORMAL
MUCOUS THREADS #/AREA URNS LPF: PRESENT /LPF
NITRATE UR QL: NEGATIVE
PH UR STRIP: 5 [PH] (ref 5–7)
RBC URINE: 1 /HPF
SP GR UR STRIP: 1.02 (ref 1–1.03)
SQUAMOUS EPITHELIAL: 5 /HPF
TRICHOMONAS, WET PREP: ABNORMAL
UROBILINOGEN UR STRIP-MCNC: NORMAL MG/DL
WBC URINE: 2 /HPF
WBC'S/HIGH POWER FIELD, WET PREP: ABNORMAL
YEAST, WET PREP: ABNORMAL

## 2021-08-10 PROCEDURE — 87210 SMEAR WET MOUNT SALINE/INK: CPT

## 2021-08-10 PROCEDURE — 99213 OFFICE O/P EST LOW 20 MIN: CPT | Mod: 95 | Performed by: NURSE PRACTITIONER

## 2021-08-10 PROCEDURE — G0463 HOSPITAL OUTPT CLINIC VISIT: HCPCS

## 2021-08-10 PROCEDURE — 81001 URINALYSIS AUTO W/SCOPE: CPT

## 2021-08-10 PROCEDURE — 99024 POSTOP FOLLOW-UP VISIT: CPT | Performed by: PLASTIC SURGERY

## 2021-08-10 ASSESSMENT — PAIN SCALES - GENERAL: PAINLEVEL: MODERATE PAIN (4)

## 2021-08-10 NOTE — NURSING NOTE
"Oncology Rooming Note    August 10, 2021 11:49 AM   Jackie Siddiqi is a 66 year old female who presents for:    Chief Complaint   Patient presents with     Oncology Clinic Visit     Capsular contracture of breast implant, sequela; 2 week post op      Initial Vitals: /82   Pulse 57   Temp 98.2  F (36.8  C) (Oral)   Resp 18   Wt 58.9 kg (129 lb 12.8 oz)   LMP 03/26/2003   SpO2 91%   BMI 23.74 kg/m   Estimated body mass index is 23.74 kg/m  as calculated from the following:    Height as of 7/28/21: 1.575 m (5' 2\").    Weight as of this encounter: 58.9 kg (129 lb 12.8 oz). Body surface area is 1.61 meters squared.  Moderate Pain (4) Comment: Data Unavailable   Patient's last menstrual period was 03/26/2003.  Allergies reviewed: Yes  Medications reviewed: Yes    Medications: Medication refills not needed today.  Pharmacy name entered into [facility]:    [facility] [place], [place] - [address]  [facility] - [place], [place] - [address] [facility] [place], [place] - [address] [id number] - [place], [place] - [address] AT [facility] OF [address] MA[address]  [facility]  [facility] [address] [address] [employer] - [place], [place] - [address] [place] [address] [address] - [place], [place] - [address] [facility] - [place], [place] - [address] AT PORTAL TO REGISTERED [address] [address]ITES    Clinical concerns: No new concerns        Joanna Damon LPN            "

## 2021-08-10 NOTE — PROGRESS NOTES
Jackie is a 66 year old who is being evaluated via a billable telephone visit.      What phone number would you like to be contacted at? [phone]  How would you like to obtain your AVS? MyChart    Assessment & Plan     Dysuria  Recommend patient go to lab for urine and wet prep.   Discussed with patient negative indication for urine infection and wet prep negative as well. Due to her symptoms of pelvic pain and back pain recommend she be seen in clinic for formal evaluation and further workup. Possibly additional lab and imaging may be indicated.     Pelvic pain in female             Patient Instructions   Due to the length and symptoms of pelvic pain, back pain, urinary frequency and pain when bladder is full I am recommending you come into clinic for formal evaluation and further work up     Thank you  TEDDY Vásquez CNP, CNP  [facility] is a 66 year old who presents for the following health issues     HPI     Genitourinary - Female  Onset/Duration: 3 weeks ago   Description:   Painful urination (Dysuria): no           Frequency: YES  Blood in urine (Hematuria): no  Delay in urine (Hesitency): no  Intensity: mild  Progression of Symptoms:  worsening and same  Accompanying Signs & Symptoms:  Fever/chills: no  Flank pain: YES  Nausea and vomiting: no  Vaginal symptoms: none  Abdominal/Pelvic Pain: YES- pelvic only  History:   History of frequent UTI s: no  History of kidney stones: no  Sexually Active: YES  Possibility of pregnancy: No  Precipitating or alleviating factors: None  Therapies tried and outcome: drink lots of water     States she is having bladder spasms. She was having foul smelling urine this is now clear with increased water. Mild back pain. Denies fever, nausea or vomiting        Review of Systems   Constitutional, HEENT, cardiovascular, pulmonary, GI, , musculoskeletal, neuro, skin, endocrine and psych systems are  negative, except as otherwise noted.      Objective             Physical Exam   healthy, alert and no distress  PSYCH: Alert and oriented times 3; coherent speech, normal   rate and volume, able to articulate logical thoughts, able   to abstract reason, no tangential thoughts, no hallucinations   or delusions  Her affect is normal  RESP: No cough, no audible wheezing, able to talk in full sentences  Remainder of exam unable to be completed due to telephone visits            Phone call duration: 12 minutes

## 2021-08-10 NOTE — PATIENT INSTRUCTIONS
Due to the length and symptoms of pelvic pain, back pain, urinary frequency and pain when bladder is full I am recommending you come into clinic for formal evaluation and further work up     Thank you  Bruna Christie CNP

## 2021-08-10 NOTE — PROGRESS NOTES
PRESENTING COMPLAINT:  Postoperative visit status post bilateral breast reconstruction for breast cancer with grade 4 capsular contracture and misshapen asymmetric breasts requiring revision of reconstructed breast implant exchange done 07/28/2021.    HISTORY OF PRESENTING COMPLAINT:  Ms. Siddiqi is 66 years old.  She is about almost 2 weeks out from surgery.  Extremely happy with the results.  No major issues.  Some pain in the inframammary fold area.    PHYSICAL EXAMINATION:  Vital signs stable.  She is afebrile, in no obvious distress.  Both breasts are healing in well.  No evidence of infection, seroma, hematoma.    ASSESSMENT AND PLAN:  Based on the above findings, a diagnosis of bilateral breast reconstruction was made.  She was happy with the results.  I am happy with the results.  I will see her back in 6 weeks to plan further fat grafting if needed.

## 2021-08-10 NOTE — LETTER
8/10/2021         RE: Jackie Siddiqi  [address]  [place] [address]        Dear Colleague,    Thank you for referring your patient, Jackie Siddiqi, to the [facility]. Please see a copy of my visit note below.    PRESENTING COMPLAINT:  Postoperative visit status post bilateral breast reconstruction for breast cancer with grade 4 capsular contracture and misshapen asymmetric breasts requiring revision of reconstructed breast implant exchange done 07/28/2021.    HISTORY OF PRESENTING COMPLAINT:  Ms. Siddiqi is 66 years old.  She is about almost 2 weeks out from surgery.  Extremely happy with the results.  No major issues.  Some pain in the inframammary fold area.    PHYSICAL EXAMINATION:  Vital signs stable.  She is afebrile, in no obvious distress.  Both breasts are healing in well.  No evidence of infection, seroma, hematoma.    ASSESSMENT AND PLAN:  Based on the above findings, a diagnosis of bilateral breast reconstruction was made.  She was happy with the results.  I am happy with the results.  I will see her back in 6 weeks to plan further fat grafting if needed.          Again, thank you for allowing me to participate in the care of your patient.        Sincerely,        JENNIFFER Miner MD

## 2021-09-10 NOTE — PROGRESS NOTES
"Outpatient Occupational Therapy Discharge Note     Patient: Jackie Siddiqi  : 1955    Beginning/End Dates of Reporting Period:  21 to 9/10/21  The pt was seen for 6 OT treatment sessions since SOC.    Referring Provider: Dr Jovana Wooten Mai    Therapy Diagnosis: Post concussion; effecting memory and overall functioning for daily activiites.    Client Self Report: Pt reported feels like moving quicker.  She did state having increased short term memory ie; left the water running in the sink and went outside.  The pt reports \"head numbing\"    Objective Measurements:  Pt not available for final testing    Goals:  Pt not available for final testing    Plan:  Discharge from therapy.    Discharge:    Reason for Discharge: Patient chooses to discontinue therapy.  Patient has failed to schedule further appointments.      Discharge Plan: Patient to continue home program.        Thank you for referring Jackie  To OT services to assist with manage symptoms and return to functional independence with daily activities.    If you have any questions or concerns, please contact me at [phone].    Cynthia Rodriguez MA, OTR/[facility]  [email or web address]  "

## 2021-09-21 ENCOUNTER — TELEPHONE (OUTPATIENT)
Dept: FAMILY MEDICINE | Facility: CLINIC | Age: 66
End: 2021-09-21

## 2021-09-21 ENCOUNTER — OFFICE VISIT (OUTPATIENT)
Dept: PLASTIC SURGERY | Facility: CLINIC | Age: 66
End: 2021-09-21
Attending: PLASTIC SURGERY
Payer: COMMERCIAL

## 2021-09-21 VITALS
OXYGEN SATURATION: 98 % | WEIGHT: 128.1 LBS | DIASTOLIC BLOOD PRESSURE: 81 MMHG | HEIGHT: 62 IN | SYSTOLIC BLOOD PRESSURE: 119 MMHG | HEART RATE: 63 BPM | RESPIRATION RATE: 16 BRPM | TEMPERATURE: 97.5 F | BODY MASS INDEX: 23.57 KG/M2

## 2021-09-21 DIAGNOSIS — R30.0 DYSURIA: Primary | ICD-10-CM

## 2021-09-21 DIAGNOSIS — T85.44XS CAPSULAR CONTRACTURE OF BREAST IMPLANT, SEQUELA: Primary | ICD-10-CM

## 2021-09-21 PROCEDURE — 99024 POSTOP FOLLOW-UP VISIT: CPT | Performed by: PLASTIC SURGERY

## 2021-09-21 PROCEDURE — G0463 HOSPITAL OUTPT CLINIC VISIT: HCPCS

## 2021-09-21 ASSESSMENT — MIFFLIN-ST. JEOR: SCORE: 1064.31

## 2021-09-21 ASSESSMENT — PAIN SCALES - GENERAL: PAINLEVEL: NO PAIN (0)

## 2021-09-21 NOTE — PROGRESS NOTES
Service Date: 2021    POSTOPERATIVE VISIT    PRESENTING COMPLAINT:  Postoperative visit status post bilateral breast reconstruction revision done on 2021.    HISTORY OF PRESENTING COMPLAINT:  Ms. Calle is 66 years old.  She is almost 2 months out from surgery.  Overall, happy with the results.  Wants more cleavage.  Otherwise, no issues.    PHYSICAL EXAMINATION:  Vital signs stable.  She is afebrile, in no obvious distress.  Everything has healed in well.    ASSESSMENT AND PLAN:  Based on the above findings, a diagnosis of bilateral breast reconstruction was made.  I explained to the patient that cleavage can be attained with a bra or with larger, oversized implants.  She is not interested in further upgrading which I agree with.  She does not require any further surgeries.  I will see her back on a p.r.n. basis.    JENNIFFER Miner MD        D: 2021   T: 2021   MT: [email or web address]    Name:     YVETTE CALLE  MRN:      [id number]        Account:      [id number]   :      1955           Service Date: 2021       Document: [id number]

## 2021-09-21 NOTE — NURSING NOTE
"Oncology Rooming Note    September 21, 2021 10:42 AM   Jackie Siddiqi is a 66 year old female who presents for:    Chief Complaint   Patient presents with     Oncology Clinic Visit     6 week follow up /      Initial Vitals: /81   Pulse 63   Temp 97.5  F (36.4  C) (Oral)   Resp 16   Ht 1.575 m (5' 2\")   Wt 58.1 kg (128 lb 1.6 oz)   LMP 03/26/2003 (Approximate)   SpO2 98%   BMI 23.43 kg/m   Estimated body mass index is 23.43 kg/m  as calculated from the following:    Height as of this encounter: 1.575 m (5' 2\").    Weight as of this encounter: 58.1 kg (128 lb 1.6 oz). Body surface area is 1.59 meters squared.  No Pain (0) Comment: Data Unavailable   Patient's last menstrual period was 03/26/2003 (approximate).  Allergies reviewed: Yes  Medications reviewed: Yes    Medications: Medication refills not needed today.  Pharmacy name entered into [facility]:    [facility] [place], [place] - [address]  [facility] - [place], [place] - [address] [facility] [place] - [address] [id number] - [place] - [address] [facility] [address] [facility] [address] AT [facility] [address]  [facility]  [facility] [address] [address] [facility] [place] - [address] [address] [place] [address] [address] - [place], [place] - [address] [facility] [place] - [address] AT PORTAL TO REGISTERED [facility] SITES    Clinical concerns: No new concerns today      Yanelis Campo, JAVIER September 21, 2021  10:43 AM             "

## 2021-09-21 NOTE — LETTER
2021         RE: Yvette Calle  [address]  [place] [address]        Dear Colleague,    Thank you for referring your patient, Yvette Calle, to the [facility]. Please see a copy of my visit note below.    Service Date: 2021    POSTOPERATIVE VISIT    PRESENTING COMPLAINT:  Postoperative visit status post bilateral breast reconstruction revision done on 2021.    HISTORY OF PRESENTING COMPLAINT:  Ms. Calle is 66 years old.  She is almost 2 months out from surgery.  Overall, happy with the results.  Wants more cleavage.  Otherwise, no issues.    PHYSICAL EXAMINATION:  Vital signs stable.  She is afebrile, in no obvious distress.  Everything has healed in well.    ASSESSMENT AND PLAN:  Based on the above findings, a diagnosis of bilateral breast reconstruction was made.  I explained to the patient that cleavage can be attained with a bra or with larger, oversized implants.  She is not interested in further upgrading which I agree with.  She does not require any further surgeries.  I will see her back on a p.r.n. basis.    JENNIFFER Miner MD        D: 2021   T: 2021   MT: kandice    Name:     YVETTE CALLE  MRN:      [id number]        Account:      [id number]   :      1955           Service Date: 2021       Document: [id number]      Again, thank you for allowing me to participate in the care of your patient.        Sincerely,        JENNIFFER Miner MD

## 2021-09-21 NOTE — TELEPHONE ENCOUNTER
Patient calling with UTI symptoms for about 1 month. Was seen and had urine sample done with trace of infection, was told to drink lots of water. Now worsening.     Having odor, lower abdominal pain, pain with urination and prior to urinating. Feeling weak and overall not feeling well.     Scheduled patient for virtual visit with ALVERTO on 9/22.    Routing to provider to sign orders for urine.    PHYLLIS Trejo/[facility]

## 2021-09-22 ENCOUNTER — LAB (OUTPATIENT)
Dept: LAB | Facility: OTHER | Age: 66
End: 2021-09-22
Payer: COMMERCIAL

## 2021-09-22 ENCOUNTER — VIRTUAL VISIT (OUTPATIENT)
Dept: FAMILY MEDICINE | Facility: OTHER | Age: 66
End: 2021-09-22
Payer: COMMERCIAL

## 2021-09-22 DIAGNOSIS — N30.00 ACUTE CYSTITIS WITHOUT HEMATURIA: Primary | ICD-10-CM

## 2021-09-22 DIAGNOSIS — R30.0 DYSURIA: ICD-10-CM

## 2021-09-22 LAB
ALBUMIN UR-MCNC: NEGATIVE MG/DL
APPEARANCE UR: ABNORMAL
BILIRUB UR QL STRIP: NEGATIVE
CLUE CELLS: ABNORMAL
COLOR UR AUTO: YELLOW
GLUCOSE UR STRIP-MCNC: NEGATIVE MG/DL
HGB UR QL STRIP: NEGATIVE
KETONES UR STRIP-MCNC: NEGATIVE MG/DL
LEUKOCYTE ESTERASE UR QL STRIP: ABNORMAL
NITRATE UR QL: NEGATIVE
PH UR STRIP: 5 [PH] (ref 5–7)
RBC #/AREA URNS AUTO: ABNORMAL /HPF
SP GR UR STRIP: 1.01 (ref 1–1.03)
SQUAMOUS #/AREA URNS AUTO: ABNORMAL /LPF
TRICHOMONAS, WET PREP: ABNORMAL
UROBILINOGEN UR STRIP-ACNC: 0.2 E.U./DL
WBC #/AREA URNS AUTO: ABNORMAL /HPF
WBC'S/HIGH POWER FIELD, WET PREP: ABNORMAL
YEAST, WET PREP: ABNORMAL

## 2021-09-22 PROCEDURE — 87210 SMEAR WET MOUNT SALINE/INK: CPT

## 2021-09-22 PROCEDURE — 87086 URINE CULTURE/COLONY COUNT: CPT

## 2021-09-22 PROCEDURE — 99213 OFFICE O/P EST LOW 20 MIN: CPT | Mod: 95 | Performed by: PHYSICIAN ASSISTANT

## 2021-09-22 PROCEDURE — 81001 URINALYSIS AUTO W/SCOPE: CPT

## 2021-09-22 RX ORDER — NITROFURANTOIN 25; 75 MG/1; MG/1
100 CAPSULE ORAL 2 TIMES DAILY
Qty: 14 CAPSULE | Refills: 0 | Status: SHIPPED | OUTPATIENT
Start: 2021-09-22 | End: 2021-11-10

## 2021-09-22 ASSESSMENT — PAIN SCALES - GENERAL: PAINLEVEL: MILD PAIN (2)

## 2021-09-22 NOTE — PROGRESS NOTES
Jackie is a 66 year old who is being evaluated via a billable telephone visit.      What phone number would you like to be contacted at? [phone]  How would you like to obtain your AVS? MyChart    Assessment & Plan       ICD-10-CM    1. Acute cystitis without hematuria  N30.00 nitroFURantoin macrocrystal-monohydrate (MACROBID) 100 MG capsule       Will check a UA and wet prep for further evaluation.  UA has come back concerning for acute cystitis but wet prep is normal.  Will treat with a 7 day course of Macrobid to take as directed.  Encouraged to stay well hydrated and if symptoms are not improving, she will contact the clinic.      DANIEL Reeves [facility]   Jackie is a 66 year old who presents for the following health issues     HPI     Genitourinary - Female  Onset/Duration: 1 month after using a cath during breast surgery   Description:   Painful urination (Dysuria): YES           Frequency: YES  Blood in urine (Hematuria): no  Delay in urine (Hesitency): YES  Intensity: mild  Progression of Symptoms:  worsening and constant  Accompanying Signs & Symptoms:  Fever/chills: no  Flank pain: no  Nausea and vomiting: no  Vaginal symptoms: discharge  Abdominal/Pelvic Pain: YES- in low abdomen   History:   History of frequent UTI s: no  History of kidney stones: no  Sexually Active: no  Possibility of pregnancy: No  Precipitating or alleviating factors:   Therapies tried and outcome: Cranberry juice prn (contraindicated in Coumadin patients) Tylenol and ibuprofen when she is really uncomfortable     Abnormal smelling urine with intermittent burning upon urination along with lower abdominal pain, especially before she has to urinate. She has been noticing some vaginal discharge that is a yellowish/brownish color along with itching. She just feels wiped out with no energy. No fevers or chills. She had a surgery at the end of July and states that she likely has an  infection from the catheterization during surgery. She had a UA on 8/10 without obvious UTI but states she was drinking a lot of water at that time.     Review of Systems   Constitutional, cardiovascular, pulmonary, gi and gu systems are negative, except as otherwise noted.      Objective    Vitals - Patient Reported  Pain Score: Mild Pain (2)  Pain Loc: Abdomen    Physical Exam   healthy, alert and no distress  PSYCH: Alert and oriented times 3; coherent speech, normal   rate and volume, able to articulate logical thoughts, able   to abstract reason, no tangential thoughts, no hallucinations   or delusions  Her affect is normal  RESP: No cough, no audible wheezing, able to talk in full sentences  Remainder of exam unable to be completed due to telephone visits      Results for orders placed or performed in visit on 09/22/21   UA Macro with Reflex to Micro and Culture - lab collect     Status: Abnormal    Specimen: Urine, Midstream   Result Value Ref Range    Color Urine Yellow Colorless, Straw, Light Yellow, Yellow    Appearance Urine Slightly Cloudy (A) Clear    Glucose Urine Negative Negative mg/dL    Bilirubin Urine Negative Negative    Ketones Urine Negative Negative mg/dL    Specific Gravity Urine 1.010 1.003 - 1.035    Blood Urine Negative Negative    pH Urine 5.0 5.0 - 7.0    Protein Albumin Urine Negative Negative mg/dL    Urobilinogen Urine 0.2 0.2, 1.0 E.U./dL    Nitrite Urine Negative Negative    Leukocyte Esterase Urine Moderate (A) Negative   Urine Microscopic Exam     Status: Abnormal   Result Value Ref Range    RBC Urine 0-2 0-2 /HPF /HPF    WBC Urine 10-25 (A) 0-5 /HPF /HPF    Squamous Epithelials Urine Few (A) None Seen /LPF   Wet prep - lab collect     Status: Abnormal    Specimen: Vagina; Swab   Result Value Ref Range    Trichomonas Absent Absent    Yeast Absent Absent    Clue Cells Absent Absent    WBCs/high power field 3+ (A) None       Phone call duration: 6 minutes

## 2021-09-22 NOTE — PATIENT INSTRUCTIONS
You have a urinary tract infection.  Will treat with Macrobid twice daily for 7 days.  Stay well hydrated and if not improving, let me know.

## 2021-09-23 LAB — BACTERIA UR CULT: NO GROWTH

## 2021-09-27 ENCOUNTER — VIRTUAL VISIT (OUTPATIENT)
Dept: FAMILY MEDICINE | Facility: OTHER | Age: 66
End: 2021-09-27
Payer: COMMERCIAL

## 2021-09-27 ENCOUNTER — NURSE TRIAGE (OUTPATIENT)
Dept: NURSING | Facility: CLINIC | Age: 66
End: 2021-09-27

## 2021-09-27 DIAGNOSIS — R50.9 FEVER, UNSPECIFIED FEVER CAUSE: Primary | ICD-10-CM

## 2021-09-27 PROCEDURE — 99213 OFFICE O/P EST LOW 20 MIN: CPT | Mod: 95 | Performed by: PHYSICIAN ASSISTANT

## 2021-09-27 ASSESSMENT — PAIN SCALES - GENERAL: PAINLEVEL: NO PAIN (0)

## 2021-09-27 NOTE — TELEPHONE ENCOUNTER
Triage call:     Patient calling with possible COVID symptoms- started on Thursday last week (9/23/21)  Reports new cough  Chills off and on- states she has not taken her temperature - had patient take her temp on the phone 101.3F  Pain all over her body  Headache as well- reports this is where she is having the most pain  States low appetite- states she gets sick to her stomach with food    Also states she is on antibiotics as well- UTI- started 9/22/21  Reports that last night she started to feel worse with the chills and weakness  Reports she is drinking water- 2-3 bottles per day  Cough comes and goes- reports dry cough  Has not been vaccinated for COVID  Also notes that she is still experiencing increased vaginal discharge- brownish yellow color- reports this has been the same since she was diagnosed with UTI    Advised a virtual visit today- reviewed additional care advice with patient and she verbalizes understanding. Assisted in transferring to scheduling. Advised that if no visits patient needs to be seen and should go to . Patient agrees to do so.     Magdalena Boykin RN BSN 9/27/2021 7:39 AM    COVID 19 Nurse Triage Plan/Patient Instructions    Please be aware that novel coronavirus (COVID-19) may be circulating in the community. If you develop symptoms such as fever, cough, or SOB or if you have concerns about the presence of another infection including coronavirus (COVID-19), please contact your health care provider or visit [email or web address].     Disposition/Instructions    Virtual Visit with provider recommended. Reference Visit Selection Guide.    Thank you for taking steps to prevent the spread of this virus.  o Limit your contact with others.  o Wear a simple mask to cover your cough.  o Wash your hands well and often.    Resources     [facility]: About COVID-19: [email or web address]    CDC: What to Do If You're Sick:  www.cdc.gov/coronavirus/2019-ncov/about/steps-when-sick.html    CDC: Ending Home Isolation: www.cdc.gov/coronavirus/2019-ncov/hcp/disposition-in-home-patients.html     CDC: Caring for Someone: www.cdc.gov/coronavirus/2019-ncov/if-you-are-sick/care-for-someone.html     [facility]: Interim Guidance for Hospital Discharge to Home: www.health.[facility].mn./diseases/coronavirus/hcp/hospdischarge.pdf    [facility] clinical trials (COVID-19 research studies): [email or web address]-clinical-trials     Below are the COVID-19 hotlines at the [place] Department of Health ([facility]). Interpreters are available.   o For health questions: Call 046-839-4417 or 1-112.559.3455 (7 a.m. to 7 p.m.)  o For questions about schools and childcare: Call 492-008-5581 or 1-290.235.1518 (7 a.m. to 7 p.m.)     Reason for Disposition    [1] HIGH RISK patient (e.g., age > 64 years, diabetes, heart or lung disease, weak immune system) AND [2] new or worsening symptoms    [1] Taking antibiotic > 24 hours for UTI (urinary tract or bladder infection) AND [2] fever persists    Additional Information    Negative: SEVERE difficulty breathing (e.g., struggling for each breath, speaks in single words)    Negative: Difficult to awaken or acting confused (e.g., disoriented, slurred speech)    Negative: Bluish (or gray) lips or face now    Negative: Shock suspected (e.g., cold/pale/clammy skin, too weak to stand, low BP, rapid pulse)    Negative: Sounds like a life-threatening emergency to the triager    Negative: [1] COVID-19 exposure AND [2] has not completed COVID-19 vaccine series AND [3] no symptoms    Negative: [1] COVID-19 exposure AND [2] completed COVID-19 vaccine series (fully vaccinated) AND [3] no symptoms    Negative: COVID-19 vaccine reaction suspected (e.g., fever, headache, muscle aches) occurring during days 1-3 after getting vaccine    Negative: COVID-19 vaccine, questions about    Negative: [1] COVID-19 vaccine series completed  (fully vaccinated) in past 3 months AND [2] new-onset of COVID-19 symptoms BUT [3] no known exposure    Negative: [1] Had lab test confirmed COVID-19 infection within last 3 monthsAND [2] new-onset of COVID-19 symptoms BUT [3] no known exposure    Negative: [1] Lives with someone known to have influenza (flu test positive) AND [2] flu-like symptoms (e.g., cough, runny nose, sore throat, SOB; with or without fever)    Negative: [1] Adult with possible COVID-19 symptoms AND [2] triager concerned about severity of symptoms or other causes    Negative: COVID-19 and breastfeeding, questions about    Negative: SEVERE or constant chest pain or pressure (Exception: mild central chest pain, present only when coughing)    Negative: MODERATE difficulty breathing (e.g., speaks in phrases, SOB even at rest, pulse 100-120)    Negative: [1] Headache AND [2] stiff neck (can't touch chin to chest)    Negative: MILD difficulty breathing (e.g., minimal/no SOB at rest, SOB with walking, pulse <100)    Negative: Chest pain or pressure    Negative: Patient sounds very sick or weak to the triager    Negative: Fever > 103 F (39.4 C)    Negative: [1] Fever > 101 F (38.3 C) AND [2] age > 60 years    Negative: [1] Fever > 100.0 F (37.8 C) AND [2] bedridden (e.g., nursing home patient, CVA, chronic illness, recovering from surgery)    Negative: Shock suspected (e.g., cold/pale/clammy skin, too weak to stand, low BP, rapid pulse)    Negative: Sounds like a life-threatening emergency to the triager    Negative: Urinary tract infection suspected, but not taking antibiotics    Negative: [1] Unable to urinate (or only a few drops) > 4 hours AND     [2] bladder feels very full (e.g., palpable bladder or strong urge to urinate)    Negative: Passing pure blood or large blood clots (i.e., size > a dime)  (Exceptions: flecks, small strands, or pinkish-red color)    Negative: Fever > 103 F (39.4 C)    Negative: Patient sounds very sick or weak to the  triager    Negative: [1] SEVERE pain (e.g., excruciating) AND [2] no improvement 2 hours after pain medications    Negative: [1] Fever > 100.0 F (37.8 C) AND [2] new onset since starting antibiotics    Negative: [1] Side (flank) or lower back pain AND [2] new onset since starting antibiotics    Negative: [1] Taking antibiotic > 24 hours for UTI AND [2] flank or lower back pain worsening    Negative: [1] Vomiting 2 or more times AND [2] interferes with taking oral antibiotic    Protocols used: CORONAVIRUS (COVID-19) DIAGNOSED OR [id number] 3.25, URINARY TRACT INFECTION ON ANTIBIOTIC FOLLOW-UP CALL - FEMALE-Located within [facility]

## 2021-09-27 NOTE — PROGRESS NOTES
Jackie is a 66 year old who is being evaluated via a billable telephone visit.      What phone number would you like to be contacted at? [phone]  How would you like to obtain your AVS? MyChart    Assessment & Plan     Fever, unspecified fever cause  Concern for potential COVID given headache, fever, brain fog, cough, decreased appetite, altered sense of taste.  Did review her recent UA which seemed consistent with UTI but her urine culture showed no growth and if she has been taking Macrobid without improvement less concerned that this is truly UTI.  Did discuss that Pyelonephritis and sepsis due to severe UTI/Pyelonephritis is a possibility but again with negative culture and being on antibiotics less likely but something to still consider.  Recommended COVID testing to rule this in/out.  Discussed symptomatic treatment. Discussed symptoms to monitor for for worsening illness and needing ER evaluation such as higher fevers, unresponsive to OTCs, severe back pain or abdominal pain, worsening weakness, can't eat/drink.  Did offer Zofran for nausea but declined at this time.  If COVID negative consider pyelo still and starting antibiotics versus having patient come to clinic for evaluation.    - Symptomatic COVID-19 Virus (Coronavirus) by PCR; Future      Return in about 3 days (around 9/30/2021) for If not improving, sooner if worse or new concerns.    Options for treatment and follow-up care were reviewed with the patient and/or guardian. Patient and/or guardian engaged in the decision making process and verbalized understanding of the options discussed and agreed with the final plan.    DANIEL Pickard [facility]   Jackie is a 66 year old who presents for the following health issues     HPI     Acute Illness  Acute illness concerns: fever, chills, body aches, weak, no appetite.   Onset/Duration: since last Thursday - first symptoms were headachy, foggy, a little bit  weak and then rapidly developed into severe chills.   Symptoms:  Fever: YES- 101.3 this morning. Has been having fevers all weekend- generally 100-101.  Has been able to reduce them with Tylenol.  She feels better on Tylenol.   Chills/Sweats: YES- chills   Headache (location?): YES-4/10;  Had a concussion in June and was having headaches anyway but now it is just worse since she got sick. Head feels full.    Sinus Pressure: no  Conjunctivitis:  YES- the first couple of days, not anymore. Red watery eyes first day or two.   Ear Pain: YES: left  Rhinorrhea: no  Congestion: no  Sore Throat: no  Cough: YES-non-productive  Wheeze: no  Decreased Appetite: YES-everything tastes weird. Denies loss of taste and smell.   Nausea: YES-every time she tries to eat. Hasn't had much to eat in the last 4-5 days. Gets nauseous easily. Can't really eat solids.  Drinking fluids ok.  She has a lot of cotton mouth right now.   Vomiting: no  Diarrhea: no  Dysuria/Freq.: no  Dysuria or Hematuria: no  Rashes: no  Fatigue/Achiness: YES- body aches, feels weak.   Sick/Strep Exposure: no  Therapies tried and outcome: tylenol     Has not had diagnosed positive COVID but thinks she had symptoms a year ago or so - had such severe shortness of breath at that time and was sick for a week and got better.  No vaccination yet. Currently doesn't have any chest symptoms. Sometimes if she takes a deep breath she'll get a dry cough.     Take Macrobid for potential UTI.  She has not had any improvement in these symptoms while on the antibiotics.  She does note having had discharge. Her UTI type symptoms started just prior to these other symptoms starting.     Review of Systems   Constitutional, HEENT, cardiovascular, pulmonary, gi and gu systems are negative, except as otherwise noted.      Objective    Vitals - Patient Reported  Pain Score: No Pain (0)      Vitals:  No vitals were obtained today due to virtual visit.    Physical Exam   alert and no  distress  PSYCH: Alert and oriented times 3; coherent speech, normal   rate and volume, able to articulate logical thoughts, able   to abstract reason, no tangential thoughts, no hallucinations   or delusions  Her affect is flat  RESP: No cough, no audible wheezing, able to talk in full sentences  Remainder of exam unable to be completed due to telephone visits        Phone call duration: 14:10 minutes

## 2021-10-04 ENCOUNTER — TELEPHONE (OUTPATIENT)
Dept: FAMILY MEDICINE | Facility: CLINIC | Age: 66
End: 2021-10-04

## 2021-10-04 NOTE — TELEPHONE ENCOUNTER
Patient calling to see if you could see Dr. Quinonez   sooner then later- She had a virtual visit with ALVERTO on 9/22/21 and was put on antibiotic for uti- but didn't feel good on that medication so had a virtual visit with JULISSA and she told her to stop that medication- She still is having frequent urination, pressure, vaginal discomfort heavy discharge,and lower abd. Pain.    Please advise and okay to lm if needed

## 2021-10-04 NOTE — TELEPHONE ENCOUNTER
Left message to return call. Patient can be seen by another provider or she can do another virtual visit/ E visit is an option also. Patient will likely need to come to the clinic for lab.   Rebecca Gtz CMA on 10/4/2021 at 1:31 PM
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
## 2021-10-13 ENCOUNTER — VIRTUAL VISIT (OUTPATIENT)
Dept: FAMILY MEDICINE | Facility: CLINIC | Age: 66
End: 2021-10-13
Payer: COMMERCIAL

## 2021-10-13 DIAGNOSIS — N89.8 VAGINAL DISCHARGE: ICD-10-CM

## 2021-10-13 DIAGNOSIS — R30.0 DYSURIA: Primary | ICD-10-CM

## 2021-10-13 PROCEDURE — 99213 OFFICE O/P EST LOW 20 MIN: CPT | Mod: 95 | Performed by: FAMILY MEDICINE

## 2021-10-13 RX ORDER — FLUCONAZOLE 150 MG/1
150 TABLET ORAL ONCE
Qty: 2 TABLET | Refills: 0 | Status: SHIPPED | OUTPATIENT
Start: 2021-10-13 | End: 2021-10-13

## 2021-10-13 RX ORDER — CIPROFLOXACIN 250 MG/1
250 TABLET, FILM COATED ORAL 2 TIMES DAILY
Qty: 14 TABLET | Refills: 0 | Status: SHIPPED | OUTPATIENT
Start: 2021-10-13 | End: 2021-11-10

## 2021-10-13 NOTE — PROGRESS NOTES
Jackie is a 66 year old who is being evaluated via a billable telephone visit.      What phone number would you like to be contacted at?   How would you like to obtain your AVS?     Assessment & Plan     Dysuria  This was sent in but the next day she called in did not want to take it so she was switched to Bactrim DS.  - ciprofloxacin (CIPRO) 250 MG tablet; Take 1 tablet (250 mg) by mouth 2 times daily    Vaginal discharge  This was sent in but she did not want to take it so recommended over-the-counter Monistat.  - fluconazole (DIFLUCAN) 150 MG tablet; Take 1 tablet (150 mg) by mouth once for 1 dose And repeat 1 dose after completing antibiotic                 Return in about 1 week (around 10/20/2021) for If no improvement.    Obed Luu MD  [facility]   Jackie is a 66 year old who presents for the following health issues: UTI symptoms.  She had this back in September.  And thought it was caused by catheterization during surgery prior.  She has symptoms back again.  They did lab work done and it did show UTI and a wet prep was negative.    HPI           Review of Systems   Constitutional, HEENT, cardiovascular, pulmonary, gi and gu systems are negative, except as otherwise noted.      Objective           Vitals:  No vitals were obtained today due to virtual visit.    Physical Exam   healthy, alert and no distress  PSYCH: Alert and oriented times 3; coherent speech, normal   rate and volume, able to articulate logical thoughts, able   to abstract reason, no tangential thoughts, no hallucinations   or delusions  Her affect is normal  RESP: No cough, no audible wheezing, able to talk in full sentences  Remainder of exam unable to be completed due to telephone visits                Phone call duration: 6 minutes

## 2021-10-13 NOTE — PROGRESS NOTES
Jackie is a 66 year old who is being evaluated via a billable telephone visit.      What phone number would you like to be contacted at? [phone]   How would you like to obtain your AVS? Mail a copy    Assessment & Plan     Dysuria  Continues to have trouble here.  She could not tolerate the Macrodantin so she took only 1 days worth.  But she has other symptoms as noted below.  Going to try with Cipro for a week.  If this does not seem to be helping she needs to come in for lab work and probably a pelvic exam.  - ciprofloxacin (CIPRO) 250 MG tablet; Take 1 tablet (250 mg) by mouth 2 times daily    Vaginal discharge  As noted above if the Diflucan that I gave her does not seem to take care of this she will present herself for face-to-face visit  - fluconazole (DIFLUCAN) 150 MG tablet; Take 1 tablet (150 mg) by mouth once for 1 dose And repeat 1 dose after completing antibiotic                 No follow-ups on file.    Obed Luu MD  [facility]   Jackie is a 66 year old who presents for the following health issues     HPI     Genitourinary - Female  Onset/Duration: a couple of months ago  Description:   Painful urination (Dysuria): only with pressure           Frequency: every 2 hours  Blood in urine (Hematuria): no  Delay in urine (Hesitency): YES  Intensity: moderate  Progression of Symptoms:  same  Accompanying Signs & Symptoms:  Fever/chills: no  Flank pain: no  Nausea and vomiting: nausea  Vaginal symptoms: discharge, itching and creamy white when wiping.  Discharge on underwear, yellow-brownishand sometimes crusty  Abdominal/Pelvic Pain: YES- lower abdominal/pelvic area  History:   History of frequent UTI s: no  History of kidney stones: no  Sexually Active: no  Possibility of pregnancy: No  Precipitating or alleviating factors:   Therapies tried and outcome: Cranberry juice prn (contraindicated in Coumadin patients), Increase fluid intake, OTC advil or tylenol.   Each has helped a little          Review of Systems   Constitutional, HEENT, cardiovascular, pulmonary, gi and gu systems are negative, except as otherwise noted.      Objective           Vitals:  No vitals were obtained today due to virtual visit.    Physical Exam   healthy, alert and no distress  PSYCH: Alert and oriented times 3; coherent speech, normal   rate and volume, able to articulate logical thoughts, able   to abstract reason, no tangential thoughts, no hallucinations   or delusions  Her affect is normal and pleasant  RESP: No cough, no audible wheezing, able to talk in full sentences  Remainder of exam unable to be completed due to telephone visits                Phone call duration: 8 minutes

## 2021-10-14 ENCOUNTER — TELEPHONE (OUTPATIENT)
Dept: FAMILY MEDICINE | Facility: CLINIC | Age: 66
End: 2021-10-14

## 2021-10-14 NOTE — TELEPHONE ENCOUNTER
Obed Luu MD  You 2 hours ago (12:47 PM)     SJ    Would need to know which medication she is referring to.    Message text

## 2021-10-14 NOTE — TELEPHONE ENCOUNTER
Patient returned call and stating the warnings on the antibiotic(Ciprofloxacin, and the Fluconazole) prescribed is scaring her and she is wanting something else prescribed with less noted side affects. Adina Gibson LPN

## 2021-10-14 NOTE — TELEPHONE ENCOUNTER
Reason for Call:  Other call back    Detailed comments: Patient states she was seen yesterday and prescribed a couple medications for a bladder infection and she was reading the warning on the paperwork that came with the medication and she does not want to take the medication and would like something different prescribed to her.     Phone Number Patient can be reached at: Cell number on file:    Telephone Information:   Mobile [phone]       Best Time: Any    Can we leave a detailed message on this number? YES    Call taken on 10/14/2021 at 9:41 AM by Patti Epperson

## 2021-10-15 NOTE — TELEPHONE ENCOUNTER
Obed Luu MD  [facility] 1 hour ago (7:22 AM)     SJ    Bactrim DS 1 twice daily for a week and she can use over-the-counter vaginal yeast cream if she wants to should follow-up with her primary if the condition continues next week

## 2021-10-19 ENCOUNTER — TELEPHONE (OUTPATIENT)
Dept: FAMILY MEDICINE | Facility: OTHER | Age: 66
End: 2021-10-19

## 2021-10-19 NOTE — TELEPHONE ENCOUNTER
Reason for Call:  Form, our goal is to have forms completed with 72 hours, however, some forms may require a visit or additional information.    Type of letter, form or note:  medical    Who is the form from?: Disability leave paperwork needs completed for employer (if other please explain)    Where did the form come from: Patient or family brought in       What clinic location was the form placed at?: [facility] [phone]    Where the form was placed: Team B Box/Folder    What number is listed as a contact on the form?: [phone]       Additional comments: Please complete and fax to [phone]    Call taken on 10/19/2021 at 4:26 PM by Lyndsey Oliva

## 2021-10-20 NOTE — TELEPHONE ENCOUNTER
Patient was home for 2 weeks, she had covid like symptoms her employer made her contact [employer] to file for this. She states she did get a covid test in [employer] parking lot in [place]. She was told she would get results in mail and never got them.

## 2021-10-20 NOTE — TELEPHONE ENCOUNTER
Left message for patient to return call. See ES message below and please gather more information for what this form is in regards to.   Anahi Gage MA

## 2021-10-20 NOTE — TELEPHONE ENCOUNTER
What is she needing disability paperwork filled out for? I did a virtual visit for her for a fever in September.     Karla Beck PA-C

## 2021-10-23 ENCOUNTER — HEALTH MAINTENANCE LETTER (OUTPATIENT)
Age: 66
End: 2021-10-23

## 2021-10-25 NOTE — ANESTHESIA PREPROCEDURE EVALUATION
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]   Procedure : Procedure(s):  LEFT PHACOEMULSIFICATION, CATARACT, WITH INTRAOCULAR LENS IMPLANT     Past Medical History:   Diagnosis Date     Abnormal Papanicolaou smear of cervix and cervical HPV     cryocautery     Breast cystic disease      Hiatal hernia      Hypothyroidism      Motion sickness      Nonsenile cataract      Peptic ulcer, unspecified site, unspecified as acute or chronic, without mention of hemorrhage, perforation, or obstruction     Peptic ulcer disease     Personal history of colonic polyps       Past Surgical History:   Procedure Laterality Date     C  DELIVERY ONLY       x2     COLONOSCOPY  2007     COLONOSCOPY  2013    Procedure: COLONOSCOPY;  colonoscopy, Esophagoscopy, Gastroscopy, Duodenoscopy EGD, multiple biopsies;  Surgeon: Joe Benson MD;  Location:  GI     COLONOSCOPY N/A 2017    Procedure: COMBINED COLONOSCOPY, SINGLE OR MULTIPLE BIOPSY/POLYPECTOMY BY BIOPSY;  colonoscopy with polypectomy by biopsy;  Surgeon: Tolu No MD;  Location:  GI     COLONOSCOPY N/A 2020    Procedure: Colonoscopy with possible biopsy and/or polypectomy;  Surgeon: Obed Quick MD;  Location:  GI     ENDOSCOPY  2007    Sm hiatus hernia     ESOPHAGOSCOPY, GASTROSCOPY, DUODENOSCOPY (EGD), COMBINED  2013    Procedure: COMBINED ESOPHAGOSCOPY, GASTROSCOPY, DUODENOSCOPY (EGD), BIOPSY SINGLE OR MULTIPLE;  ESOPHAGOGASTRODUODENOSCOPY WITH MULTIPLE BIOPSIES;  Surgeon: Joe Benson MD;  Location:  GI     MASTECTOMY, BILATERAL       ZZC NONSPECIFIC PROCEDURE      Laparoscopic exam with adhesions     ZZC NONSPECIFIC PROCEDURE      Mastectomy for cystic breast disease, breast implants      Allergies   Allergen Reactions     No Known Drug Allergies       Social  History     Tobacco Use     Smoking status: Former Smoker     Packs/day: 0.50     Years: 50.00     Pack years: 25.00     Types: Cigarettes     Quit date: 2019     Years since quittin.6     Smokeless tobacco: Never Used     Tobacco comment: 1 pack per week, started age 11   Substance Use Topics     Alcohol use: No     Alcohol/week: 0.0 standard drinks     Comment: holidays only      Wt Readings from Last 1 Encounters:   21 59.9 kg (132 lb)        Anesthesia Evaluation   Pt has had prior anesthetic. Type: General and MAC.    History of anesthetic complications  - motion sickness.      ROS/MED HX  ENT/Pulmonary:     (+) tobacco use, Past use,     Neurologic:  - neg neurologic ROS     Cardiovascular:  - neg cardiovascular ROS   (+) Dyslipidemia -----    METS/Exercise Tolerance:     Hematologic:  - neg hematologic  ROS     Musculoskeletal:  - neg musculoskeletal ROS     GI/Hepatic:     (+) GERD, Asymptomatic on medication, hiatal hernia,     Renal/Genitourinary:  - neg Renal ROS     Endo:     (+) thyroid problem, hypothyroidism,     Psychiatric/Substance Use:  - neg psychiatric ROS     Infectious Disease:  - neg infectious disease ROS     Malignancy:  - neg malignancy ROS     Other:  - neg other ROS          Physical Exam    Airway  airway exam normal           Respiratory Devices and Support         Dental  no notable dental history         Cardiovascular   cardiovascular exam normal          Pulmonary   pulmonary exam normal                OUTSIDE LABS:  CBC:   Lab Results   Component Value Date    WBC 7.6 2019    WBC 7.7 2019    HGB 13.3 2019    HGB 13.2 2019    HCT 40.5 2019    HCT 39.6 2019     2019     2019     BMP:   Lab Results   Component Value Date     2021     2019    POTASSIUM 3.9 2021    POTASSIUM 3.6 2019    CHLORIDE 109 2021    CHLORIDE 107 2019    CO2 29 2021    CO2 29  09/16/2019    BUN 14 03/09/2021    BUN 11 09/16/2019    CR 0.74 03/09/2021    CR 0.74 09/16/2019    GLC 94 03/09/2021     (H) 09/16/2019     COAGS:   Lab Results   Component Value Date    INR 1.01 04/19/2016     POC: No results found for: BGM, HCG, HCGS  HEPATIC:   Lab Results   Component Value Date    ALBUMIN 4.0 03/09/2021    PROTTOTAL 7.6 03/09/2021    ALT 25 03/09/2021    AST 15 03/09/2021    ALKPHOS 63 03/09/2021    BILITOTAL 1.3 03/09/2021     OTHER:   Lab Results   Component Value Date    PH 5.0 03/28/2003    A1C 5.6 02/12/2017    SALLY 8.8 03/09/2021    LIPASE 130 02/16/2017    AMYLASE 33 02/16/2017    TSH 3.84 03/09/2021    T4 0.97 01/11/2013    SED 29 09/16/2019       Anesthesia Plan    ASA Status:  3      Anesthesia Type: MAC.     - Reason for MAC: chronic cardiopulmonary disease   Induction: N/a.           Consents    Anesthesia Plan(s) and associated risks, benefits, and realistic alternatives discussed. Questions answered and patient/representative(s) expressed understanding.     - Discussed with:  Patient      - Extended Intubation/Ventilatory Support Discussed: No.      - Patient is DNR/DNI Status: No    Use of blood products discussed: No .     Postoperative Care    Pain management: IV analgesics, Oral pain medications.   PONV prophylaxis: Ondansetron (or other 5HT-3)     Comments:                Joe Acosta MD   ambulate

## 2021-11-10 ENCOUNTER — OFFICE VISIT (OUTPATIENT)
Dept: FAMILY MEDICINE | Facility: CLINIC | Age: 66
End: 2021-11-10
Payer: COMMERCIAL

## 2021-11-10 VITALS
HEART RATE: 72 BPM | RESPIRATION RATE: 18 BRPM | OXYGEN SATURATION: 96 % | BODY MASS INDEX: 24 KG/M2 | TEMPERATURE: 97.9 F | WEIGHT: 130.4 LBS | DIASTOLIC BLOOD PRESSURE: 60 MMHG | SYSTOLIC BLOOD PRESSURE: 114 MMHG | HEIGHT: 62 IN

## 2021-11-10 DIAGNOSIS — R10.30 LOWER ABDOMINAL PAIN: Primary | ICD-10-CM

## 2021-11-10 DIAGNOSIS — E03.9 HYPOTHYROIDISM, UNSPECIFIED TYPE: ICD-10-CM

## 2021-11-10 DIAGNOSIS — N89.8 VAGINAL DISCHARGE: ICD-10-CM

## 2021-11-10 DIAGNOSIS — K21.9 GASTROESOPHAGEAL REFLUX DISEASE WITHOUT ESOPHAGITIS: ICD-10-CM

## 2021-11-10 DIAGNOSIS — R05.9 COUGH: ICD-10-CM

## 2021-11-10 LAB
ALBUMIN UR-MCNC: NEGATIVE MG/DL
APPEARANCE UR: ABNORMAL
BACTERIA #/AREA URNS HPF: ABNORMAL /HPF
BILIRUB UR QL STRIP: NEGATIVE
CLUE CELLS: ABNORMAL
COLOR UR AUTO: YELLOW
GLUCOSE UR STRIP-MCNC: NEGATIVE MG/DL
HGB UR QL STRIP: NEGATIVE
KETONES UR STRIP-MCNC: NEGATIVE MG/DL
LEUKOCYTE ESTERASE UR QL STRIP: ABNORMAL
MUCOUS THREADS #/AREA URNS LPF: PRESENT /LPF
NITRATE UR QL: NEGATIVE
PH UR STRIP: 5 [PH] (ref 5–7)
RBC URINE: 8 /HPF
SP GR UR STRIP: 1.02 (ref 1–1.03)
SQUAMOUS EPITHELIAL: 7 /HPF
T4 FREE SERPL-MCNC: 0.88 NG/DL (ref 0.76–1.46)
TRICHOMONAS, WET PREP: ABNORMAL
TSH SERPL DL<=0.005 MIU/L-ACNC: 7.19 MU/L (ref 0.4–4)
UROBILINOGEN UR STRIP-MCNC: NORMAL MG/DL
WBC URINE: 19 /HPF
WBC'S/HIGH POWER FIELD, WET PREP: ABNORMAL
YEAST, WET PREP: ABNORMAL

## 2021-11-10 PROCEDURE — 36415 COLL VENOUS BLD VENIPUNCTURE: CPT | Performed by: FAMILY MEDICINE

## 2021-11-10 PROCEDURE — 84439 ASSAY OF FREE THYROXINE: CPT | Performed by: FAMILY MEDICINE

## 2021-11-10 PROCEDURE — 81001 URINALYSIS AUTO W/SCOPE: CPT | Performed by: FAMILY MEDICINE

## 2021-11-10 PROCEDURE — 99214 OFFICE O/P EST MOD 30 MIN: CPT | Performed by: FAMILY MEDICINE

## 2021-11-10 PROCEDURE — 87210 SMEAR WET MOUNT SALINE/INK: CPT | Performed by: FAMILY MEDICINE

## 2021-11-10 PROCEDURE — 84443 ASSAY THYROID STIM HORMONE: CPT | Performed by: FAMILY MEDICINE

## 2021-11-10 RX ORDER — POLYETHYLENE GLYCOL 3350 17 G/17G
17 POWDER, FOR SOLUTION ORAL DAILY
Qty: 500 G | Refills: 1 | Status: SHIPPED | OUTPATIENT
Start: 2021-11-10 | End: 2022-05-06

## 2021-11-10 RX ORDER — FLUCONAZOLE 150 MG/1
150 TABLET ORAL DAILY
Qty: 3 TABLET | Refills: 0 | Status: SHIPPED | OUTPATIENT
Start: 2021-11-10 | End: 2021-11-13

## 2021-11-10 RX ORDER — FAMOTIDINE 20 MG/1
20 TABLET, FILM COATED ORAL 2 TIMES DAILY
Qty: 60 TABLET | Refills: 3 | Status: SHIPPED | OUTPATIENT
Start: 2021-11-10 | End: 2022-09-02

## 2021-11-10 ASSESSMENT — PAIN SCALES - GENERAL: PAINLEVEL: MODERATE PAIN (4)

## 2021-11-10 ASSESSMENT — MIFFLIN-ST. JEOR: SCORE: 1074.74

## 2021-11-10 NOTE — PROGRESS NOTES
Assessment & Plan     (R10.30) Lower abdominal pain  (primary encounter diagnosis)  Comment: Likely due to constipation.  The pain has resolved since she has regular bowel movement.  Encouraged to drink a lot of water and increase fiber intake.  Continue with MiraLAX and/or senna as needed.    Plan: polyethylene glycol (MIRALAX) 17 GM/Dose powder            (N89.8) Vaginal discharge  Comment: Clinical presentation was suggestive of yeast vaginitis.  Wet prep was normal today.  Will treat her empirically with Diflucan.  Offered pelvic exam but she declined.  Denied of STD risk.  Follow-up if the symptoms persist or get worse.    Plan: Wet prep - Clinic Collect, UA reflex to         Microscopic - lab collect, fluconazole         (DIFLUCAN) 150 MG tablet            (E03.9) Hypothyroidism, unspecified type  Comment: Was on levothyroxine but took herself off several months ago.  Recheck the TSH level today which was high.  Restarted levothyroxine at 25 mcg daily.  Recheck the TSH level in 3-4 months.    Plan: TSH with free T4 reflex, T4 free, levothyroxine        (SYNTHROID/LEVOTHROID) 25 MCG tablet            (K21.9) Gastroesophageal reflux disease without esophagitis  Comment: Overall is doing well.  Been off the omeprazole for a while and has been doing well.  Will have her try the Pepcid as needed.  Encouraged to avoid any known triggers or spicy food/greasy food or late meals.  She denied of excessive NSAID or alcohol intake.    Plan: famotidine (PEPCID) 20 MG tablet            (R05.9) Cough  Comment: Started yesterday.  No sign of infection.  Will monitor.  OTC meds as needed for symptomatic treatment.  Call in or follow-up if gets worse or has any concern.    She was recommended to get the Covid, shingles and influenza vaccination but declined.  Risk and benefit discussed, she was willing to take the risk.    Return in about 6 months (around 5/10/2022) for Physical Exam.    Jovana Wooten Mai, MD  [facility]  CLINIC [facility]   Jackie is a 66 year old who presents for the following health issues     HPI     Low abdominal pain with constipation    Cough with yellow sputum was using medication for indigestion,  Acid reflux that has been worse.    Vaginal discharge that has been white and yellow/brown patient used 7 day OTC medication this improved discharge for a few days and it returned.     Jackie is here today for couple concerns.  First concern is the lower abdominal cramps that she was having for several days.  She thinks it was due to constipation; the cramps went away with stool softener.  Her stomach however has been very gurgling. She has problem with the lower left pain for years and extensive work-ups have been negative except of diverticulosis.  No longer constipated.  Been having abnormal yellow, creamy and itchy vaginal discharge for over a week.  OTC yeast cream helped for couple days then it came back.  Denied of STD risk.  No abnormal bleeding.  No problem with urination.  No change in her diet.      Her acid reflux was doing well with omeprazole.  Ran out of the omeprazole for a while and has been doing ok.  Not really sure if she still needs it.  Been having little coughing with yellow sputum since yesterday.  Not too bad, not bothering her.  No runny nose or nasal congestion.  No fever or chills.  No sinus pain or pressure.  No recent exposure to any kind of illnesses.  She is not vaccinated for influenza nor the Covid 19.  No loss of taste or smell.    Also would like to recheck her thyroid level.  She was on levothyroxine but stopped taking it by choice several months ago.  No heart palpitation.  No fatigue or unintended weight change.  No heat or cold intolerance.  No other concern today.      Review of Systems   Constitutional, HEENT, cardiovascular, pulmonary, gi and gu systems are negative, except as otherwise noted.      Objective    /60   Pulse 72   Temp 97.9  F (36.6  C)  "(Temporal)   Resp 18   Ht 1.575 m (5' 2\")   Wt 59.1 kg (130 lb 6.4 oz)   LMP 03/26/2003 (Approximate)   SpO2 96%   BMI 23.85 kg/m    Body mass index is 23.85 kg/m .  Physical Exam   GENERAL: healthy, alert and no distress.  Speaking in full sentences  HENT: ear canals and TM's normal.  Nares are non-congested. Oropharynx is pink and moist. No tender with palpation to the sinuses.   NECK: no adenopathy or thyromegaly  RESP: lungs clear to auscultation - no rales, rhonchi or wheezes.  Good respiratory effort throughout.  CV: regular rate and rhythm, normal S1 S2, no S3 or S4, no murmur, click or rub, no peripheral edema  ABDOMEN: soft, nontender, nondistended, no palpable masses or organomegaly with normal bowel sound.  No CVA tenderness.  MS: no gross musculoskeletal defects noted, no edema.  Hip and lower back exam was normal.    Results for orders placed or performed in visit on 11/10/21   TSH with free T4 reflex     Status: Abnormal   Result Value Ref Range    TSH 7.19 (H) 0.40 - 4.00 mU/L   UA reflex to Microscopic - lab collect     Status: Abnormal   Result Value Ref Range    Color Urine Yellow Colorless, Straw, Light Yellow, Yellow    Appearance Urine Slightly Cloudy (A) Clear    Glucose Urine Negative Negative mg/dL    Bilirubin Urine Negative Negative    Ketones Urine Negative Negative mg/dL    Specific Gravity Urine 1.017 1.003 - 1.035    Blood Urine Negative Negative    pH Urine 5.0 5.0 - 7.0    Protein Albumin Urine Negative Negative mg/dL    Urobilinogen Urine Normal Normal, 2.0 mg/dL    Nitrite Urine Negative Negative    Leukocyte Esterase Urine Large (A) Negative    Bacteria Urine Few (A) None Seen /HPF    RBC Urine 8 (H) <=2 /HPF    WBC Urine 19 (H) <=5 /HPF    Squamous Epithelials Urine 7 (H) <=1 /HPF    Mucus Urine Present (A) None Seen /LPF   T4 free     Status: Normal   Result Value Ref Range    Free T4 0.88 0.76 - 1.46 ng/dL   Wet prep - Clinic Collect     Status: Abnormal    Specimen: " Vagina; Swab   Result Value Ref Range    Trichomonas Absent Absent    Yeast Absent Absent    Clue Cells Absent Absent    WBCs/high power field 3+ (A) None

## 2021-11-11 ENCOUNTER — VIRTUAL VISIT (OUTPATIENT)
Dept: FAMILY MEDICINE | Facility: CLINIC | Age: 66
End: 2021-11-11
Payer: COMMERCIAL

## 2021-11-11 ENCOUNTER — TELEPHONE (OUTPATIENT)
Dept: FAMILY MEDICINE | Facility: CLINIC | Age: 66
End: 2021-11-11

## 2021-11-11 DIAGNOSIS — R52 BODY ACHES: Primary | ICD-10-CM

## 2021-11-11 DIAGNOSIS — R50.9 FEVER, UNSPECIFIED FEVER CAUSE: ICD-10-CM

## 2021-11-11 DIAGNOSIS — R05.9 COUGH: ICD-10-CM

## 2021-11-11 PROCEDURE — 99213 OFFICE O/P EST LOW 20 MIN: CPT | Mod: 95 | Performed by: FAMILY MEDICINE

## 2021-11-11 RX ORDER — BENZONATATE 200 MG/1
200 CAPSULE ORAL 3 TIMES DAILY PRN
Qty: 30 CAPSULE | Refills: 1 | Status: SHIPPED | OUTPATIENT
Start: 2021-11-11 | End: 2022-03-15

## 2021-11-11 NOTE — PROGRESS NOTES
Jackie is a 66 year old who is being evaluated via a billable telephone visit.      What phone number would you like to be contacted at? [phone]  How would you like to obtain your AVS? MyChart    Assessment & Plan     Body aches  I am very concerned about acute Covid with this unvaccinated patient and a positive exposure last week.  We will have her do a Covid test ideally on day 3 of the illness which would be tomorrow.  I talked to her about obtaining a home pulse oximeter that her pulse ox should be greater than 92%    - Symptomatic COVID-19 Virus (Coronavirus) by PCR; Future    Fever, unspecified fever cause    - Symptomatic COVID-19 Virus (Coronavirus) by PCR; Future    Cough  Patient is having a severe cough so we will have her take Tessalon  - benzonatate (TESSALON) 200 MG capsule; Take 1 capsule (200 mg) by mouth 3 times daily as needed for cough      18 minutes spent on the date of the encounter doing chart review, review of test results, interpretation of tests, patient visit, documentation and discussion with other provider(s)         No follow-ups on file.    Joann Bowers, [facility]   Jackie is a 66 year old who presents for the following health issues     HPI       Concern for COVID-19  About how many days ago did these symptoms start?  Started last night 10-11 pm.  She was sick at the beginning of October and tested negative.  She had chills/body aches and fever 101, headaches and cough.  She was better for about 3 weeks.      Is this your first visit for this illness? No     How would you describe your symptoms since your last visit? My symptoms have worsened  In the 14 days before your symptoms started, have you had close contact with someone with COVID-19 (Coronavirus)? Yes, I have been in contact with someone who has COVID-19/Coronavirus (confirmed by lab test).  Her daughter and son in law were positive for covid a week ago.      Do you have a  fever or chills? Yes, the highest temperature was 100.3 today  Are you having new or worsening difficulty breathing? No  Do you have new or worsening cough? Yes, I am coughing up mucus.  Have you had any new or unexplained body aches? YES    Have you experienced any of the following NEW symptoms?    Headache: YES    Sore throat: YES    Loss of taste or smell: No    Chest pain: No    Diarrhea: No    Rash: No  What treatments have you tried? acetaminophen  Who do you live with? Foster son 18  and roommate both unvaccinated     Are you, or a household member, a healthcare worker or a ? No  Do you live in a nursing home, group home, or shelter? No  Do you have a way to get food/medications if quarantined? Yes, I have a friend or family member who can help me.      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  She is suppose to work tonight     Review of Systems   Constitutional, HEENT, cardiovascular, pulmonary, gi and gu systems are negative, except as otherwise noted.      Objective             Physical Exam   healthy, alert and no distress  PSYCH: Alert and oriented times 3; coherent speech, normal   rate and volume, able to articulate logical thoughts, able   to abstract reason, no tangential thoughts, no hallucinations   or delusions  Her affect is normal  RESP: No cough, no audible wheezing, able to talk in full sentences  Remainder of exam unable to be completed due to telephone visits            Phone call duration: 18 minutes

## 2021-11-11 NOTE — LETTER
[facility]  [address] [place] [address]  Phone: [phone]    November 11, 2021        Jackie Siddiqi  [address] [address] [place] [address] [place] [address]        To whom it may concern:    RE: Jackie Siddiqi    Patient was seen and treated today at our clinic and missed work.  She will be off work November 11 through November 20th.  She will be able to return to work the evening of November 21.    Please contact me for questions or concerns.      Sincerely,          Joann Bowers, DO

## 2021-11-11 NOTE — TELEPHONE ENCOUNTER
I had a virtual visit with this patient today.  I have written her a letter for work.  Can you printed out and mail it to her?    Joann Bowers DO

## 2021-11-12 ENCOUNTER — TELEPHONE (OUTPATIENT)
Dept: FAMILY MEDICINE | Facility: CLINIC | Age: 66
End: 2021-11-12
Payer: COMMERCIAL

## 2021-11-12 NOTE — TELEPHONE ENCOUNTER
"Called patient, looks like forest tried to reach out to her to schedule an appointment for COVID testing.  She has not gone through her messages yet, she will do so. Explained the importance of getting tested.  I also gave her [employer] help line as she was unable to get into her [employer] account- along with her username.    She has not called any one due to \"being too sick\", patient sounded somewhat better today, no coughing.    Instructed to call back if she is having any trouble.      Megan Hess RN  "

## 2021-11-12 NOTE — TELEPHONE ENCOUNTER
I saw this patient's via telehealth visit yesterday.  She had symptoms of Covid including shortness of breath.  I do not see that she is scheduled her Covid test yet.  Please call her to see how she is doing and also help her schedule the test.  It is important that she get tested because if she gets worse she may need monoclonal antibodies    Joann Bowers DO

## 2021-11-13 RX ORDER — LEVOTHYROXINE SODIUM 25 UG/1
25 TABLET ORAL DAILY
Qty: 90 TABLET | Refills: 1 | Status: SHIPPED | OUTPATIENT
Start: 2021-11-13 | End: 2022-03-23

## 2021-11-17 ENCOUNTER — LAB (OUTPATIENT)
Dept: FAMILY MEDICINE | Facility: CLINIC | Age: 66
End: 2021-11-17
Attending: FAMILY MEDICINE
Payer: COMMERCIAL

## 2021-11-17 DIAGNOSIS — R50.9 FEVER, UNSPECIFIED FEVER CAUSE: ICD-10-CM

## 2021-11-17 DIAGNOSIS — R52 BODY ACHES: ICD-10-CM

## 2021-11-17 PROCEDURE — U0005 INFEC AGEN DETEC AMPLI PROBE: HCPCS

## 2021-11-17 PROCEDURE — U0003 INFECTIOUS AGENT DETECTION BY NUCLEIC ACID (DNA OR RNA); SEVERE ACUTE RESPIRATORY SYNDROME CORONAVIRUS 2 (SARS-COV-2) (CORONAVIRUS DISEASE [COVID-19]), AMPLIFIED PROBE TECHNIQUE, MAKING USE OF HIGH THROUGHPUT TECHNOLOGIES AS DESCRIBED BY CMS-2020-01-R: HCPCS

## 2021-11-18 ENCOUNTER — TELEPHONE (OUTPATIENT)
Dept: FAMILY MEDICINE | Facility: CLINIC | Age: 66
End: 2021-11-18
Payer: COMMERCIAL

## 2021-11-18 DIAGNOSIS — U07.1 INFECTION DUE TO 2019 NOVEL CORONAVIRUS: Primary | ICD-10-CM

## 2021-11-18 LAB — SARS-COV-2 RNA RESP QL NAA+PROBE: POSITIVE

## 2021-11-18 NOTE — TELEPHONE ENCOUNTER
Meryl Mejia,    Your Covid test was positive as expected. How are you doing now? Did she get the pulse oximeter like we discussed? Please let me know if there is any thank you to help you feel better.    Joann Gentile DO

## 2021-11-18 NOTE — TELEPHONE ENCOUNTER
Spoke to patient. Informed her of the note below. She did not get a pulse oximeter yet. Reviewed COVID isolation and homecare with patient. Patient had questions regarding monoclonial antibodies. Advised to go to the [facility] website. Patient said her work letter said she can return to work on 11/21/2021. Patient said there is no way she can go back to work with how she is feeling. Plus, she has to quarantine.     Patient is wondering if she can get an updated letter excusing her from work.     Will route to provider to advise.     Gricel Womack, BARRETTN, RN  [facility]

## 2021-11-18 NOTE — LETTER
[facility]  [address] [place] [address]  Phone: [phone]    November 19, 2021        Jackie Kiseron  [address] [facility] [place] [address]          To whom it may concern:    RE: Jackie M Siddiqi    Patient has Covid and will need to be out of work from November 11 until November 26, 2021.    Please contact me for questions or concerns.      Sincerely,          Joann Bowers, DO

## 2021-11-19 NOTE — TELEPHONE ENCOUNTER
The patient symptoms started on November 10 so she is officially not contagious anymore after November 20 which is why her notes that she could go back to November 21.  I will give the patient a note to extend her time off another week but she will likely need to see her primary to determine if she is going to need an extended time off or FMLA paperwork completed.  I suggest she make an appointment to see her primary next week if she will no longer be contagious.  She can look on the [facility] website to try and do monoclonal antibodies.    Joann Bowers DO

## 2022-01-13 ENCOUNTER — OFFICE VISIT (OUTPATIENT)
Dept: FAMILY MEDICINE | Facility: CLINIC | Age: 67
End: 2022-01-13
Payer: COMMERCIAL

## 2022-01-13 VITALS
WEIGHT: 130.5 LBS | DIASTOLIC BLOOD PRESSURE: 66 MMHG | TEMPERATURE: 97.3 F | OXYGEN SATURATION: 96 % | SYSTOLIC BLOOD PRESSURE: 110 MMHG | RESPIRATION RATE: 16 BRPM | BODY MASS INDEX: 24.01 KG/M2 | HEIGHT: 62 IN | HEART RATE: 86 BPM

## 2022-01-13 DIAGNOSIS — N76.0 BV (BACTERIAL VAGINOSIS): Primary | ICD-10-CM

## 2022-01-13 DIAGNOSIS — N89.8 VAGINAL DISCHARGE: ICD-10-CM

## 2022-01-13 DIAGNOSIS — B96.89 BV (BACTERIAL VAGINOSIS): Primary | ICD-10-CM

## 2022-01-13 LAB
CLUE CELLS: PRESENT
TRICHOMONAS, WET PREP: ABNORMAL
WBC'S/HIGH POWER FIELD, WET PREP: ABNORMAL
YEAST, WET PREP: ABNORMAL

## 2022-01-13 PROCEDURE — 87591 N.GONORRHOEAE DNA AMP PROB: CPT | Performed by: FAMILY MEDICINE

## 2022-01-13 PROCEDURE — 99213 OFFICE O/P EST LOW 20 MIN: CPT | Performed by: FAMILY MEDICINE

## 2022-01-13 PROCEDURE — 87210 SMEAR WET MOUNT SALINE/INK: CPT | Performed by: FAMILY MEDICINE

## 2022-01-13 PROCEDURE — 87491 CHLMYD TRACH DNA AMP PROBE: CPT | Performed by: FAMILY MEDICINE

## 2022-01-13 RX ORDER — METRONIDAZOLE 500 MG/1
500 TABLET ORAL 2 TIMES DAILY
Qty: 14 TABLET | Refills: 0 | Status: SHIPPED | OUTPATIENT
Start: 2022-01-13 | End: 2022-02-11

## 2022-01-13 ASSESSMENT — MIFFLIN-ST. JEOR: SCORE: 1075.19

## 2022-01-13 ASSESSMENT — PAIN SCALES - GENERAL: PAINLEVEL: MODERATE PAIN (4)

## 2022-01-13 NOTE — PROGRESS NOTES
"  {PROVIDER CHARTING PREFERENCE:266557}    Subjective   Jackie is a 66 year old who presents for the following health issues     HPI     Genitourinary - Female  Onset/Duration: ***  Description:   Painful urination (Dysuria): {.:641594::\"no\"}           Frequency: {.:748950::\"no\"}  Blood in urine (Hematuria): {.:785758::\"no\"}  Delay in urine (Hesitency): {.:086585::\"no\"}  Intensity: {.:706525}  Progression of Symptoms:  {.:068127}  Accompanying Signs & Symptoms:  Fever/chills: {.:765582::\"no\"}  Flank pain: {.:727893::\"no\"}  Nausea and vomiting: {.:556773::\"no\"}  Vaginal symptoms: {VAGINAL SYMPTOMS:616010::\"none\"}  Abdominal/Pelvic Pain: {.:842866::\"no\"}  History:   History of frequent UTI s: {.:036373::\"no\"}  History of kidney stones: {.:374146::\"no\"}  Sexually Active: {.:525662::\"no\"}  Possibility of pregnancy: { :721435::\"No\"}  Precipitating or alleviating factors: {NONE DEFAULTED:649181::\"None\"}  Therapies tried and outcome: {UTI sx treat:781295::\" *** \"}    {additonal problems for provider to add (Optional):864622}    Review of Systems   {ROS COMP (Optional):551820}      Objective    LMP 03/26/2003 (Approximate)   There is no height or weight on file to calculate BMI.  Physical Exam   {Exam List (Optional):858594}    {Diagnostic Test Results (Optional):853552}    {AMBULATORY ATTESTATION (Optional):236435}        "

## 2022-01-13 NOTE — PROGRESS NOTES
Assessment & Plan     BV (bacterial vaginosis)  This started in the fall.  Reviewed chart. has had virtual visits.  Did have a UTI treated in November with a face-to-face visit..  She has got thick discharge.  Has been treated with Diflucan and Monistat.  Failed.  Wet prep today shows clue cells.  Will treat with Flagyl 500 twice daily for 7 days told her to avoid alcohol.  Follow-up if not improving also did GC chlamydia and its pending at the time of this discharge.  We will have to notify her of the results.    Vaginal discharge  See discussion above.  - Wet prep - Clinic Collect  - Chlamydia & Gonorrhea by PCR, GICH/Range - Clinic Collect  - metroNIDAZOLE (FLAGYL) 500 MG tablet; Take 1 tablet (500 mg) by mouth 2 times daily for 7 days                 No follow-ups on file.    Obed Luu MD  [facility] FLORI Mejia is a 66 year old who presents for the following health issues     HPI     Vaginal Symptoms  Onset/Duration: 4 months ago  Description:  Vaginal Discharge: curd-like chunks?, brown colored   Itching (Pruritis): YES  Burning sensation:  YES  Odor: no  Accompanying Signs & Symptoms:  Urinary symptoms: no  Abdominal pain: YES, lower left sided  Fever: no  History:   Sexually active: YES  New Partner: YES  Possibility of Pregnancy:  no  Recent antibiotic use: no  Previous vaginitis issues: YES  Precipitating or alleviating factors: None  Therapies tried and outcome: Diflucan and Monistat          Review of Systems   Constitutional, HEENT, cardiovascular, pulmonary, gi and gu systems are negative, except as otherwise noted.      Objective    LMP 03/26/2003 (Approximate)   There is no height or weight on file to calculate BMI.  Physical Exam   GENERAL: healthy, alert and no distress  ABDOMEN: soft, nontender, no hepatosplenomegaly, no masses and bowel sounds normal   (female): normal female external genitalia, normal urethral meatus  and thick discharge.  Tender  to movement of the cervix.  Some tenderness in the suprapubic region palpation over the uterus.  Clue cells noted on wet prep.  SKIN: no suspicious lesions or rashes

## 2022-01-14 LAB
C TRACH DNA SPEC QL PROBE+SIG AMP: NEGATIVE
N GONORRHOEA DNA SPEC QL NAA+PROBE: NEGATIVE

## 2022-02-10 ENCOUNTER — TELEPHONE (OUTPATIENT)
Dept: FAMILY MEDICINE | Facility: CLINIC | Age: 67
End: 2022-02-10
Payer: COMMERCIAL

## 2022-02-10 DIAGNOSIS — N89.8 VAGINAL DISCHARGE: ICD-10-CM

## 2022-02-10 NOTE — TELEPHONE ENCOUNTER
Reason for Call:  Other call back and prescription    Detailed comments: Patient called stating she had seen Dr. Luu for bacterial vaginosis and he had given her a medication for it. She states it is still bothering her and wondering if she could get another round of the medication. Writer asked patient if she knew what the medication was and she stated she did not and had already thrown the bottle away.     Phone Number Patient can be reached at: Home number on file [phone] (home)    Best Time: Any    Can we leave a detailed message on this number? YES    Call taken on 2/10/2022 at 11:02 AM by Patti Epperson

## 2022-02-11 RX ORDER — METRONIDAZOLE 500 MG/1
500 TABLET ORAL 2 TIMES DAILY
Qty: 14 TABLET | Refills: 0 | Status: SHIPPED | OUTPATIENT
Start: 2022-02-11 | End: 2022-03-15

## 2022-03-11 ENCOUNTER — TELEPHONE (OUTPATIENT)
Dept: FAMILY MEDICINE | Facility: CLINIC | Age: 67
End: 2022-03-11
Payer: COMMERCIAL

## 2022-03-11 NOTE — TELEPHONE ENCOUNTER
Pt having a lot of GI issues and would like to see if she can be worked in to be seen sometime next week. Next available at time is march 28th

## 2022-03-15 ENCOUNTER — HOSPITAL ENCOUNTER (OUTPATIENT)
Dept: CT IMAGING | Facility: CLINIC | Age: 67
Discharge: HOME | End: 2022-03-15
Attending: FAMILY MEDICINE | Admitting: FAMILY MEDICINE
Payer: COMMERCIAL

## 2022-03-15 ENCOUNTER — OFFICE VISIT (OUTPATIENT)
Dept: FAMILY MEDICINE | Facility: CLINIC | Age: 67
End: 2022-03-15
Payer: COMMERCIAL

## 2022-03-15 VITALS
WEIGHT: 131 LBS | SYSTOLIC BLOOD PRESSURE: 112 MMHG | OXYGEN SATURATION: 96 % | HEART RATE: 76 BPM | RESPIRATION RATE: 14 BRPM | BODY MASS INDEX: 23.96 KG/M2 | DIASTOLIC BLOOD PRESSURE: 78 MMHG | TEMPERATURE: 98.2 F

## 2022-03-15 DIAGNOSIS — R19.8 PAINFUL DEFECATION: ICD-10-CM

## 2022-03-15 DIAGNOSIS — K57.30 DIVERTICULOSIS OF COLON: ICD-10-CM

## 2022-03-15 DIAGNOSIS — R91.8 ABNORMAL CT LUNG SCREENING: ICD-10-CM

## 2022-03-15 DIAGNOSIS — E78.5 HYPERLIPIDEMIA LDL GOAL <160: ICD-10-CM

## 2022-03-15 DIAGNOSIS — E03.9 HYPOTHYROIDISM, UNSPECIFIED TYPE: ICD-10-CM

## 2022-03-15 DIAGNOSIS — R10.32 LLQ ABDOMINAL PAIN: Primary | ICD-10-CM

## 2022-03-15 DIAGNOSIS — J44.9 CHRONIC OBSTRUCTIVE PULMONARY DISEASE, UNSPECIFIED COPD TYPE (H): ICD-10-CM

## 2022-03-15 DIAGNOSIS — Z87.891 PERSONAL HISTORY OF TOBACCO USE: ICD-10-CM

## 2022-03-15 LAB
ALBUMIN UR-MCNC: NEGATIVE MG/DL
APPEARANCE UR: ABNORMAL
BASOPHILS # BLD AUTO: 0.1 10E3/UL (ref 0–0.2)
BASOPHILS NFR BLD AUTO: 2 %
BILIRUB UR QL STRIP: NEGATIVE
CAOX CRY #/AREA URNS HPF: ABNORMAL /HPF
CHOLEST SERPL-MCNC: 226 MG/DL
COLOR UR AUTO: YELLOW
CRP SERPL-MCNC: <2.9 MG/L (ref 0–8)
EOSINOPHIL # BLD AUTO: 0.3 10E3/UL (ref 0–0.7)
EOSINOPHIL NFR BLD AUTO: 5 %
ERYTHROCYTE [DISTWIDTH] IN BLOOD BY AUTOMATED COUNT: 12.9 % (ref 10–15)
ERYTHROCYTE [SEDIMENTATION RATE] IN BLOOD BY WESTERGREN METHOD: 17 MM/HR (ref 0–30)
FASTING STATUS PATIENT QL REPORTED: YES
GLUCOSE UR STRIP-MCNC: NEGATIVE MG/DL
HCT VFR BLD AUTO: 39.6 % (ref 35–47)
HDLC SERPL-MCNC: 61 MG/DL
HGB BLD-MCNC: 13.2 G/DL (ref 11.7–15.7)
HGB UR QL STRIP: NEGATIVE
IMM GRANULOCYTES # BLD: 0 10E3/UL
IMM GRANULOCYTES NFR BLD: 0 %
KETONES UR STRIP-MCNC: NEGATIVE MG/DL
LDLC SERPL CALC-MCNC: 131 MG/DL
LEUKOCYTE ESTERASE UR QL STRIP: ABNORMAL
LYMPHOCYTES # BLD AUTO: 2 10E3/UL (ref 0.8–5.3)
LYMPHOCYTES NFR BLD AUTO: 37 %
MCH RBC QN AUTO: 29.9 PG (ref 26.5–33)
MCHC RBC AUTO-ENTMCNC: 33.3 G/DL (ref 31.5–36.5)
MCV RBC AUTO: 90 FL (ref 78–100)
MONOCYTES # BLD AUTO: 0.5 10E3/UL (ref 0–1.3)
MONOCYTES NFR BLD AUTO: 8 %
MUCOUS THREADS #/AREA URNS LPF: PRESENT /LPF
NEUTROPHILS # BLD AUTO: 2.7 10E3/UL (ref 1.6–8.3)
NEUTROPHILS NFR BLD AUTO: 48 %
NITRATE UR QL: NEGATIVE
NONHDLC SERPL-MCNC: 165 MG/DL
NRBC # BLD AUTO: 0 10E3/UL
NRBC BLD AUTO-RTO: 0 /100
PH UR STRIP: 5 [PH] (ref 5–7)
PLATELET # BLD AUTO: 276 10E3/UL (ref 150–450)
RBC # BLD AUTO: 4.42 10E6/UL (ref 3.8–5.2)
RBC URINE: 0 /HPF
SP GR UR STRIP: 1.02 (ref 1–1.03)
SQUAMOUS EPITHELIAL: 2 /HPF
T4 FREE SERPL-MCNC: 0.92 NG/DL (ref 0.76–1.46)
TRIGL SERPL-MCNC: 170 MG/DL
TSH SERPL DL<=0.005 MIU/L-ACNC: 6.13 MU/L (ref 0.4–4)
UROBILINOGEN UR STRIP-MCNC: NORMAL MG/DL
WBC # BLD AUTO: 5.5 10E3/UL (ref 4–11)
WBC URINE: 6 /HPF

## 2022-03-15 PROCEDURE — 80061 LIPID PANEL: CPT | Performed by: FAMILY MEDICINE

## 2022-03-15 PROCEDURE — 85652 RBC SED RATE AUTOMATED: CPT | Performed by: FAMILY MEDICINE

## 2022-03-15 PROCEDURE — 36415 COLL VENOUS BLD VENIPUNCTURE: CPT | Performed by: FAMILY MEDICINE

## 2022-03-15 PROCEDURE — 84439 ASSAY OF FREE THYROXINE: CPT | Performed by: FAMILY MEDICINE

## 2022-03-15 PROCEDURE — 71271 CT THORAX LUNG CANCER SCR C-: CPT

## 2022-03-15 PROCEDURE — 99214 OFFICE O/P EST MOD 30 MIN: CPT | Performed by: FAMILY MEDICINE

## 2022-03-15 PROCEDURE — 81001 URINALYSIS AUTO W/SCOPE: CPT | Performed by: FAMILY MEDICINE

## 2022-03-15 PROCEDURE — 85025 COMPLETE CBC W/AUTO DIFF WBC: CPT | Performed by: FAMILY MEDICINE

## 2022-03-15 PROCEDURE — 84443 ASSAY THYROID STIM HORMONE: CPT | Performed by: FAMILY MEDICINE

## 2022-03-15 PROCEDURE — 87086 URINE CULTURE/COLONY COUNT: CPT | Performed by: FAMILY MEDICINE

## 2022-03-15 PROCEDURE — 86140 C-REACTIVE PROTEIN: CPT | Performed by: FAMILY MEDICINE

## 2022-03-15 ASSESSMENT — PATIENT HEALTH QUESTIONNAIRE - PHQ9
SUM OF ALL RESPONSES TO PHQ QUESTIONS 1-9: 2
10. IF YOU CHECKED OFF ANY PROBLEMS, HOW DIFFICULT HAVE THESE PROBLEMS MADE IT FOR YOU TO DO YOUR WORK, TAKE CARE OF THINGS AT HOME, OR GET ALONG WITH OTHER PEOPLE: SOMEWHAT DIFFICULT
SUM OF ALL RESPONSES TO PHQ QUESTIONS 1-9: 2

## 2022-03-15 ASSESSMENT — PAIN SCALES - GENERAL: PAINLEVEL: MILD PAIN (2)

## 2022-03-15 NOTE — LETTER
My COPD Action Plan     Name: Jackie Siddiqi    YOB: 1955   Date: 3/15/2022    My doctor: Jovana Wooten Mai, MD   My clinic: [address] [address] [phone]  My Controller Medicine: Albuterol (Proair/Ventolin/Proventolin)   Dose:      My Rescue Medicine: NONe   Dose:      My Flare Up Medicine: NONE   Dose:      My COPD Severity: Mild = FeV1 > 80%      Use of Oxygen: Oxygen Not Prescribed      Make sure you've had your pneumonia   vaccines.          GREEN ZONE       Doing well today      Usual level of activity and exercise    Usual amount of cough and mucus    No shortness of breath    Usual level of health (thinking clearly, sleeping well, feel like eating) Actions:      Take daily medicines    Use oxygen as prescribed    Follow regular exercise and diet plan    Avoid cigarette smoke and other irritants that harm the lungs           YELLOW ZONE          Having a bad day or flare up      Short of breath more than usual    A lot more sputum (mucus) than usual    Sputum looks yellow, green, tan, brown or bloody    More coughing or wheezing    Fever or chills    Less energy; trouble completing activities    Trouble thinking or focusing    Using quick relief inhaler or nebulizer more often    Poor sleep; symptoms wake me up    Do not feel like eating Actions:      Get plenty of rest    Take daily medicines    Use quick relief inhaler every 3 hours    If you use oxygen, call you doctor to see if you should adjust your oxygen    Do breathing exercises or other things to help you relax    Let a loved one, friend or neighbor know you are feeling worse    Call your care team if you have 2 or more symptoms.  Start taking steroids or antibiotics if directed by your care team           RED ZONE       Need medical care now      Severe shortness of breath (feel you can't breathe)    Fever, chills    Not enough breath to do any activity    Trouble  coughing up mucus, walking or talking    Blood in mucus    Frequent coughing   Rescue medicines are not working    Not able to sleep because of breathing    Feel confused or drowsy    Chest pain    Actions:      Call your health care team.  If you cannot reach your care team, call 911 or go to the emergency room.        Annual Reminders:  Meet with Care Team, Flu Shot every Fall  Pharmacy:    [facility] [place], [place] - [address]  [id number] - [place], [place] - [address] [facility] [place] [address] [id number] - [place] [address] [facility] [place] [id number] [address]  [facility]  [facility] [id number] [address] [facility] [place], [place] - [address] [place] [address] [id number] - [place], [place] - [address] [facility] [place], [place] - [address] AT PORTAL TO REGISTERED [facility] SITES    3/15/2022        RE: Jackie Siddiqi  [address]  [place] [address]        No notes on file      Sincerely,        Jovana Wooten Mai, MD

## 2022-03-15 NOTE — PATIENT INSTRUCTIONS
Lung Cancer Screening   Frequently Asked Questions  If you are at high-risk for lung cancer, getting screened with low-dose computed tomography (LDCT) every year can help save your life. This handout offers answers to some of the most common questions about lung cancer screening. If you have other questions, please call [phone] ([phone]).     What is it?  Lung cancer screening uses special X-ray technology to create an image of your lung tissue. The exam is quick and easy and takes less than 10 seconds. We don t give you any medicine or use any needles. You can eat before and after the exam. You don t need to change your clothes as long as the clothing on your chest doesn t contain metal. But, you do need to be able to hold your breath for at least 6 seconds during the exam.    What is the goal of lung cancer screening?  The goal of lung cancer screening is to save lives. Many times, lung cancer is not found until a person starts having physical symptoms. Lung cancer screening can help detect lung cancer in the earliest stages when it may be easier to treat.    Who should be screened for lung cancer?  We suggest lung cancer screening for anyone who is at high-risk for lung cancer. You are in the high-risk group if you:      are between the ages of 55 and 79, and    have smoked at least 1 pack of cigarettes a day for 20 or more years, and    still smoke or have quit within the past 15 years.    However, if you have a new cough or shortness of breath, you should talk to your doctor before being screened.    Why does it matter if I have symptoms?  Certain symptoms can be a sign that you have a condition in your lungs that should be checked and treated by your doctor. These symptoms include fever, chest pain, a new or changing cough, shortness of breath that you have never felt before, coughing up blood or unexplained weight loss. Having any of these symptoms can greatly affect the results of lung  cancer screening.       Should all smokers get an LDCT lung cancer screening exam?  It depends. Lung cancer screening is for a very specific group of men and women who have a history of heavy smoking over a long period of time (see  Who should be screened for lung cancer  above).  I am in the high-risk group, but have been diagnosed with cancer in the past. Is LDCT lung cancer screening right for me?  In some cases, you should not have LDCT lung screening, such as when your doctor is already following your cancer with CT scan studies. Your doctor will help you decide if LDCT lung screening is right for you.  Do I need to have a screening exam every year?  Yes. If you are in the high-risk group described earlier, you should get an LDCT lung cancer screening exam every year until you are 79, or are no longer willing or able to undergo screening and possible procedures to diagnose and treat lung cancer.  How effective is LDCT at preventing death from lung cancer?  Studies have shown that LDCT lung cancer screening can lower the risk of death from lung cancer by 20 percent in people who are at high-risk.  What are the risks?  There are some risks and limitations of LDCT lung cancer screening. We want to make sure you understand the risks and benefits, so please let us know if you have any questions. Your doctor may want to talk with you more about these risks.    Radiation exposure: As with any exam that uses radiation, there is a very small increased risk of cancer. The amount of radiation in LDCT is small--about the same amount a person would get from a mammogram. Your doctor orders the exam when he or she feels the potential benefits outweigh the risks.    False negatives: No test is perfect, including LDCT. It is possible that you may have a medical condition, including lung cancer, that is not found during your exam. This is called a false negative result.    False positives and more testing: LDCT very often finds  something in the lung that could be cancer, but in fact is not. This is called a false positive result. False positive tests often cause anxiety. To make sure these findings are not cancer, you may need to have more tests. These tests will be done only if you give us permission. Sometimes patients need a treatment that can have side effects, such as a biopsy. For more information on false positives, see  What can I expect from the results?     Findings not related to lung cancer: Your LDCT exam also takes pictures of areas of your body next to your lungs. In a very small number of cases, the CT scan will show an abnormal finding in one of these areas, such as your kidneys, adrenal glands, liver or thyroid. This finding may not be serious, but you may need more tests. Your doctor can help you decide what other tests you may need, if any.  What can I expect from the results?  About 1 out of 4 LDCT exams will find something that may need more tests. Most of the time, these findings are lung nodules. Lung nodules are very small collections of tissue in the lung. These nodules are very common, and the vast majority--more than 97 percent--are not cancer (benign). Most are normal lymph nodes or small areas of scarring from past infections.  But, if a small lung nodule is found to be cancer, the cancer can be cured more than 90 percent of the time. To know if the nodule is cancer, we may need to get more images before your next yearly screening exam. If the nodule has suspicious features (for example, it is large, has an odd shape or grows over time), we will refer you to a specialist for further testing.  Will my doctor also get the results?  Yes. Your doctor will get a copy of your results.

## 2022-03-15 NOTE — PROGRESS NOTES
Lung Cancer Screening Shared Decision Making Visit     Jackie Siddiqi is eligible for lung cancer screening on the basis of the information provided in my signed lung cancer screening order.     I have discussed with patient the risks and benefits of screening for lung cancer with low-dose CT.     The risks include:  radiation exposure: one low dose chest CT has as much ionizing radiation as about 15 chest x-rays or 6 months of background radiation living in [place]    false positives: 96% of positive findings/nodules are NOT cancer, but some might still require additional diagnostic evaluation, including biopsy  over-diagnosis: some slow growing cancers that might never have been clinically significant will be detected and treated unnecessarily     The benefit of early detection of lung cancer is contingent upon adherence to annual screening or more frequent follow up if indicated.     Furthermore, reaping the benefits of screening requires Jackie Siddiqi to be willing and physically able to undergo diagnostic procedures, if indicated. Although no specific guide is available for determining severity of comorbidities, it is reasonable to withhold screening in patients who have greater mortality risk from other diseases.     We did discuss that the only way to prevent lung cancer is to not smoke. Smoking cessation counseling was not given.      I did not offer risk estimation using a calculator such as this one:    ShouldIScreen        Answers for HPI/ROS submitted by the patient on 3/15/2022  If you checked off any problems, how difficult have these problems made it for you to do your work, take care of things at home, or get along with other people?: Somewhat difficult  PHQ9 TOTAL SCORE: 2  How many servings of fruits and vegetables do you eat daily?: 2-3  On average, how many sweetened beverages do you drink each day (Examples: soda, juice, sweet tea, etc.  Do NOT count diet or artificially sweetened  beverages)?: 0  How many minutes a day do you exercise enough to make your heart beat faster?: 9 or less  How many days a week do you exercise enough to make your heart beat faster?: 3 or less  How many days per week do you miss taking your medication?: 2  What is the reason for your visit today?: Pain in abdominal area and stomach  When did your symptoms begin?: More than a month  How would you describe these symptoms?: Moderate  Are your symptoms:: Worsening  Have you had these symptoms before?: Yes  Have you tried or received treatment for these symptoms before?: Yes  Did that treatment work? : No  Is there anything that makes you feel worse?: No  Is there anything that makes you feel better?: No

## 2022-03-15 NOTE — PROGRESS NOTES
"  Assessment & Plan       ICD-10-CM    1. LLQ abdominal pain  R10.32 CBC with platelets and differential     ESR: Erythrocyte sedimentation rate     CRP, inflammation     UA Macro with Reflex to Micro and Culture - lab collect     XR Abdomen 2 Views     UA Macro with Reflex to Micro and Culture - lab collect     CRP, inflammation     ESR: Erythrocyte sedimentation rate     CBC with platelets and differential     Urine Culture     Adult Gastro Ref - Consult Only   2. Painful defecation  R19.8 CBC with platelets and differential     ESR: Erythrocyte sedimentation rate     CRP, inflammation     CRP, inflammation     ESR: Erythrocyte sedimentation rate     CBC with platelets and differential     Adult Gastro Ref - Consult Only   3. Diverticulosis of colon  K57.30    4. Abnormal CT lung screening  R91.8 COPD ACTION PLAN     CT Chest Lung Cancer Scrn Low Dose wo   5. Chronic obstructive pulmonary disease, unspecified COPD type (H)  J44.9    6. Personal history of tobacco use  Z87.891 Prof Fee: Shared Decision Making Visit for Lung Cancer Screening     CT Chest Lung Cancer Scrn Low Dose wo   7. Hypothyroidism, unspecified type  E03.9 TSH with free T4 reflex     TSH with free T4 reflex     T4 free   8. Hyperlipidemia LDL goal <160  E78.5 Lipid panel reflex to direct LDL Fasting     Lipid panel reflex to direct LDL Fasting     Abdominal pain with painful defecation and diverticulosis  Jackie who is here today for recurrent lower left quadrant abdominal pain that she has been having on and off in the last 5 years, getting worse in the last several weeks.  The same kind of pain that she has had.  Also been having painful with \"soft and stringy\" stools without melena or hematochezia - which is new.  No constipation. Work-up for  LLQ pain in the past with colonoscopies and CT scans yielded no obvious cause of her pain.  Last colonoscopy was in 2020 which show colonic polyps, internal hemorrhoid and diverticulosis.  Exam today " was pretty normal.  The pain has really bothersome to her.  Informed that based on her symptoms and physical exam, it is less likely that she has active diverticulitis or obstruction.  Will get the basic lab work include CBC, CRP, sed rate, and CMP - they were normal.  Abdominal x-ray showed no obstruction but mild constipation.    I recommended to increase fiber and water intake.  Also recommended to restart stool softener.  Will refer to GI specialist for reevaluation as well.  Symptoms that need to be seen or call in discussed, specially if develops worsening abdominal pain, fever, abdominal distention, melena, hematochezia or if she has any concern.  She felt comfortable with the plan.    History of tobacco use  She has a history of 45 pack/year smoking, quit 5 years ago.  Discussed with her about lung cancer screening with low-dose CT scan.  Pros and cons discussed and also informed with her about the potential for possible.  She is interested and therefore referral.   The CT scan in [facility] showed new but benign looking nodule.  Per radiologist's recommendation, repeat the CT scan in 3 months was ordered and she was scheduled for it.       COPD:  Stable and doing well.  Not on the maintenance inhaler.  Uses the rescue inhaler rarely.  No longer smoking.  Follow-up as needed.    Hypothyroidism  Stable but has been having changes in stool consistency and frequency.  Rechecked the TSH today and it was in the slightly high.  Increased the levothyroxine dose to 50 mcg daily, recheck the TSH again in 3 months.    She was recommended to get this shingle, COVID and influenza vaccination but she declined.       Return in about 3 months (around 6/15/2022) for Physical Exam.    Jovana Wooten Mai, MD  [employer]    Sajan Mejia is a 66 year old who presents for the following health issues     History of Present Illness       Mental Health Follow-up:                    Today's PHQ-9         PHQ-9  "Total Score: 2  PHQ-9 Q9 Thoughts of better off dead/self-harm past 2 weeks :   (P) Not at all    How difficult have these problems made it for you to do your work, take care of things at home, or get along with other people: Somewhat difficult        Reason for visit:  Pain in abdominal area and stomach  Symptom onset:  More than a month  Symptom intensity:  Moderate  Symptom progression:  Worsening  Had these symptoms before:  Yes  Has tried/received treatment for these symptoms:  Yes  Previous treatment was successful:  No  What makes it worse:  No  What makes it better:  No    She eats 2-3 servings of fruits and vegetables daily.She consumes 0 sweetened beverage(s) daily.She exercises with enough effort to increase her heart rate 9 or less minutes per day.  She exercises with enough effort to increase her heart rate 3 or less days per week. She is missing 2 dose(s) of medications per week.         Jackie is here today for worsening of the lower left abdominal pain.  Stated that he has had on and off for at least 5 years.  Work-up with colonoscopies and CT scans yielded no obvious cause of her symptoms.  She saw a GI specialist for it in 2019 and no formal diagnose was made - never follow up after it.  She was treated empirically for constipation.  Her colonoscopy in 2017 and 2020 which showed colonic polyps and diverticulosis.  Has had multiple abdominal/pelvis CT scans and again no obvious cause of her symptoms identified.  HIDA scan in 2018 was also normal.  Was doing pretty good for several and it has come back in the last several weeks; getting worse.  The same kind of pain that she had before and it is very bothersome to her.  Also been having painful defecations with soft and \"stringy\" stools.  No constipation or rectal pressure.  Her stools are soft but no diarrhea.  No bloody or black stool.  The pain is localized to the lower left area just above the groin.  When the pain is bad, she would feel nauseate " but no vomiting.  No change in her diet.  Stated that her lower abdomen feels bubbly.  No fever or chills.  No problem with urination.  Stopped taking the stool softener for a while and has increased fiber intake.  Been drinking a lot of water.  Denied of excessive alcohol.  Smoked about 1 ppd for 45 years, quitted 5 years ago.        Review of Systems   Constitutional, HEENT, cardiovascular, pulmonary, gi and gu systems are negative, except as otherwise noted.      Objective    /78   Pulse 76   Temp 98.2  F (36.8  C) (Temporal)   Resp 14   Wt 59.4 kg (131 lb)   LMP 03/26/2003 (Approximate)   SpO2 96%   BMI 23.96 kg/m    Body mass index is 23.96 kg/m .  Physical Exam   GENERAL: alert and no distress.  Speaking full sentences.  RESP: lungs clear to auscultation - no rales, rhonchi or wheezes  CV: regular rate and rhythm, normal S1 S2, no S3 or S4, no murmur, click or rub, no peripheral edema  ABDOMEN: soft, nondistended, no palpable masses or organomegaly with normal bowel sound.  No CVA tenderness.  Mild tender will place into the lower abdomen diffusely, worse in the lower left quadrant without guarding or rebound.  MS: no gross musculoskeletal defects noted, no edema.  Hips and lower spine exam were normal.    Results for orders placed or performed during the hospital encounter of 03/15/22   CT Chest Lung Cancer Scrn Low Dose wo     Status: None    Narrative    CT CHEST LUNG CANCER SCREEN LOW DOSE WITHOUT CONTRAST March 15, 2022  10:42 AM    HISTORY: Lung cancer screening, >= 30 pack-year current smoker (Age  55-80y). Abnormal CT lung screening. Personal history of tobacco use.    TECHNIQUE: Scans obtained from the apices through the diaphragm  without IV contrast. Low dose CT chest technique was utilized.  Radiation dose for this scan was reduced using automated exposure  control, adjustment of the mA and/or kV according to patient size, or  iterative reconstruction technique.    COMPARISON: CT chest  dated 3/22/2021 and 3/16/2020. Chest x-rays dated  6/2/2021.    FINDINGS:    Lungs: There are multiple small nodules scattered throughout the  bilateral lungs. One in the medial left superior segment (image 151  series 3) measures up to 0.2 cm and was not definitely seen on the  prior study. There is a nodule in the anterior left lower lung lobe  (image 238 series 3 and image 88 series 7) measuring up to 1.1 x 0.5 x  0.6 cm, increased in size since the prior study dated 3/2/2021 where  it measured up to approximately 1.1 x 0.3 x 0.5 cm. It is, however,  unchanged since the prior study dated 3/16/2020. Nodule in the  posterior right lower lung lobe (image 185 series 3 measures  approximately 0.4 cm in diameter. No other new nodules are identified.  No pneumothorax, infiltrate, or effusion. There is mild centrilobular  emphysema in the bilateral lungs with apical prominence. Scarring in  bilateral apices is again noted.    Additional findings: Bilateral breast prostheses are again seen. The  heart is normal in size. No mediastinal, hilar, or axillary  lymphadenopathy is identified. Visualized portions of the thyroid are  unremarkable. There are dependently layering gallstones in the  gallbladder. There is nonaneurysmal atherosclerosis. Visualized  portions of the upper abdominal contents are otherwise unremarkable.  No aggressive osseous lesions or acute osseous fractures are  identified. Degenerative changes are noted in the spine.      Impression    IMPRESSION:   1. ACR Assessment Category:  Lung-RADS Category 4A. Suspicious.  Additional diagnostic testing and/or tissue sampling is recommended. 3  month LDCT  (please order exam code IMG 2163); PET/CT may be used when  there is a >/= 8 mm solid component. Enlarging noncalcified nodule in  the left lower lung lobe with a mean diameter approximately 0.7 cm  since most recent prior CT chest from 2021; however, this nodule  appears very similar to the prior CT chest from  "2020. New nodule in  the right lower lung lobe measures up to 0.4 cm in diameter. Another  new 0.2 cm nodule is seen in the left upper lobe.  2. Significant Incidental Finding(s):  Category S: No.    Download the \"LungRADS Assessment Categories\" table at this site:   http://www.acr.org/Quality-Safety/Resources/LungRADS    JUSTIN MARTIN MD         SYSTEM ID:  [id number]   Results for orders placed or performed in visit on 03/15/22   UA Macro with Reflex to Micro and Culture - lab collect     Status: Abnormal    Specimen: Urine, Clean Catch   Result Value Ref Range    Color Urine Yellow Colorless, Straw, Light Yellow, Yellow    Appearance Urine Slightly Cloudy (A) Clear    Glucose Urine Negative Negative mg/dL    Bilirubin Urine Negative Negative    Ketones Urine Negative Negative mg/dL    Specific Gravity Urine 1.025 1.003 - 1.035    Blood Urine Negative Negative    pH Urine 5.0 5.0 - 7.0    Protein Albumin Urine Negative Negative mg/dL    Urobilinogen Urine Normal Normal, 2.0 mg/dL    Nitrite Urine Negative Negative    Leukocyte Esterase Urine Large (A) Negative    Mucus Urine Present (A) None Seen /LPF    Calcium Oxalate Crystals Urine Many (A) None Seen /HPF    RBC Urine 0 <=2 /HPF    WBC Urine 6 (H) <=5 /HPF    Squamous Epithelials Urine 2 (H) <=1 /HPF    Narrative    Urine Culture ordered based on laboratory criteria   CRP, inflammation     Status: Normal   Result Value Ref Range    CRP Inflammation <2.9 0.0 - 8.0 mg/L   ESR: Erythrocyte sedimentation rate     Status: Normal   Result Value Ref Range    Erythrocyte Sedimentation Rate 17 0 - 30 mm/hr   TSH with free T4 reflex     Status: Abnormal   Result Value Ref Range    TSH 6.13 (H) 0.40 - 4.00 mU/L   Lipid panel reflex to direct LDL Fasting     Status: Abnormal   Result Value Ref Range    Cholesterol 226 (H) <200 mg/dL    Triglycerides 170 (H) <150 mg/dL    Direct Measure HDL 61 >=50 mg/dL    LDL Cholesterol Calculated 131 (H) <=100 mg/dL    Non HDL Cholesterol " 165 (H) <130 mg/dL    Patient Fasting > 8hrs? Yes     Narrative    Cholesterol  Desirable:  <200 mg/dL    Triglycerides  Normal:  Less than 150 mg/dL  Borderline High:  150-199 mg/dL  High:  200-499 mg/dL  Very High:  Greater than or equal to 500 mg/dL    Direct Measure HDL  Female:  Greater than or equal to 50 mg/dL   Male:  Greater than or equal to 40 mg/dL    LDL Cholesterol  Desirable:  <100mg/dL  Above Desirable:  100-129 mg/dL   Borderline High:  130-159 mg/dL   High:  160-189 mg/dL   Very High:  >= 190 mg/dL    Non HDL Cholesterol  Desirable:  130 mg/dL  Above Desirable:  130-159 mg/dL  Borderline High:  160-189 mg/dL  High:  190-219 mg/dL  Very High:  Greater than or equal to 220 mg/dL   CBC with platelets and differential     Status: None   Result Value Ref Range    WBC Count 5.5 4.0 - 11.0 10e3/uL    RBC Count 4.42 3.80 - 5.20 10e6/uL    Hemoglobin 13.2 11.7 - 15.7 g/dL    Hematocrit 39.6 35.0 - 47.0 %    MCV 90 78 - 100 fL    MCH 29.9 26.5 - 33.0 pg    MCHC 33.3 31.5 - 36.5 g/dL    RDW 12.9 10.0 - 15.0 %    Platelet Count 276 150 - 450 10e3/uL    % Neutrophils 48 %    % Lymphocytes 37 %    % Monocytes 8 %    % Eosinophils 5 %    % Basophils 2 %    % Immature Granulocytes 0 %    NRBCs per 100 WBC 0 <1 /100    Absolute Neutrophils 2.7 1.6 - 8.3 10e3/uL    Absolute Lymphocytes 2.0 0.8 - 5.3 10e3/uL    Absolute Monocytes 0.5 0.0 - 1.3 10e3/uL    Absolute Eosinophils 0.3 0.0 - 0.7 10e3/uL    Absolute Basophils 0.1 0.0 - 0.2 10e3/uL    Absolute Immature Granulocytes 0.0 <=0.4 10e3/uL    Absolute NRBCs 0.0 10e3/uL   T4 free     Status: Normal   Result Value Ref Range    Free T4 0.92 0.76 - 1.46 ng/dL   Urine Culture     Status: None    Specimen: Urine, Clean Catch   Result Value Ref Range    Culture 10,000-50,000 CFU/mL Mixture of urogenital bita    CBC with platelets and differential     Status: None    Narrative    The following orders were created for panel order CBC with platelets and  differential.  Procedure                               Abnormality         Status                     ---------                               -----------         ------                     CBC with platelets and d...[[id number]]                      Final result                 Please view results for these tests on the individual orders.

## 2022-03-16 ENCOUNTER — HOSPITAL ENCOUNTER (OUTPATIENT)
Dept: GENERAL RADIOLOGY | Facility: CLINIC | Age: 67
Discharge: HOME | End: 2022-03-16
Attending: FAMILY MEDICINE | Admitting: FAMILY MEDICINE
Payer: COMMERCIAL

## 2022-03-16 DIAGNOSIS — R10.32 LLQ ABDOMINAL PAIN: ICD-10-CM

## 2022-03-16 PROCEDURE — 74019 RADEX ABDOMEN 2 VIEWS: CPT

## 2022-03-16 ASSESSMENT — PATIENT HEALTH QUESTIONNAIRE - PHQ9: SUM OF ALL RESPONSES TO PHQ QUESTIONS 1-9: 2

## 2022-03-17 DIAGNOSIS — R91.8 PULMONARY NODULES: Primary | ICD-10-CM

## 2022-03-17 LAB — BACTERIA UR CULT: NORMAL

## 2022-03-18 ENCOUNTER — TELEPHONE (OUTPATIENT)
Dept: FAMILY MEDICINE | Facility: CLINIC | Age: 67
End: 2022-03-18
Payer: COMMERCIAL

## 2022-03-18 NOTE — TELEPHONE ENCOUNTER
Please try again later,  call was answered then dropped, no voicemail option.  Ama Machado MA on 3/18/2022 at 11:57 AM

## 2022-03-18 NOTE — TELEPHONE ENCOUNTER
----- Message from Jovana Wooten Mai, MD sent at 3/17/2022  9:33 AM CDT -----  Discussed result with patient over the phone.  Follow-up CT scan ordered.  Please help to set up the CT scan in 3 months as per radiologist's recommendation.

## 2022-03-23 ENCOUNTER — TELEPHONE (OUTPATIENT)
Dept: FAMILY MEDICINE | Facility: CLINIC | Age: 67
End: 2022-03-23
Payer: COMMERCIAL

## 2022-03-23 DIAGNOSIS — E03.9 HYPOTHYROIDISM, UNSPECIFIED TYPE: ICD-10-CM

## 2022-03-23 RX ORDER — LEVOTHYROXINE SODIUM 50 UG/1
50 TABLET ORAL DAILY
Qty: 90 TABLET | Refills: 1 | Status: SHIPPED | OUTPATIENT
Start: 2022-03-23 | End: 2022-05-19

## 2022-03-23 NOTE — TELEPHONE ENCOUNTER
Patient is calling and requesting results of TSH and other labs.  She stated she is having difficulty with My Chart and would like to get results of all tests that were released to My Chart.  Patient's notes stated as documented per Dr. Devi MD:    Dear Jackie,    Your lab today showed your thyroid level remains to be slightly low, will increase your levothyroxine to 50 mcg daily.  Recheck its level in 3 months.  The sed rate and CRP were normal which indicate of no active inflammation in your body.  Your cholesterol level remains to be slightly elevated, overall about the same it was a year ago.  Will continue to hold off on medication for now.  Please work on exercising and healthy diet as discussed before.  Recheck the level in a year.  CBC was normal.  There is no indication of active infection or diverticulitis.  Your urine test did not suggest of having a bladder infection or kidney stone.  Please let me know if you have any further question.  Thank you        Jovana.   Kashif Mejia,    Your urine culture showed no signs of infection.     Vui.   Written by Jovana Wooten Mai, MD on 3/17/2022  9:06 AM CDT      Patient requested new dose medication be sent to [facility] in [place].    Prescription approved per [facility] Refill Protocol.  Per documentation stated above Dr. Devi MD  Advised patient to call with any further questions or concerns.  Transferred patient to central scheduling to reschedule upcoming CT scan.  Patient stated understanding.      Maritza Gonsalez RN

## 2022-04-09 ENCOUNTER — HEALTH MAINTENANCE LETTER (OUTPATIENT)
Age: 67
End: 2022-04-09

## 2022-04-19 ENCOUNTER — OFFICE VISIT (OUTPATIENT)
Dept: FAMILY MEDICINE | Facility: CLINIC | Age: 67
End: 2022-04-19
Payer: COMMERCIAL

## 2022-04-19 VITALS
OXYGEN SATURATION: 94 % | DIASTOLIC BLOOD PRESSURE: 66 MMHG | HEART RATE: 96 BPM | SYSTOLIC BLOOD PRESSURE: 110 MMHG | TEMPERATURE: 97.1 F | BODY MASS INDEX: 24.53 KG/M2 | WEIGHT: 134.13 LBS

## 2022-04-19 DIAGNOSIS — L65.9 HAIR LOSS: ICD-10-CM

## 2022-04-19 DIAGNOSIS — N30.01 ACUTE CYSTITIS WITH HEMATURIA: ICD-10-CM

## 2022-04-19 DIAGNOSIS — R68.89 COLD INTOLERANCE: ICD-10-CM

## 2022-04-19 DIAGNOSIS — R30.0 DYSURIA: Primary | ICD-10-CM

## 2022-04-19 DIAGNOSIS — E03.9 HYPOTHYROIDISM, UNSPECIFIED TYPE: ICD-10-CM

## 2022-04-19 LAB
ALBUMIN UR-MCNC: NEGATIVE MG/DL
APPEARANCE UR: CLEAR
BILIRUB UR QL STRIP: NEGATIVE
COLOR UR AUTO: YELLOW
GLUCOSE UR STRIP-MCNC: NEGATIVE MG/DL
HGB UR QL STRIP: ABNORMAL
KETONES UR STRIP-MCNC: NEGATIVE MG/DL
LEUKOCYTE ESTERASE UR QL STRIP: ABNORMAL
MUCOUS THREADS #/AREA URNS LPF: PRESENT /LPF
NITRATE UR QL: NEGATIVE
PH UR STRIP: 5 [PH] (ref 5–7)
RBC URINE: 2 /HPF
SP GR UR STRIP: 1.03 (ref 1–1.03)
SQUAMOUS EPITHELIAL: 2 /HPF
UROBILINOGEN UR STRIP-MCNC: 2 MG/DL
WBC URINE: 14 /HPF

## 2022-04-19 PROCEDURE — 87086 URINE CULTURE/COLONY COUNT: CPT | Performed by: PHYSICIAN ASSISTANT

## 2022-04-19 PROCEDURE — 99214 OFFICE O/P EST MOD 30 MIN: CPT | Performed by: PHYSICIAN ASSISTANT

## 2022-04-19 PROCEDURE — 81001 URINALYSIS AUTO W/SCOPE: CPT | Performed by: PHYSICIAN ASSISTANT

## 2022-04-19 RX ORDER — SULFAMETHOXAZOLE/TRIMETHOPRIM 800-160 MG
1 TABLET ORAL 2 TIMES DAILY
Qty: 6 TABLET | Refills: 0 | Status: SHIPPED | OUTPATIENT
Start: 2022-04-19 | End: 2022-04-22

## 2022-04-19 ASSESSMENT — PAIN SCALES - GENERAL: PAINLEVEL: NO PAIN (0)

## 2022-04-19 ASSESSMENT — PATIENT HEALTH QUESTIONNAIRE - PHQ9
SUM OF ALL RESPONSES TO PHQ QUESTIONS 1-9: 12
10. IF YOU CHECKED OFF ANY PROBLEMS, HOW DIFFICULT HAVE THESE PROBLEMS MADE IT FOR YOU TO DO YOUR WORK, TAKE CARE OF THINGS AT HOME, OR GET ALONG WITH OTHER PEOPLE: SOMEWHAT DIFFICULT
SUM OF ALL RESPONSES TO PHQ QUESTIONS 1-9: 12

## 2022-04-19 NOTE — PATIENT INSTRUCTIONS
Take full course of antibiotics- Bactrim twice daily for 3 days  Urine culture pending, we will call you only if culture shows resistance and change of antibiotic is required or if no growth to stop antibiotics and to follow-up with your primary care provider.  Please follow up with primary care provider in 1 week for a repeat urine test as there was blood in your urine today.  May use over the counter AZO pain relief or Uristat (phenazopyridine) for urinary burning but do not use for 24 hours before future urine tests.  Drink plenty of fluids. Limit caffeine and alcohol as these are bladder irritants.  May take tylenol or ibuprofen as needed for discomfort.   If you develop any vomiting, high fevers or lower back pain, these can be signs of a kidney infection and you should be seen in urgent care or in the ER.  Prevention of future infections by drinking cranberry juice, urination after intercourse and wiping from front to back after using the toilet.  Please follow up with primary care provider if symptoms return, if you're not improving, worsening or new symptoms or for any adverse reactions to medications.
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
## 2022-04-20 ASSESSMENT — PATIENT HEALTH QUESTIONNAIRE - PHQ9: SUM OF ALL RESPONSES TO PHQ QUESTIONS 1-9: 12

## 2022-04-21 LAB — BACTERIA UR CULT: NO GROWTH

## 2022-04-21 NOTE — RESULT ENCOUNTER NOTE
Urine culture remains negative with no bacterial growth.   If she is feeling better, finish antibiotic.  If she is not feeling better, stop antibiotic and follow up with primary care provider.    Destinee Carey PA-C  [facility]

## 2022-05-06 ENCOUNTER — OFFICE VISIT (OUTPATIENT)
Dept: GASTROENTEROLOGY | Facility: CLINIC | Age: 67
End: 2022-05-06
Attending: FAMILY MEDICINE
Payer: COMMERCIAL

## 2022-05-06 VITALS
DIASTOLIC BLOOD PRESSURE: 80 MMHG | OXYGEN SATURATION: 94 % | SYSTOLIC BLOOD PRESSURE: 130 MMHG | TEMPERATURE: 97 F | HEART RATE: 93 BPM | WEIGHT: 132 LBS | BODY MASS INDEX: 24.14 KG/M2 | RESPIRATION RATE: 16 BRPM

## 2022-05-06 DIAGNOSIS — R10.32 LLQ ABDOMINAL PAIN: ICD-10-CM

## 2022-05-06 DIAGNOSIS — K59.00 CONSTIPATION, UNSPECIFIED CONSTIPATION TYPE: Primary | ICD-10-CM

## 2022-05-06 DIAGNOSIS — G89.29 OTHER CHRONIC PAIN: ICD-10-CM

## 2022-05-06 DIAGNOSIS — R19.8 PAINFUL DEFECATION: ICD-10-CM

## 2022-05-06 PROCEDURE — 99214 OFFICE O/P EST MOD 30 MIN: CPT | Performed by: INTERNAL MEDICINE

## 2022-05-06 RX ORDER — LUBIPROSTONE 24 UG/1
24 CAPSULE ORAL 2 TIMES DAILY WITH MEALS
Qty: 60 CAPSULE | Refills: 11 | Status: SHIPPED | OUTPATIENT
Start: 2022-05-06 | End: 2022-09-02

## 2022-05-06 ASSESSMENT — PAIN SCALES - GENERAL: PAINLEVEL: EXTREME PAIN (9)

## 2022-05-06 NOTE — NURSING NOTE
Chief Complaint   Patient presents with     Abdominal Cramping     Ongoing for years.      Abdominal Pain     LLQ Pain

## 2022-05-06 NOTE — PATIENT INSTRUCTIONS
As we discussed:    1.  Continue fluid fiber exercise for the bowels    2.  Start Amitiza 24 mcg with meals twice a day this is to help constipation bloating and discomfort.    3.  A squatty potty may help change the angle of the pelvis that we will make using the bathroom easier.  These should be around $30    4.  Other medicines to treat chronic pain may be future consideration.    Return to clinic as planned and as needed    Thyroid follow-up blood test is due mid June.
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
## 2022-05-17 DIAGNOSIS — E03.9 HYPOTHYROIDISM, UNSPECIFIED TYPE: ICD-10-CM

## 2022-05-17 NOTE — TELEPHONE ENCOUNTER
Reason for Call:  Medication or medication refill:    Do you use a [facility]?  Name of the pharmacy and phone number for the current request:  [facility]    Name of the medication requested: levothyroxine (SYNTHROID/LEVOTHROID) 50 MCG tablet    Other request: Patient is wanting the medication sent as a 90 day supply and sent to the [facility] mail order pharmacy    Can we leave a detailed message on this number? YES    Phone number patient can be reached at: Home number on file [phone] (home)    Best Time: any    Call taken on 5/17/2022 at 4:50 PM by Patti Epperson

## 2022-05-18 NOTE — TELEPHONE ENCOUNTER
Routing refill request to provider for review/approval because:  Labs out of range:  TSH    Joann Blas RN

## 2022-05-19 RX ORDER — LEVOTHYROXINE SODIUM 50 UG/1
50 TABLET ORAL DAILY
Qty: 30 TABLET | Refills: 0 | Status: SHIPPED | OUTPATIENT
Start: 2022-05-19 | End: 2022-09-02

## 2022-05-20 NOTE — TELEPHONE ENCOUNTER
Called Jackie to help create her lab appointment and left a message to return the call to schedule TSH level labs.    Miya Wise     [facility]

## 2022-05-26 ENCOUNTER — LAB (OUTPATIENT)
Dept: LAB | Facility: CLINIC | Age: 67
End: 2022-05-26
Payer: COMMERCIAL

## 2022-05-26 DIAGNOSIS — E03.9 HYPOTHYROIDISM, UNSPECIFIED TYPE: ICD-10-CM

## 2022-05-26 LAB — TSH SERPL DL<=0.005 MIU/L-ACNC: 1.42 MU/L (ref 0.4–4)

## 2022-05-26 PROCEDURE — 84443 ASSAY THYROID STIM HORMONE: CPT

## 2022-05-26 PROCEDURE — 36415 COLL VENOUS BLD VENIPUNCTURE: CPT

## 2022-06-06 ENCOUNTER — HOSPITAL ENCOUNTER (OUTPATIENT)
Dept: CT IMAGING | Facility: CLINIC | Age: 67
Discharge: HOME | End: 2022-06-06
Attending: FAMILY MEDICINE | Admitting: FAMILY MEDICINE
Payer: COMMERCIAL

## 2022-06-06 DIAGNOSIS — R91.8 PULMONARY NODULES: ICD-10-CM

## 2022-06-06 PROCEDURE — 71250 CT THORAX DX C-: CPT

## 2022-06-08 PROBLEM — R91.8 ABNORMAL CT LUNG SCREENING: Status: RESOLVED | Noted: 2019-10-02 | Resolved: 2022-06-08

## 2022-07-05 ENCOUNTER — TELEPHONE (OUTPATIENT)
Dept: FAMILY MEDICINE | Facility: CLINIC | Age: 67
End: 2022-07-05

## 2022-07-05 NOTE — TELEPHONE ENCOUNTER
Patient is wondering if she can get fitted into the schedule for a preop appointment. She had one scheduled on 7/01 and thought that appointment was this week. Her procedure is on 7/12 and no clinics has any openings before then. Please call patient back at [phone].  Maryam Austin   [facility]  Central Scheduler

## 2022-07-07 ENCOUNTER — OFFICE VISIT (OUTPATIENT)
Dept: FAMILY MEDICINE | Facility: CLINIC | Age: 67
End: 2022-07-07
Payer: COMMERCIAL

## 2022-07-07 VITALS
OXYGEN SATURATION: 97 % | SYSTOLIC BLOOD PRESSURE: 110 MMHG | WEIGHT: 128 LBS | TEMPERATURE: 97.5 F | DIASTOLIC BLOOD PRESSURE: 68 MMHG | HEART RATE: 79 BPM | BODY MASS INDEX: 23.41 KG/M2 | RESPIRATION RATE: 16 BRPM

## 2022-07-07 DIAGNOSIS — Z01.818 PREOP GENERAL PHYSICAL EXAM: Primary | ICD-10-CM

## 2022-07-07 DIAGNOSIS — E78.5 HYPERLIPIDEMIA LDL GOAL <160: ICD-10-CM

## 2022-07-07 DIAGNOSIS — H02.30 EXCESS SKIN OF EYELID, UNSPECIFIED LATERALITY: ICD-10-CM

## 2022-07-07 DIAGNOSIS — J44.9 CHRONIC OBSTRUCTIVE PULMONARY DISEASE, UNSPECIFIED COPD TYPE (H): ICD-10-CM

## 2022-07-07 DIAGNOSIS — E03.9 HYPOTHYROIDISM, UNSPECIFIED TYPE: ICD-10-CM

## 2022-07-07 PROCEDURE — 99214 OFFICE O/P EST MOD 30 MIN: CPT | Performed by: NURSE PRACTITIONER

## 2022-07-07 ASSESSMENT — PATIENT HEALTH QUESTIONNAIRE - PHQ9
SUM OF ALL RESPONSES TO PHQ QUESTIONS 1-9: 2
SUM OF ALL RESPONSES TO PHQ QUESTIONS 1-9: 2

## 2022-07-07 ASSESSMENT — PAIN SCALES - GENERAL: PAINLEVEL: NO PAIN (0)

## 2022-07-07 NOTE — PROGRESS NOTES
[address] [address]  Phone: [phone]  Fax: [phone]  Primary Provider: Jovana Quinonez  Pre-op Performing Provider: SANDIE PINTO      PREOPERATIVE EVALUATION:  Today's date: 7/7/2022    Jackie Siddiqi is a 67 year old female who presents for a preoperative evaluation.    Surgical Information:  Surgery/Procedure: Eyelid surgery  Surgery Location: [facility]   Surgeon: Dr. No   Surgery Date: 7/12/22  Time of Surgery: TBD  Where patient plans to recover: At home with family  Fax number for surgical facility: [phone]    Type of Anesthesia Anticipated: General    Assessment & Plan     The proposed surgical procedure is considered LOW risk.    Preop general physical exam  pre-op physical to evaluate for anesthesia and its greg-operative risks.    Excess skin of eyelid, unspecified laterality  Planning blepheroplasty    Chronic obstructive pulmonary disease, unspecified COPD type (H)  Well controlled.  Quit smoking in 2019.    - REVIEW OF HEALTH MAINTENANCE PROTOCOL ORDERS    Hypothyroidism, unspecified type  Stable    Hyperlipidemia LDL goal <160  stable           Risks and Recommendations:  The patient has the following additional risks and recommendations for perioperative complications:   - No identified additional risk factors other than previously addressed    Medication Instructions:  Patient is to take all scheduled medications on the day of surgery    RECOMMENDATION:  APPROVAL GIVEN to proceed with proposed procedure, without further diagnostic evaluation.        30 minutes spent on the date of the encounter doing chart review, review of test results, interpretation of tests, patient visit and documentation         Subjective     HPI related to upcoming procedure: sagging eyelids    Preop Questions 7/7/2022   1. Have you ever had a heart attack or stroke? No   2. Have you ever had surgery on your heart or blood vessels, such as a  stent placement, a coronary artery bypass, or surgery on an artery in your head, neck, heart, or legs? No   3. Do you have chest pain with activity? No   4. Do you have a history of  heart failure? No   5. Do you currently have a cold, bronchitis or symptoms of other infection? No   6. Do you have a cough, shortness of breath, or wheezing? No   7. Do you or anyone in your family have previous history of blood clots? No   8. Do you or does anyone in your family have a serious bleeding problem such as prolonged bleeding following surgeries or cuts? No   9. Have you ever had problems with anemia or been told to take iron pills? No   10. Have you had any abnormal blood loss such as black, tarry or bloody stools, or abnormal vaginal bleeding? No   11. Have you ever had a blood transfusion? No   12. Are you willing to have a blood transfusion if it is medically needed before, during, or after your surgery? Yes   13. Have you or any of your relatives ever had problems with anesthesia? No   14. Do you have sleep apnea, excessive snoring or daytime drowsiness? No   15. Do you have any artifical heart valves or other implanted medical devices like a pacemaker, defibrillator, or continuous glucose monitor? No   16. Do you have artificial joints? No   17. Are you allergic to latex? No       Health Care Directive:  Patient does not have a Health Care Directive or Living Will: Discussed advance care planning with patient; however, patient declined at this time.    Preoperative Review of :   reviewed - no record of controlled substances prescribed.      Status of Chronic Conditions:  COPD - Patient has a longstanding history of moderate-severe COPD . Patient has been doing well overall noting NO SYMPTOMS and continues on medication regimen consisting of albuterol prn without adverse reactions or side effects.      Review of Systems  CONSTITUTIONAL: NEGATIVE for fever, chills, change in weight  INTEGUMENTARY/SKIN: NEGATIVE  for worrisome rashes, moles or lesions  EYES: NEGATIVE for vision changes or irritation  ENT/MOUTH: NEGATIVE for ear, mouth and throat problems  RESP: NEGATIVE for significant cough or SOB  CV: NEGATIVE for chest pain, palpitations or peripheral edema  GI: NEGATIVE for nausea, abdominal pain, heartburn, or change in bowel habits  : NEGATIVE for frequency, dysuria, or hematuria  MUSCULOSKELETAL: NEGATIVE for significant arthralgias or myalgia  NEURO: NEGATIVE for weakness, dizziness or paresthesias  ENDOCRINE: NEGATIVE for temperature intolerance, skin/hair changes  HEME: NEGATIVE for bleeding problems  PSYCHIATRIC: NEGATIVE for changes in mood or affect    Patient Active Problem List    Diagnosis Date Noted     Capsular contracture of breast implant, sequela 03/30/2021     Priority: Medium     Added automatically from request for surgery [id number]       Hx of bilateral mastectomy 03/09/2021     Priority: Medium     LLQ abdominal pain 03/09/2021     Priority: Medium     Combined forms of age-related cataract of right eye 02/09/2021     Priority: Medium     Added automatically from request for surgery [id number]       Combined form of age-related cataract, left eye 02/09/2021     Priority: Medium     Added automatically from request for surgery [id number]       Diverticulosis of colon 04/06/2020     Priority: Medium     Chronic obstructive pulmonary disease, unspecified COPD type (H) 09/16/2019     Priority: Medium     Hypothyroidism, unspecified type 11/29/2016     Priority: Medium     Hyperlipidemia LDL goal <160 11/29/2016     Priority: Medium     Osteoarthritis of carpometacarpal joint of right thumb, unspecified osteoarthritis type 11/29/2016     Priority: Medium     IMO Regulatory Load OCT 2020       Advanced directives, counseling/discussion 01/11/2013     Priority: Medium     Discussed advance care planning with patient; information given to patient to review. 1/11/2013          Lichen sclerosus et atrophicus of the  vulva 01/09/2012     Priority: Medium     Esophageal reflux 05/16/2005     Priority: Medium      Past Medical History:   Diagnosis Date     Abnormal Papanicolaou smear of cervix and cervical HPV     cryocautery     Breast cystic disease      Hiatal hernia      Hypothyroidism      Motion sickness      Nonsenile cataract      Peptic ulcer, unspecified site, unspecified as acute or chronic, without mention of hemorrhage, perforation, or obstruction     Peptic ulcer disease     Personal history of colonic polyps      Past Surgical History:   Procedure Laterality Date     CATARACT IOL, RT/LT       COLONOSCOPY  07/16/2007     COLONOSCOPY  1/18/2013    Procedure: COLONOSCOPY;  colonoscopy, Esophagoscopy, Gastroscopy, Duodenoscopy EGD, multiple biopsies;  Surgeon: Joe Benson MD;  Location: [facility]     COLONOSCOPY N/A 12/6/2017    Procedure: COMBINED COLONOSCOPY, SINGLE OR MULTIPLE BIOPSY/POLYPECTOMY BY BIOPSY;  colonoscopy with polypectomy by biopsy;  Surgeon: Tolu No MD;  Location: [facility]     COLONOSCOPY N/A 6/24/2020    Procedure: Colonoscopy with possible biopsy and/or polypectomy;  Surgeon: Obed Quick MD;  Location: [facility]     ENDOSCOPY  07/16/2007    Sm hiatus hernia     ESOPHAGOSCOPY, GASTROSCOPY, DUODENOSCOPY (EGD), COMBINED  1/18/2013    Procedure: COMBINED ESOPHAGOSCOPY, GASTROSCOPY, DUODENOSCOPY (EGD), BIOPSY SINGLE OR MULTIPLE;  ESOPHAGOGASTRODUODENOSCOPY WITH MULTIPLE BIOPSIES;  Surgeon: Joe Benson MD;  Location:  GI     MASTECTOMY, BILATERAL       PHACOEMULSIFICATION CLEAR CORNEA WITH STANDARD INTRAOCULAR LENS IMPLANT Left 4/26/2021    Procedure: LEFT PHACOEMULSIFICATION, CATARACT, WITH INTRAOCULAR LENS IMPLANT;  Surgeon: Foreign Snider MD;  Location: [facility] OR     PHACOEMULSIFICATION CLEAR CORNEA WITH STANDARD INTRAOCULAR LENS IMPLANT Right 4/12/2021    Procedure: RIGHT PHACOEMULSIFICATION, CATARACT, WITH INTRAOCULAR LENS IMPLANT;  Surgeon: Foreign Snider MD;  Location: [facility]  OR     REMOVE AND REPLACE BREAST IMPLANT PROSTHESIS Bilateral 2021    Procedure: Bilateral breast revision and implant exchange;  Surgeon: JENNIFFER Miner MD;  Location: [facility] OR     [facility]  DELIVERY ONLY       x2     [facility] NONSPECIFIC PROCEDURE      Laparoscopic exam with adhesions     [facility] NONSPECIFIC PROCEDURE      Mastectomy for cystic breast disease, breast implants     Current Outpatient Medications   Medication Sig Dispense Refill     Cholecalciferol (VITAMIN D3) 25 MCG (1000 UT) CAPS        clobetasol (TEMOVATE) 0.05 % external ointment Apply a small pea size amount at bedtime couple times a week as needed 60 g 1     estradiol (ESTRACE) 0.1 MG/GM vaginal cream Place 2 g vaginally twice a week Please stop the premarin cream 42.5 g 1     famotidine (PEPCID) 20 MG tablet Take 1 tablet (20 mg) by mouth 2 times daily For acid reflux 60 tablet 3     Lactobacillus (PROBIOTIC ACIDOPHILUS PO)        levothyroxine (SYNTHROID/LEVOTHROID) 50 MCG tablet Take 1 tablet (50 mcg) by mouth daily 30 tablet 0     lubiprostone (AMITIZA) 24 MCG capsule Take 1 capsule (24 mcg) by mouth 2 times daily (with meals) 60 capsule 11     VITAMIN K PO Take by mouth daily         Allergies   Allergen Reactions     No Known Drug Allergies         Social History     Tobacco Use     Smoking status: Former Smoker     Packs/day: 0.50     Years: 50.00     Pack years: 25.00     Types: Cigarettes     Quit date: 2019     Years since quittin.8     Smokeless tobacco: Never Used     Tobacco comment: 1 pack per week, started age 11   Substance Use Topics     Alcohol use: No     Alcohol/week: 0.0 standard drinks     Comment: holidays only     Family History   Problem Relation Age of Onset     Diabetes Father      Hypertension Father      Alcohol/Drug Father      Eye Disorder Father      Obesity Father      Breast Cancer Mother      Osteoporosis Mother      Thyroid Disease Mother      Cancer Mother         Bladder     Cancer  Sister         fallopian tube cancer     Obesity Sister      Alzheimer Disease Sister      Cancer Sister         Skin Cancer     Allergies Daughter      Cancer Maternal Grandmother         uterine cancer     Liver Disease Daughter         Biliary Atresia     Genitourinary Problems Brother      No Known Problems Daughter      Heart Disease Brother         Heart Murmur     Glaucoma Maternal Grandfather      Genitourinary Problems Brother         kidney disease (two brothers)     Macular Degeneration No family hx of      History   Drug Use No         Objective     LMP 03/26/2003 (Approximate)     Physical Exam    GENERAL APPEARANCE: healthy, alert and no distress     EYES: EOMI, PERRL     HENT: ear canals and TM's normal and nose and mouth without ulcers or lesions     NECK: no adenopathy, no asymmetry, masses, or scars and thyroid normal to palpation     RESP: lungs clear to auscultation - no rales, rhonchi or wheezes     CV: regular rates and rhythm, normal S1 S2, no S3 or S4 and no murmur, click or rub     ABDOMEN:  soft, nontender, no HSM or masses and bowel sounds normal     MS: extremities normal- no gross deformities noted, no evidence of inflammation in joints, FROM in all extremities.     SKIN: no suspicious lesions or rashes     NEURO: Normal strength and tone, sensory exam grossly normal, mentation intact and speech normal     PSYCH: mentation appears normal. and affect normal/bright     LYMPHATICS: No cervical adenopathy    Recent Labs   Lab Test 03/15/22  1025 06/07/21  1451 03/09/21  0948   HGB 13.2 12.7  --     354  --    NA  --  142 141   POTASSIUM  --  3.6 3.9   CR  --  0.67 0.74        Diagnostics:  No labs were ordered during this visit.   No EKG required for low risk surgery (cataract, skin procedure, breast biopsy, etc).    Revised Cardiac Risk Index (RCRI):  The patient has the following serious cardiovascular risks for perioperative complications:   - No serious cardiac risks = 0 points      RCRI Interpretation: 0 points: Class I (very low risk - 0.4% complication rate)           Signed Electronically by: Yin Erwin NP  Copy of this evaluation report is provided to requesting physician.

## 2022-07-07 NOTE — PATIENT INSTRUCTIONS
Preparing for Your Surgery  Getting started  A nurse will call you to review your health history and instructions. They will give you an arrival time based on your scheduled surgery time. Please be ready to share:    Your doctor's clinic name and phone number    Your medical, surgical and anesthesia history    A list of allergies and sensitivities    A list of medicines, including herbal treatments and over-the-counter drugs    Whether the patient has a legal guardian (ask how to send us the papers in advance)  Please tell us if you're pregnant--or if there's any chance you might be pregnant. Some surgeries may injure a fetus (unborn baby), so they require a pregnancy test. Surgeries that are safe for a fetus don't always need a test, and you can choose whether to have one.   If you have a child who's having surgery, please ask for a copy of Preparing for Your Child's Surgery.    Preparing for surgery    Within 30 days of surgery: Have a pre-op exam (sometimes called an H&P, or History and Physical). This can be done at a clinic or pre-operative center.  ? If you're having a , you may not need this exam. Talk to your care team.    At your pre-op exam, talk to your care team about all medicines you take. If you need to stop any medicines before surgery, ask when to start taking them again.  ? We do this for your safety. Many medicines can make you bleed too much during surgery. Some change how well surgery (anesthesia) drugs work.    Call your insurance company to let them know you're having surgery. (If you don't have insurance, call [phone].)    Call your clinic if there's any change in your health. This includes signs of a cold or flu (sore throat, runny nose, cough, rash, fever). It also includes a scrape or scratch near the surgery site.    If you have questions on the day of surgery, call your hospital or surgery center.  COVID testing  You may need to be tested for COVID-19 before having  surgery. If so, we will give you instructions.  Eating and drinking guidelines  For your safety: Unless your surgeon tells you otherwise, follow the guidelines below.    Eat and drink as usual until 8 hours before surgery. After that, no food or milk.    Drink clear liquids until 2 hours before surgery. These are liquids you can see through, like water, Gatorade and Propel Water. You may also have black coffee and tea (no cream or milk).    Nothing by mouth within 2 hours of surgery. This includes gum, candy and breath mints.    If you drink alcohol: Stop drinking it the night before surgery.    If your care team tells you to take medicine on the morning of surgery, it's okay to take it with a sip of water.  Preventing infection    Shower or bathe the night before and morning of your surgery. Follow the instructions your clinic gave you. (If no instructions, use regular soap.)    Don't shave or clip hair near your surgery site. We'll remove the hair if needed.    Don't smoke or vape the morning of surgery. You may chew nicotine gum up to 2 hours before surgery. A nicotine patch is okay.  ? Note: Some surgeries require you to completely quit smoking and nicotine. Check with your surgeon.    Your care team will make every effort to keep you safe from infection. We will:  ? Clean our hands often with soap and water (or an alcohol-based hand rub).  ? Clean the skin at your surgery site with a special soap that kills germs.  ? Give you a special gown to keep you warm. (Cold raises the risk of infection.)  ? Wear special hair covers, masks, gowns and gloves during surgery.  ? Give antibiotic medicine, if prescribed. Not all surgeries need antibiotics.  What to bring on the day of surgery    Photo ID and insurance card    Copy of your health care directive, if you have one    Glasses and hearing aides (bring cases)  ? You can't wear contacts during surgery    Inhaler and eye drops, if you use them (tell us about these when  you arrive)    CPAP machine or breathing device, if you use them    A few personal items, if spending the night    If you have . . .  ? A pacemaker, ICD (cardiac defibrillator) or other implant: Bring the ID card.  ? An implanted stimulator: Bring the remote control.  ? A legal guardian: Bring a copy of the certified (court-stamped) guardianship papers.  Please remove any jewelry, including body piercings. Leave jewelry and other valuables at home.  If you're going home the day of surgery    You must have a responsible adult drive you home. They should stay with you overnight as well.    If you don't have someone to stay with you, and you aren't safe to go home alone, we may keep you overnight. Insurance often won't pay for this.  After surgery  If it's hard to control your pain or you need more pain medicine, please call your surgeon's office.  Questions?   If you have any questions for your care team, list them here: _________________________________________________________________________________________________________________________________________________________________________ ____________________________________ ____________________________________ ____________________________________  For informational purposes only. Not to replace the advice of your health care provider. Copyright   2003, 2019 [facility]. All rights reserved. Clinically reviewed by Shantelle Casillas MD. [place] [id number] - REV 07/21.

## 2022-07-08 ENCOUNTER — TELEPHONE (OUTPATIENT)
Dept: FAMILY MEDICINE | Facility: CLINIC | Age: 67
End: 2022-07-08

## 2022-07-08 NOTE — TELEPHONE ENCOUNTER
Waiting for Jackie to call back and get fax number to fax pre op.  Pre op to fax is in my pending folder on my desk.

## 2022-07-09 ENCOUNTER — LAB (OUTPATIENT)
Dept: URGENT CARE | Facility: URGENT CARE | Age: 67
End: 2022-07-09
Payer: COMMERCIAL

## 2022-07-09 DIAGNOSIS — Z20.822 SUSPECTED COVID-19 VIRUS INFECTION: ICD-10-CM

## 2022-07-09 LAB — SARS-COV-2 RNA RESP QL NAA+PROBE: NEGATIVE

## 2022-07-09 PROCEDURE — U0003 INFECTIOUS AGENT DETECTION BY NUCLEIC ACID (DNA OR RNA); SEVERE ACUTE RESPIRATORY SYNDROME CORONAVIRUS 2 (SARS-COV-2) (CORONAVIRUS DISEASE [COVID-19]), AMPLIFIED PROBE TECHNIQUE, MAKING USE OF HIGH THROUGHPUT TECHNOLOGIES AS DESCRIBED BY CMS-2020-01-R: HCPCS

## 2022-07-09 PROCEDURE — U0005 INFEC AGEN DETEC AMPLI PROBE: HCPCS

## 2022-08-17 ENCOUNTER — MYC MEDICAL ADVICE (OUTPATIENT)
Dept: GASTROENTEROLOGY | Facility: CLINIC | Age: 67
End: 2022-08-17

## 2022-09-02 ENCOUNTER — OFFICE VISIT (OUTPATIENT)
Dept: FAMILY MEDICINE | Facility: OTHER | Age: 67
End: 2022-09-02
Payer: COMMERCIAL

## 2022-09-02 ENCOUNTER — TELEPHONE (OUTPATIENT)
Dept: FAMILY MEDICINE | Facility: OTHER | Age: 67
End: 2022-09-02

## 2022-09-02 VITALS
RESPIRATION RATE: 20 BRPM | TEMPERATURE: 97.2 F | HEIGHT: 62 IN | WEIGHT: 129 LBS | SYSTOLIC BLOOD PRESSURE: 124 MMHG | HEART RATE: 68 BPM | BODY MASS INDEX: 23.74 KG/M2 | DIASTOLIC BLOOD PRESSURE: 70 MMHG | OXYGEN SATURATION: 97 %

## 2022-09-02 DIAGNOSIS — E03.9 HYPOTHYROIDISM, UNSPECIFIED TYPE: ICD-10-CM

## 2022-09-02 DIAGNOSIS — M54.50 ACUTE RIGHT-SIDED LOW BACK PAIN WITHOUT SCIATICA: Primary | ICD-10-CM

## 2022-09-02 DIAGNOSIS — J44.9 CHRONIC OBSTRUCTIVE PULMONARY DISEASE, UNSPECIFIED COPD TYPE (H): ICD-10-CM

## 2022-09-02 DIAGNOSIS — U07.1 CLINICAL DIAGNOSIS OF COVID-19: ICD-10-CM

## 2022-09-02 DIAGNOSIS — Z00.00 ENCOUNTER FOR MEDICARE ANNUAL WELLNESS EXAM: ICD-10-CM

## 2022-09-02 DIAGNOSIS — N95.2 ATROPHIC VAGINITIS: ICD-10-CM

## 2022-09-02 PROCEDURE — 99214 OFFICE O/P EST MOD 30 MIN: CPT | Mod: 25 | Performed by: NURSE PRACTITIONER

## 2022-09-02 PROCEDURE — 99397 PER PM REEVAL EST PAT 65+ YR: CPT | Performed by: NURSE PRACTITIONER

## 2022-09-02 RX ORDER — CLOBETASOL PROPIONATE 0.5 MG/G
OINTMENT TOPICAL
Qty: 60 G | Refills: 0 | Status: SHIPPED | OUTPATIENT
Start: 2022-09-02

## 2022-09-02 RX ORDER — ESTRADIOL 0.1 MG/G
2 CREAM VAGINAL
Qty: 42.5 G | Refills: 0
Start: 2022-09-05 | End: 2024-04-19

## 2022-09-02 RX ORDER — ESTRADIOL 0.1 MG/G
2 CREAM VAGINAL
Qty: 42.5 G | Refills: 0 | Status: SHIPPED | OUTPATIENT
Start: 2022-09-05 | End: 2022-09-02

## 2022-09-02 RX ORDER — METHYLPREDNISOLONE 4 MG
TABLET, DOSE PACK ORAL
Qty: 21 TABLET | Refills: 0 | Status: SHIPPED | OUTPATIENT
Start: 2022-09-02 | End: 2023-01-26

## 2022-09-02 RX ORDER — LEVOTHYROXINE SODIUM 50 UG/1
50 TABLET ORAL DAILY
Qty: 90 TABLET | Refills: 3 | Status: SHIPPED | OUTPATIENT
Start: 2022-09-02 | End: 2023-09-06

## 2022-09-02 ASSESSMENT — ENCOUNTER SYMPTOMS: BACK PAIN: 1

## 2022-09-02 ASSESSMENT — PAIN SCALES - GENERAL: PAINLEVEL: MODERATE PAIN (5)

## 2022-09-02 NOTE — PATIENT INSTRUCTIONS
- Start steroid taper and complete entire package  - Recommend xray today and taking it easy  - Ibuprofen and tylenol alternating.   -Gentle stretching exercises after a hot shower is besst.   -Ice or heat whichever is most comfortable.   -  If not Improving, in 10-14 days please return to clinic.   - If you develop any numbness or tingling, radiation of pain or worsening symptoms return to clinic.   - If any bowel or bladder concerns please go in to be evaluated.       TEDDY Dhillon CNP  Questions or concerns please feel free to send me a [employer] message or call me at [phone]    Patient Education   Personalized Prevention Plan  You are due for the preventive services outlined below.  Your care team is available to assist you in scheduling these services.  If you have already completed any of these items, please share that information with your care team to update in your medical record.  Health Maintenance Due   Topic Date Due    Breathing Capacity Test  Never done    COVID-19 Vaccine (1) Never done    Zoster (Shingles) Vaccine (1 of 2) Never done    Pneumococcal Vaccine (2 - PCV) 05/29/2019    Flu Vaccine (1) Never done

## 2022-09-02 NOTE — PROGRESS NOTES
Assessment & Plan     Acute right-sided low back pain without sciatica  [place]  Recommend Xray  Suspect this is a strain/contusion to the area as she is having some improvement. Recommend steroid treatment along with OTC, NSAIDS/Tylenol, ice/heat and monitoring for worsening symptoms  - Advised could trial PT and message me about this  -Discussed re-evaluation in clinic if her symptoms change and discuss warning symptoms and when to go in for evaluation in ER  - XR Lumbar Spine 2/3 Views; Future  - methylPREDNISolone (MEDROL DOSEPAK) 4 MG tablet therapy pack; Follow Package Directions    Atrophic vaginitis  Chronic use, stable Refill given today   Follow up with PCP for further refills   - clobetasol (TEMOVATE) 0.05 % external ointment; Apply a small pea size amount at bedtime couple times a week as needed  - estradiol (ESTRACE) 0.1 MG/GM vaginal cream; Place 2 g vaginally twice a week Please stop the premarin cream    Chronic obstructive pulmonary disease, unspecified COPD type (H)  Per patient stable     Encounter for Medicare annual wellness exam  Updated HM   Encouraged vaccines     Hypothyroidism, unspecified type  Recheck in May was stable, but no medication was refilled  Recommend recheck with PCP in 1 year.   - levothyroxine (SYNTHROID/LEVOTHROID) 50 MCG tablet; Take 1 tablet (50 mcg) by mouth daily    Clinical diagnosis of COVID-19  Had Covid-19 08/10/2022 and was out of work from 08/10/2022-08/14/2022 and returned wearing mask 08/15/2022.   Needs paperwork for time missed.   I was able to review her labs in [facility] from [facility], they declined filling her paperwork said it had to be done by FP so I advised her to send this to me to fill out.         Patient Instructions     - Start steroid taper and complete entire package  - Recommend xray today and taking it easy  - Ibuprofen and tylenol alternating.   -Gentle stretching exercises after a hot shower is besst.   -Ice or  heat whichever is most comfortable.   -  If not Improving, in 10-14 days please return to clinic.   - If you develop any numbness or tingling, radiation of pain or worsening symptoms return to clinic.   - If any bowel or bladder concerns please go in to be evaluated.       TEDDY Dhillon CNP  Questions or concerns please feel free to send me a [employer] message or call me at [phone]    Patient Education   Personalized Prevention Plan  You are due for the preventive services outlined below.  Your care team is available to assist you in scheduling these services.  If you have already completed any of these items, please share that information with your care team to update in your medical record.  Health Maintenance Due   Topic Date Due     Breathing Capacity Test  Never done     COVID-19 Vaccine (1) Never done     Zoster (Shingles) Vaccine (1 of 2) Never done     Pneumococcal Vaccine (2 - PCV) 05/29/2019     Flu Vaccine (1) Never done            Return in about 4 weeks (around 9/30/2022) for Annual Wellness Visit, recheck symptoms.    TEDDY Dhillon CNP  [facility]    Sajan Mejia is a 67 year old, presenting for the following health issues:  Back Pain      Back Pain     History of Present Illness       Back Pain:  She presents for follow up of back pain. Patient's back pain is a new problem.    Original cause of back pain: other  First noticed back pain: in the last week  Patient feels back pain: dailyLocation of back pain:  Other  Description of back pain: cramping and sharp  Back pain spreads: right buttocks    Since patient first noticed back pain, pain is: gradually improving  Does back pain interfere with her job:  Yes  On a scale of 1-10 (10 being the worst), patient describes pain as:  5  What makes back pain worse: other  Acupuncture: not tried  Acetaminophen: not tried  Activity or exercise: not tried  Chiropractor:  Not tried  Cold: not tried  Heat: not  tried  Massage: not tried  Muscle relaxants: not tried  NSAIDS: not tried  Opioids: not tried  Physical Therapy: not tried  Rest: helpful  Steroid Injection: not tried  Stretching: not tried  Surgery: not tried  TENS unit: not tried  Topical pain relievers: not tried  Other healthcare providers patient is seeing for back pain: None    She eats 2-3 servings of fruits and vegetables daily.She consumes 1 sweetened beverage(s) daily.She exercises with enough effort to increase her heart rate 9 or less minutes per day.  She exercises with enough effort to increase her heart rate 3 or less days per week. She is missing 1 dose(s) of medications per week.  She is not taking prescribed medications regularly due to remembering to take.     [place]  Fell off tube and got foot stuck on a branch  Trying to stretch up through the tube and everything happened fast.   Developed pain in the low back, bad for a couple days. A little better still hurts.   Across her lower back, gotten better in middle and more in the right. Still can feel in middle not as intense.   Sit down in a chair and hard time getting back up.   No numbness or tingling and no pain down leg.   No bowel or bladder incontinence.   She did have an injury in her lower back 25 years ago. No surgeries. No injections or history of them.   5 if she is trying to stand and sitting. Mainly with movement.     Ibuprofen can help       Annual Wellness Visit    Patient has been advised of split billing requirements and indicates understanding: Yes     Are you in the first 12 months of your Medicare Part B coverage?  No    Physical Health:    In general, how would you rate your overall physical health? fair    Outside of work, how many days during the week do you exercise?none    Outside of work, approximately how many minutes a day do you exercise?not applicable    If you drink alcohol do you typically have >3 drinks per day or >7 drinks per week? No    Do you  "usually eat at least 4 servings of fruit and vegetables a day, include whole grains & fiber and avoid regularly eating high fat or \"junk\" foods? NO    Do you have any problems taking medications regularly? No    Do you have any side effects from medications? none    Needs assistance for the following daily activities: no assistance needed    Which of the following safety concerns are present in your home?  lack of grab bars in the bathroom     Hearing impairment: No    In the past 6 months, have you been bothered by leaking of urine? no    Mental Health:    In general, how would you rate your overall mental or emotional health? excellent  PHQ-2 Score: 0    Do you feel safe in your environment? Yes    Have you ever done Advance Care Planning? (For example, a Health Directive, POLST, or a discussion with a medical provider or your loved ones about your wishes)? No, advance care planning information given to patient to review.  Patient declined advance care planning discussion at this time.    Fall risk:  Fallen 2 or more times in the past year?: No  Any fall with injury in the past year?: No    Cognitive Screenin) Repeat 3 items (Leader, Season, Table)    2) Clock draw: NORMAL  3) 3 item recall: Recalls 3 objects  Results: 3 items recalled: COGNITIVE IMPAIRMENT LESS LIKELY    Mini-CogTM Copyright S Alida. Licensed by the author for use in [facility]; reprinted with permission (solalit@.[facility]). All rights reserved.          Current providers sharing in care for this patient include:   Patient Care Team:  Jovana Quinonez MD as PCP - General (Family Practice)  JENNIFFER Miner MD as MD (Plastic Surgery)  Foreign Snider MD as Assigned Surgical Provider  Jovana Quinonez MD as Assigned PCP  Alex Frances MD as MD (Gastroenterology)    Patient has been advised of split billing requirements and indicates understanding: Yes      Review of Systems   Musculoskeletal: Positive for back pain.          " "  Objective    /70 (BP Location: Right arm, Patient Position: Sitting, Cuff Size: Adult Regular)   Pulse 68   Temp 97.2  F (36.2  C) (Temporal)   Resp 20   Ht 1.575 m (5' 2\")   Wt 58.5 kg (129 lb)   LMP 03/26/2003 (Approximate)   SpO2 97%   Breastfeeding No   BMI 23.59 kg/m    Body mass index is 23.59 kg/m .  Physical Exam  Musculoskeletal:      Lumbar back: Signs of trauma, tenderness and bony tenderness present. No swelling, edema, deformity, lacerations or spasms. Decreased range of motion. Negative right straight leg raise test and negative left straight leg raise test. No scoliosis.        Back:       Comments: Pain with ROM of hip twist to the left side pain felt along right to mid lower back. Pain with toe reach (hip flexion)  Tenderness along the midline lumbar spine into right lower flank region.         GENERAL: healthy, alert and no distress  RESP: lungs clear to auscultation - no rales, rhonchi or wheezes  SKIN: no suspicious lesions or rashes  NEURO: Normal strength and tone, mentation intact and speech normal  PSYCH: mentation appears normal, affect normal/bright    Diagnostic: Lumbar spine ordered             "

## 2022-09-02 NOTE — TELEPHONE ENCOUNTER
Forms/Letter Request    Type of form/letter: Work    Have you been seen for this request: Yes 09/02    Do we have the form/letter: Yes: Team C    When is form/letter needed by: 9/6    How would you like the form/letter returned: Fax    Patient Notified form requests are processed in 3-5 business days:Yes    Fax [phone]

## 2022-09-03 ENCOUNTER — HOSPITAL ENCOUNTER (OUTPATIENT)
Dept: GENERAL RADIOLOGY | Facility: CLINIC | Age: 67
Discharge: HOME | End: 2022-09-03
Attending: NURSE PRACTITIONER | Admitting: NURSE PRACTITIONER
Payer: COMMERCIAL

## 2022-09-03 DIAGNOSIS — M54.50 ACUTE RIGHT-SIDED LOW BACK PAIN WITHOUT SCIATICA: ICD-10-CM

## 2022-09-03 PROCEDURE — 72100 X-RAY EXAM L-S SPINE 2/3 VWS: CPT

## 2022-10-09 ENCOUNTER — HEALTH MAINTENANCE LETTER (OUTPATIENT)
Age: 67
End: 2022-10-09

## 2023-01-11 DIAGNOSIS — E03.9 HYPOTHYROIDISM, UNSPECIFIED TYPE: ICD-10-CM

## 2023-01-11 RX ORDER — LEVOTHYROXINE SODIUM 50 UG/1
TABLET ORAL
Qty: 90 TABLET | Refills: 3 | OUTPATIENT
Start: 2023-01-11

## 2023-01-11 NOTE — TELEPHONE ENCOUNTER
Patient calling in regarding refills. Patient states she should always have a refill on file as she is supposed to be on this indefinitely, refill to [facility] should always be in file. We reviewed her chart. Patient seen in [place] on 9/2/22. All prescription's that day were sent to [facility] in [place]. Advised patient to call [facility] pharmacy and let them know where prescription's are at, which ones she wants switched over, then they will do pharmacy to pharmacy transfer. Patient verbalized understanding and will do this.

## 2023-01-26 ENCOUNTER — TELEPHONE (OUTPATIENT)
Dept: FAMILY MEDICINE | Facility: OTHER | Age: 68
End: 2023-01-26

## 2023-01-26 ENCOUNTER — ANCILLARY PROCEDURE (OUTPATIENT)
Dept: GENERAL RADIOLOGY | Facility: OTHER | Age: 68
End: 2023-01-26
Attending: FAMILY MEDICINE
Payer: COMMERCIAL

## 2023-01-26 ENCOUNTER — OFFICE VISIT (OUTPATIENT)
Dept: FAMILY MEDICINE | Facility: OTHER | Age: 68
End: 2023-01-26
Payer: COMMERCIAL

## 2023-01-26 VITALS
RESPIRATION RATE: 22 BRPM | TEMPERATURE: 96.7 F | HEIGHT: 62 IN | WEIGHT: 126 LBS | HEART RATE: 75 BPM | BODY MASS INDEX: 23.19 KG/M2 | DIASTOLIC BLOOD PRESSURE: 80 MMHG | OXYGEN SATURATION: 97 % | SYSTOLIC BLOOD PRESSURE: 130 MMHG

## 2023-01-26 DIAGNOSIS — E03.9 HYPOTHYROIDISM, UNSPECIFIED TYPE: ICD-10-CM

## 2023-01-26 DIAGNOSIS — Z12.11 SCREEN FOR COLON CANCER: ICD-10-CM

## 2023-01-26 DIAGNOSIS — N76.0 BACTERIAL VAGINOSIS: ICD-10-CM

## 2023-01-26 DIAGNOSIS — J44.9 CHRONIC OBSTRUCTIVE PULMONARY DISEASE, UNSPECIFIED COPD TYPE (H): ICD-10-CM

## 2023-01-26 DIAGNOSIS — R68.83 CHILLS: Primary | ICD-10-CM

## 2023-01-26 DIAGNOSIS — B96.89 BACTERIAL VAGINOSIS: ICD-10-CM

## 2023-01-26 DIAGNOSIS — N89.8 VAGINAL DISCHARGE: ICD-10-CM

## 2023-01-26 DIAGNOSIS — N30.01 ACUTE CYSTITIS WITH HEMATURIA: ICD-10-CM

## 2023-01-26 DIAGNOSIS — R68.83 CHILLS: ICD-10-CM

## 2023-01-26 LAB
ALBUMIN SERPL BCG-MCNC: 4.5 G/DL (ref 3.5–5.2)
ALBUMIN UR-MCNC: NEGATIVE MG/DL
ALP SERPL-CCNC: 75 U/L (ref 35–104)
ALT SERPL W P-5'-P-CCNC: 16 U/L (ref 10–35)
ANION GAP SERPL CALCULATED.3IONS-SCNC: 12 MMOL/L (ref 7–15)
APPEARANCE UR: CLEAR
AST SERPL W P-5'-P-CCNC: 20 U/L (ref 10–35)
BACTERIA #/AREA URNS HPF: ABNORMAL /HPF
BASOPHILS # BLD AUTO: 0.1 10E3/UL (ref 0–0.2)
BASOPHILS NFR BLD AUTO: 1 %
BILIRUB SERPL-MCNC: 0.9 MG/DL
BILIRUB UR QL STRIP: NEGATIVE
BUN SERPL-MCNC: 12.4 MG/DL (ref 8–23)
CALCIUM SERPL-MCNC: 8.9 MG/DL (ref 8.8–10.2)
CHLORIDE SERPL-SCNC: 103 MMOL/L (ref 98–107)
CLUE CELLS: PRESENT
COLOR UR AUTO: YELLOW
CREAT SERPL-MCNC: 0.77 MG/DL (ref 0.51–0.95)
DEPRECATED HCO3 PLAS-SCNC: 26 MMOL/L (ref 22–29)
EOSINOPHIL # BLD AUTO: 0.4 10E3/UL (ref 0–0.7)
EOSINOPHIL NFR BLD AUTO: 6 %
ERYTHROCYTE [DISTWIDTH] IN BLOOD BY AUTOMATED COUNT: 13 % (ref 10–15)
GFR SERPL CREATININE-BSD FRML MDRD: 83 ML/MIN/1.73M2
GLUCOSE SERPL-MCNC: 74 MG/DL (ref 70–99)
GLUCOSE UR STRIP-MCNC: NEGATIVE MG/DL
HCT VFR BLD AUTO: 41.2 % (ref 35–47)
HGB BLD-MCNC: 13.6 G/DL (ref 11.7–15.7)
HGB UR QL STRIP: ABNORMAL
KETONES UR STRIP-MCNC: ABNORMAL MG/DL
LEUKOCYTE ESTERASE UR QL STRIP: ABNORMAL
LYMPHOCYTES # BLD AUTO: 3.1 10E3/UL (ref 0.8–5.3)
LYMPHOCYTES NFR BLD AUTO: 46 %
MCH RBC QN AUTO: 29.6 PG (ref 26.5–33)
MCHC RBC AUTO-ENTMCNC: 33 G/DL (ref 31.5–36.5)
MCV RBC AUTO: 90 FL (ref 78–100)
MONOCYTES # BLD AUTO: 0.5 10E3/UL (ref 0–1.3)
MONOCYTES NFR BLD AUTO: 8 %
NEUTROPHILS # BLD AUTO: 2.7 10E3/UL (ref 1.6–8.3)
NEUTROPHILS NFR BLD AUTO: 39 %
NITRATE UR QL: NEGATIVE
PH UR STRIP: 5 [PH] (ref 5–7)
PLATELET # BLD AUTO: 315 10E3/UL (ref 150–450)
POTASSIUM SERPL-SCNC: 3.5 MMOL/L (ref 3.4–5.3)
PROT SERPL-MCNC: 7.5 G/DL (ref 6.4–8.3)
RBC # BLD AUTO: 4.6 10E6/UL (ref 3.8–5.2)
RBC #/AREA URNS AUTO: ABNORMAL /HPF
SODIUM SERPL-SCNC: 141 MMOL/L (ref 136–145)
SP GR UR STRIP: 1.02 (ref 1–1.03)
SQUAMOUS #/AREA URNS AUTO: ABNORMAL /LPF
T3FREE SERPL-MCNC: 2.3 PG/ML (ref 2–4.4)
T4 FREE SERPL-MCNC: 1.24 NG/DL (ref 0.9–1.7)
TRICHOMONAS, WET PREP: ABNORMAL
TSH SERPL DL<=0.005 MIU/L-ACNC: 2.74 UIU/ML (ref 0.3–4.2)
UROBILINOGEN UR STRIP-ACNC: 0.2 E.U./DL
WBC # BLD AUTO: 6.8 10E3/UL (ref 4–11)
WBC #/AREA URNS AUTO: ABNORMAL /HPF
WBC'S/HIGH POWER FIELD, WET PREP: ABNORMAL
YEAST, WET PREP: ABNORMAL

## 2023-01-26 PROCEDURE — 81001 URINALYSIS AUTO W/SCOPE: CPT | Performed by: FAMILY MEDICINE

## 2023-01-26 PROCEDURE — 71046 X-RAY EXAM CHEST 2 VIEWS: CPT | Mod: TC | Performed by: RADIOLOGY

## 2023-01-26 PROCEDURE — 36415 COLL VENOUS BLD VENIPUNCTURE: CPT | Performed by: FAMILY MEDICINE

## 2023-01-26 PROCEDURE — 87086 URINE CULTURE/COLONY COUNT: CPT | Performed by: FAMILY MEDICINE

## 2023-01-26 PROCEDURE — 84481 FREE ASSAY (FT-3): CPT | Performed by: FAMILY MEDICINE

## 2023-01-26 PROCEDURE — 80050 GENERAL HEALTH PANEL: CPT | Performed by: FAMILY MEDICINE

## 2023-01-26 PROCEDURE — 84439 ASSAY OF FREE THYROXINE: CPT | Performed by: FAMILY MEDICINE

## 2023-01-26 PROCEDURE — 99214 OFFICE O/P EST MOD 30 MIN: CPT | Performed by: FAMILY MEDICINE

## 2023-01-26 PROCEDURE — 87210 SMEAR WET MOUNT SALINE/INK: CPT | Performed by: FAMILY MEDICINE

## 2023-01-26 RX ORDER — CHLORAL HYDRATE 500 MG
2 CAPSULE ORAL DAILY
COMMUNITY

## 2023-01-26 RX ORDER — ZINC GLUCONATE 50 MG
50 TABLET ORAL DAILY
COMMUNITY

## 2023-01-26 RX ORDER — METRONIDAZOLE 500 MG/1
500 TABLET ORAL 2 TIMES DAILY
Qty: 14 TABLET | Refills: 0 | Status: SHIPPED | OUTPATIENT
Start: 2023-01-26 | End: 2023-02-02

## 2023-01-26 RX ORDER — CIPROFLOXACIN 500 MG/1
500 TABLET, FILM COATED ORAL 2 TIMES DAILY
Qty: 10 TABLET | Refills: 0 | Status: SHIPPED | OUTPATIENT
Start: 2023-01-26 | End: 2023-01-27

## 2023-01-26 ASSESSMENT — ENCOUNTER SYMPTOMS: FATIGUE: 1

## 2023-01-26 ASSESSMENT — PAIN SCALES - GENERAL: PAINLEVEL: MILD PAIN (2)

## 2023-01-26 NOTE — PROGRESS NOTES
Assessment & Plan     Chronic obstructive pulmonary disease, unspecified COPD type (H)      Chills  Pt noted to have BV and signs of acute cystitis based on results  Was advised to completed abx as prescribed  Advised a recheck in 1 week for a close follow up  Will consider further eval if symptoms do not improve with meds    - CBC with platelets and differential; Future  - Comprehensive metabolic panel (BMP + Alb, Alk Phos, ALT, AST, Total. Bili, TP); Future  - XR Chest 2 Views; Future  - Wet prep - lab collect; Future  - UA with Microscopic reflex to Culture - lab collect; Future  - UA with Microscopic reflex to Culture - lab collect  - Wet prep - lab collect  - Comprehensive metabolic panel (BMP + Alb, Alk Phos, ALT, AST, Total. Bili, TP)  - CBC with platelets and differential  - Urine Microscopic  - Urine Culture    Vaginal discharge  - Wet prep - lab collect; Future  - Wet prep - lab collect    Screen for colon cancer    - Colonoscopy Screening  Referral; Future    Hypothyroidism, unspecified type  - TSH with free T4 reflex; Future  - T3, Free; Future  - T4, free; Future  - T4, free  - T3, Free  - TSH    Bacterial vaginosis  - metroNIDAZOLE (FLAGYL) 500 MG tablet; Take 1 tablet (500 mg) by mouth 2 times daily for 7 days    Acute cystitis with hematuria  - sulfamethoxazole-trimethoprim (BACTRIM DS) 800-160 MG tablet; Take 1 tablet by mouth 2 times daily for 5 days    Return in about 1 week (around 2/2/2023) for ok to use my same day slot .    Kristin Bragg MD  [facility]   Jackie is a 67 year old, presenting for the following health issues:  Vaginal Problem, Chills, and Fatigue      Vaginal Problem     Fatigue  Associated symptoms include fatigue.   History of Present Illness       Reason for visit:  Not feeling well  Symptom onset:  1-2 weeks ago  Symptoms include:  Chills fatigue weakness nausea pain in lower abdomen and discharge  Symptom intensity:   "Moderate  Symptom progression:  Staying the same  Had these symptoms before:  No  What makes it worse:  Activity  What makes it better:  Rest and warmth    She eats 0-1 servings of fruits and vegetables daily.She consumes 1 sweetened beverage(s) daily.She exercises with enough effort to increase her heart rate 10 to 19 minutes per day.  She exercises with enough effort to increase her heart rate 3 or less days per week. She is missing 2 dose(s) of medications per week.     Has mild pain in the lower abdomen. Is extremely fatigued lately for the past 3 weeks. Has been going to work but it has not been easy.  Had a runny nose initially at the beginning of the this episode and since then symptoms have gotten progressively worse where now she feels very weak, cannot get anything done, feels like the whole body is vibrating, chills are like hot flashes inside the body.   Bowel movements she has had softer stools. Denies any recent travel. Little grandkid have been sick with congestion and runny nose. No other sick contacts. Is concerned if her thyroid is off. The last time she was sick like this she was noted to have bacterial vaginal infection. Former smoker. Quit about 4-5 yrs ago. Denies any cough, headaches, chest pain , shortness of breath , dizziness or palpitations. Feels nauseated at times. No excessive NSAID use. Denies any excessive weight loss.         Review of Systems   Constitutional: Positive for fatigue.   Genitourinary: Positive for vaginal discharge.      Constitutional, HEENT, cardiovascular, pulmonary, gi and gu systems are negative, except as otherwise noted.      Objective    /80 (BP Location: Left arm, Patient Position: Sitting, Cuff Size: Adult Small)   Pulse 75   Temp (!) 96.7  F (35.9  C) (Temporal)   Resp 22   Ht 1.57 m (5' 1.81\")   Wt 57.2 kg (126 lb)   LMP 03/26/2003 (Approximate)   SpO2 97%   BMI 23.19 kg/m    Body mass index is 23.19 kg/m .  Physical Exam   GENERAL: healthy, " alert and no distress  NECK: no adenopathy, no asymmetry, masses, or scars and thyroid normal to palpation  RESP: lungs clear to auscultation - no rales, rhonchi or wheezes  CV: regular rate and rhythm, normal S1 S2, no S3 or S4, no murmur, click or rub, no peripheral edema and peripheral pulses strong  ABDOMEN: soft, nontender, no hepatosplenomegaly, no masses and bowel sounds normal  MS: no gross musculoskeletal defects noted, no edema

## 2023-01-26 NOTE — RESULT ENCOUNTER NOTE
Ms. Siddiqi    Your recent test results are attached.  Urinalysis and vaginal discharge did show signs of infection.  I sent 2 different antibiotics to the pharmacy.  Please start taking these pills.  There are some labs that are still pending.  I will get back to you as soon as I have these results.    If you have any questions or concerns please contact me via My Chart or call the clinic at [phone]     Thank You  Kristin Bragg MD.

## 2023-01-26 NOTE — TELEPHONE ENCOUNTER
Patient called to schedule her follow up appointment. Appointment scheduled on 2/2 at 9:30 am with a 9:10 arrival. Patient requesting results on her labs as she is having difficulty with getting into her mychart. Informed of the providers documentation as noted on the lab results. Did inform that there are still labs pending on results. Patient was informed of two prescriptions being sent to her pharmacy. Adina Gibson LPN

## 2023-01-27 ENCOUNTER — TELEPHONE (OUTPATIENT)
Dept: FAMILY MEDICINE | Facility: CLINIC | Age: 68
End: 2023-01-27
Payer: COMMERCIAL

## 2023-01-27 DIAGNOSIS — N30.01 ACUTE CYSTITIS WITH HEMATURIA: Primary | ICD-10-CM

## 2023-01-27 RX ORDER — SULFAMETHOXAZOLE/TRIMETHOPRIM 800-160 MG
1 TABLET ORAL 2 TIMES DAILY
Qty: 10 TABLET | Refills: 0 | Status: SHIPPED | OUTPATIENT
Start: 2023-01-27 | End: 2023-02-01

## 2023-01-27 RX ORDER — NITROFURANTOIN 25; 75 MG/1; MG/1
100 CAPSULE ORAL 2 TIMES DAILY
Qty: 14 CAPSULE | Refills: 0 | Status: SHIPPED | OUTPATIENT
Start: 2023-01-27 | End: 2023-02-03

## 2023-01-27 NOTE — TELEPHONE ENCOUNTER
Patient picked up cipro prescription. She began reading the side effects and is really uncomfortable taking this medication.   She is asking if provider could prescribe an alternate antibiotic.     Pharmacy - [facility]    Patient would like a call back with the alternate that is prescribed.     Rossi HYDEN, RN  [facility]

## 2023-01-27 NOTE — RESULT ENCOUNTER NOTE
Ms. Siddiqi    Your recent test results are attached.  Chest x-ray did not show any signs of pneumonia    If you have any questions or concerns please contact me via My Chart or call the clinic at [phone]     Thank You  Kristin Bragg MD.

## 2023-01-27 NOTE — TELEPHONE ENCOUNTER
Called patient, read result notes from labs and xray to patient. She already picked up abx.     Patient worried about starting Cipro, transferred to RN line to discuss medications.

## 2023-01-27 NOTE — TELEPHONE ENCOUNTER
Test Results    Contacts       Type Contact Phone/Fax    01/27/2023 12:34 PM CST Phone (Incoming) Jackie Siddiqi (Self) [phone] (M)    01/27/2023 12:35 PM CST Phone (Incoming) Jackie Siddiqi JENNIFFER (Self) [phone] (M)          Who ordered the test:  Kristin Bragg MD    Type of test: Lab and X-ray    Date of test:  01/26/2023    Where was the test performed:  [facility]    What are your questions/concerns?:  Results    [facility] or would you prefer to receive a phone call?:   Patient would prefer a phone call   Okay to leave a detailed message?: Yes at Cell number on file:    Telephone Information:   Mobile [phone]

## 2023-01-27 NOTE — RESULT ENCOUNTER NOTE
Ms. Siddiqi    Your recent test results are attached.  Labs show normal blood chemistries including kidney and liver functions and blood counts.  Complete antibiotics as prescribed and return to clinic if symptoms do not improve    If you have any questions or concerns please contact me via My Chart or call the clinic at [phone]     Thank You  Kristin Bragg MD.

## 2023-01-27 NOTE — RESULT ENCOUNTER NOTE
I called and discussed results with the patient.  Urine culture shows no growth on day 1.  I suspect her symptoms are likely secondary to her bacterial vaginosis but deeper infection like pelvic inflammatory disease cannot be completely ruled out.  She does report significantly improved symptoms since starting metronidazole.  Advised starting Bactrim along with this, as she has responded to Bactrim in the past for similar symptoms.  Advised to complete an ultrasound of the pelvis if symptoms do not improve over the weekend.  Patient voiced understanding of the plan

## 2023-01-28 LAB — BACTERIA UR CULT: NO GROWTH

## 2023-02-02 ENCOUNTER — OFFICE VISIT (OUTPATIENT)
Dept: FAMILY MEDICINE | Facility: OTHER | Age: 68
End: 2023-02-02
Payer: COMMERCIAL

## 2023-02-02 VITALS
HEIGHT: 62 IN | OXYGEN SATURATION: 97 % | BODY MASS INDEX: 24.11 KG/M2 | SYSTOLIC BLOOD PRESSURE: 118 MMHG | WEIGHT: 131 LBS | DIASTOLIC BLOOD PRESSURE: 70 MMHG | RESPIRATION RATE: 18 BRPM | TEMPERATURE: 97.1 F | HEART RATE: 62 BPM

## 2023-02-02 DIAGNOSIS — K59.00 CONSTIPATION, UNSPECIFIED CONSTIPATION TYPE: ICD-10-CM

## 2023-02-02 DIAGNOSIS — R10.2 PELVIC PAIN IN FEMALE: ICD-10-CM

## 2023-02-02 DIAGNOSIS — R10.32 LEFT LOWER QUADRANT PAIN: Primary | ICD-10-CM

## 2023-02-02 PROCEDURE — 99214 OFFICE O/P EST MOD 30 MIN: CPT | Performed by: FAMILY MEDICINE

## 2023-02-02 RX ORDER — SENNA AND DOCUSATE SODIUM 50; 8.6 MG/1; MG/1
1 TABLET, FILM COATED ORAL AT BEDTIME
Qty: 30 TABLET | Refills: 0 | Status: SHIPPED | OUTPATIENT
Start: 2023-02-02 | End: 2023-11-02

## 2023-02-02 RX ORDER — CLOBETASOL PROPIONATE 0.5 MG/G
OINTMENT TOPICAL
Qty: 60 G | Refills: 0 | Status: CANCELLED | OUTPATIENT
Start: 2023-02-02

## 2023-02-02 RX ORDER — POLYETHYLENE GLYCOL 3350 17 G/17G
1 POWDER, FOR SOLUTION ORAL DAILY
Qty: 510 G | Refills: 0 | Status: SHIPPED | OUTPATIENT
Start: 2023-02-02 | End: 2023-03-04

## 2023-02-02 NOTE — PROGRESS NOTES
"  Assessment & Plan         Pelvic pain in female/Left lower quadrant pain/Constipation, unspecified constipation type    Patient reports improved symptoms since last week but now continues reports some persistent lower pelvic and left lower quadrant pain. Has history of diverticulosis and went through extensive work up 2 yrs ago for left qaudrant pain including colonoscopy and CT scan by gastroenterology which did not reveal any cause. chills she had have been better but not completely resolved. I advised completed an ultrasound to r/o uterine /Ovarian pathology of her persistent pelvic pain. Will treat constipation in the interim and also schedule CT if the ultrasound in inconclusive to r/o diverticulitis.   Pt agreeable to the plan  Recheck in 1 month for follow up or sooner if symptoms get worse.   - US Pelvic Complete with Transvaginal; Future  - polyethylene glycol (MIRALAX) 17 GM/Dose powder; Take 17 g (1 capful) by mouth daily for 30 days  - SENNA-docusate sodium (SENNA S) 8.6-50 MG tablet; Take 1 tablet by mouth At Bedtime      - CT Abdomen Pelvis w Contrast; Future      Return in about 4 weeks (around 3/2/2023) for ok to use my same day slot.    Kristin Bragg MD  [facility]ia is a 67 year old accompanied by her grandson, presenting for the following health issues:  RECHECK (Discharge,cistitis, hematuria)      HPI     Following up on cistitis and discharge and hematuria  Continues to have symptoms though mildly improved  Reports pelvic pain and eft sided abdominal pain    Review of Systems   Constitutional, HEENT, cardiovascular, pulmonary, gi and gu systems are negative, except as otherwise noted.      Objective    /70 (BP Location: Right arm, Patient Position: Sitting, Cuff Size: Adult Regular)   Pulse 62   Temp 97.1  F (36.2  C) (Temporal)   Resp 18   Ht 1.575 m (5' 2\")   Wt 59.4 kg (131 lb)   LMP 03/26/2003 (Approximate)   SpO2 97%   BMI " 23.96 kg/m    Body mass index is 23.96 kg/m .  Physical Exam   GENERAL: healthy, alert and no distress  NECK: no adenopathy, no asymmetry, masses, or scars and thyroid normal to palpation  RESP: lungs clear to auscultation - no rales, rhonchi or wheezes  CV: regular rate and rhythm, normal S1 S2, no S3 or S4, no murmur, click or rub, no peripheral edema and peripheral pulses strong  ABDOMEN: tenderness LLQ and lower midline and bowel sounds normal  MS: no gross musculoskeletal defects noted, no edema

## 2023-02-07 ENCOUNTER — TELEPHONE (OUTPATIENT)
Dept: GASTROENTEROLOGY | Facility: CLINIC | Age: 68
End: 2023-02-07
Payer: COMMERCIAL

## 2023-02-07 NOTE — TELEPHONE ENCOUNTER
"    Screening Questions  BLUE  KIND OF PREP RED  LOCATION [review exclusion criteria] GREEN  SEDATION TYPE        N Are you active on mychart?       ZEE LOPEZ Ordering/Referring Provider?        [employer] What type of coverage do you have?      N Have you had a positive covid test in the last 14 days?     24.0 1. BMI  [BMI 40+ - review exclusion criteria]    Y  2. Are you able to give consent for your medical care? [IF NO,RN REVIEW]          N  3. Are you taking any prescription pain medications on a routine schedule   (ex narcotics: tramadol, oxycodone, roxicodone, oxycontin,  and percocet)?          3a. EXTENDED PREP What kind of prescription?     N 4. Do you have any chemical dependencies such as alcohol, street drugs, or methadone?        **If yes 3- 5 , please schedule with MAC sedation.**          IF YES TO ANY 6 - 10 - HOSPITAL SETTING ONLY.     N 6.   Do you need assistance transferring?     N 7.   Have you had a heart or lung transplant?    N 8.   Are you currently on dialysis?   N 9.   Do you use daily home oxygen?   N 10. Do you take nitroglycerin?   10a.  If yes, how often?     11. [FEMALES]   Are you currently pregnant?    11a.  If yes, how many weeks? [ Greater than 12 weeks, OR NEEDED]    N 12. Do you have Pulmonary Hypertension? *NEED PAC APPT AT [facility]*     N 13. [review exclusion criteria]  Do you have any implantable devices in your body (pacemaker, defib, LVAD)?    N 14. In the past 6 months, have you had any heart related issues including cardiomyopathy or heart attack?     14a.  If yes, did it require cardiac stenting if so when?     N 15. Have you had a stroke or Transient ischemic attack (TIA - aka  mini stroke ) within 6 months?      N 16. Do you have mod to severe Obstructive Sleep Apnea?  [Hospital only]    N 17. Do you have SEVERE AND UNCONTROLLED asthma? *NEED PAC APPT AT [facility]*     N 18. Are you currently taking any blood thinners?     18a. If yes, inform patient to \"follow up w/ " "ordering provider for bridging instructions.\"    N 19. Do you take the medication Phentermine?    19a. If yes, \"Hold for 7 days before procedure.  Please consult your prescribing provider if you have questions about holding this medication.\"     N  20. Do you have chronic kidney disease?      N  21. Do you have a diagnosis of diabetes?     N  22. On a regular basis do you go 3-5 days between bowel movements?      23. Preferred LOCAL Pharmacy for Pre Prescription    [ LIST ONLY ONE PHARMACY]       [id number] [address] AT [facility] OF  & MA[address]          - CLOSING REMINDERS -    Informed patient they will need an adult    Cannot take any type of public or medical transportation alone    Conscious Sedation- Needs  for 6 hours after the procedure       MAC/General-Needs  for 24 hours after procedure    Pre-Procedure Covid test to be completed [[facility] PCR Testing Required]    Confirmed Nurse will call to complete assessment       - SCHEDULING DETAILS -  NO Hospital Setting Required? If yes, what is the exclusion?:    LONG  Surgeon    3/29  Date of Procedure  Lower Endoscopy [Colonoscopy]  Type of Procedure Scheduled  [facility]   STANDARD [facility]-If you answer yes to questions #8, #20, #21Which Colonoscopy Prep was Sent?     MAC Sedation Type     NO PAC / Pre-op Required                 "

## 2023-02-08 NOTE — TELEPHONE ENCOUNTER
Please fax forms. Fax number on last page.   Please call patient let her know I filled them out and signed.     TEDDY Dhillon CNP  Questions or concerns please feel free to send me a [employer] message or call me  Phone : [phone]       No

## 2023-02-21 ENCOUNTER — HOSPITAL ENCOUNTER (OUTPATIENT)
Dept: ULTRASOUND IMAGING | Facility: CLINIC | Age: 68
Discharge: HOME | End: 2023-02-21
Attending: FAMILY MEDICINE
Payer: COMMERCIAL

## 2023-02-21 ENCOUNTER — HOSPITAL ENCOUNTER (OUTPATIENT)
Dept: CT IMAGING | Facility: CLINIC | Age: 68
Discharge: HOME | End: 2023-02-21
Attending: FAMILY MEDICINE
Payer: COMMERCIAL

## 2023-02-21 DIAGNOSIS — R10.2 PELVIC PAIN IN FEMALE: ICD-10-CM

## 2023-02-21 DIAGNOSIS — R10.32 LEFT LOWER QUADRANT PAIN: ICD-10-CM

## 2023-02-21 PROCEDURE — 250N000011 HC RX IP 250 OP 636: Performed by: FAMILY MEDICINE

## 2023-02-21 PROCEDURE — 250N000009 HC RX 250: Performed by: FAMILY MEDICINE

## 2023-02-21 PROCEDURE — 76856 US EXAM PELVIC COMPLETE: CPT

## 2023-02-21 PROCEDURE — 74177 CT ABD & PELVIS W/CONTRAST: CPT

## 2023-02-21 RX ORDER — IOPAMIDOL 755 MG/ML
500 INJECTION, SOLUTION INTRAVASCULAR ONCE
Status: COMPLETED | OUTPATIENT
Start: 2023-02-21 | End: 2023-02-21

## 2023-02-21 RX ADMIN — IOPAMIDOL 64 ML: 755 INJECTION, SOLUTION INTRAVENOUS at 15:34

## 2023-02-21 RX ADMIN — SODIUM CHLORIDE 61 ML: 9 INJECTION, SOLUTION INTRAVENOUS at 15:33

## 2023-02-21 NOTE — RESULT ENCOUNTER NOTE
Ms. Siddiqi    Your recent test results are attached. Ultrasound of the pelvis did not show any concerning lesions. Will await CT abdomen results before further recommendations    If you have any questions or concerns please contact me via My Chart or call the clinic at [phone]     Thank You  Kristin Bragg MD.

## 2023-02-23 NOTE — RESULT ENCOUNTER NOTE
Ms. Siddiqi    Your recent test results are attached. CT abdomen was essentially normal and did not show any signs of inflammation and no reason to explain the left lower abdominal pain has been identified. I would recommend her keep up with her stool regimen and complete the colonoscopy as scheduled in march. Advise to return to clinic if notices any worsening symptoms    If you have any questions or concerns please contact me via My Chart or call the clinic at [phone]     Thank You  Kristin Bragg MD.

## 2023-02-28 ENCOUNTER — TELEPHONE (OUTPATIENT)
Dept: FAMILY MEDICINE | Facility: CLINIC | Age: 68
End: 2023-02-28
Payer: COMMERCIAL

## 2023-02-28 NOTE — RESULT ENCOUNTER NOTE
Pt did not see her mychart results. Please call and notify      CT abdomen was essentially normal and did not show any signs of inflammation and no reason to explain the left lower abdominal pain has been identified. I would recommend her keep up with her stool regimen and complete the colonoscopy as scheduled in march. Advise to return to clinic if notices any worsening symptoms

## 2023-03-28 ENCOUNTER — ANESTHESIA EVENT (OUTPATIENT)
Dept: GASTROENTEROLOGY | Facility: CLINIC | Age: 68
End: 2023-03-28
Payer: COMMERCIAL

## 2023-03-28 ASSESSMENT — COPD QUESTIONNAIRES: COPD: 1

## 2023-03-28 NOTE — H&P
[facility] Pre-op History and Physical    Jackie Siddiqi MRN# [id number]   Age: 67 year old YOB: 1955      Date of Surgery: 3/29/2023 Location [facility]      Date of Exam 3/29/2023 Facility (Same day)       Home clinic: [facility]  Primary care provider: Jovana Quinonez         Chief Complaint and/or Reason for Procedure:   No chief complaint on file.  Hx adenoma.  Exam 2020.         Active problem list:     Patient Active Problem List    Diagnosis Date Noted     Capsular contracture of breast implant, sequela 03/30/2021     Priority: Medium     Added automatically from request for surgery [id number]       Hx of bilateral mastectomy 03/09/2021     Priority: Medium     LLQ abdominal pain 03/09/2021     Priority: Medium     Combined forms of age-related cataract of right eye 02/09/2021     Priority: Medium     Added automatically from request for surgery [id number]       Combined form of age-related cataract, left eye 02/09/2021     Priority: Medium     Added automatically from request for surgery [id number]       Diverticulosis of colon 04/06/2020     Priority: Medium     Chronic obstructive pulmonary disease, unspecified COPD type (H) 09/16/2019     Priority: Medium     Hypothyroidism, unspecified type 11/29/2016     Priority: Medium     Hyperlipidemia LDL goal <160 11/29/2016     Priority: Medium     Osteoarthritis of carpometacarpal joint of right thumb, unspecified osteoarthritis type 11/29/2016     Priority: Medium     IMO Regulatory Load OCT 2020       Advanced directives, counseling/discussion 01/11/2013     Priority: Medium     Discussed advance care planning with patient; information given to patient to review. 1/11/2013          Lichen sclerosus et atrophicus of the vulva 01/09/2012     Priority: Medium     Esophageal reflux 05/16/2005     Priority: Medium            Medications (include herbals and vitamins):   Any Plavix use in  the last 7 days? No     No current facility-administered medications for this encounter.     Current Outpatient Medications   Medication Sig     bisacodyl (DULCOLAX) 5 MG EC tablet Take 2 tablets at 3 pm the day before your procedure. If your procedure is before 11 am, take 2 additional tablets at 11 pm. If your procedure is after 11 am, take 2 additional tablets at 6 am. For additional instructions refer to your colonoscopy prep instructions.     Cholecalciferol (VITAMIN D3) 25 MCG (1000 UT) CAPS      clobetasol (TEMOVATE) 0.05 % external ointment Apply a small pea size amount at bedtime couple times a week as needed     estradiol (ESTRACE) 0.1 MG/GM vaginal cream Place 2 g vaginally twice a week Please stop the premarin cream     fish oil-omega-3 fatty acids 1000 MG capsule Take 2 g by mouth daily     Lactobacillus (PROBIOTIC ACIDOPHILUS PO)  (Patient not taking: Reported on 2/2/2023)     levothyroxine (SYNTHROID/LEVOTHROID) 50 MCG tablet Take 1 tablet (50 mcg) by mouth daily     polyethylene glycol (GOLYTELY) 236 g suspension The night before the exam at 6 pm drink an 8-ounce glass every 15 minutes until the jug is half empty. If you arrive before 11 AM: Drink the other half of the Golytely jug at 11 PM night before procedure. If you arrive after 11 AM: Drink the other half of the Golytely jug at 6 AM day of procedure. For additional instructions refer to your colonoscopy prep instructions.     SENNA-docusate sodium (SENNA S) 8.6-50 MG tablet Take 1 tablet by mouth At Bedtime     VITAMIN K PO Take by mouth daily     zinc gluconate 50 MG tablet Take 50 mg by mouth daily             Allergies:      Allergies   Allergen Reactions     No Known Drug Allergies      Allergy to Latex? No  Allergy to tape?   No  Intolerances:             Physical Exam:   All vitals have been reviewed  No data found.  No intake/output data recorded.  Lungs:   No increased work of breathing, good air exchange, clear to auscultation  bilaterally, no crackles or wheezing     Cardiovascular:   Normal apical impulse, regular rate and rhythm, normal S1 and S2, no S3 or S4, and no murmur noted             Lab / Radiology Results:            Anesthetic risk and/or ASA classification:       Alex Frances MD

## 2023-03-29 ENCOUNTER — ANESTHESIA (OUTPATIENT)
Dept: GASTROENTEROLOGY | Facility: CLINIC | Age: 68
End: 2023-03-29
Payer: COMMERCIAL

## 2023-03-29 ENCOUNTER — HOSPITAL ENCOUNTER (OUTPATIENT)
Facility: CLINIC | Age: 68
Discharge: HOME | End: 2023-03-29
Attending: INTERNAL MEDICINE | Admitting: INTERNAL MEDICINE
Payer: COMMERCIAL

## 2023-03-29 VITALS
TEMPERATURE: 97.1 F | RESPIRATION RATE: 14 BRPM | HEART RATE: 68 BPM | OXYGEN SATURATION: 99 % | SYSTOLIC BLOOD PRESSURE: 118 MMHG | DIASTOLIC BLOOD PRESSURE: 65 MMHG

## 2023-03-29 LAB — COLONOSCOPY: NORMAL

## 2023-03-29 PROCEDURE — 250N000009 HC RX 250: Performed by: NURSE ANESTHETIST, CERTIFIED REGISTERED

## 2023-03-29 PROCEDURE — 88305 TISSUE EXAM BY PATHOLOGIST: CPT | Mod: TC | Performed by: INTERNAL MEDICINE

## 2023-03-29 PROCEDURE — 258N000003 HC RX IP 258 OP 636: Performed by: NURSE ANESTHETIST, CERTIFIED REGISTERED

## 2023-03-29 PROCEDURE — 370N000017 HC ANESTHESIA TECHNICAL FEE, PER MIN: Performed by: INTERNAL MEDICINE

## 2023-03-29 PROCEDURE — 88305 TISSUE EXAM BY PATHOLOGIST: CPT | Mod: 26 | Performed by: PATHOLOGY

## 2023-03-29 PROCEDURE — 250N000011 HC RX IP 250 OP 636: Performed by: NURSE ANESTHETIST, CERTIFIED REGISTERED

## 2023-03-29 PROCEDURE — 45385 COLONOSCOPY W/LESION REMOVAL: CPT | Mod: PT | Performed by: INTERNAL MEDICINE

## 2023-03-29 PROCEDURE — 45380 COLONOSCOPY AND BIOPSY: CPT | Performed by: INTERNAL MEDICINE

## 2023-03-29 RX ORDER — LIDOCAINE HYDROCHLORIDE 20 MG/ML
INJECTION, SOLUTION INFILTRATION; PERINEURAL PRN
Status: DISCONTINUED | OUTPATIENT
Start: 2023-03-29 | End: 2023-03-29

## 2023-03-29 RX ORDER — PROPOFOL 10 MG/ML
INJECTION, EMULSION INTRAVENOUS CONTINUOUS PRN
Status: DISCONTINUED | OUTPATIENT
Start: 2023-03-29 | End: 2023-03-29

## 2023-03-29 RX ORDER — PROPOFOL 10 MG/ML
INJECTION, EMULSION INTRAVENOUS PRN
Status: DISCONTINUED | OUTPATIENT
Start: 2023-03-29 | End: 2023-03-29

## 2023-03-29 RX ORDER — SODIUM CHLORIDE, SODIUM LACTATE, POTASSIUM CHLORIDE, CALCIUM CHLORIDE 600; 310; 30; 20 MG/100ML; MG/100ML; MG/100ML; MG/100ML
INJECTION, SOLUTION INTRAVENOUS CONTINUOUS
Status: DISCONTINUED | OUTPATIENT
Start: 2023-03-29 | End: 2023-03-29 | Stop reason: HOSPADM

## 2023-03-29 RX ADMIN — PROPOFOL 50 MG: 10 INJECTION, EMULSION INTRAVENOUS at 09:59

## 2023-03-29 RX ADMIN — PROPOFOL 150 MCG/KG/MIN: 10 INJECTION, EMULSION INTRAVENOUS at 09:59

## 2023-03-29 RX ADMIN — LIDOCAINE HYDROCHLORIDE 50 MG: 20 INJECTION, SOLUTION INFILTRATION; PERINEURAL at 09:59

## 2023-03-29 RX ADMIN — SODIUM CHLORIDE, POTASSIUM CHLORIDE, SODIUM LACTATE AND CALCIUM CHLORIDE 10 ML/HR: 600; 310; 30; 20 INJECTION, SOLUTION INTRAVENOUS at 09:44

## 2023-03-29 ASSESSMENT — ACTIVITIES OF DAILY LIVING (ADL)
ADLS_ACUITY_SCORE: 35
ADLS_ACUITY_SCORE: 35

## 2023-03-29 NOTE — RESULT ENCOUNTER NOTE
Ms. Siddiqi    Your recent test results are attached. Colonoscopy revealed a 5mm polyp, some benign diverticuli in the sigmoid colon and some hemorrhoids.  The pathology results on the polyp are still pending. I will get back to you once these are available    If you have any questions or concerns please contact me via My Chart or call the clinic at [phone]     Thank You  Kristin Bragg MD.

## 2023-03-29 NOTE — DISCHARGE INSTRUCTIONS
[facility]    Home Care Following Endoscopy          Activity:  You have just undergone an endoscopic procedure usually performed with conscious sedation.  Do not work or operate machinery (including a car) for at least 12 hours.    I encourage you to walk and attempt to pass this air as soon as possible.    Diet:  Return to the diet you were on before your procedure but eat lightly for the first 12-24 hours.  Drink plenty of water.  Resume any regular medications unless otherwise advised by your physician.  Please begin any new medication prescribed as a result of your procedure as directed by your physician.   If you had any biopsy or polyp removed please refrain from aspirin or aspirin products for 2 days.  If on Coumadin please restart as instructed by your physician.   Pain:  You may take Tylenol as needed for pain.  Expected Recovery:  You can expect some mild abdominal fullness and/or discomfort due to the air used to inflate your intestinal tract. It is also normal to have a mild sore throat after upper endoscopy.    Call Your Physician if You Have:  After Colonoscopy:  Worsening persisting abdominal pain which is worse with activity.  Fevers (>101 degrees F), chills or shakes.  Passage of continued blood with bowel movements.     Any questions or concerns about your recovery, please call [phone] or after hours [phone] ([phone]) Nurse Advice Line.    Follow-up Care:  You did have polyps removed.  The polyps/biopsy tissue sample will be sent to pathology.    You should receive letter in your My Chart with your results within 1-2 weeks. If you do not participate in My Chart a physical letter will come in the mail in 2-3 weeks.  Please call if you have not received a notification of your results.

## 2023-03-29 NOTE — ANESTHESIA CARE TRANSFER NOTE
Patient: Jackie Siddiqi    Procedure: Procedure(s):  COLONOSCOPY, WITH POLYPECTOMY       Diagnosis: Screen for colon cancer [Z12.11]  Diagnosis Additional Information: No value filed.    Anesthesia Type:   MAC     Note:    Oropharynx: oropharynx clear of all foreign objects and spontaneously breathing  Level of Consciousness: drowsy  Oxygen Supplementation: face mask    Independent Airway: airway patency satisfactory and stable  Dentition: dentition unchanged  Vital Signs Stable: post-procedure vital signs reviewed and stable  Report to RN Given: handoff report given  Patient transferred to: Phase II    Handoff Report: Identifed the Patient, Identified the Reponsible Provider, Reviewed the pertinent medical history, Discussed the surgical course, Reviewed Intra-OP anesthesia mangement and issues during anesthesia, Set expectations for post-procedure period and Allowed opportunity for questions and acknowledgement of understanding      Vitals:  Vitals Value Taken Time   BP     Temp     Pulse     Resp     SpO2         Electronically Signed By: TEDDY Whitman CRNA  March 29, 2023  10:18 AM

## 2023-03-29 NOTE — ANESTHESIA POSTPROCEDURE EVALUATION
Patient: Jackie Siddiqi    Procedure: Procedure(s):  COLONOSCOPY, WITH POLYPECTOMY       Anesthesia Type:  MAC    Note:  Disposition: Outpatient   Postop Pain Control: Uneventful            Sign Out: Well controlled pain   PONV: No   Neuro/Psych: Uneventful            Sign Out: Acceptable/Baseline neuro status   Airway/Respiratory: Uneventful            Sign Out: Acceptable/Baseline resp. status   CV/Hemodynamics: Uneventful            Sign Out: Acceptable CV status   Other NRE: NONE   DID A NON-ROUTINE EVENT OCCUR? No    Event details/Postop Comments:  Pt was happy with anesthesia care.  No complications.  I will follow up with the pt if needed.           Last vitals:  Vitals Value Taken Time   /65 03/29/23 1050   Temp     Pulse 68 03/29/23 1050   Resp     SpO2 98 % 03/29/23 1058   Vitals shown include unvalidated device data.    Electronically Signed By: TEDDY Whitman CRNA  March 29, 2023  11:05 AM

## 2023-03-29 NOTE — ANESTHESIA PREPROCEDURE EVALUATION
Anesthesia Pre-Procedure Evaluation    Patient: Jackie Siddiqi   MRN: [id number] : 1955        Procedure : Procedure(s):  COLONOSCOPY          Past Medical History:   Diagnosis Date     Abnormal Papanicolaou smear of cervix and cervical HPV     cryocautery     Breast cystic disease      Hiatal hernia      Hypothyroidism      Motion sickness      Nonsenile cataract      Peptic ulcer, unspecified site, unspecified as acute or chronic, without mention of hemorrhage, perforation, or obstruction     Peptic ulcer disease     Personal history of colonic polyps       Past Surgical History:   Procedure Laterality Date     CATARACT IOL, RT/LT       COLONOSCOPY  2007     COLONOSCOPY  2013    Procedure: COLONOSCOPY;  colonoscopy, Esophagoscopy, Gastroscopy, Duodenoscopy EGD, multiple biopsies;  Surgeon: Joe Benson MD;  Location:  GI     COLONOSCOPY N/A 2017    Procedure: COMBINED COLONOSCOPY, SINGLE OR MULTIPLE BIOPSY/POLYPECTOMY BY BIOPSY;  colonoscopy with polypectomy by biopsy;  Surgeon: Tolu No MD;  Location:  GI     COLONOSCOPY N/A 2020    Procedure: Colonoscopy with possible biopsy and/or polypectomy;  Surgeon: Obed Quick MD;  Location:  GI     ENDOSCOPY  2007    Sm hiatus hernia     ESOPHAGOSCOPY, GASTROSCOPY, DUODENOSCOPY (EGD), COMBINED  2013    Procedure: COMBINED ESOPHAGOSCOPY, GASTROSCOPY, DUODENOSCOPY (EGD), BIOPSY SINGLE OR MULTIPLE;  ESOPHAGOGASTRODUODENOSCOPY WITH MULTIPLE BIOPSIES;  Surgeon: Joe Benson MD;  Location: [facility]     MASTECTOMY, BILATERAL       PHACOEMULSIFICATION CLEAR CORNEA WITH STANDARD INTRAOCULAR LENS IMPLANT Left 2021    Procedure: LEFT PHACOEMULSIFICATION, CATARACT, WITH INTRAOCULAR LENS IMPLANT;  Surgeon: Foreign Snider MD;  Location: [facility] OR     PHACOEMULSIFICATION CLEAR CORNEA WITH STANDARD INTRAOCULAR LENS IMPLANT Right 2021    Procedure: RIGHT PHACOEMULSIFICATION, CATARACT, WITH INTRAOCULAR LENS  IMPLANT;  Surgeon: Foreign Snider MD;  Location: [facility] OR     REMOVE AND REPLACE BREAST IMPLANT PROSTHESIS Bilateral 2021    Procedure: Bilateral breast revision and implant exchange;  Surgeon: JENNIFFER Miner MD;  [place] [facility] [place]     [facility]  DELIVERY ONLY       x2     [facility] NONSPECIFIC PROCEDURE      Laparoscopic exam with adhesions     [facility] NONSPECIFIC PROCEDURE      Mastectomy for cystic breast disease, breast implants      Allergies   Allergen Reactions     No Known Drug Allergies       Social History     Tobacco Use     Smoking status: Former     Packs/day: 0.50     Years: 50.00     Pack years: 25.00     Types: Cigarettes     Quit date: 2019     Years since quitting: 3.5     Smokeless tobacco: Never     Tobacco comments:     1 pack per week, started age 11   Substance Use Topics     Alcohol use: No     Alcohol/week: 0.0 standard drinks     Comment: holidays only      Wt Readings from Last 1 Encounters:   23 59.4 kg (131 lb)        Anesthesia Evaluation   Pt has had prior anesthetic. Type: General.        ROS/MED HX  ENT/Pulmonary:  - neg pulmonary ROS   (+) COPD,     Neurologic:  - neg neurologic ROS     Cardiovascular:  - neg cardiovascular ROS   (+) Dyslipidemia -----Previous cardiac testing   Echo: Date: Results:    Stress Test: Date: Results:    ECG Reviewed: Date: 2021 Results:  Sinus Rhythm   WITHIN NORMAL LIMITS  Cath: Date: Results:      METS/Exercise Tolerance:     Hematologic:  - neg hematologic  ROS     Musculoskeletal:  - neg musculoskeletal ROS     GI/Hepatic:  - neg GI/hepatic ROS   (+) GERD, hiatal hernia,     Renal/Genitourinary:  - neg Renal ROS     Endo:  - neg endo ROS   (+) thyroid problem, hypothyroidism,     Psychiatric/Substance Use:  - neg psychiatric ROS     Infectious Disease:  - neg infectious disease ROS     Malignancy:  - neg malignancy ROS     Other:  - neg other ROS          Physical Exam    Airway  airway exam normal      Mallampati: II    TM distance: > 3 FB   Neck ROM: full   Mouth opening: > 3 cm    Respiratory Devices and Support         Dental           Cardiovascular          Rhythm and rate: regular and normal     Pulmonary   pulmonary exam normal        breath sounds clear to auscultation           OUTSIDE LABS:  CBC:   Lab Results   Component Value Date    WBC 6.8 01/26/2023    WBC 5.5 03/15/2022    HGB 13.6 01/26/2023    HGB 13.2 03/15/2022    HCT 41.2 01/26/2023    HCT 39.6 03/15/2022     01/26/2023     03/15/2022     BMP:   Lab Results   Component Value Date     01/26/2023     06/07/2021    POTASSIUM 3.5 01/26/2023    POTASSIUM 3.6 06/07/2021    CHLORIDE 103 01/26/2023    CHLORIDE 107 06/07/2021    CO2 26 01/26/2023    CO2 29 06/07/2021    BUN 12.4 01/26/2023    BUN 16 06/07/2021    CR 0.77 01/26/2023    CR 0.67 06/07/2021    GLC 74 01/26/2023    GLC 86 06/07/2021     COAGS:   Lab Results   Component Value Date    INR 1.01 04/19/2016     POC: No results found for: BGM, HCG, HCGS  HEPATIC:   Lab Results   Component Value Date    ALBUMIN 4.5 01/26/2023    PROTTOTAL 7.5 01/26/2023    ALT 16 01/26/2023    AST 20 01/26/2023    ALKPHOS 75 01/26/2023    BILITOTAL 0.9 01/26/2023     OTHER:   Lab Results   Component Value Date    PH 5.0 03/28/2003    A1C 5.6 02/12/2017    SALLY 8.9 01/26/2023    LIPASE 130 02/16/2017    AMYLASE 33 02/16/2017    TSH 2.74 01/26/2023    T4 1.24 01/26/2023    CRP <2.9 03/15/2022    SED 17 03/15/2022       Anesthesia Plan    ASA Status:  3   NPO Status:  NPO Appropriate    Anesthesia Type: MAC.     - Reason for MAC: straight local not clinically adequate   Induction: Intravenous, Propofol.   Maintenance: TIVA.        Consents    Anesthesia Plan(s) and associated risks, benefits, and realistic alternatives discussed. Questions answered and patient/representative(s) expressed understanding.    - Discussed:     - Discussed with:  Patient      - Extended Intubation/Ventilatory Support Discussed:  No.      - Patient is DNR/DNI Status: No    Use of blood products discussed: No .     Postoperative Care    Pain management: IV analgesics.        Comments:    Other Comments: The risks and benefits of anesthesia, and the alternatives where applicable, have been discussed with the patient, and they wish to proceed.            TEDDY August CRNA

## 2023-03-29 NOTE — LETTER
March 31, 2023      Jackiegwen Siddiqi  [address]  [place] [address]        Dear ,    We are writing to inform you of your test results.    Benign polyp removed.  A repeat exam in 5 yrs is suggested.      Resulted Orders   Surgical Pathology Exam   Result Value Ref Range    Case Report       Surgical Pathology Report                         Case: [id number]                                  Authorizing Provider:  Alex Frances MD        Collected:           03/29/2023 10:08 AM          Ordering Location:     [facility]          Received:            03/29/2023 11:54 AM                                 [facility]                                                          Pathologist:           Cesar Francisco MD PhD                                                      Specimen:    Large Intestine, Colon, Ascending                                                          Final Diagnosis       A. Colon, Ascending, polyp, polypectomy:  -Tubular adenoma, fragments  -Negative for high-grade dysplasia and malignancy.        Clinical Information       Procedure:  COLONOSCOPY, WITH POLYPECTOMY  Pre-op Diagnosis: Screen for colon cancer [Z12.11]  Post-op Diagnosis: Z12.11 - Screen for colon cancer [ICD-10-CM]      Gross Description       A(1). Large Intestine, Colon, Ascending, :  The specimen is received in formalin, labeled with the patient's name, medical record number and other identifying information and designated  ascending colon . It consists of 5  tan soft tissue fragments ranging from 0.2-0.5 cm. Entirely submitted in one cassette.   (Migdalia Mistry)      Microscopic Description       Microscopic examination was performed.        Performing Labs       The technical component of this testing was completed at [facility]      Case Images         If you have any questions or concerns, please call the clinic at the number  listed above.       Sincerely,      Alex Frances MD

## 2023-09-06 ENCOUNTER — NURSE TRIAGE (OUTPATIENT)
Dept: FAMILY MEDICINE | Facility: CLINIC | Age: 68
End: 2023-09-06
Payer: COMMERCIAL

## 2023-09-06 DIAGNOSIS — E03.9 HYPOTHYROIDISM, UNSPECIFIED TYPE: ICD-10-CM

## 2023-09-06 RX ORDER — LEVOTHYROXINE SODIUM 50 UG/1
50 TABLET ORAL DAILY
Qty: 30 TABLET | Refills: 0 | Status: SHIPPED | OUTPATIENT
Start: 2023-09-06 | End: 2023-10-03

## 2023-09-06 NOTE — TELEPHONE ENCOUNTER
S-(situation): Patient is calling and stated for approximately 1 month with frequent urination and discharge.    B-(background): Patient stated last time she had discharge of this color and had frequency, she had a UTI.  Patient stated she tried to treat at home with increased liquids and cranberry juice.    A-(assessment): Possible UTI    R-(recommendations): To be seen today.  Sent to Urgent care due to unavailable appointments in clinic.  Patient stated understanding.    Reason for Disposition   Urinating more frequently than usual (i.e., frequency)    Additional Information   Negative: Shock suspected (e.g., cold/pale/clammy skin, too weak to stand, low BP, rapid pulse)   Negative: Sounds like a life-threatening emergency to the triager   Negative: Followed a female genital area injury (e.g., labia, vagina, vulva)   Negative: Followed a male genital area injury (penis, scrotum)   Negative: Vaginal discharge   Negative: Pus (white, yellow) or bloody discharge from end of penis   Negative: Pain or burning with passing urine (urination) and pregnant   Negative: Pain or burning with passing urine (urination) and female   Negative: Pain or burning with passing urine (urination) and male   Negative: Pain or itching in the vulvar area   Negative: Pain in scrotum is main symptom   Negative: Blood in the urine is main symptom   Negative: Symptoms arising from use of a urinary catheter (e.g., Coude, Chao)   Negative: Unable to urinate (or only a few drops) > 4 hours and bladder feels very full (e.g., palpable bladder or strong urge to urinate)   Negative: Decreased urination and drinking very little and dehydration suspected (e.g., dark urine, no urine > 12 hours, very dry mouth, very lightheaded)   Negative: Patient sounds very sick or weak to the triager   Negative: Fever > 100.4 F  (38.0 C)   Negative: Side (flank) or lower back pain present   Negative: Bad or foul-smelling urine    Answer Assessment - Initial  "Assessment Questions  1. SYMPTOM: \"What's the main symptom you're concerned about?\" (e.g., frequency, incontinence)      Frequency, color, discharge (last time had UTI), abdominal all over pain and lower mid back pain.    2. ONSET: \"When did the  this  start?\"      Approximately 1 month for abdominal pain and discharge, has tried cranberry juice.  Back pain going on for approximately 2 weeks.    3. PAIN: \"Is there any pain?\" If Yes, ask: \"How bad is it?\" (Scale: 1-10; mild, moderate, severe)      Abdominal pain is now constant 4-5/10, back intermittent 6-7/10 (has treatment for chronic back pain.    4. CAUSE: \"What do you think is causing the symptoms?\"      UTI    5. OTHER SYMPTOMS: \"Do you have any other symptoms?\" (e.g., blood in urine, fever, flank pain, pain with urination)      No - lower abdominal irritated    6. PREGNANCY: \"Is there any chance you are pregnant?\" \"When was your last menstrual period?\"      Unknown    Protocols used: Urinary Symptoms-A-OH  Maritza Gonsalez RN    "

## 2023-10-02 NOTE — PATIENT INSTRUCTIONS
If you have any questions regarding your visit, Please contact your care team.     [employer]: [phone]    To Schedule an Appointment 24/7  Call: [phone] [facility] HOURS TELEPHONE NUMBER     Mike Craig MD  Medical Director    Rosalba Noonan-PHYLLIS Colvin-Surgery Scheduler  Katheryn-Surgery Scheduler               Tuesday-[place]  7:30 a.m-4:30 p.m    Thursday-[place]  7:30 a.m-4:30 p.m    Typical Surgery Days: Tuesday or Friday [facility] [place]  [address] [place] [address]  PH: [phone]     Imaging Scheduling all locations  PH: [phone]     [facility]  [address] [place] [address] [place] [address]  PH: [phone]    [facility]  [address] [place] [address]  PH: [phone] Fax      **Surgeries** Our Surgery Schedulers will contact you to schedule. If you do not receive a call within 3 business days, please call [phone].    Urgent Care locations:  [facility] Monday-Friday  10 am - 8 pm  Saturday and Sunday   9 am - 5 pm  Monday-Friday   10 am - 8 pm  Saturday and Sunday   9 am - 5 pm   [phone]   If you need a medication refill, please contact your pharmacy. Please allow 3 business days for your refill to be completed.  As always, Thank you for trusting us with your healthcare needs!    see additional instructions from your care team below

## 2023-10-03 DIAGNOSIS — E03.9 HYPOTHYROIDISM, UNSPECIFIED TYPE: ICD-10-CM

## 2023-10-03 RX ORDER — LEVOTHYROXINE SODIUM 50 UG/1
50 TABLET ORAL DAILY
Qty: 30 TABLET | Refills: 0 | Status: SHIPPED | OUTPATIENT
Start: 2023-10-03 | End: 2023-10-31

## 2023-10-03 NOTE — TELEPHONE ENCOUNTER
1 month supply refilled, please follow-up before med run out.  She needs a physical and general follow-up, 40 minutes.  Thank you

## 2023-10-04 ENCOUNTER — OFFICE VISIT (OUTPATIENT)
Dept: OBGYN | Facility: CLINIC | Age: 68
End: 2023-10-04
Payer: COMMERCIAL

## 2023-10-04 VITALS
SYSTOLIC BLOOD PRESSURE: 116 MMHG | HEART RATE: 66 BPM | OXYGEN SATURATION: 95 % | WEIGHT: 126.9 LBS | DIASTOLIC BLOOD PRESSURE: 66 MMHG | BODY MASS INDEX: 23.21 KG/M2

## 2023-10-04 DIAGNOSIS — N89.8 VAGINAL DISCHARGE: Primary | ICD-10-CM

## 2023-10-04 DIAGNOSIS — N90.4 LICHEN SCLEROSUS ET ATROPHICUS OF THE VULVA: ICD-10-CM

## 2023-10-04 LAB
CLUE CELLS: ABNORMAL
TRICHOMONAS, WET PREP: ABNORMAL
WBC'S/HIGH POWER FIELD, WET PREP: ABNORMAL
YEAST, WET PREP: ABNORMAL

## 2023-10-04 PROCEDURE — 99203 OFFICE O/P NEW LOW 30 MIN: CPT | Performed by: OBSTETRICS & GYNECOLOGY

## 2023-10-04 PROCEDURE — 87210 SMEAR WET MOUNT SALINE/INK: CPT | Performed by: OBSTETRICS & GYNECOLOGY

## 2023-10-04 RX ORDER — CIPROFLOXACIN 250 MG/1
TABLET, FILM COATED ORAL
COMMUNITY
Start: 2023-09-07 | End: 2023-11-02

## 2023-10-04 RX ORDER — CLOBETASOL PROPIONATE 0.5 MG/G
OINTMENT TOPICAL 2 TIMES DAILY
Qty: 15 G | Refills: 0 | Status: SHIPPED | OUTPATIENT
Start: 2023-10-04 | End: 2023-11-02

## 2023-10-04 NOTE — PROGRESS NOTES
Jackie is a 68 year old seen in referral from Urgent care.  She is complaining of vulvo-vaginal discomfort as well as discharge.  She had a negative urine and wet prep.  She was treated with Cipro for possible UTI, but her symptoms didn't resolve.    She does have a history of Lichen Sclerosis      ROS: Pertinent ROS as above.    Gyn Hx:      Past Medical History:   Diagnosis Date    Abnormal Papanicolaou smear of cervix and cervical HPV     cryocautery    Breast cystic disease     Hiatal hernia     Hypothyroidism     Motion sickness     Nonsenile cataract     Peptic ulcer, unspecified site, unspecified as acute or chronic, without mention of hemorrhage, perforation, or obstruction     Peptic ulcer disease    Personal history of colonic polyps      Past Surgical History:   Procedure Laterality Date    CATARACT IOL, RT/LT      COLONOSCOPY  07/16/2007    COLONOSCOPY  1/18/2013    Procedure: COLONOSCOPY;  colonoscopy, Esophagoscopy, Gastroscopy, Duodenoscopy EGD, multiple biopsies;  Surgeon: Joe Benson MD;  Location:  GI    COLONOSCOPY N/A 12/6/2017    Procedure: COMBINED COLONOSCOPY, SINGLE OR MULTIPLE BIOPSY/POLYPECTOMY BY BIOPSY;  colonoscopy with polypectomy by biopsy;  Surgeon: Tolu No MD;  Location:  GI    COLONOSCOPY N/A 6/24/2020    Procedure: Colonoscopy with possible biopsy and/or polypectomy;  Surgeon: Obed Quick MD;  Location:  GI    COLONOSCOPY N/A 3/29/2023    Procedure: COLONOSCOPY, WITH POLYPECTOMY;  Surgeon: Alex Frances MD;  Location:  GI    ENDOSCOPY  07/16/2007    Sm hiatus hernia    ESOPHAGOSCOPY, GASTROSCOPY, DUODENOSCOPY (EGD), COMBINED  1/18/2013    Procedure: COMBINED ESOPHAGOSCOPY, GASTROSCOPY, DUODENOSCOPY (EGD), BIOPSY SINGLE OR MULTIPLE;  ESOPHAGOGASTRODUODENOSCOPY WITH MULTIPLE BIOPSIES;  Surgeon: Joe Benson MD;  Location: [facility]    MASTECTOMY, BILATERAL      PHACOEMULSIFICATION CLEAR CORNEA WITH STANDARD INTRAOCULAR LENS IMPLANT Left 4/26/2021     Procedure: LEFT PHACOEMULSIFICATION, CATARACT, WITH INTRAOCULAR LENS IMPLANT;  Surgeon: Foreign Snider MD;  Location: [facility] OR    PHACOEMULSIFICATION CLEAR CORNEA WITH STANDARD INTRAOCULAR LENS IMPLANT Right 2021    Procedure: RIGHT PHACOEMULSIFICATION, CATARACT, WITH INTRAOCULAR LENS IMPLANT;  Surgeon: Foreign Snider MD;  Location: [facility] OR    REMOVE AND REPLACE BREAST IMPLANT PROSTHESIS Bilateral 2021    Procedure: Bilateral breast revision and implant exchange;  Surgeon: JENNIFFER Miner MD;  Location: [facility] OR    [facility]  DELIVERY ONLY       x2    [facility] NONSPECIFIC PROCEDURE      Laparoscopic exam with adhesions    [facility] NONSPECIFIC PROCEDURE      Mastectomy for cystic breast disease, breast implants         ALL/Meds: Her medication and allergy histories were reviewed and are documented in their appropriate chart areas.    SH: Reviewed and documented in the appropriate area of the chart.  FH:  Her family history is reviewed and updated in the chart, today.  PMH: Her past medical, surgical, and obstetric histories were reviewed and updated today in the appropriate chart areas.    PE: /66 (BP Location: Right arm, Patient Position: Chair, Cuff Size: Adult Regular)   Pulse 66   Wt 57.6 kg (126 lb 14.4 oz)   LMP 2003 (Approximate)   SpO2 95%   BMI 23.21 kg/m    Body mass index is 23.21 kg/m .    Pertinent Physical exam findings:    General Appearance:  healthy, alert, active, no distress    Abdomen: Benign, Soft, flat, non-tender, No masses, organomegaly, No inguinal nodes, and Bowel sounds normoactive.   Pelvic:       - Ext: Vulva and perineum are normal without lesion, mass or discharge.  There is point tenderness at the posterior forchette and inside the labia majora.  No significant scarring or loss of archetecture        - Urethra: normal without discharge or scarring no hypermobility       - Bladder: no tenderness, no masses       - Vagina:  atrophic and with white  and creamy discharge       - Cervix: normal    Wet prep:  negative      A/P:(N89.8) Vaginal discharge  (primary encounter diagnosis)  Comment:   Plan: Wet prep - Clinic Collect            (N90.4) Lichen sclerosus et atrophicus of the vulva  Comment:   Plan: clobetasol (TEMOVATE) 0.05 % external ointment        Follow up in 2-4 weeks, will then plan taper           - No orders of the defined types were placed in this encounter.

## 2023-10-28 ENCOUNTER — HEALTH MAINTENANCE LETTER (OUTPATIENT)
Age: 68
End: 2023-10-28

## 2023-10-31 DIAGNOSIS — E03.9 HYPOTHYROIDISM, UNSPECIFIED TYPE: ICD-10-CM

## 2023-10-31 RX ORDER — LEVOTHYROXINE SODIUM 50 UG/1
50 TABLET ORAL DAILY
Qty: 15 TABLET | Refills: 0 | Status: SHIPPED | OUTPATIENT
Start: 2023-10-31 | End: 2023-11-01

## 2023-11-01 ENCOUNTER — TELEPHONE (OUTPATIENT)
Dept: FAMILY MEDICINE | Facility: OTHER | Age: 68
End: 2023-11-01
Payer: COMMERCIAL

## 2023-11-01 RX ORDER — LEVOTHYROXINE SODIUM 50 UG/1
50 TABLET ORAL DAILY
Qty: 15 TABLET | Refills: 0 | Status: SHIPPED | OUTPATIENT
Start: 2023-11-01 | End: 2023-11-15

## 2023-11-01 NOTE — TELEPHONE ENCOUNTER
Patient scheduled for tomorrow AM for abdominal pain     Please triage     Rickie Carranza PA-C  [facility]

## 2023-11-01 NOTE — TELEPHONE ENCOUNTER
RN Triage    Patient Contact    Attempt # 1 and MyChart sent    RN did attempt to reach patient. No answer. Message left for patient to call the clinic back and ask to speak to a triage nurse.     Maritza Palmer RN on 11/1/2023 at 11:22 AM

## 2023-11-02 ENCOUNTER — ANCILLARY PROCEDURE (OUTPATIENT)
Dept: GENERAL RADIOLOGY | Facility: OTHER | Age: 68
End: 2023-11-02
Attending: PHYSICIAN ASSISTANT
Payer: COMMERCIAL

## 2023-11-02 ENCOUNTER — OFFICE VISIT (OUTPATIENT)
Dept: FAMILY MEDICINE | Facility: OTHER | Age: 68
End: 2023-11-02
Payer: COMMERCIAL

## 2023-11-02 VITALS
HEART RATE: 58 BPM | BODY MASS INDEX: 22.82 KG/M2 | HEIGHT: 62 IN | OXYGEN SATURATION: 95 % | WEIGHT: 124 LBS | TEMPERATURE: 98 F | DIASTOLIC BLOOD PRESSURE: 76 MMHG | RESPIRATION RATE: 20 BRPM | SYSTOLIC BLOOD PRESSURE: 130 MMHG

## 2023-11-02 DIAGNOSIS — R10.32 LLQ ABDOMINAL PAIN: ICD-10-CM

## 2023-11-02 DIAGNOSIS — E03.9 HYPOTHYROIDISM, UNSPECIFIED TYPE: ICD-10-CM

## 2023-11-02 DIAGNOSIS — E78.5 HYPERLIPIDEMIA LDL GOAL <160: ICD-10-CM

## 2023-11-02 DIAGNOSIS — R51.9 ACUTE NONINTRACTABLE HEADACHE, UNSPECIFIED HEADACHE TYPE: ICD-10-CM

## 2023-11-02 DIAGNOSIS — R30.0 DYSURIA: ICD-10-CM

## 2023-11-02 DIAGNOSIS — N30.01 ACUTE CYSTITIS WITH HEMATURIA: ICD-10-CM

## 2023-11-02 DIAGNOSIS — F51.04 PSYCHOPHYSIOLOGICAL INSOMNIA: ICD-10-CM

## 2023-11-02 DIAGNOSIS — K59.01 SLOW TRANSIT CONSTIPATION: ICD-10-CM

## 2023-11-02 DIAGNOSIS — Z00.00 ENCOUNTER FOR MEDICARE ANNUAL WELLNESS EXAM: Primary | ICD-10-CM

## 2023-11-02 LAB
ALBUMIN SERPL BCG-MCNC: 4.4 G/DL (ref 3.5–5.2)
ALBUMIN UR-MCNC: NEGATIVE MG/DL
ALP SERPL-CCNC: 73 U/L (ref 35–104)
ALT SERPL W P-5'-P-CCNC: 14 U/L (ref 0–50)
ANION GAP SERPL CALCULATED.3IONS-SCNC: 13 MMOL/L (ref 7–15)
APPEARANCE UR: CLEAR
AST SERPL W P-5'-P-CCNC: 20 U/L (ref 0–45)
BACTERIA #/AREA URNS HPF: ABNORMAL /HPF
BILIRUB SERPL-MCNC: 0.4 MG/DL
BILIRUB UR QL STRIP: NEGATIVE
BUN SERPL-MCNC: 11.5 MG/DL (ref 8–23)
CALCIUM SERPL-MCNC: 9.6 MG/DL (ref 8.8–10.2)
CHLORIDE SERPL-SCNC: 105 MMOL/L (ref 98–107)
CHOLEST SERPL-MCNC: 236 MG/DL
COLOR UR AUTO: YELLOW
CREAT SERPL-MCNC: 0.8 MG/DL (ref 0.51–0.95)
DEPRECATED HCO3 PLAS-SCNC: 26 MMOL/L (ref 22–29)
EGFRCR SERPLBLD CKD-EPI 2021: 79 ML/MIN/1.73M2
ERYTHROCYTE [DISTWIDTH] IN BLOOD BY AUTOMATED COUNT: 12.4 % (ref 10–15)
GLUCOSE SERPL-MCNC: 92 MG/DL (ref 70–99)
GLUCOSE UR STRIP-MCNC: NEGATIVE MG/DL
HCT VFR BLD AUTO: 41.1 % (ref 35–47)
HDLC SERPL-MCNC: 59 MG/DL
HGB BLD-MCNC: 13.4 G/DL (ref 11.7–15.7)
HGB UR QL STRIP: ABNORMAL
KETONES UR STRIP-MCNC: NEGATIVE MG/DL
LDLC SERPL CALC-MCNC: 137 MG/DL
LEUKOCYTE ESTERASE UR QL STRIP: ABNORMAL
MCH RBC QN AUTO: 29.7 PG (ref 26.5–33)
MCHC RBC AUTO-ENTMCNC: 32.6 G/DL (ref 31.5–36.5)
MCV RBC AUTO: 91 FL (ref 78–100)
NITRATE UR QL: NEGATIVE
NONHDLC SERPL-MCNC: 177 MG/DL
PH UR STRIP: 5.5 [PH] (ref 5–7)
PLATELET # BLD AUTO: 317 10E3/UL (ref 150–450)
POTASSIUM SERPL-SCNC: 3.8 MMOL/L (ref 3.4–5.3)
PROT SERPL-MCNC: 7.5 G/DL (ref 6.4–8.3)
RBC # BLD AUTO: 4.51 10E6/UL (ref 3.8–5.2)
RBC #/AREA URNS AUTO: ABNORMAL /HPF
SODIUM SERPL-SCNC: 144 MMOL/L (ref 135–145)
SP GR UR STRIP: 1.02 (ref 1–1.03)
SQUAMOUS #/AREA URNS AUTO: ABNORMAL /LPF
TRIGL SERPL-MCNC: 202 MG/DL
TSH SERPL DL<=0.005 MIU/L-ACNC: 3.74 UIU/ML (ref 0.3–4.2)
UROBILINOGEN UR STRIP-ACNC: 0.2 E.U./DL
WBC # BLD AUTO: 6 10E3/UL (ref 4–11)
WBC #/AREA URNS AUTO: ABNORMAL /HPF

## 2023-11-02 PROCEDURE — 81001 URINALYSIS AUTO W/SCOPE: CPT | Performed by: PHYSICIAN ASSISTANT

## 2023-11-02 PROCEDURE — G0439 PPPS, SUBSEQ VISIT: HCPCS | Performed by: PHYSICIAN ASSISTANT

## 2023-11-02 PROCEDURE — 85027 COMPLETE CBC AUTOMATED: CPT | Performed by: PHYSICIAN ASSISTANT

## 2023-11-02 PROCEDURE — 80053 COMPREHEN METABOLIC PANEL: CPT | Performed by: PHYSICIAN ASSISTANT

## 2023-11-02 PROCEDURE — 74019 RADEX ABDOMEN 2 VIEWS: CPT | Mod: TC | Performed by: RADIOLOGY

## 2023-11-02 PROCEDURE — 84443 ASSAY THYROID STIM HORMONE: CPT | Performed by: PHYSICIAN ASSISTANT

## 2023-11-02 PROCEDURE — 99214 OFFICE O/P EST MOD 30 MIN: CPT | Mod: 25 | Performed by: PHYSICIAN ASSISTANT

## 2023-11-02 PROCEDURE — 87086 URINE CULTURE/COLONY COUNT: CPT | Performed by: PHYSICIAN ASSISTANT

## 2023-11-02 PROCEDURE — 80061 LIPID PANEL: CPT | Performed by: PHYSICIAN ASSISTANT

## 2023-11-02 PROCEDURE — 36415 COLL VENOUS BLD VENIPUNCTURE: CPT | Performed by: PHYSICIAN ASSISTANT

## 2023-11-02 RX ORDER — NITROFURANTOIN 25; 75 MG/1; MG/1
100 CAPSULE ORAL 2 TIMES DAILY
Qty: 14 CAPSULE | Refills: 0 | Status: SHIPPED | OUTPATIENT
Start: 2023-11-02 | End: 2023-11-09

## 2023-11-02 RX ORDER — TRAZODONE HYDROCHLORIDE 50 MG/1
50-100 TABLET, FILM COATED ORAL AT BEDTIME
Qty: 60 TABLET | Refills: 1 | Status: SHIPPED | OUTPATIENT
Start: 2023-11-02 | End: 2024-02-05

## 2023-11-02 ASSESSMENT — ENCOUNTER SYMPTOMS
ABDOMINAL PAIN: 1
NAUSEA: 1
MYALGIAS: 1
HEADACHES: 1
CONSTIPATION: 1
WEAKNESS: 1
DIZZINESS: 1
HEMATURIA: 0
HEMATOCHEZIA: 0
NERVOUS/ANXIOUS: 1
BREAST MASS: 0
FREQUENCY: 1
CHILLS: 1
ARTHRALGIAS: 1

## 2023-11-02 ASSESSMENT — PAIN SCALES - GENERAL: PAINLEVEL: MILD PAIN (3)

## 2023-11-02 ASSESSMENT — PATIENT HEALTH QUESTIONNAIRE - PHQ9
SUM OF ALL RESPONSES TO PHQ QUESTIONS 1-9: 10
10. IF YOU CHECKED OFF ANY PROBLEMS, HOW DIFFICULT HAVE THESE PROBLEMS MADE IT FOR YOU TO DO YOUR WORK, TAKE CARE OF THINGS AT HOME, OR GET ALONG WITH OTHER PEOPLE: SOMEWHAT DIFFICULT
SUM OF ALL RESPONSES TO PHQ QUESTIONS 1-9: 10

## 2023-11-02 ASSESSMENT — ACTIVITIES OF DAILY LIVING (ADL): CURRENT_FUNCTION: NO ASSISTANCE NEEDED

## 2023-11-02 NOTE — RESULT ENCOUNTER NOTE
Please call patient with the following message    Patient has a UTI. I have sent a different antibiotic for her Macrobid - 1 tablet twice a day for 7 days.     Rickie Carranza PA-C

## 2023-11-02 NOTE — PROGRESS NOTES
The patient s PHQ-9 score is consistent with moderate depression. She was provided with information regarding depression and was advised to schedule a follow up appointment in *** weeks to further address this issue.

## 2023-11-02 NOTE — PATIENT INSTRUCTIONS
Bowel clean out   Start Fiber - as below   Trazodone - 1/2 to 2 tablets before bed   Consider starting probiotic - Aleksander's Colon [employer], [employer]   Recheck 4-6 weeks   Stop Ibuprofen   Trial of Tylenol for headaches - Extra Strength 500 mg, 1-2 capsules as needed for headache       Fiber Supplement  - Recommend fiber supplement   - Start with 1 of selected product (pills, gummies, tablets, etc.) at night, consistent time      Recommend Psyllium Husk Fiber      Increase every 2 weeks as tolerated until soft daily bowel movement       Miralax Clean Out  Start a clear liquid diet after breakfast.  A clear liquid diet consists of soda, juices without pulp, broth, Jell-O, Popsicles, Italian ice, hard candies (if age appropriate).  Pretty much anything you can see through!!  (NO dairy products or solid food.)    You will need:  32 or 64 oz. of flavored Pedialyte or Gatorade (See Below)  One 255 gram bottle of Miralax  2 or 3 bisacodyl (Dulcolax) tablets     These are all available without prescription.      Around 12 Noon on the day of the clean out, mix the entire container of Miralax (255 gr) in 64 oz. (or half a container in 32 ounces) of Pedialyte or  Gatorade. Leave this Miralax mixture in the refrigerator for one hour to help the Miralax dissolve, and to help the mixture taste better.  Note, the dose we re suggesting is for a bowel  cleanout.   It is not the dose that is written on the bottle, which is designed for daily softening of stool.  We need this higher dose so that the cleanout will work.    Drink 8-12 oz. of the MiraLax-electrolyte solution mixture every 15-20 minutes until the entire 64 oz mixture is consumed.  It is very important to drink all 64 oz of the MiraLax-electrolyte solution.   Within 30 min of finishing the MiraLax-electrolyte solution mixture, take the 3 bisacodyl (Dulcolax) tablets with 8-12 oz. of clear liquid (these tabs can be crushed).  (Note that the package instructions may  direct not to take more than two tablets at a time, but for this preparation take three).                   Patient Education   Personalized Prevention Plan  You are due for the preventive services outlined below.  Your care team is available to assist you in scheduling these services.  If you have already completed any of these items, please share that information with your care team to update in your medical record.  Health Maintenance Due   Topic Date Due    Breathing Capacity Test  Never done    COVID-19 Vaccine (1) Never done    Zoster (Shingles) Vaccine (1 of 2) Never done    RSV VACCINE 60+ (1 - 1-dose 60+ series) Never done    Pneumococcal Vaccine (2 - PCV) 05/29/2019    Cholesterol Lab  03/15/2023    LUNG CANCER SCREENING  06/06/2023    ANNUAL REVIEW OF HM ORDERS  07/07/2023    Flu Vaccine (1) Never done    Annual Wellness Visit  09/02/2023     Learning About Depression Screening  What is depression screening?  Depression screening is a way to see if you have depression symptoms. It may be done by a doctor or counselor. It's often part of a routine checkup. That's because your mental health is just as important as your physical health.  Depression is a mental health condition that affects how you feel, think, and act. You may:  Have less energy.  Lose interest in your daily activities.  Feel sad and grouchy for a long time.  Depression is very common. It affects people of all ages.  Many things can lead to depression. Some people become depressed after they have a stroke or find out they have a major illness like cancer or heart disease. The death of a loved one or a breakup may lead to depression. It can run in families. Most experts believe that a combination of inherited genes and stressful life events can cause it.  What happens during screening?  You may be asked to fill out a form about your depression symptoms. You and the doctor will discuss your answers. The doctor may ask you more questions to  "learn more about how you think, act, and feel.  What happens after screening?  If you have symptoms of depression, your doctor will talk to you about your options.  Doctors usually treat depression with medicines or counseling. Often, combining the two works best. Many people don't get help because they think that they'll get over the depression on their own. But people with depression may not get better unless they get treatment.  The cause of depression is not well understood. There may be many factors involved. But if you have depression, it's not your fault.  A serious symptom of depression is thinking about death or suicide. If you or someone you care about talks about this or about feeling hopeless, get help right away.  It's important to know that depression can be treated. Medicine, counseling, and self-care may help.  Where can you learn more?  Go to https://www.[facility] [employer] [facility].net/patiented  Enter T185 in the search box to learn more about \"Learning About Depression Screening.\"  Current as of: October 20, 2022               Content Version: 13.7    4734-6481 [employer].   Care instructions adapted under license by your healthcare professional. If you have questions about a medical condition or this instruction, always ask your healthcare professional. [employer] disclaims any warranty or liability for your use of this information.         "
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
## 2023-11-03 LAB — BACTERIA UR CULT: NORMAL

## 2023-11-03 NOTE — RESULT ENCOUNTER NOTE
Meryl Mejia    Your results were normal except cholesterol is high. Looks like it has been this high for awhile. I would recommend a visit with your PCP to discuss medication for this.     The results are attached for your review.       iRckie Carranza PA-C

## 2023-11-05 PROBLEM — F51.04 PSYCHOPHYSIOLOGICAL INSOMNIA: Status: ACTIVE | Noted: 2023-11-05

## 2023-11-05 PROBLEM — K59.01 SLOW TRANSIT CONSTIPATION: Status: ACTIVE | Noted: 2023-11-05

## 2023-11-06 ENCOUNTER — NURSE TRIAGE (OUTPATIENT)
Dept: FAMILY MEDICINE | Facility: CLINIC | Age: 68
End: 2023-11-06
Payer: COMMERCIAL

## 2023-11-06 NOTE — TELEPHONE ENCOUNTER
I have not seen her for over a year.  Looks like she has been seeing provider in [place] and her last appointment was actually only 4 days ago.  Please have that provider to address her concern if possible.  Otherwise have her to follow-up for further evaluation.  Thank you.

## 2023-11-06 NOTE — TELEPHONE ENCOUNTER
Patient is called and informed of this message.  Patient understands and agrees to this plan. RN reviewed red flag symptoms with patient and when to see emergency care. Patient agreed and understood.     Closing this encounter.    BARRETT TinajeroN, RN

## 2023-11-06 NOTE — TELEPHONE ENCOUNTER
Nurse Triage SBAR    Is this a 2nd Level Triage? YES, LICENSED PRACTITIONER REVIEW IS REQUIRED    Situation: Patient calling regarding itching on both feet, ankles, and legs that started a few days ago. Patient denies any rash or visible symptoms on body. She also notes that he has had a very dry mouth. Denies any difficulty breathing, but notes that about 2x a few days ago she felt like she was having a hard time swallowing. She explained that she was eating cereal and felt like it almost got stuck in her throat.     Patient took the Macrobid started 11/2, but has not taken a dose today. Total she has had would be 4 days worth.     Background: Patient saw Rickie Carranza on 11/2 and was prescribed Macrobid for UTI. Patient has never taken this medication and feels these symptoms are related.     Assessment: RN advised patient I would get a message to provider to see if patient should be seen or antibiotic stopped or changed.     Protocol Recommended Disposition:   See Today in Office, Callback by PCP today    Recommendation: Do you feel patient should be seen in office or UC/ED? Or if antibiotic should be changed or stopped?    FREEMAN Tinajero, RN       Routed to provider    Does the patient meet one of the following criteria for ADS visit consideration? 16+ years old, with an [facility] PCP     TIP  Providers, please consider if this condition is appropriate for management at one of our Acute and Diagnostic Services sites.     If patient is a good candidate, please use dotphrase <dot>triageresponse and select Refer to ADS to document.  Additional Information   Negative: Difficulty breathing or wheezing   Negative: Hoarseness or cough that started soon after 1st dose of drug   Negative: Swollen tongue that started soon after first dose of drug   Negative: Fever and purple or blood-colored spots or dots   Negative: Too weak or sick to stand   Negative: Sounds like a life-threatening emergency to the  triager   Hives or itching   Taking new prescription antibiotic (Exception: finished taking new prescription antibiotic)   Negative: Rash is only on 1 part of the body (localized)   Negative: Taking new non-prescription (OTC) antihistamine, decongestant, ear drops, eye drops, or other OTC cough/cold medicine   Negative: Taking new prescription antihistamine, allergy medicine, asthma medicine, eye drops, ear drops or nose drops   Negative: Rash started more than 3 days after stopping new prescription medicine   Negative: Swollen tongue   Negative: Fever   Negative: Face or lip swelling   Negative: Purple or blood-colored spots or dots (no fever and sounds well to triager)   Negative: Joint pain or swelling   Negative: Bloody crusts on lips or in mouth   Negative: Large or small blisters on skin (i.e., fluid filled bubbles or sacs)   Negative: Pregnant   Negative: Rash beginning within 4 hours of a new prescription medication   Negative: Widespread hives and onset < 2 hours of exposure to 1st dose of drug   Negative: Patient sounds very sick or weak to the triager    Protocols used: Rash - Widespread While On Drugs-A-[place]

## 2023-11-06 NOTE — TELEPHONE ENCOUNTER
Unlikely to be from the Macrobid as these are not known reported side effects.     However, since it seems she has gotten 3 days in, ok to discontinue medication.    Rickie Carranza PA-C  [facility]

## 2023-11-07 ENCOUNTER — NURSE TRIAGE (OUTPATIENT)
Dept: FAMILY MEDICINE | Facility: CLINIC | Age: 68
End: 2023-11-07
Payer: COMMERCIAL

## 2023-11-07 NOTE — TELEPHONE ENCOUNTER
"Spoke with patient.  Was seen on 11/02/2023 was given Macrobid for UTI.  Noticed a few symptoms itching, dry mouth sensation of food getting stuck and was advised to stop it.  Now that last couple days notices a rash on her knees down and on both legs that is skin colored.  Now today on forearms.     Was \"sick\" over the weekend.  Night sweats and possible feer and intense weakness.  Also noticed tastes are different, but those symptoms seem better. Never checked for COVID.     Advised that she should be seen.  Could try an OTC anti-itch cream or benadryl to help with symptoms.  If bothersome could go to [facility].  Declined would like to be seen in clinic.  Appointment but will try OTC.      Connor Bianchi, MSN, APRN, PHN, FNP-C  [facility] ~ Registered Nurse  Clinic Triage ~ [facility]  November 7, 2023    Reason for Disposition   Widespread itching and cause unknown and present > 48 hours    Additional Information   Negative: Life-threatening reaction (anaphylaxis) in the past to similar substance (e.g., food, insect bite/sting, chemical, etc.) and < 2 hours since exposure   Negative: Difficulty breathing or wheezing   Negative: Difficulty swallowing or slurred speech and sudden onset   Negative: Sounds like a life-threatening emergency to the triager   Negative: Insect bites suspected   Negative: Hives suspected (urticaria, itchy pink bumps with pale centers that come and go over minutes to hours)   Negative: Widespread rash also present   Negative: Itching in just one area or spot   Negative: Patient sounds very sick or weak to the triager   Negative: Patient wants to be seen   Negative: MILD widespread itching AND pregnant   Negative: Hand or foot itching AND pregnant   Negative: Taking prescription medication that could cause itching (e.g., codeine/morphine/other opiates, aspirin)   Negative: MODERATE-SEVERE widespread itching (i.e., interferes with sleep, normal activities or school) and not " improved after 24 hours of itching Care Advice   Negative: MODERATE-SEVERE widespread itching (i.e., interferes with sleep, normal activities or school) AND pregnant    Protocols used: Itching - Widespread-A-OH

## 2023-11-14 DIAGNOSIS — E03.9 HYPOTHYROIDISM, UNSPECIFIED TYPE: ICD-10-CM

## 2023-11-15 RX ORDER — LEVOTHYROXINE SODIUM 50 UG/1
50 TABLET ORAL DAILY
Qty: 90 TABLET | Refills: 1 | Status: SHIPPED | OUTPATIENT
Start: 2023-11-15 | End: 2024-08-21

## 2023-11-28 ENCOUNTER — TELEPHONE (OUTPATIENT)
Dept: OPHTHALMOLOGY | Facility: CLINIC | Age: 68
End: 2023-11-28
Payer: COMMERCIAL

## 2023-11-28 NOTE — TELEPHONE ENCOUNTER
Spoke with patient - she woke on 11-22-23 with large renetta colored spots in the vision of her left eye which remained for half of the day, but resolved after she took a nap.  Since then, she has noticed a small, blurry, mobile area, in her left eye, but only when she does quick eye movement.  Overall, she feels her vision has decreased slightly.    Last appointment with Dr. Snider:  2021.    Appointment scheduled for tomorrow at 3:20 for a pressure check, dilation.

## 2023-11-28 NOTE — TELEPHONE ENCOUNTER
[facility] Call Center    Phone Message    May a detailed message be left on voicemail: yes     Reason for Call: Symptoms or Concerns     If patient has red-flag symptoms, warm transfer to triage line    Current symptom or concern: Pt reports that about 3-4 days ago she noticed floaters and possibly flashers in the Lt eye, which have since stopped. She also states that she has some blurriness occasionally which only seems to be present if she's moving her eyes side to side.    Symptoms have been present for:  Few day(s)    Has patient previously been seen for this? No    By : Dr. Snider    Date: 6/3/21    Are there any new or worsening symptoms? Yes: New floaters, possible flashers, blurriness.    Action Taken: Message routed to:  Other: [facility]    Travel Screening: Not Applicable

## 2023-11-29 ENCOUNTER — OFFICE VISIT (OUTPATIENT)
Dept: OPHTHALMOLOGY | Facility: CLINIC | Age: 68
End: 2023-11-29
Payer: COMMERCIAL

## 2023-11-29 DIAGNOSIS — H43.812 POSTERIOR VITREOUS DETACHMENT OF LEFT EYE: Primary | ICD-10-CM

## 2023-11-29 PROCEDURE — 92014 COMPRE OPH EXAM EST PT 1/>: CPT | Performed by: STUDENT IN AN ORGANIZED HEALTH CARE EDUCATION/TRAINING PROGRAM

## 2023-11-29 ASSESSMENT — TONOMETRY
OS_IOP_MMHG: 18
OD_IOP_MMHG: 20
IOP_METHOD: APPLANATION

## 2023-11-29 ASSESSMENT — CUP TO DISC RATIO
OD_RATIO: 0.3
OS_RATIO: 0.3

## 2023-11-29 ASSESSMENT — EXTERNAL EXAM - LEFT EYE: OS_EXAM: NORMAL

## 2023-11-29 ASSESSMENT — VISUAL ACUITY
OS_SC: 20/20
METHOD: SNELLEN - LINEAR
OD_PH_SC+: -3
OD_PH_SC: 20/20
OD_SC+: +2
OD_SC: 20/30

## 2023-11-29 ASSESSMENT — SLIT LAMP EXAM - LIDS
COMMENTS: 1+ DERMATOCHALASIS
COMMENTS: 1+ DERMATOCHALASIS

## 2023-11-29 ASSESSMENT — EXTERNAL EXAM - RIGHT EYE: OD_EXAM: NORMAL

## 2023-11-29 NOTE — PATIENT INSTRUCTIONS
Signs and symptoms of retinal detachment (shower of black floaters, frequent flashes of light, curtain over part of visual field) discussed.    Foreign Snider MD  [phone]

## 2023-11-29 NOTE — LETTER
11/29/2023         RE: Jackie Siddiqi  [address]  [facility] [address]        Dear Colleague,    Thank you for referring your patient, Jackie Siddiqi, to the [facility]. Please see a copy of my visit note below.     Current Eye Medications:  none.      Subjective:  The patient woke on 11-22-23 with large renetta-colored spots in the vision of her left eye which remained for half of the day, but resolved after she took a nap.  Since then, she has noticed a small, blurry, mobile area, in her left eye, but only when she does quick eye movement.  Overall, she feels her vision has decreased slightly.  Currently she wears over-the-counter readers, only.       Objective:  See Ophthalmology Exam.       Assessment:  Jackie Siddiqi is a 68 year old female who presents with:   Encounter Diagnosis   Name Primary?     Posterior vitreous detachment of left eye Yes       Plan:  Signs and symptoms of retinal detachment (shower of black floaters, frequent flashes of light, curtain over part of visual field) discussed.    Foreign Snider MD  [phone]     Again, thank you for allowing me to participate in the care of your patient.        Sincerely,        Foreign Snider MD

## 2023-11-29 NOTE — PROGRESS NOTES
Current Eye Medications:  none.      Subjective:  The patient woke on 11-22-23 with large renetta-colored spots in the vision of her left eye which remained for half of the day, but resolved after she took a nap.  Since then, she has noticed a small, blurry, mobile area, in her left eye, but only when she does quick eye movement.  Overall, she feels her vision has decreased slightly.  Currently she wears over-the-counter readers, only.       Objective:  See Ophthalmology Exam.       Assessment:  Jackie Siddiqi is a 68 year old female who presents with:   Encounter Diagnosis   Name Primary?    Posterior vitreous detachment of left eye Yes       Plan:  Signs and symptoms of retinal detachment (shower of black floaters, frequent flashes of light, curtain over part of visual field) discussed.    Foreign Snider MD  [phone]

## 2024-02-05 DIAGNOSIS — F51.04 PSYCHOPHYSIOLOGICAL INSOMNIA: ICD-10-CM

## 2024-02-05 RX ORDER — TRAZODONE HYDROCHLORIDE 50 MG/1
50-100 TABLET, FILM COATED ORAL AT BEDTIME
Qty: 60 TABLET | Refills: 5 | Status: SHIPPED | OUTPATIENT
Start: 2024-02-05

## 2024-04-19 ENCOUNTER — NURSE TRIAGE (OUTPATIENT)
Dept: FAMILY MEDICINE | Facility: CLINIC | Age: 69
End: 2024-04-19
Payer: COMMERCIAL

## 2024-04-19 ENCOUNTER — ANCILLARY PROCEDURE (OUTPATIENT)
Dept: GENERAL RADIOLOGY | Facility: OTHER | Age: 69
End: 2024-04-19
Attending: FAMILY MEDICINE
Payer: COMMERCIAL

## 2024-04-19 ENCOUNTER — OFFICE VISIT (OUTPATIENT)
Dept: FAMILY MEDICINE | Facility: OTHER | Age: 69
End: 2024-04-19
Payer: COMMERCIAL

## 2024-04-19 VITALS
SYSTOLIC BLOOD PRESSURE: 90 MMHG | RESPIRATION RATE: 20 BRPM | OXYGEN SATURATION: 91 % | BODY MASS INDEX: 21.7 KG/M2 | HEART RATE: 78 BPM | TEMPERATURE: 99.2 F | DIASTOLIC BLOOD PRESSURE: 50 MMHG | WEIGHT: 119 LBS

## 2024-04-19 DIAGNOSIS — J44.1 COPD EXACERBATION (H): Primary | ICD-10-CM

## 2024-04-19 DIAGNOSIS — R05.2 SUBACUTE COUGH: ICD-10-CM

## 2024-04-19 PROBLEM — H25.812 COMBINED FORM OF AGE-RELATED CATARACT, LEFT EYE: Status: RESOLVED | Noted: 2021-02-09 | Resolved: 2024-04-19

## 2024-04-19 PROBLEM — H25.811 COMBINED FORMS OF AGE-RELATED CATARACT OF RIGHT EYE: Status: RESOLVED | Noted: 2021-02-09 | Resolved: 2024-04-19

## 2024-04-19 PROBLEM — T85.44XS CAPSULAR CONTRACTURE OF BREAST IMPLANT, SEQUELA: Status: RESOLVED | Noted: 2021-03-30 | Resolved: 2024-04-19

## 2024-04-19 LAB
ANION GAP SERPL CALCULATED.3IONS-SCNC: 11 MMOL/L (ref 7–15)
BUN SERPL-MCNC: 14.1 MG/DL (ref 8–23)
CALCIUM SERPL-MCNC: 8.9 MG/DL (ref 8.8–10.2)
CHLORIDE SERPL-SCNC: 100 MMOL/L (ref 98–107)
CREAT SERPL-MCNC: 0.84 MG/DL (ref 0.51–0.95)
DEPRECATED HCO3 PLAS-SCNC: 26 MMOL/L (ref 22–29)
EGFRCR SERPLBLD CKD-EPI 2021: 74 ML/MIN/1.73M2
ERYTHROCYTE [DISTWIDTH] IN BLOOD BY AUTOMATED COUNT: 12.6 % (ref 10–15)
FLUAV RNA SPEC QL NAA+PROBE: NEGATIVE
FLUBV RNA RESP QL NAA+PROBE: POSITIVE
GLUCOSE SERPL-MCNC: 93 MG/DL (ref 70–99)
HCT VFR BLD AUTO: 38.9 % (ref 35–47)
HGB BLD-MCNC: 12.9 G/DL (ref 11.7–15.7)
MCH RBC QN AUTO: 29.5 PG (ref 26.5–33)
MCHC RBC AUTO-ENTMCNC: 33.2 G/DL (ref 31.5–36.5)
MCV RBC AUTO: 89 FL (ref 78–100)
PLATELET # BLD AUTO: 201 10E3/UL (ref 150–450)
POTASSIUM SERPL-SCNC: 4.2 MMOL/L (ref 3.4–5.3)
RBC # BLD AUTO: 4.37 10E6/UL (ref 3.8–5.2)
RSV RNA SPEC NAA+PROBE: NEGATIVE
SARS-COV-2 RNA RESP QL NAA+PROBE: NEGATIVE
SODIUM SERPL-SCNC: 137 MMOL/L (ref 135–145)
WBC # BLD AUTO: 3.8 10E3/UL (ref 4–11)

## 2024-04-19 PROCEDURE — 85027 COMPLETE CBC AUTOMATED: CPT | Performed by: FAMILY MEDICINE

## 2024-04-19 PROCEDURE — 99214 OFFICE O/P EST MOD 30 MIN: CPT | Performed by: FAMILY MEDICINE

## 2024-04-19 PROCEDURE — 87637 SARSCOV2&INF A&B&RSV AMP PRB: CPT | Performed by: FAMILY MEDICINE

## 2024-04-19 PROCEDURE — 80048 BASIC METABOLIC PNL TOTAL CA: CPT | Performed by: FAMILY MEDICINE

## 2024-04-19 PROCEDURE — 71046 X-RAY EXAM CHEST 2 VIEWS: CPT | Mod: TC | Performed by: RADIOLOGY

## 2024-04-19 PROCEDURE — 36415 COLL VENOUS BLD VENIPUNCTURE: CPT | Performed by: FAMILY MEDICINE

## 2024-04-19 RX ORDER — RESPIRATORY SYNCYTIAL VIRUS VACCINE 120MCG/0.5
0.5 KIT INTRAMUSCULAR ONCE
Qty: 1 EACH | Refills: 0 | Status: CANCELLED | OUTPATIENT
Start: 2024-04-19 | End: 2024-04-19

## 2024-04-19 RX ORDER — AZITHROMYCIN 250 MG/1
TABLET, FILM COATED ORAL
Qty: 6 TABLET | Refills: 0 | Status: SHIPPED | OUTPATIENT
Start: 2024-04-19

## 2024-04-19 RX ORDER — PREDNISONE 20 MG/1
20 TABLET ORAL DAILY
Qty: 5 TABLET | Refills: 0 | Status: SHIPPED | OUTPATIENT
Start: 2024-04-19 | End: 2024-04-24

## 2024-04-19 NOTE — TELEPHONE ENCOUNTER
Cough, runny nose, weakness, dizzy, headache.     Call transferred to triage to find appointment for patient.   She is having cold-like symptoms including cough, runny nose, headache. Also feeling more fatigued and weak.  Reports intermittent dizziness and shortness of breath with activity.     Appointment scheduled to be seen in clinic today. Instructed patient if she faints, is unable to walk without holding onto walls, chest pain, or shortness of breath at rest she needs to present to the ED. Patient expressed understanding.     Rossi MILLARD, RN     Reason for Disposition   MODERATE dizziness (e.g., interferes with normal activities)  (Exception: Dizziness caused by heat exposure, sudden standing, or poor fluid intake.)    Additional Information   Negative: SEVERE difficulty breathing (e.g., struggling for each breath, speaks in single words)   Negative: Shock suspected (e.g., cold/pale/clammy skin, too weak to stand, low BP, rapid pulse)   Negative: Difficult to awaken or acting confused (e.g., disoriented, slurred speech)   Negative: Fainted, and still feels dizzy afterwards   Negative: Overdose (accidental or intentional) of medications   Negative: New neurologic deficit that is present now: * Weakness of the face, arm, or leg on one side of the body * Numbness of the face, arm, or leg on one side of the body * Loss of speech or garbled speech   Negative: Heart beating < 50 beats per minute OR > 140 beats per minute   Negative: Sounds like a life-threatening emergency to the triager   Negative: Chest pain   Negative: Rectal bleeding, bloody stool, or tarry-black stool   Negative: Vomiting is main symptom   Negative: Diarrhea is main symptom   Negative: Headache is main symptom   Negative: Heat exhaustion suspected (i.e., dehydration from heat exposure)   Negative: Patient states that they are having an anxiety or panic attack   Negative: Dizziness from low blood sugar (i.e., < 60 mg/dl or 3.5 mmol/l)    Negative: SEVERE dizziness (e.g., unable to stand, requires support to walk, feels like passing out now)   Negative: SEVERE headache or neck pain   Negative: Spinning or tilting sensation (vertigo) present now and one or more stroke risk factors (i.e., hypertension, diabetes mellitus, prior stroke/TIA, heart attack, age over 60) (Exception: Prior physician evaluation for this AND no different/worse than usual.)   Negative: Neurologic deficit that was brief (now gone), ANY of the following:* Weakness of the face, arm, or leg on one side of the body* Numbness of the face, arm, or leg on one side of the body* Loss of speech or garbled speech   Negative: Loss of vision or double vision  (Exception: Similar to previous migraines.)   Negative: Extra heartbeats, irregular heart beating, or heart is beating very fast (i.e., 'palpitations')   Negative: Difficulty breathing   Negative: Drinking very little and dehydration suspected (e.g., no urine > 12 hours, very dry mouth, very lightheaded)   Negative: Follows bleeding (e.g., stomach, rectum, vagina)  (Exception: Became dizzy from sight of small amount blood.)   Negative: Patient sounds very sick or weak to the triager   Negative: Lightheadedness (dizziness) present now, after 2 hours of rest and fluids   Negative: Spinning or tilting sensation (vertigo) present now   Negative: Fever > 103 F (39.4 C)   Negative: Fever > 100.0 F (37.8 C) and has diabetes mellitus or a weak immune system (e.g., HIV positive, cancer chemotherapy, organ transplant, splenectomy, chronic steroids)    Protocols used: Dizziness-A-OH

## 2024-04-19 NOTE — PROGRESS NOTES
Assessment & Plan     COPD exacerbation (H)/Subacute cough  White blood count is slightly low and suspect possible viral infection.  No obvious infiltrate on chest x-ray, radiology review is pending.  Patient does have known COPD.  Will treat her for COPD exacerbation with Zithromax and prednisone.  Did discuss that she is out of the timeframe for treatment if her COVID or her flu is positive.  Discussed that if her RSV is positive this is symptomatic cares.    - azithromycin (ZITHROMAX) 250 MG tablet; Two tablets first day, then one tablet daily for four days.  - predniSONE (DELTASONE) 20 MG tablet; Take 1 tablet (20 mg) by mouth daily for 5 days  - XR Chest 2 Views; Future  - Symptomatic Influenza A/B, RSV, & SARS-CoV2 PCR (COVID-19) Nose  - CBC with platelets  - Basic metabolic panel  (Ca, Cl, CO2, Creat, Gluc, K, Na, BUN)            Sajan Mejia is a 68 year old, presenting for the following health issues:  Flu        4/19/2024    10:50 AM   Additional Questions   Roomed by audrey salmon     History of Present Illness       Reason for visit:  Flu like symptoms  Symptom onset:  1-2 weeks ago  Symptom intensity:  Moderate  Symptom progression:  Worsening  Had these symptoms before:  No  What makes it worse:  Activity  What makes it better:  Resting    She eats 2-3 servings of fruits and vegetables daily.She consumes 1 sweetened beverage(s) daily.She exercises with enough effort to increase her heart rate 9 or less minutes per day.  She exercises with enough effort to increase her heart rate 3 or less days per week.   She is taking medications regularly.     Triage encounter:   Cough, runny nose, weakness, dizzy, headache.      Call transferred to triage to find appointment for patient.   She is having cold-like symptoms including cough, runny nose, headache. Also feeling more fatigued and weak.  Reports intermittent dizziness and shortness of breath with activity.      Appointment scheduled to be seen in  clinic today. Instructed patient if she faints, is unable to walk without holding onto walls, chest pain, or shortness of breath at rest she needs to present to the ED. Patient expressed understanding.      Rossi MILLARD, RN      Patient states her symptoms started 8 days ago.  She states she has a cough, feels warm at times although she has not checked her temperature, fatigue, just not feeling well.  She thought she was improving and now she feels her symptoms have worsened again.  She did not do any COVID testing at home.  She does carry diagnosis of COPD but is not on any inhalers or nebulizers.      Objective    BP 90/50 (BP Location: Left arm, Patient Position: Sitting, Cuff Size: Adult Regular)   Pulse 78   Temp 99.2  F (37.3  C) (Temporal)   Resp 20   Wt 54 kg (119 lb)   LMP 03/26/2003 (Approximate)   SpO2 91%   BMI 21.70 kg/m    Body mass index is 21.7 kg/m .  Physical Exam   Gen: no apparent respiratory distress, although appears tired and has a dry and hacking cough  HENT: Ears normal. Throat and pharynx normal. Neck supple. No adenopathy or masses in the neck or supraclavicular regions. Sinuses non tender.   Chest: clear to auscultation without wheeze, rale or rhonchi  Cor: regular rate and rhythm without murmur  Ext: warm and dry without edema  Psych: Alert and oriented times 3; coherent speech, normal   rate and volume, able to articulate logical thoughts, able   to abstract reason, no tangential thoughts, no hallucinations   or delusions  Her affect is flat      CXR - Reviewed and interpreted by me Normal- no infiltrates, effusions, pneumothoraces, cardiomegaly or masses  awaiting formal interpretation from Radiologist at this time  Results for orders placed or performed in visit on 04/19/24 (from the past 24 hour(s))   CBC with platelets   Result Value Ref Range    WBC Count 3.8 (L) 4.0 - 11.0 10e3/uL    RBC Count 4.37 3.80 - 5.20 10e6/uL    Hemoglobin 12.9 11.7 - 15.7 g/dL    Hematocrit  38.9 35.0 - 47.0 %    MCV 89 78 - 100 fL    MCH 29.5 26.5 - 33.0 pg    MCHC 33.2 31.5 - 36.5 g/dL    RDW 12.6 10.0 - 15.0 %    Platelet Count 201 150 - 450 10e3/uL           Signed Electronically by: Radha Peres MD

## 2024-04-22 ENCOUNTER — NURSE TRIAGE (OUTPATIENT)
Dept: FAMILY MEDICINE | Facility: CLINIC | Age: 69
End: 2024-04-22
Payer: COMMERCIAL

## 2024-04-22 ENCOUNTER — TELEPHONE (OUTPATIENT)
Dept: FAMILY MEDICINE | Facility: CLINIC | Age: 69
End: 2024-04-22
Payer: COMMERCIAL

## 2024-04-22 NOTE — TELEPHONE ENCOUNTER
INCOMING FORMS    Sender: [employer]    Type of Form, letter or note (What is requested?): short term disability forms    How was the form received?: Fax    How should forms be returned?:  Fax : [phone]    Form placed in TC bin for review/signature if appropriate.

## 2024-04-22 NOTE — TELEPHONE ENCOUNTER
Nurse Triage SBAR    Is this a 2nd Level Triage? NO    Situation: Patient calling to ask about her lab results. Let patient know she was positive for Influenza B on Friday. She is out of the window for Tamiflu. Discussed symptoms, she is having a bad cough with some pain in her breastbone area only with coughing but no pain when at rest. No SOB, no chest pain or wheezing. No further fevers. She reports she is slowly getting better but still very tired and weak. She is eating and drinking okay.    Background: Influenza B diagnosed on 4/18/24.     Assessment: Patient denies SOB, wheezing or chest pain. Discussed home remedies and red flag symptoms. She agrees to plan and will call back if worsening.    Protocol Recommended Disposition:   Home Care    Recommendation:  Patient will manage symptoms at home. She knows when to call back for an appointment.    \    Does the patient meet one of the following criteria for ADS visit consideration? No    FREEMAN Huddleston, RN      Reason for Disposition   Influenza diagnosed by doctor (or NP/PA) and no complications    Additional Information   Negative: SEVERE difficulty breathing (e.g., struggling for each breath, speaks in single words)   Negative: Bluish (or gray) lips or face   Negative: Shock suspected (e.g., cold/pale/clammy skin, too weak to stand, low BP, rapid pulse)   Negative: Sounds like a life-threatening emergency to the triager   Negative: Asthma attack (coughing, wheezing) is main concern and previously diagnosed with asthma OR using asthma medicines   Negative: Chest pain  (Exception: MILD central chest pain, present only when coughing.)   Negative: Headache and stiff neck (can't touch chin to chest)   Negative: Difficulty breathing that is not severe and not relieved by cleaning out the nose   Negative: Patient sounds very sick or weak to the triager   Negative: Fever > 104 F (40 C)   Negative: Fever present > 3 days (72 hours)   Negative: Fever returns after  gone for over 24 hours and symptoms worse or not improved   Negative: Using nasal washes and pain medicine > 24 hours and sinus pain (around cheekbone or eye) persists   Negative: Earache   Negative: Patient wants to be seen   Negative: Nasal discharge present > 10 days   Negative: Cough present > 3 weeks    Protocols used: Influenza (Flu) Follow-Up Call-A-OH

## 2024-04-25 ENCOUNTER — TELEPHONE (OUTPATIENT)
Dept: FAMILY MEDICINE | Facility: CLINIC | Age: 69
End: 2024-04-25
Payer: COMMERCIAL

## 2024-04-25 NOTE — TELEPHONE ENCOUNTER
Reason for Call:  Other forms filled out    Detailed comments: pt called to let the doctor know forms were being sent over from [employer] to be filled out for short term leave. Start date needs to be 4/19 - the date pt saw Dr. Peres in clinic    Phone Number Patient can be reached at: Home number on file [phone]     Best Time: any    Can we leave a detailed message on this number? YES    Call taken on 4/25/2024 at 12:04 PM by Susanna Javed   Opzelura Counseling:  I discussed with the patient the risks of Opzelura including but not limited to nasopharngitis, bronchitis, ear infection, eosinophila, hives, diarrhea, folliculitis, tonsillitis, and rhinorrhea.  Taken orally, this medication has been linked to serious infections; higher rate of mortality; malignancy and lymphoproliferative disorders; major adverse cardiovascular events; thrombosis; thrombocytopenia, anemia, and neutropenia; and lipid elevations.

## 2024-04-25 NOTE — TELEPHONE ENCOUNTER
INCOMING FORMS    Sender: [employer]    Type of Form, letter or note (What is requested?): return to work forms    How was the form received?: Fax    How should forms be returned?:  Fax : [phone]    Form placed in [facility] bin for review/signature if appropriate.

## 2024-04-25 NOTE — TELEPHONE ENCOUNTER
Please send to Dr. Quinonez for filling out. Not sure why this was put in my in-basket.       TEDDY Dhillon CNP  Questions or concerns please feel free to send me a [employer] message or call me  Phone : [phone]

## 2024-05-21 ENCOUNTER — TRANSFERRED RECORDS (OUTPATIENT)
Dept: HEALTH INFORMATION MANAGEMENT | Facility: CLINIC | Age: 69
End: 2024-05-21
Payer: COMMERCIAL

## 2024-06-12 ENCOUNTER — HOSPITAL ENCOUNTER (EMERGENCY)
Facility: CLINIC | Age: 69
Discharge: HOME | End: 2024-06-12
Attending: FAMILY MEDICINE | Admitting: FAMILY MEDICINE
Payer: COMMERCIAL

## 2024-06-12 VITALS
TEMPERATURE: 98.3 F | RESPIRATION RATE: 18 BRPM | WEIGHT: 115 LBS | SYSTOLIC BLOOD PRESSURE: 150 MMHG | BODY MASS INDEX: 20.97 KG/M2 | HEART RATE: 71 BPM | OXYGEN SATURATION: 97 % | DIASTOLIC BLOOD PRESSURE: 73 MMHG

## 2024-06-12 DIAGNOSIS — S30.860A TICK BITE OF BACK, INITIAL ENCOUNTER: ICD-10-CM
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
## 2024-06-12 PROCEDURE — 99283 EMERGENCY DEPT VISIT LOW MDM: CPT | Performed by: FAMILY MEDICINE

## 2024-06-12 PROCEDURE — 250N000013 HC RX MED GY IP 250 OP 250 PS 637: Performed by: FAMILY MEDICINE

## 2024-06-12 PROCEDURE — 99283 EMERGENCY DEPT VISIT LOW MDM: CPT

## 2024-06-12 RX ORDER — DOXYCYCLINE 100 MG/1
200 CAPSULE ORAL ONCE
Status: COMPLETED | OUTPATIENT
Start: 2024-06-12 | End: 2024-06-12

## 2024-06-12 RX ADMIN — DOXYCYCLINE 200 MG: 100 CAPSULE ORAL at 22:46

## 2024-06-12 ASSESSMENT — COLUMBIA-SUICIDE SEVERITY RATING SCALE - C-SSRS
2. HAVE YOU ACTUALLY HAD ANY THOUGHTS OF KILLING YOURSELF IN THE PAST MONTH?: NO
6. HAVE YOU EVER DONE ANYTHING, STARTED TO DO ANYTHING, OR PREPARED TO DO ANYTHING TO END YOUR LIFE?: NO
1. IN THE PAST MONTH, HAVE YOU WISHED YOU WERE DEAD OR WISHED YOU COULD GO TO SLEEP AND NOT WAKE UP?: NO

## 2024-06-12 ASSESSMENT — ENCOUNTER SYMPTOMS: WOUND: 1

## 2024-06-12 ASSESSMENT — ACTIVITIES OF DAILY LIVING (ADL): ADLS_ACUITY_SCORE: 33

## 2024-06-13 NOTE — ED PROVIDER NOTES
History     Chief Complaint   Patient presents with    Tick Bite     HPI  Jackie Siddiqi is a 69 year old female who presents emergency room with concerns of a tick bite to the right scapular area of her back.  She states that she was mowing grass earlier today and the grass was tall and she noticed several ticks on her.  One was stuck to the skin on her back and she pulled it off but now it has been somewhat tender and she noticed a slight red ring around it and is concerned about Lyme.  She describes the tick as being small and not consistent with a wood tick.  She is not sure exactly how long it had been attached to her skin.    Allergies:  Allergies   Allergen Reactions    No Known Drug Allergy        Problem List:    Patient Active Problem List    Diagnosis Date Noted    Slow transit constipation 11/05/2023     Priority: Medium    Psychophysiological insomnia 11/05/2023     Priority: Medium    Hx of bilateral mastectomy 03/09/2021     Priority: Medium    LLQ abdominal pain 03/09/2021     Priority: Medium    Diverticulosis of colon 04/06/2020     Priority: Medium    Chronic obstructive pulmonary disease, unspecified COPD type (H) 09/16/2019     Priority: Medium    Hypothyroidism, unspecified type 11/29/2016     Priority: Medium    Hyperlipidemia LDL goal <160 11/29/2016     Priority: Medium    Osteoarthritis of carpometacarpal joint of right thumb, unspecified osteoarthritis type 11/29/2016     Priority: Medium     IMO Regulatory Load OCT 2020      Lichen sclerosus et atrophicus of the vulva 01/09/2012     Priority: Medium    Esophageal reflux 05/16/2005     Priority: Medium        Past Medical History:    Past Medical History:   Diagnosis Date    Abnormal Papanicolaou smear of cervix and cervical HPV     Breast cystic disease     Hiatal hernia     Hypothyroidism     Motion sickness     Nonsenile cataract     Peptic ulcer, unspecified site, unspecified as acute or chronic, without mention of hemorrhage,  perforation, or obstruction     Personal history of colonic polyps        Past Surgical History:    Past Surgical History:   Procedure Laterality Date    CATARACT IOL, RT/LT      COLONOSCOPY  2007    COLONOSCOPY  2013    Procedure: COLONOSCOPY;  colonoscopy, Esophagoscopy, Gastroscopy, Duodenoscopy EGD, multiple biopsies;  Surgeon: Joe Benson MD;  Location: [facility]    COLONOSCOPY N/A 2017    Procedure: COMBINED COLONOSCOPY, SINGLE OR MULTIPLE BIOPSY/POLYPECTOMY BY BIOPSY;  colonoscopy with polypectomy by biopsy;  Surgeon: Tolu No MD;  Location: [facility]    COLONOSCOPY N/A 2020    Procedure: Colonoscopy with possible biopsy and/or polypectomy;  Surgeon: Obed Quick MD;  Location: [facility]    COLONOSCOPY N/A 3/29/2023    Procedure: COLONOSCOPY, WITH POLYPECTOMY;  Surgeon: Alex Frances MD;  Location:  GI    ENDOSCOPY  2007    Sm hiatus hernia    ESOPHAGOSCOPY, GASTROSCOPY, DUODENOSCOPY (EGD), COMBINED  2013    Procedure: COMBINED ESOPHAGOSCOPY, GASTROSCOPY, DUODENOSCOPY (EGD), BIOPSY SINGLE OR MULTIPLE;  ESOPHAGOGASTRODUODENOSCOPY WITH MULTIPLE BIOPSIES;  Surgeon: Joe Benson MD;  Location:  GI    MASTECTOMY, BILATERAL      PHACOEMULSIFICATION CLEAR CORNEA WITH STANDARD INTRAOCULAR LENS IMPLANT Left 2021    Procedure: LEFT PHACOEMULSIFICATION, CATARACT, WITH INTRAOCULAR LENS IMPLANT;  Surgeon: Foreign Snider MD;  Location: [facility] OR    PHACOEMULSIFICATION CLEAR CORNEA WITH STANDARD INTRAOCULAR LENS IMPLANT Right 2021    Procedure: RIGHT PHACOEMULSIFICATION, CATARACT, WITH INTRAOCULAR LENS IMPLANT;  Surgeon: Foreign Snider MD;  Location:  OR    REMOVE AND REPLACE BREAST IMPLANT PROSTHESIS Bilateral 2021    Procedure: Bilateral breast revision and implant exchange;  Surgeon: JENNIFFER Miner MD;  Location: [facility] OR    [facility]  DELIVERY ONLY       x2    [facility] NONSPECIFIC PROCEDURE      Laparoscopic exam with adhesions     ZZC NONSPECIFIC PROCEDURE      Mastectomy for cystic breast disease, breast implants       Family History:    Family History   Problem Relation Age of Onset    Diabetes Father     Hypertension Father     Alcohol/Drug Father     Eye Disorder Father     Obesity Father     Breast Cancer Mother     Osteoporosis Mother     Thyroid Disease Mother     Cancer Mother         Bladder    Cancer Sister         fallopian tube cancer    Obesity Sister     Alzheimer Disease Sister     Cancer Sister         Skin Cancer    Allergies Daughter     Cancer Maternal Grandmother         uterine cancer    Liver Disease Daughter         Biliary Atresia    Genitourinary Problems Brother     No Known Problems Daughter     Heart Disease Brother         Heart Murmur    Glaucoma Maternal Grandfather     Genitourinary Problems Brother         kidney disease (two brothers)    Macular Degeneration No family hx of        Social History:  Marital Status:  Single [1]  Social History     Tobacco Use    Smoking status: Former     Current packs/day: 0.00     Average packs/day: 0.5 packs/day for 50.0 years (25.0 ttl pk-yrs)     Types: Cigarettes     Start date: 1969     Quit date: 2019     Years since quittin.7    Smokeless tobacco: Never    Tobacco comments:     1 pack per week, started age 11   Vaping Use    Vaping status: Never Used   Substance Use Topics    Alcohol use: No     Alcohol/week: 0.0 standard drinks of alcohol     Comment: holidays only    Drug use: No        Medications:    azithromycin (ZITHROMAX) 250 MG tablet  Cholecalciferol (VITAMIN D3) 25 MCG (1000 UT) CAPS  clobetasol (TEMOVATE) 0.05 % external ointment  fish oil-omega-3 fatty acids 1000 MG capsule  Lactobacillus (PROBIOTIC ACIDOPHILUS PO)  levothyroxine (SYNTHROID/LEVOTHROID) 50 MCG tablet  traZODone (DESYREL) 50 MG tablet  VITAMIN K PO  zinc gluconate 50 MG tablet          Review of Systems   Skin:  Positive for wound (Right scapular area tick bite with tick removed  earlier today by the patient now with increased itching and redness around the site.).   All other systems reviewed and are negative.      Physical Exam   BP: (!) 150/73  Pulse: 71  Temp: 98.3  F (36.8  C)  Resp: 18  Weight: 52.2 kg (115 lb)  SpO2: 97 %      Physical Exam  Vitals and nursing note reviewed.   Musculoskeletal:        Back:          ED Course        Procedures              Critical Care time:  none               No results found for this or any previous visit (from the past 24 hour(s)).    Medications   doxycycline hyclate (VIBRAMYCIN) capsule 200 mg (200 mg Oral $Given 6/12/24 5249)       Assessments & Plan (with Medical Decision Making)  69-year-old to the ER secondary concerns of tick bite and possibly needing treatment for Lyme.  Exam findings consistent with a tick bite with localized immune response to the bite.  No signs of secondary infection at this time.  No target lesion noted.  Patient was offered prophylactic treatment for the tick bite and agreed to this.  She was given a dose of doxycycline 200 mg orally.  She was also given instructions on the care of the tick bite area.  Handouts were sent home with her discussing tick bites as well.  To return to the ER for increase or worsening of symptoms as needed.     I have reviewed the nursing notes.    I have reviewed the findings, diagnosis, plan and need for follow up with the patient.           Discharge Medication List as of 6/12/2024 10:43 PM          Final diagnoses:   Tick bite of back, initial encounter       6/12/2024   [facility] EMERGENCY DEPT       Joe Workman,   06/12/24 2538

## 2024-06-13 NOTE — ED TRIAGE NOTES
PT c/o tick bite to right shoulder that she removed last night. Redness present to area today.   Does have a 2nd tick on left neck line     Triage Assessment (Adult)       Row Name 06/12/24 1480          Triage Assessment    Airway WDL WDL        Respiratory WDL    Respiratory WDL WDL

## 2024-06-13 NOTE — DISCHARGE INSTRUCTIONS
Please read and follow the handout(s) instructions. Return, if needed, for increased or worsening symptoms and as directed by the handout(s).    He received a dose of doxycycline today.  This 1 dose should prevent development of Lyme disease associated with the tick that was embedded in your skin today.  The localized area of redness is a sign that your immune system is figured out the tick was there and is coming into clean up any infection and heal the area of the bite.  There is a medicine called Zyrtec that you can purchase without prescription that can help some with the itchiness.

## 2024-07-26 ENCOUNTER — OFFICE VISIT (OUTPATIENT)
Dept: FAMILY MEDICINE | Facility: OTHER | Age: 69
End: 2024-07-26
Payer: COMMERCIAL

## 2024-07-26 ENCOUNTER — ANCILLARY PROCEDURE (OUTPATIENT)
Dept: GENERAL RADIOLOGY | Facility: OTHER | Age: 69
End: 2024-07-26
Attending: NURSE PRACTITIONER
Payer: COMMERCIAL

## 2024-07-26 VITALS
OXYGEN SATURATION: 95 % | HEART RATE: 60 BPM | TEMPERATURE: 97.6 F | HEIGHT: 62 IN | DIASTOLIC BLOOD PRESSURE: 70 MMHG | SYSTOLIC BLOOD PRESSURE: 130 MMHG | WEIGHT: 116.5 LBS | BODY MASS INDEX: 21.44 KG/M2 | RESPIRATION RATE: 20 BRPM

## 2024-07-26 DIAGNOSIS — M25.552 HIP PAIN, LEFT: ICD-10-CM

## 2024-07-26 DIAGNOSIS — M16.10 HIP ARTHRITIS: ICD-10-CM

## 2024-07-26 DIAGNOSIS — R10.32 LLQ ABDOMINAL PAIN: ICD-10-CM

## 2024-07-26 DIAGNOSIS — M25.552 HIP PAIN, LEFT: Primary | ICD-10-CM

## 2024-07-26 PROBLEM — K44.9 DIAPHRAGMATIC HERNIA: Status: ACTIVE | Noted: 2024-05-21

## 2024-07-26 PROBLEM — K22.2 OBSTRUCTION OF ESOPHAGUS: Status: ACTIVE | Noted: 2024-01-30

## 2024-07-26 PROBLEM — K31.9 DISORDER OF FUNCTION OF STOMACH: Status: ACTIVE | Noted: 2024-01-30

## 2024-07-26 LAB
ALBUMIN SERPL BCG-MCNC: 4.2 G/DL (ref 3.5–5.2)
ALP SERPL-CCNC: 64 U/L (ref 40–150)
ALT SERPL W P-5'-P-CCNC: 16 U/L (ref 0–50)
ANION GAP SERPL CALCULATED.3IONS-SCNC: 9 MMOL/L (ref 7–15)
AST SERPL W P-5'-P-CCNC: 20 U/L (ref 0–45)
BASOPHILS # BLD AUTO: 0.1 10E3/UL (ref 0–0.2)
BASOPHILS NFR BLD AUTO: 1 %
BILIRUB SERPL-MCNC: 0.6 MG/DL
BUN SERPL-MCNC: 12.8 MG/DL (ref 8–23)
CALCIUM SERPL-MCNC: 9.8 MG/DL (ref 8.8–10.4)
CHLORIDE SERPL-SCNC: 105 MMOL/L (ref 98–107)
CREAT SERPL-MCNC: 0.82 MG/DL (ref 0.51–0.95)
EGFRCR SERPLBLD CKD-EPI 2021: 76 ML/MIN/1.73M2
EOSINOPHIL # BLD AUTO: 0.4 10E3/UL (ref 0–0.7)
EOSINOPHIL NFR BLD AUTO: 6 %
ERYTHROCYTE [DISTWIDTH] IN BLOOD BY AUTOMATED COUNT: 12.7 % (ref 10–15)
GLUCOSE SERPL-MCNC: 92 MG/DL (ref 70–99)
HCO3 SERPL-SCNC: 30 MMOL/L (ref 22–29)
HCT VFR BLD AUTO: 40.8 % (ref 35–47)
HGB BLD-MCNC: 13.4 G/DL (ref 11.7–15.7)
IMM GRANULOCYTES # BLD: 0 10E3/UL
IMM GRANULOCYTES NFR BLD: 0 %
LYMPHOCYTES # BLD AUTO: 3 10E3/UL (ref 0.8–5.3)
LYMPHOCYTES NFR BLD AUTO: 43 %
MCH RBC QN AUTO: 30.2 PG (ref 26.5–33)
MCHC RBC AUTO-ENTMCNC: 32.8 G/DL (ref 31.5–36.5)
MCV RBC AUTO: 92 FL (ref 78–100)
MONOCYTES # BLD AUTO: 0.5 10E3/UL (ref 0–1.3)
MONOCYTES NFR BLD AUTO: 7 %
NEUTROPHILS # BLD AUTO: 2.9 10E3/UL (ref 1.6–8.3)
NEUTROPHILS NFR BLD AUTO: 42 %
PLATELET # BLD AUTO: 288 10E3/UL (ref 150–450)
POTASSIUM SERPL-SCNC: 4.2 MMOL/L (ref 3.4–5.3)
PROT SERPL-MCNC: 7.2 G/DL (ref 6.4–8.3)
RBC # BLD AUTO: 4.43 10E6/UL (ref 3.8–5.2)
SODIUM SERPL-SCNC: 144 MMOL/L (ref 135–145)
WBC # BLD AUTO: 6.8 10E3/UL (ref 4–11)

## 2024-07-26 PROCEDURE — 73502 X-RAY EXAM HIP UNI 2-3 VIEWS: CPT | Mod: TC | Performed by: RADIOLOGY

## 2024-07-26 PROCEDURE — 99214 OFFICE O/P EST MOD 30 MIN: CPT | Performed by: NURSE PRACTITIONER

## 2024-07-26 PROCEDURE — 80053 COMPREHEN METABOLIC PANEL: CPT | Performed by: NURSE PRACTITIONER

## 2024-07-26 PROCEDURE — 36415 COLL VENOUS BLD VENIPUNCTURE: CPT | Performed by: NURSE PRACTITIONER

## 2024-07-26 PROCEDURE — 85025 COMPLETE CBC W/AUTO DIFF WBC: CPT | Performed by: NURSE PRACTITIONER

## 2024-07-26 RX ORDER — CLOBETASOL PROPIONATE 0.5 MG/G
OINTMENT TOPICAL
Qty: 60 G | Refills: 0 | Status: CANCELLED | OUTPATIENT
Start: 2024-07-26

## 2024-07-26 RX ORDER — ASCORBIC ACID 500 MG
500 TABLET ORAL DAILY
COMMUNITY
Start: 2023-11-21

## 2024-07-26 RX ORDER — PNV NO.95/FERROUS FUM/FOLIC AC 28MG-0.8MG
1 TABLET ORAL DAILY
COMMUNITY
Start: 2023-11-21

## 2024-07-26 RX ORDER — TRETINOIN 0.5 MG/G
CREAM TOPICAL AT BEDTIME
COMMUNITY
Start: 2023-10-31

## 2024-07-26 RX ORDER — MULTIVITAMIN WITH IRON
1 TABLET ORAL DAILY
COMMUNITY
Start: 2023-11-21

## 2024-07-26 RX ORDER — VITAMIN E 268 MG
1 CAPSULE ORAL DAILY
COMMUNITY
Start: 2023-11-21

## 2024-07-26 RX ORDER — SUCRALFATE ORAL 1 G/10ML
10 SUSPENSION ORAL EVERY 6 HOURS
COMMUNITY
Start: 2024-01-30

## 2024-07-26 ASSESSMENT — PAIN SCALES - GENERAL: PAINLEVEL: MILD PAIN (2)

## 2024-07-26 NOTE — PROGRESS NOTES
Assessment & Plan     Hip pain, left  Patient notes that she has pain that starts in the groin and goes into the outer left hip and down the leg. Has pain with sitting and changing positions. No noticeable pain with rotation of the hip, more tightening during exam.   Recommend xray and then follow up with orthopedic 1  - XR Hip Left 2-3 Views; Future  - Orthopedic  Referral; Future    LLQ abdominal pain  Has had imaging for this and worked with PCP and GI provider for this.   Notes that she has had this ongoing for months and is not sure if this is related to hip or her chronic bowel concerns.   - CBC with platelets and differential; Future  - Comprehensive metabolic panel (BMP + Alb, Alk Phos, ALT, AST, Total. Bili, TP); Future  - CBC with platelets and differential  - Comprehensive metabolic panel (BMP + Alb, Alk Phos, ALT, AST, Total. Bili, TP)    Hip arthritis    - Orthopedic  Referral; Future      Advised labs today and if changes in her pain go in to be evaluated.   Could consider repeating CT from December, pain was same back then. This showed cholelithiasis and diverticulosis without diverticulitis. Discussed bland diet, CBC today. Given this pain is ongoing I am less concerned about diverticulitis. CBC is normal.     The patient indicates understanding of these issues and agrees with the plan.      See Patient Instructions    Subjective   Jackie is a 69 year old, presenting for the following health issues:  Musculoskeletal Problem (Left Hip)      7/26/2024     7:52 AM   Additional Questions   Roomed by Karrie HOLT     History of Present Illness       Reason for visit:  Left Hip pain  Symptom onset:  1-2 weeks ago  Symptoms include:  Pain in left side hip and sometimes traveling rachel  Symptom intensity:  Mild  Symptom progression:  Worsening  Had these symptoms before:  No  What makes it worse:  Getting up from a sitting position  What makes it better:  Walking    She eats 0-1 servings of  "fruits and vegetables daily.She consumes 2 sweetened beverage(s) daily.She exercises with enough effort to increase her heart rate 20 to 29 minutes per day.  She exercises with enough effort to increase her heart rate 3 or less days per week. She is missing 2 dose(s) of medications per week.         Review of Systems  Constitutional, HEENT, cardiovascular, pulmonary, gi and gu systems are negative, except as otherwise noted.      Objective    /70   Pulse 60   Temp 97.6  F (36.4  C) (Temporal)   Resp 20   Ht 1.577 m (5' 2.09\")   Wt 52.8 kg (116 lb 8 oz)   LMP 03/26/2003 (Approximate)   SpO2 95%   BMI 21.25 kg/m    Body mass index is 21.25 kg/m .  Physical Exam  Abdominal:      Tenderness: There is abdominal tenderness in the left lower quadrant.   Musculoskeletal:      Left hip: Tenderness and bony tenderness present. No deformity or crepitus. Normal range of motion.      Comments: Adduction and abduction movement of left hip did not exhibit groin pain or extreme pains.             Results for orders placed or performed in visit on 07/26/24   XR Hip Left 2-3 Views     Status: None    Narrative    EXAM: XR HIP LEFT 2-3 VIEWS  DATE/TIME: 7/26/2024 8:40 AM    INDICATION: Hip pain, left  COMPARISON: None available.       Impression    IMPRESSION: Mild degenerative arthrosis of the left hip. No acute  fracture.    SAWYER CAMEJO DO         SYSTEM ID:  [id number]   Results for orders placed or performed in visit on 07/26/24   CBC with platelets and differential     Status: None   Result Value Ref Range    WBC Count 6.8 4.0 - 11.0 10e3/uL    RBC Count 4.43 3.80 - 5.20 10e6/uL    Hemoglobin 13.4 11.7 - 15.7 g/dL    Hematocrit 40.8 35.0 - 47.0 %    MCV 92 78 - 100 fL    MCH 30.2 26.5 - 33.0 pg    MCHC 32.8 31.5 - 36.5 g/dL    RDW 12.7 10.0 - 15.0 %    Platelet Count 288 150 - 450 10e3/uL    % Neutrophils 42 %    % Lymphocytes 43 %    % Monocytes 7 %    % Eosinophils 6 %    % Basophils 1 %    % Immature " Granulocytes 0 %    Absolute Neutrophils 2.9 1.6 - 8.3 10e3/uL    Absolute Lymphocytes 3.0 0.8 - 5.3 10e3/uL    Absolute Monocytes 0.5 0.0 - 1.3 10e3/uL    Absolute Eosinophils 0.4 0.0 - 0.7 10e3/uL    Absolute Basophils 0.1 0.0 - 0.2 10e3/uL    Absolute Immature Granulocytes 0.0 <=0.4 10e3/uL   CBC with platelets and differential     Status: None    Narrative    The following orders were created for panel order CBC with platelets and differential.  Procedure                               Abnormality         Status                     ---------                               -----------         ------                     CBC with platelets and d...[[id number]]                      Final result                 Please view results for these tests on the individual orders.           Signed Electronically by: TEDDY Dhillon CNP

## 2024-07-31 ENCOUNTER — OFFICE VISIT (OUTPATIENT)
Dept: ORTHOPEDICS | Facility: CLINIC | Age: 69
End: 2024-07-31
Attending: NURSE PRACTITIONER
Payer: COMMERCIAL

## 2024-07-31 VITALS
SYSTOLIC BLOOD PRESSURE: 113 MMHG | TEMPERATURE: 97.5 F | DIASTOLIC BLOOD PRESSURE: 72 MMHG | BODY MASS INDEX: 21.53 KG/M2 | WEIGHT: 117 LBS | HEIGHT: 62 IN

## 2024-07-31 DIAGNOSIS — M76.32 IT BAND SYNDROME, LEFT: ICD-10-CM

## 2024-07-31 DIAGNOSIS — M70.62 GREATER TROCHANTERIC BURSITIS, LEFT: Primary | ICD-10-CM

## 2024-07-31 PROCEDURE — 99203 OFFICE O/P NEW LOW 30 MIN: CPT | Performed by: ORTHOPAEDIC SURGERY

## 2024-07-31 NOTE — PROGRESS NOTES
ORTHOPEDIC CONSULT      Chief Complaint: Jackie Siddiqi is a 69 year old female who is being seen for   Chief Complaint   Patient presents with    Left Hip - Pain       History of Present Illness:   Here for 1 to 1.5 months of lateral hip pain. No identified incident or trauma. No new activities. Mostly lateral with some radiation posterior and lateral hip down the leg to the knee. No deep groin pain. No anterior thigh pain.  No radiation from back down the leg.  Most painful with laying on it, sitting, and with driving.  Can be painful at work as well.      She also mentions has had years of LLQ abdominal pain that she mentions when the hip has been more painful she has had more pain there. She is working on GI workup with PCP.  This pain does not radiate into the groin.       Patient's past medical, surgical, social and family histories reviewed.     Past Medical History:   Diagnosis Date    Abnormal Papanicolaou smear of cervix and cervical HPV     cryocautery    Breast cystic disease     Hiatal hernia     Hypothyroidism     Motion sickness     Nonsenile cataract     Peptic ulcer, unspecified site, unspecified as acute or chronic, without mention of hemorrhage, perforation, or obstruction     Peptic ulcer disease    Personal history of colonic polyps        Past Surgical History:   Procedure Laterality Date    CATARACT IOL, RT/LT      COLONOSCOPY  07/16/2007    COLONOSCOPY  1/18/2013    Procedure: COLONOSCOPY;  colonoscopy, Esophagoscopy, Gastroscopy, Duodenoscopy EGD, multiple biopsies;  Surgeon: Joe Benson MD;  Location:  GI    COLONOSCOPY N/A 12/6/2017    Procedure: COMBINED COLONOSCOPY, SINGLE OR MULTIPLE BIOPSY/POLYPECTOMY BY BIOPSY;  colonoscopy with polypectomy by biopsy;  Surgeon: Tolu No MD;  Location:  GI    COLONOSCOPY N/A 6/24/2020    Procedure: Colonoscopy with possible biopsy and/or polypectomy;  Surgeon: Obed Quick MD;  Location:  GI    COLONOSCOPY N/A 3/29/2023     Procedure: COLONOSCOPY, WITH POLYPECTOMY;  Surgeon: Alex Frances MD;  Location: [facility]    ENDOSCOPY  2007    Sm hiatus hernia    ESOPHAGOSCOPY, GASTROSCOPY, DUODENOSCOPY (EGD), COMBINED  2013    Procedure: COMBINED ESOPHAGOSCOPY, GASTROSCOPY, DUODENOSCOPY (EGD), BIOPSY SINGLE OR MULTIPLE;  ESOPHAGOGASTRODUODENOSCOPY WITH MULTIPLE BIOPSIES;  Surgeon: Joe Benson MD;  Location: [facility]    MASTECTOMY, BILATERAL      PHACOEMULSIFICATION CLEAR CORNEA WITH STANDARD INTRAOCULAR LENS IMPLANT Left 2021    Procedure: LEFT PHACOEMULSIFICATION, CATARACT, WITH INTRAOCULAR LENS IMPLANT;  Surgeon: Foreign Snider MD;  Location: [facility]    PHACOEMULSIFICATION CLEAR CORNEA WITH STANDARD INTRAOCULAR LENS IMPLANT Right 2021    Procedure: RIGHT PHACOEMULSIFICATION, CATARACT, WITH INTRAOCULAR LENS IMPLANT;  Surgeon: Foreign Snider MD;  Location: [facility]    REMOVE AND REPLACE BREAST IMPLANT PROSTHESIS Bilateral 2021    Procedure: Bilateral breast revision and implant exchange;  Surgeon: JENNIFFER Miner MD;  [place] [facility] [place]    [facility]  DELIVERY ONLY       x2    [facility] NONSPECIFIC PROCEDURE      Laparoscopic exam with adhesions    [facility] NONSPECIFIC PROCEDURE      Mastectomy for cystic breast disease, breast implants       Medications:  Current Outpatient Medications   Medication Sig Dispense Refill    azithromycin (ZITHROMAX) 250 MG tablet Two tablets first day, then one tablet daily for four days. (Patient not taking: Reported on 2024) 6 tablet 0    Biotin 2500 MCG CHEW Take 2,500 capsules by mouth daily      Cholecalciferol (VITAMIN D3) 25 MCG (1000 UT) CAPS       clobetasol (TEMOVATE) 0.05 % external ointment Apply a small pea size amount at bedtime couple times a week as needed 60 g 0    Ferrous Sulfate (IRON) 325 (65 Fe) MG tablet Take 1 tablet by mouth daily      fish oil-omega-3 fatty acids 1000 MG capsule Take 2 g by mouth daily      hyoscyamine (LEVSIN/SL) 0.125 MG  sublingual tablet Take 0.125 mg by mouth every 6 hours as needed for cramping      Lactobacillus (PROBIOTIC ACIDOPHILUS PO)       levothyroxine (SYNTHROID/LEVOTHROID) 50 MCG tablet TAKE 1 TABLET(50 MCG) BY MOUTH DAILY 90 tablet 1    sucralfate (CARAFATE) 1 GM/10ML suspension Take 10 mLs by mouth every 6 hours      traZODone (DESYREL) 50 MG tablet Take 1-2 tablets ( mg) by mouth at bedtime (Patient not taking: Reported on 2024) 60 tablet 5    tretinoin (RETIN-A) 0.05 % external cream Apply topically at bedtime      Vitamin A 2400 MCG (8000 UT) CAPS Take 1 capsule by mouth daily      vitamin C (ASCORBIC ACID) 500 MG tablet Take 500 mg by mouth daily      Vitamin E 180 MG (400 UNIT) CAPS Take 1 capsule by mouth daily      VITAMIN K PO Take by mouth daily      zinc gluconate 50 MG tablet Take 50 mg by mouth daily       No current facility-administered medications for this visit.       Allergies   Allergen Reactions    No Known Drug Allergy        Social History     Occupational History    Not on file   Tobacco Use    Smoking status: Former     Current packs/day: 0.00     Average packs/day: 0.5 packs/day for 50.0 years (25.0 ttl pk-yrs)     Types: Cigarettes     Start date: 1969     Quit date: 2019     Years since quittin.9    Smokeless tobacco: Never    Tobacco comments:     1 pack per week, started age 11   Vaping Use    Vaping status: Never Used   Substance and Sexual Activity    Alcohol use: No     Alcohol/week: 0.0 standard drinks of alcohol     Comment: holidays only    Drug use: No    Sexual activity: Not Currently     Comment: divorce, 3 children.       Family History   Problem Relation Age of Onset    Diabetes Father     Hypertension Father     Alcohol/Drug Father     Eye Disorder Father     Obesity Father     Breast Cancer Mother     Osteoporosis Mother     Thyroid Disease Mother     Cancer Mother         Bladder    Cancer Sister         fallopian tube cancer    Obesity Sister     Alzheimer  "Disease Sister     Cancer Sister         Skin Cancer    Allergies Daughter     Cancer Maternal Grandmother         uterine cancer    Liver Disease Daughter         Biliary Atresia    Genitourinary Problems Brother     No Known Problems Daughter     Heart Disease Brother         Heart Murmur    Glaucoma Maternal Grandfather     Genitourinary Problems Brother         kidney disease (two brothers)    Macular Degeneration No family hx of        REVIEW OF SYSTEMS  10 point review systems performed otherwise negative as noted as per history of present illness.    Physical Exam:  Vitals: /72   Temp 97.5  F (36.4  C)   Ht 1.577 m (5' 2.09\")   Wt 53.1 kg (117 lb)   LMP 03/26/2003 (Approximate)   BMI 21.34 kg/m    BMI= Body mass index is 21.34 kg/m .  Constitutional: healthy, alert and no acute distress   Psychiatric: mentation appears normal and affect normal/bright  NEURO: no focal deficits  RESP: Normal with easy respirations and no use of accessory muscles to breathe, no audible wheezing or retractions  CV: LLE:  no edema           SKIN: No erythema, rashes, excoriation, or breakdown. No evidence of infection.   JOINT/EXTREMITIES:left hip: No gross deformity.  Point tenderness over the greater trochanteric region which reproduces some of her symptoms.  Negative straight leg.  No focal atrophy.  IT band tightness.  No pain with hip active and passive range of motion which is within normal limits     GAIT: not tested     Diagnostic Modalities:  Left hip x-ray: No acute fractures or dislocations.  Overall superior joint space is maintained.  Some questionable medial wear.  Independent visualization of the images was performed.      Impression: left hip greater trochanteric bursitis with IT band syndrome    Plan:  All of the above pertinent physical exam and imaging modalities findings was reviewed with Jackie.    Treatment options discussed.  We did review a greater trochanteric steroid injection.  She had a " previous extremely painful thumb injection with steroids so she does not want any repeat injections.  Unable to take anti-inflammatories due to gastritis.  We discussed proceeding with physical therapy.  Referral was sent to [facility] to help address her IT band.  Certainly could return for injection any point. Recommend to continue workup with PCP on LLQ abd pain.         Return to clinic 4-6 , week(s), PRN, or sooner as needed for changes.  Re-x-ray on return: No    Teto Reyes D.O.

## 2024-07-31 NOTE — LETTER
7/31/2024      Jackie Siddiqi  [address]  [place] [address]      Dear Colleague,    Thank you for referring your patient, Jackie Siddiqi, to the [facility]. Please see a copy of my visit note below.    ORTHOPEDIC CONSULT      Chief Complaint: Jackie Siddiqi is a 69 year old female who is being seen for   Chief Complaint   Patient presents with     Left Hip - Pain       History of Present Illness:   Here for 1 to 1.5 months of lateral hip pain. No identified incident or trauma. No new activities. Mostly lateral with some radiation posterior and lateral hip down the leg to the knee. No deep groin pain. No anterior thigh pain.  No radiation from back down the leg.  Most painful with laying on it, sitting, and with driving.  Can be painful at work as well.      She also mentions has had years of LLQ abdominal pain that she mentions when the hip has been more painful she has had more pain there. She is working on GI workup with PCP.  This pain does not radiate into the groin.       Patient's past medical, surgical, social and family histories reviewed.     Past Medical History:   Diagnosis Date     Abnormal Papanicolaou smear of cervix and cervical HPV     cryocautery     Breast cystic disease      Hiatal hernia      Hypothyroidism      Motion sickness      Nonsenile cataract      Peptic ulcer, unspecified site, unspecified as acute or chronic, without mention of hemorrhage, perforation, or obstruction     Peptic ulcer disease     Personal history of colonic polyps        Past Surgical History:   Procedure Laterality Date     CATARACT IOL, RT/LT       COLONOSCOPY  07/16/2007     COLONOSCOPY  1/18/2013    Procedure: COLONOSCOPY;  colonoscopy, Esophagoscopy, Gastroscopy, Duodenoscopy EGD, multiple biopsies;  Surgeon: Joe Benson MD;  Location:  GI     COLONOSCOPY N/A 12/6/2017    Procedure: COMBINED COLONOSCOPY, SINGLE OR MULTIPLE BIOPSY/POLYPECTOMY BY BIOPSY;  colonoscopy  with polypectomy by biopsy;  Surgeon: Tolu No MD;  Location: [facility]     COLONOSCOPY N/A 2020    Procedure: Colonoscopy with possible biopsy and/or polypectomy;  Surgeon: Obed Quick MD;  Location: [facility]     COLONOSCOPY N/A 3/29/2023    Procedure: COLONOSCOPY, WITH POLYPECTOMY;  Surgeon: Alex Frances MD;  Location: [facility]     ENDOSCOPY  2007    Sm hiatus hernia     ESOPHAGOSCOPY, GASTROSCOPY, DUODENOSCOPY (EGD), COMBINED  2013    Procedure: COMBINED ESOPHAGOSCOPY, GASTROSCOPY, DUODENOSCOPY (EGD), BIOPSY SINGLE OR MULTIPLE;  ESOPHAGOGASTRODUODENOSCOPY WITH MULTIPLE BIOPSIES;  Surgeon: Joe Benson MD;  Location: [facility]     MASTECTOMY, BILATERAL       PHACOEMULSIFICATION CLEAR CORNEA WITH STANDARD INTRAOCULAR LENS IMPLANT Left 2021    Procedure: LEFT PHACOEMULSIFICATION, CATARACT, WITH INTRAOCULAR LENS IMPLANT;  Surgeon: Foreign Snider MD;  Location: [facility]     PHACOEMULSIFICATION CLEAR CORNEA WITH STANDARD INTRAOCULAR LENS IMPLANT Right 2021    Procedure: RIGHT PHACOEMULSIFICATION, CATARACT, WITH INTRAOCULAR LENS IMPLANT;  Surgeon: Foreign Snider MD;  Location: [facility]     REMOVE AND REPLACE BREAST IMPLANT PROSTHESIS Bilateral 2021    Procedure: Bilateral breast revision and implant exchange;  Surgeon: JENNIFFER Miner MD;  [place] [facility] [place]     [facility]  [place] ONLY       x2     [facility] NONSPECIFIC PROCEDURE      Laparoscopic exam with adhesions     [facility] NONSPECIFIC PROCEDURE      Mastectomy for cystic breast disease, breast implants       Medications:  Current Outpatient Medications   Medication Sig Dispense Refill     azithromycin (ZITHROMAX) 250 MG tablet Two tablets first day, then one tablet daily for four days. (Patient not taking: Reported on 2024) 6 tablet 0     Biotin 2500 MCG CHEW Take 2,500 capsules by mouth daily       Cholecalciferol (VITAMIN D3) 25 MCG (1000 UT) CAPS        clobetasol (TEMOVATE) 0.05 % external ointment  Apply a small pea size amount at bedtime couple times a week as needed 60 g 0     Ferrous Sulfate (IRON) 325 (65 Fe) MG tablet Take 1 tablet by mouth daily       fish oil-omega-3 fatty acids 1000 MG capsule Take 2 g by mouth daily       hyoscyamine (LEVSIN/SL) 0.125 MG sublingual tablet Take 0.125 mg by mouth every 6 hours as needed for cramping       Lactobacillus (PROBIOTIC ACIDOPHILUS PO)        levothyroxine (SYNTHROID/LEVOTHROID) 50 MCG tablet TAKE 1 TABLET(50 MCG) BY MOUTH DAILY 90 tablet 1     sucralfate (CARAFATE) 1 GM/10ML suspension Take 10 mLs by mouth every 6 hours       traZODone (DESYREL) 50 MG tablet Take 1-2 tablets ( mg) by mouth at bedtime (Patient not taking: Reported on 2024) 60 tablet 5     tretinoin (RETIN-A) 0.05 % external cream Apply topically at bedtime       Vitamin A 2400 MCG (8000 UT) CAPS Take 1 capsule by mouth daily       vitamin C (ASCORBIC ACID) 500 MG tablet Take 500 mg by mouth daily       Vitamin E 180 MG (400 UNIT) CAPS Take 1 capsule by mouth daily       VITAMIN K PO Take by mouth daily       zinc gluconate 50 MG tablet Take 50 mg by mouth daily       No current facility-administered medications for this visit.       Allergies   Allergen Reactions     No Known Drug Allergy        Social History     Occupational History     Not on file   Tobacco Use     Smoking status: Former     Current packs/day: 0.00     Average packs/day: 0.5 packs/day for 50.0 years (25.0 ttl pk-yrs)     Types: Cigarettes     Start date: 1969     Quit date: 2019     Years since quittin.9     Smokeless tobacco: Never     Tobacco comments:     1 pack per week, started age 11   Vaping Use     Vaping status: Never Used   Substance and Sexual Activity     Alcohol use: No     Alcohol/week: 0.0 standard drinks of alcohol     Comment: holidays only     Drug use: No     Sexual activity: Not Currently     Comment: divorce, 3 children.       Family History   Problem Relation Age of Onset      "Diabetes Father      Hypertension Father      Alcohol/Drug Father      Eye Disorder Father      Obesity Father      Breast Cancer Mother      Osteoporosis Mother      Thyroid Disease Mother      Cancer Mother         Bladder     Cancer Sister         fallopian tube cancer     Obesity Sister      Alzheimer Disease Sister      Cancer Sister         Skin Cancer     Allergies Daughter      Cancer Maternal Grandmother         uterine cancer     Liver Disease Daughter         Biliary Atresia     Genitourinary Problems Brother      No Known Problems Daughter      Heart Disease Brother         Heart Murmur     Glaucoma Maternal Grandfather      Genitourinary Problems Brother         kidney disease (two brothers)     Macular Degeneration No family hx of        REVIEW OF SYSTEMS  10 point review systems performed otherwise negative as noted as per history of present illness.    Physical Exam:  Vitals: /72   Temp 97.5  F (36.4  C)   Ht 1.577 m (5' 2.09\")   Wt 53.1 kg (117 lb)   LMP 03/26/2003 (Approximate)   BMI 21.34 kg/m    BMI= Body mass index is 21.34 kg/m .  Constitutional: healthy, alert and no acute distress   Psychiatric: mentation appears normal and affect normal/bright  NEURO: no focal deficits  RESP: Normal with easy respirations and no use of accessory muscles to breathe, no audible wheezing or retractions  CV: LLE:  no edema           SKIN: No erythema, rashes, excoriation, or breakdown. No evidence of infection.   JOINT/EXTREMITIES:left hip: No gross deformity.  Point tenderness over the greater trochanteric region which reproduces some of her symptoms.  Negative straight leg.  No focal atrophy.  IT band tightness.  No pain with hip active and passive range of motion which is within normal limits     GAIT: not tested     Diagnostic Modalities:  Left hip x-ray: No acute fractures or dislocations.  Overall superior joint space is maintained.  Some questionable medial wear.  Independent visualization of " the images was performed.      Impression: left hip greater trochanteric bursitis with IT band syndrome    Plan:  All of the above pertinent physical exam and imaging modalities findings was reviewed with Jackie.    Treatment options discussed.  We did review a greater trochanteric steroid injection.  She had a previous extremely painful thumb injection with steroids so she does not want any repeat injections.  Unable to take anti-inflammatories due to gastritis.  We discussed proceeding with physical therapy.  Referral was sent to [place] physical therapy to help address her IT band.  Certainly could return for injection any point. Recommend to continue workup with PCP on LLQ abd pain.         Return to clinic 4-6 , week(s), PRN, or sooner as needed for changes.  Re-x-ray on return: No    Teto Reyes D.O.      Again, thank you for allowing me to participate in the care of your patient.        Sincerely,        Lb Reyes, DO

## 2024-08-01 PROBLEM — M76.32 IT BAND SYNDROME, LEFT: Status: ACTIVE | Noted: 2024-08-01

## 2024-08-01 PROBLEM — M70.62 GREATER TROCHANTERIC BURSITIS, LEFT: Status: ACTIVE | Noted: 2024-08-01

## 2024-08-21 DIAGNOSIS — E03.9 HYPOTHYROIDISM, UNSPECIFIED TYPE: ICD-10-CM

## 2024-08-21 RX ORDER — LEVOTHYROXINE SODIUM 50 UG/1
50 TABLET ORAL DAILY
Qty: 90 TABLET | Refills: 0 | Status: SHIPPED | OUTPATIENT
Start: 2024-08-21

## 2024-09-09 ENCOUNTER — APPOINTMENT (OUTPATIENT)
Dept: GENERAL RADIOLOGY | Facility: CLINIC | Age: 69
End: 2024-09-09
Attending: FAMILY MEDICINE
Payer: COMMERCIAL

## 2024-09-09 ENCOUNTER — HOSPITAL ENCOUNTER (EMERGENCY)
Facility: CLINIC | Age: 69
Discharge: HOME | End: 2024-09-09
Attending: FAMILY MEDICINE | Admitting: FAMILY MEDICINE
Payer: COMMERCIAL

## 2024-09-09 VITALS
HEART RATE: 62 BPM | OXYGEN SATURATION: 98 % | SYSTOLIC BLOOD PRESSURE: 135 MMHG | WEIGHT: 117 LBS | TEMPERATURE: 98 F | DIASTOLIC BLOOD PRESSURE: 65 MMHG | BODY MASS INDEX: 21.53 KG/M2 | HEIGHT: 62 IN | RESPIRATION RATE: 18 BRPM

## 2024-09-09 DIAGNOSIS — S61.431A PUNCTURE WOUND OF RIGHT HAND WITHOUT FOREIGN BODY, INITIAL ENCOUNTER: ICD-10-CM

## 2024-09-09 DIAGNOSIS — M79.641 PAIN OF RIGHT HAND: ICD-10-CM

## 2024-09-09 PROCEDURE — 29125 APPL SHORT ARM SPLINT STATIC: CPT | Mod: RT | Performed by: FAMILY MEDICINE

## 2024-09-09 PROCEDURE — 99284 EMERGENCY DEPT VISIT MOD MDM: CPT | Mod: 25 | Performed by: FAMILY MEDICINE

## 2024-09-09 PROCEDURE — 90471 IMMUNIZATION ADMIN: CPT | Performed by: FAMILY MEDICINE

## 2024-09-09 PROCEDURE — 250N000011 HC RX IP 250 OP 636: Performed by: FAMILY MEDICINE

## 2024-09-09 PROCEDURE — 90715 TDAP VACCINE 7 YRS/> IM: CPT | Performed by: FAMILY MEDICINE

## 2024-09-09 PROCEDURE — 99283 EMERGENCY DEPT VISIT LOW MDM: CPT | Performed by: FAMILY MEDICINE

## 2024-09-09 PROCEDURE — 73130 X-RAY EXAM OF HAND: CPT | Mod: RT

## 2024-09-09 RX ORDER — CEPHALEXIN 500 MG/1
500 CAPSULE ORAL 4 TIMES DAILY
Qty: 40 CAPSULE | Refills: 0 | Status: SHIPPED | OUTPATIENT
Start: 2024-09-09

## 2024-09-09 RX ORDER — OXYCODONE HYDROCHLORIDE 5 MG/1
5 TABLET ORAL EVERY 6 HOURS PRN
Qty: 6 TABLET | Refills: 0 | Status: SHIPPED | OUTPATIENT
Start: 2024-09-09

## 2024-09-09 RX ADMIN — CLOSTRIDIUM TETANI TOXOID ANTIGEN (FORMALDEHYDE INACTIVATED), CORYNEBACTERIUM DIPHTHERIAE TOXOID ANTIGEN (FORMALDEHYDE INACTIVATED), BORDETELLA PERTUSSIS TOXOID ANTIGEN (GLUTARALDEHYDE INACTIVATED), BORDETELLA PERTUSSIS FILAMENTOUS HEMAGGLUTININ ANTIGEN (FORMALDEHYDE INACTIVATED), BORDETELLA PERTUSSIS PERTACTIN ANTIGEN, AND BORDETELLA PERTUSSIS FIMBRIAE 2/3 ANTIGEN 0.5 ML: 5; 2; 2.5; 5; 3; 5 INJECTION, SUSPENSION INTRAMUSCULAR at 22:17

## 2024-09-09 ASSESSMENT — COLUMBIA-SUICIDE SEVERITY RATING SCALE - C-SSRS
1. IN THE PAST MONTH, HAVE YOU WISHED YOU WERE DEAD OR WISHED YOU COULD GO TO SLEEP AND NOT WAKE UP?: NO
2. HAVE YOU ACTUALLY HAD ANY THOUGHTS OF KILLING YOURSELF IN THE PAST MONTH?: NO
6. HAVE YOU EVER DONE ANYTHING, STARTED TO DO ANYTHING, OR PREPARED TO DO ANYTHING TO END YOUR LIFE?: NO

## 2024-09-09 ASSESSMENT — ACTIVITIES OF DAILY LIVING (ADL)
ADLS_ACUITY_SCORE: 33
ADLS_ACUITY_SCORE: 35

## 2024-09-10 NOTE — DISCHARGE INSTRUCTIONS
Take the antibiotic as directed.  In spite of the antibiotics, you may still develop an infection and require IV antibiotics or even with surgery to clean things out if it is not getting better.  Tylenol as needed for pain.  You may use  the oxycodone if needed for more severe pain.  Splint for comfort.  See ortho in clinic in the next 1-2 days for recheck.  Return to the ED if you develop a fever over 100.4, increasing pain in spite of pain meds, redness extending up the arm or any concerns.    It was nice visiting with you this evening.  I hope you feel better soon.     Thank you for choosing [facility]. We appreciate the opportunity to meet your urgent medical needs. Please let us know if we could have done anything to make your stay more satisfying.    After discharge, please closely monitor for any new or worsening symptoms. Return to the Emergency Department if you develop any acute worsening signs or symptoms.    If you had lab work, cultures or imaging studies done during your stay, the final results may still be pending. We will call you if your plan of care needs to change. However, if you are not improving as expected, please follow up with your primary care provider or clinic.     Start any prescription medications that were prescribed to you and take them as directed.     Please see additional handouts that may be pertinent to your condition.

## 2024-09-10 NOTE — ED PROVIDER NOTES
History     Chief Complaint   Patient presents with    Hand Injury     HPI  Jackie Siddiqi is a 69 year old female who presents to the ED with a puncture wound to the right palm.  She was over in [place] and tripped and fell in the dirt while holding her car keys in her dominant right hand.  Suffered a puncture wound to the proximal palmar aspect.  This happened around 5 PM.  She did cleaned it up with some peroxide.  Hurts if she tries to fully extend her middle finger.  Has not noticed any drainage.  Her last tetanus was in March 2016.  She bumped her right shoulder but has  good range of motion.  No other injuries.       Not allergic to any antibiotics.      Allergies:  Allergies   Allergen Reactions    No Known Drug Allergy        Problem List:    Patient Active Problem List    Diagnosis Date Noted    Greater trochanteric bursitis, left 08/01/2024     Priority: Medium    It band syndrome, left 08/01/2024     Priority: Medium    Diaphragmatic hernia 05/21/2024     Priority: Medium    Disorder of function of stomach 01/30/2024     Priority: Medium    Obstruction of esophagus 01/30/2024     Priority: Medium    Slow transit constipation 11/05/2023     Priority: Medium    Psychophysiological insomnia 11/05/2023     Priority: Medium    Hx of bilateral mastectomy 03/09/2021     Priority: Medium    LLQ abdominal pain 03/09/2021     Priority: Medium    Diverticulosis of colon 04/06/2020     Priority: Medium    Chronic obstructive pulmonary disease, unspecified COPD type (H) 09/16/2019     Priority: Medium    Hypothyroidism, unspecified type 11/29/2016     Priority: Medium    Hyperlipidemia LDL goal <160 11/29/2016     Priority: Medium    Osteoarthritis of carpometacarpal joint of right thumb, unspecified osteoarthritis type 11/29/2016     Priority: Medium     IMO Regulatory Load OCT 2020      Lichen sclerosus et atrophicus of the vulva 01/09/2012     Priority: Medium    Esophageal reflux 05/16/2005     Priority:  Medium        Past Medical History:    Past Medical History:   Diagnosis Date    Abnormal Papanicolaou smear of cervix and cervical HPV     Breast cystic disease     Hiatal hernia     Hypothyroidism     Motion sickness     Nonsenile cataract     Peptic ulcer, unspecified site, unspecified as acute or chronic, without mention of hemorrhage, perforation, or obstruction     Personal history of colonic polyps        Past Surgical History:    Past Surgical History:   Procedure Laterality Date    CATARACT IOL, RT/LT      COLONOSCOPY  07/16/2007    COLONOSCOPY  1/18/2013    Procedure: COLONOSCOPY;  colonoscopy, Esophagoscopy, Gastroscopy, Duodenoscopy EGD, multiple biopsies;  Surgeon: Joe Benson MD;  Location:  GI    COLONOSCOPY N/A 12/6/2017    Procedure: COMBINED COLONOSCOPY, SINGLE OR MULTIPLE BIOPSY/POLYPECTOMY BY BIOPSY;  colonoscopy with polypectomy by biopsy;  Surgeon: Tolu No MD;  Location: [facility]    COLONOSCOPY N/A 6/24/2020    Procedure: Colonoscopy with possible biopsy and/or polypectomy;  Surgeon: Obed Quick MD;  Location:  GI    COLONOSCOPY N/A 3/29/2023    Procedure: COLONOSCOPY, WITH POLYPECTOMY;  Surgeon: Alex Frances MD;  Location:  GI    ENDOSCOPY  07/16/2007    Sm hiatus hernia    ESOPHAGOSCOPY, GASTROSCOPY, DUODENOSCOPY (EGD), COMBINED  1/18/2013    Procedure: COMBINED ESOPHAGOSCOPY, GASTROSCOPY, DUODENOSCOPY (EGD), BIOPSY SINGLE OR MULTIPLE;  ESOPHAGOGASTRODUODENOSCOPY WITH MULTIPLE BIOPSIES;  Surgeon: Joe Benson MD;  Location: [facility]    MASTECTOMY, BILATERAL      PHACOEMULSIFICATION CLEAR CORNEA WITH STANDARD INTRAOCULAR LENS IMPLANT Left 4/26/2021    Procedure: LEFT PHACOEMULSIFICATION, CATARACT, WITH INTRAOCULAR LENS IMPLANT;  Surgeon: Foreign Snider MD;  Location: [facility]    PHACOEMULSIFICATION CLEAR CORNEA WITH STANDARD INTRAOCULAR LENS IMPLANT Right 4/12/2021    Procedure: RIGHT PHACOEMULSIFICATION, CATARACT, WITH INTRAOCULAR LENS IMPLANT;  Surgeon:  Foreign Snider MD;  Location:  OR    REMOVE AND REPLACE BREAST IMPLANT PROSTHESIS Bilateral 2021    Procedure: Bilateral breast revision and implant exchange;  Surgeon: JENNIFFER Miner MD;  [place] [facility] [place]    [email or web address]  DELIVERY ONLY       x2    [facility] [[email or web address] NONSPECIFIC PROCEDURE      Laparoscopic exam with adhesions    [email or web address] NONSPECIFIC PROCEDURE      Mastectomy for cystic breast disease, breast implants       Family History:    Family History   Problem Relation Age of Onset    Diabetes Father     Hypertension Father     Alcohol/Drug Father     Eye Disorder Father     Obesity Father     Breast Cancer Mother     Osteoporosis Mother     Thyroid Disease Mother     Cancer Mother         Bladder    Cancer Sister         fallopian tube cancer    Obesity Sister     Alzheimer Disease Sister     Cancer Sister         Skin Cancer    Allergies Daughter     Cancer Maternal Grandmother         uterine cancer    Liver Disease Daughter         Biliary Atresia    Genitourinary Problems Brother     No Known Problems Daughter     Heart Disease Brother         Heart Murmur    Glaucoma Maternal Grandfather     Genitourinary Problems Brother         kidney disease (two brothers)    Macular Degeneration No family hx of        Social History:  Marital Status:  Single [1]  Social History     Tobacco Use    Smoking status: Former     Current packs/day: 0.00     Average packs/day: 0.5 packs/day for 50.0 years (25.0 ttl pk-yrs)     Types: Cigarettes     Start date: 1969     Quit date: 2019     Years since quittin.0    Smokeless tobacco: Never    Tobacco comments:     1 pack per week, started age 11   Vaping Use    Vaping status: Never Used   Substance Use Topics    Alcohol use: No     Alcohol/week: 0.0 standard drinks of alcohol     Comment: holidays only    Drug use: No        Medications:    cephALEXin (KEFLEX) 500 MG capsule  oxyCODONE (ROXICODONE) 5 MG tablet  azithromycin (ZITHROMAX) 250 MG tablet  Biotin  "2500 MCG CHEW  Cholecalciferol (VITAMIN D3) 25 MCG (1000 UT) CAPS  clobetasol (TEMOVATE) 0.05 % external ointment  Ferrous Sulfate (IRON) 325 (65 Fe) MG tablet  fish oil-omega-3 fatty acids 1000 MG capsule  hyoscyamine (LEVSIN/SL) 0.125 MG sublingual tablet  Lactobacillus (PROBIOTIC ACIDOPHILUS PO)  levothyroxine (SYNTHROID/LEVOTHROID) 50 MCG tablet  sucralfate (CARAFATE) 1 GM/10ML suspension  traZODone (DESYREL) 50 MG tablet  tretinoin (RETIN-A) 0.05 % external cream  Vitamin A 2400 MCG (8000 UT) CAPS  vitamin C (ASCORBIC ACID) 500 MG tablet  Vitamin E 180 MG (400 UNIT) CAPS  VITAMIN K PO  zinc gluconate 50 MG tablet          Review of Systems   All other systems reviewed and are negative.      Physical Exam   BP: 135/65  Pulse: 62  Temp: 98  F (36.7  C)  Resp: 18  Height: 157.5 cm (5' 2\")  Weight: 53.1 kg (117 lb)  SpO2: 98 %      Physical Exam  Constitutional:       General: She is not in acute distress.     Appearance: Normal appearance.   Musculoskeletal:      Right hand: Tenderness present. Decreased range of motion (hurts to fully extend her middle finger). Normal sensation.        Hands:    Neurological:      Mental Status: She is alert.         ED Course        Procedures              Critical Care time:  none               Results for orders placed or performed during the hospital encounter of 09/09/24 (from the past 24 hour(s))   XR Hand Right 3 Views    Narrative    EXAM: RIGHT HAND 3 VIEWS  LOCATION: [facility]  DATE/TIME: 09/09/2024 10:17 PM CDT    INDICATION: Fell holding car keys. Palmar puncture wound. Pain with extension of the middle finger.  COMPARISON: None.      Impression    IMPRESSION:   1. A small amount of apparent gas in the soft tissues between the 2nd and 3rd metacarpal heads, likely relating to the known puncture wound.  2. No radiopaque foreign object in the right hand.  3. No visualized acute fracture or malalignment of the right hand.  4. Mild and " moderate degenerative changes scattered within the interphalangeal joints and the 1st carpometacarpal joint.        Medications   Tdap (tetanus-diphtheria-acell pertussis) (ADACEL) injection 0.5 mL (0.5 mLs Intramuscular $Given 9/9/24 5054)       Assessments & Plan (with Medical Decision Making)  69-year-old female tripped and fell while holding her car keys in her dominant right hand.  Suffered a puncture wound to the proximal palmar aspect.  Happened earlier this evening.  She cleaned it up with some peroxide.  Hurts to fully extend the middle finger.  No drainage.  Last tetanus was over 8 years ago.  That was updated tonight.  Right hand x-ray shows no bony involvement.  Small amount of gas in the soft tissues between the second and third metacarpal heads related to the known puncture wound.  Its only been a few hours since the injury so I really doubt gas-forming organism this early on.  She was placed in a splint for comfort.  Keflex prophylaxis, oxycodone if Tylenol is ineffective as she is supposed to avoid anti-inflammatories because of her stomach.  Orthopedic consult ordered.  I want her rechecked in clinic in 1-2 days to make sure that this is improving.  In spite of the oral antibiotics, she knows that if this worsens, she may end up on IV antibiotics or even require surgery.  Adacel was updated tonight.  I sent a note to Dr. Kimbrough, Dr. Ocasio and Alex Goldman PA-C asking if one of them could see her in clinic in the next 1-2 days to make sure she is improving.  I do not want her to fall through the cracks and fail follow-up       I have reviewed the nursing notes.    I have reviewed the findings, diagnosis, plan and need for follow up with the patient.           Medical Decision Making  The patient's presentation was of moderate complexity (an acute complicated injury).    The patient's evaluation involved:  review of external note(s) from 1 sources ([facility] for tetanus status)  ordering and/or review of  1 test(s) in this encounter (see separate area of note for details)    The patient's management necessitated moderate risk (prescription drug management including medications given in the ED).        Discharge Medication List as of 9/9/2024 11:46 PM        START taking these medications    Details   cephALEXin (KEFLEX) 500 MG capsule Take 1 capsule (500 mg) by mouth 4 times daily., Disp-40 capsule, R-0, [facility]      oxyCODONE (ROXICODONE) 5 MG tablet Take 1 tablet (5 mg) by mouth every 6 hours as needed for severe pain., Disp-6 tablet, R-0, [facility]             Final diagnoses:   Puncture wound of right hand without foreign body, initial encounter   Pain of right hand       9/9/2024   [facility] EMERGENCY DEPT       Shailesh Quiles MD  09/10/24 0050

## 2024-09-10 NOTE — ED TRIAGE NOTES
Pt presents with concern of puncture injury to right hand. Pt fell onto right shoulder and when she did her keys punctured the heel of her right palm

## 2024-09-11 ENCOUNTER — OFFICE VISIT (OUTPATIENT)
Dept: ORTHOPEDICS | Facility: CLINIC | Age: 69
End: 2024-09-11
Attending: FAMILY MEDICINE
Payer: COMMERCIAL

## 2024-09-11 VITALS
WEIGHT: 117 LBS | SYSTOLIC BLOOD PRESSURE: 105 MMHG | TEMPERATURE: 97.8 F | BODY MASS INDEX: 21.4 KG/M2 | DIASTOLIC BLOOD PRESSURE: 67 MMHG

## 2024-09-11 DIAGNOSIS — S61.431A PUNCTURE WOUND OF RIGHT HAND WITHOUT FOREIGN BODY, INITIAL ENCOUNTER: ICD-10-CM

## 2024-09-11 DIAGNOSIS — M79.641 PAIN OF RIGHT HAND: ICD-10-CM

## 2024-09-11 PROCEDURE — 99213 OFFICE O/P EST LOW 20 MIN: CPT | Performed by: ORTHOPAEDIC SURGERY

## 2024-09-11 NOTE — PROGRESS NOTES
S:  Patient feels some thumb muscular increase in pain RUE compared to a few days ago, punctured by key in palm.  Presents in wrist splint, hx thumb fx years ago.  Was evaluated ED 9/9:  Expand All Collapse All       History          Chief Complaint   Patient presents with    Hand Injury      HPI  Jackie Siddiqi is a 69 year old female who presents to the ED with a puncture wound to the right palm.  She was over in [place] and tripped and fell in the dirt while holding her car keys in her dominant right hand.  Suffered a puncture wound to the proximal palmar aspect.  This happened around 5 PM.  She did cleaned it up with some peroxide.  Hurts if she tries to fully extend her middle finger.  Has not noticed any drainage.  Her last tetanus was in March 2016.  She bumped her right shoulder but has  good range of motion.  No other injuries.        Assessments & Plan (with Medical Decision Making)  69-year-old female tripped and fell while holding her car keys in her dominant right hand.  Suffered a puncture wound to the proximal palmar aspect.  Happened earlier this evening.  She cleaned it up with some peroxide.  Hurts to fully extend the middle finger.  No drainage.  Last tetanus was over 8 years ago.  That was updated tonight.  Right hand x-ray shows no bony involvement.  Small amount of gas in the soft tissues between the second and third metacarpal heads related to the known puncture wound.  Its only been a few hours since the injury so I really doubt gas-forming organism this early on.  She was placed in a splint for comfort.  Keflex prophylaxis, oxycodone if Tylenol is ineffective as she is supposed to avoid anti-inflammatories because of her stomach.  Orthopedic consult ordered.  I want her rechecked in clinic in 1-2 days to make sure that this is improving.  In spite of the oral antibiotics, she knows that if this worsens, she may end up on IV antibiotics or even require surgery.  Adacel was updated  tonight.  I sent a note to Dr. Kimbrough, Dr. Ocasio and Alex Goldman PA-C asking if one of them could see her in clinic in the next 1-2 days to make sure she is improving.  I do not want her to fall through the cracks and fail follow-up       Patient Active Problem List   Diagnosis    Esophageal reflux    Lichen sclerosus et atrophicus of the vulva    Hypothyroidism, unspecified type    Hyperlipidemia LDL goal <160    Osteoarthritis of carpometacarpal joint of right thumb, unspecified osteoarthritis type    Chronic obstructive pulmonary disease, unspecified COPD type (H)    Diverticulosis of colon    Hx of bilateral mastectomy    LLQ abdominal pain    Slow transit constipation    Psychophysiological insomnia    Diaphragmatic hernia    Disorder of function of stomach    Obstruction of esophagus    Greater trochanteric bursitis, left    It band syndrome, left            Past Medical History:   Diagnosis Date    Abnormal Papanicolaou smear of cervix and cervical HPV     cryocautery    Breast cystic disease     Hiatal hernia     Hypothyroidism     Motion sickness     Nonsenile cataract     Peptic ulcer, unspecified site, unspecified as acute or chronic, without mention of hemorrhage, perforation, or obstruction     Peptic ulcer disease    Personal history of colonic polyps             Past Surgical History:   Procedure Laterality Date    CATARACT IOL, RT/LT      COLONOSCOPY  07/16/2007    COLONOSCOPY  1/18/2013    Procedure: COLONOSCOPY;  colonoscopy, Esophagoscopy, Gastroscopy, Duodenoscopy EGD, multiple biopsies;  Surgeon: Joe Benson MD;  Location:  GI    COLONOSCOPY N/A 12/6/2017    Procedure: COMBINED COLONOSCOPY, SINGLE OR MULTIPLE BIOPSY/POLYPECTOMY BY BIOPSY;  colonoscopy with polypectomy by biopsy;  Surgeon: Tolu No MD;  Location:  GI    COLONOSCOPY N/A 6/24/2020    Procedure: Colonoscopy with possible biopsy and/or polypectomy;  Surgeon: Obed Quick MD;  Location:  GI     COLONOSCOPY N/A 3/29/2023    Procedure: COLONOSCOPY, WITH POLYPECTOMY;  Surgeon: Alex Frances MD;  Location: [facility]    ENDOSCOPY  2007    Sm hiatus hernia    ESOPHAGOSCOPY, GASTROSCOPY, DUODENOSCOPY (EGD), COMBINED  2013    Procedure: COMBINED ESOPHAGOSCOPY, GASTROSCOPY, DUODENOSCOPY (EGD), BIOPSY SINGLE OR MULTIPLE;  ESOPHAGOGASTRODUODENOSCOPY WITH MULTIPLE BIOPSIES;  Surgeon: Joe Benson MD;  Location: [facility]    MASTECTOMY, BILATERAL      PHACOEMULSIFICATION CLEAR CORNEA WITH STANDARD INTRAOCULAR LENS IMPLANT Left 2021    Procedure: LEFT PHACOEMULSIFICATION, CATARACT, WITH INTRAOCULAR LENS IMPLANT;  Surgeon: Foreign Snider MD;  Location: [facility] OR    PHACOEMULSIFICATION CLEAR CORNEA WITH STANDARD INTRAOCULAR LENS IMPLANT Right 2021    Procedure: RIGHT PHACOEMULSIFICATION, CATARACT, WITH INTRAOCULAR LENS IMPLANT;  Surgeon: Foreign Snider MD;  Location:  OR    REMOVE AND REPLACE BREAST IMPLANT PROSTHESIS Bilateral 2021    Procedure: Bilateral breast revision and implant exchange;  Surgeon: JENNIFFER Mienr MD;  [place] [facility] [place]    [facility]  DELIVERY ONLY       x2    [facility] NONSPECIFIC PROCEDURE      Laparoscopic exam with adhesions    [facility] NONSPECIFIC PROCEDURE      Mastectomy for cystic breast disease, breast implants            Social History     Tobacco Use    Smoking status: Former     Current packs/day: 0.00     Average packs/day: 0.5 packs/day for 50.0 years (25.0 ttl pk-yrs)     Types: Cigarettes     Start date: 1969     Quit date: 2019     Years since quittin.0    Smokeless tobacco: Never    Tobacco comments:     1 pack per week, started age 11   Substance Use Topics    Alcohol use: No     Alcohol/week: 0.0 standard drinks of alcohol     Comment: holidays only            Family History   Problem Relation Age of Onset    Diabetes Father     Hypertension Father     Alcohol/Drug Father     Eye Disorder Father     Obesity Father     Breast Cancer  Mother     Osteoporosis Mother     Thyroid Disease Mother     Cancer Mother         Bladder    Cancer Sister         fallopian tube cancer    Obesity Sister     Alzheimer Disease Sister     Cancer Sister         Skin Cancer    Allergies Daughter     Cancer Maternal Grandmother         uterine cancer    Liver Disease Daughter         Biliary Atresia    Genitourinary Problems Brother     No Known Problems Daughter     Heart Disease Brother         Heart Murmur    Glaucoma Maternal Grandfather     Genitourinary Problems Brother         kidney disease (two brothers)    Macular Degeneration No family hx of                Allergies   Allergen Reactions    No Known Drug Allergy             Current Outpatient Medications   Medication Sig Dispense Refill    Biotin 2500 MCG CHEW Take 2,500 capsules by mouth daily      cephALEXin (KEFLEX) 500 MG capsule Take 1 capsule (500 mg) by mouth 4 times daily. 40 capsule 0    Cholecalciferol (VITAMIN D3) 25 MCG (1000 UT) CAPS       clobetasol (TEMOVATE) 0.05 % external ointment Apply a small pea size amount at bedtime couple times a week as needed 60 g 0    Ferrous Sulfate (IRON) 325 (65 Fe) MG tablet Take 1 tablet by mouth daily      fish oil-omega-3 fatty acids 1000 MG capsule Take 2 g by mouth daily      hyoscyamine (LEVSIN/SL) 0.125 MG sublingual tablet Take 0.125 mg by mouth every 6 hours as needed for cramping      Lactobacillus (PROBIOTIC ACIDOPHILUS PO)       levothyroxine (SYNTHROID/LEVOTHROID) 50 MCG tablet Take 1 tablet (50 mcg) by mouth daily. 90 tablet 0    oxyCODONE (ROXICODONE) 5 MG tablet Take 1 tablet (5 mg) by mouth every 6 hours as needed for severe pain. 6 tablet 0    sucralfate (CARAFATE) 1 GM/10ML suspension Take 10 mLs by mouth every 6 hours      tretinoin (RETIN-A) 0.05 % external cream Apply topically at bedtime      Vitamin A 2400 MCG (8000 UT) CAPS Take 1 capsule by mouth daily      vitamin C (ASCORBIC ACID) 500 MG tablet Take 500 mg by mouth daily       Vitamin E 180 MG (400 UNIT) CAPS Take 1 capsule by mouth daily      VITAMIN K PO Take by mouth daily      zinc gluconate 50 MG tablet Take 50 mg by mouth daily      azithromycin (ZITHROMAX) 250 MG tablet Two tablets first day, then one tablet daily for four days. (Patient not taking: Reported on 7/26/2024) 6 tablet 0    traZODone (DESYREL) 50 MG tablet Take 1-2 tablets ( mg) by mouth at bedtime (Patient not taking: Reported on 4/19/2024) 60 tablet 5          Review Of Systems  Skin: negative  Eyes: negative  Ears/Nose/Throat: negative  Respiratory: No shortness of breath, dyspnea on exertion, cough, or hemoptysis    O: Physical Exam:  wound healing well, no erythema or edema.  Thenar and hypothenar masses supple to palpation.  Radial /ulnar/ median nerve distributions intact to motor and sensory.      Lab:  non contributory    Images:  EXAM: RIGHT HAND 3 VIEWS  LOCATION: [facility]  DATE/TIME: 09/09/2024 10:17 PM CDT     INDICATION: Fell holding car keys. Palmar puncture wound. Pain with extension of the middle finger.  COMPARISON: None.                                                                      IMPRESSION:   1. A small amount of apparent gas in the soft tissues between the 2nd and 3rd metacarpal heads, likely relating to the known puncture wound.  2. No radiopaque foreign object in the right hand.  3. No visualized acute fracture or malalignment of the right hand.  4. Mild and moderate degenerative changes scattered within the interphalangeal joints and the 1st carpometacarpal joint.          A:    Foreign body puncture R palm after fall    P:  Will continue oral antibiotics until finished  Splint/brace continue as needed  Notify if exacerbation symptoms  See back one month, will discuss thumb CMC arthropathy further at that time             In addition to the above assessment and plan each active problem on Jackie's problem list was evaluated today. This included the  questioning of Jackie for any medication problems. We will continue the current treatment plan for these active problems except as noted.

## 2024-09-11 NOTE — LETTER
9/11/2024      Jackie Siddiqi  [address]  [place] [address]      Dear Colleague,    Thank you for referring your patient, Jackie Siddiqi, to [facility]. Please see a copy of my visit note below.    S:  Patient feels some thumb muscular increase in pain RUE compared to a few days ago, punctured by key in palm.  Presents in wrist splint, hx thumb fx years ago.  Was evaluated ED 9/9:  Expand All Collapse All       History          Chief Complaint   Patient presents with     Hand Injury      HPI  Jackie Siddiqi is a 69 year old female who presents to the ED with a puncture wound to the right palm.  She was over in [place] and tripped and fell in the dirt while holding her car keys in her dominant right hand.  Suffered a puncture wound to the proximal palmar aspect.  This happened around 5 PM.  She did cleaned it up with some peroxide.  Hurts if she tries to fully extend her middle finger.  Has not noticed any drainage.  Her last tetanus was in March 2016.  She bumped her right shoulder but has  good range of motion.  No other injuries.        Assessments & Plan (with Medical Decision Making)  69-year-old female tripped and fell while holding her car keys in her dominant right hand.  Suffered a puncture wound to the proximal palmar aspect.  Happened earlier this evening.  She cleaned it up with some peroxide.  Hurts to fully extend the middle finger.  No drainage.  Last tetanus was over 8 years ago.  That was updated tonight.  Right hand x-ray shows no bony involvement.  Small amount of gas in the soft tissues between the second and third metacarpal heads related to the known puncture wound.  Its only been a few hours since the injury so I really doubt gas-forming organism this early on.  She was placed in a splint for comfort.  Keflex prophylaxis, oxycodone if Tylenol is ineffective as she is supposed to avoid anti-inflammatories because of her stomach.  Orthopedic  consult ordered.  I want her rechecked in clinic in 1-2 days to make sure that this is improving.  In spite of the oral antibiotics, she knows that if this worsens, she may end up on IV antibiotics or even require surgery.  Adacel was updated tonight.  I sent a note to Dr. Kimbrough, Dr. Ocasio and Alex Goldman PA-C asking if one of them could see her in clinic in the next 1-2 days to make sure she is improving.  I do not want her to fall through the cracks and fail follow-up       Patient Active Problem List   Diagnosis     Esophageal reflux     Lichen sclerosus et atrophicus of the vulva     Hypothyroidism, unspecified type     Hyperlipidemia LDL goal <160     Osteoarthritis of carpometacarpal joint of right thumb, unspecified osteoarthritis type     Chronic obstructive pulmonary disease, unspecified COPD type (H)     Diverticulosis of colon     Hx of bilateral mastectomy     LLQ abdominal pain     Slow transit constipation     Psychophysiological insomnia     Diaphragmatic hernia     Disorder of function of stomach     Obstruction of esophagus     Greater trochanteric bursitis, left     It band syndrome, left            Past Medical History:   Diagnosis Date     Abnormal Papanicolaou smear of cervix and cervical HPV     cryocautery     Breast cystic disease      Hiatal hernia      Hypothyroidism      Motion sickness      Nonsenile cataract      Peptic ulcer, unspecified site, unspecified as acute or chronic, without mention of hemorrhage, perforation, or obstruction     Peptic ulcer disease     Personal history of colonic polyps             Past Surgical History:   Procedure Laterality Date     CATARACT IOL, RT/LT       COLONOSCOPY  07/16/2007     COLONOSCOPY  1/18/2013    Procedure: COLONOSCOPY;  colonoscopy, Esophagoscopy, Gastroscopy, Duodenoscopy EGD, multiple biopsies;  Surgeon: Joe Benson MD;  Location: [facility]     COLONOSCOPY N/A 12/6/2017    Procedure: COMBINED COLONOSCOPY, SINGLE OR MULTIPLE  BIOPSY/POLYPECTOMY BY BIOPSY;  colonoscopy with polypectomy by biopsy;  Surgeon: Tolu No MD;  Location: [facility]     COLONOSCOPY N/A 2020    Procedure: Colonoscopy with possible biopsy and/or polypectomy;  Surgeon: Obed Quick MD;  Location: [facility]     COLONOSCOPY N/A 3/29/2023    Procedure: COLONOSCOPY, WITH POLYPECTOMY;  Surgeon: Alxe Frances MD;  Location: [facility]     ENDOSCOPY  2007    Sm hiatus hernia     ESOPHAGOSCOPY, GASTROSCOPY, DUODENOSCOPY (EGD), COMBINED  2013    Procedure: COMBINED ESOPHAGOSCOPY, GASTROSCOPY, DUODENOSCOPY (EGD), BIOPSY SINGLE OR MULTIPLE;  ESOPHAGOGASTRODUODENOSCOPY WITH MULTIPLE BIOPSIES;  Surgeon: Joe Benson MD;  Location: [facility]     MASTECTOMY, BILATERAL       PHACOEMULSIFICATION CLEAR CORNEA WITH STANDARD INTRAOCULAR LENS IMPLANT Left 2021    Procedure: LEFT PHACOEMULSIFICATION, CATARACT, WITH INTRAOCULAR LENS IMPLANT;  Surgeon: Foreign Snider MD;  Location: [facility]     PHACOEMULSIFICATION CLEAR CORNEA WITH STANDARD INTRAOCULAR LENS IMPLANT Right 2021    Procedure: RIGHT PHACOEMULSIFICATION, CATARACT, WITH INTRAOCULAR LENS IMPLANT;  Surgeon: Foreign Snider MD;  Location: [facility]     REMOVE AND REPLACE BREAST IMPLANT PROSTHESIS Bilateral 2021    Procedure: Bilateral breast revision and implant exchange;  Surgeon: JENNIFFER Miner MD;  Location: [facility] OR     [facility]  DELIVERY ONLY       x2     [facility] NONSPECIFIC PROCEDURE      Laparoscopic exam with adhesions     [facility] NONSPECIFIC PROCEDURE      Mastectomy for cystic breast disease, breast implants            Social History     Tobacco Use     Smoking status: Former     Current packs/day: 0.00     Average packs/day: 0.5 packs/day for 50.0 years (25.0 ttl pk-yrs)     Types: Cigarettes     Start date: 1969     Quit date: 2019     Years since quittin.0     Smokeless tobacco: Never     Tobacco comments:     1 pack per week, started age 11   Substance Use  Topics     Alcohol use: No     Alcohol/week: 0.0 standard drinks of alcohol     Comment: holidays only            Family History   Problem Relation Age of Onset     Diabetes Father      Hypertension Father      Alcohol/Drug Father      Eye Disorder Father      Obesity Father      Breast Cancer Mother      Osteoporosis Mother      Thyroid Disease Mother      Cancer Mother         Bladder     Cancer Sister         fallopian tube cancer     Obesity Sister      Alzheimer Disease Sister      Cancer Sister         Skin Cancer     Allergies Daughter      Cancer Maternal Grandmother         uterine cancer     Liver Disease Daughter         Biliary Atresia     Genitourinary Problems Brother      No Known Problems Daughter      Heart Disease Brother         Heart Murmur     Glaucoma Maternal Grandfather      Genitourinary Problems Brother         kidney disease (two brothers)     Macular Degeneration No family hx of                Allergies   Allergen Reactions     No Known Drug Allergy             Current Outpatient Medications   Medication Sig Dispense Refill     Biotin 2500 MCG CHEW Take 2,500 capsules by mouth daily       cephALEXin (KEFLEX) 500 MG capsule Take 1 capsule (500 mg) by mouth 4 times daily. 40 capsule 0     Cholecalciferol (VITAMIN D3) 25 MCG (1000 UT) CAPS        clobetasol (TEMOVATE) 0.05 % external ointment Apply a small pea size amount at bedtime couple times a week as needed 60 g 0     Ferrous Sulfate (IRON) 325 (65 Fe) MG tablet Take 1 tablet by mouth daily       fish oil-omega-3 fatty acids 1000 MG capsule Take 2 g by mouth daily       hyoscyamine (LEVSIN/SL) 0.125 MG sublingual tablet Take 0.125 mg by mouth every 6 hours as needed for cramping       Lactobacillus (PROBIOTIC ACIDOPHILUS PO)        levothyroxine (SYNTHROID/LEVOTHROID) 50 MCG tablet Take 1 tablet (50 mcg) by mouth daily. 90 tablet 0     oxyCODONE (ROXICODONE) 5 MG tablet Take 1 tablet (5 mg) by mouth every 6 hours as needed for severe  pain. 6 tablet 0     sucralfate (CARAFATE) 1 GM/10ML suspension Take 10 mLs by mouth every 6 hours       tretinoin (RETIN-A) 0.05 % external cream Apply topically at bedtime       Vitamin A 2400 MCG (8000 UT) CAPS Take 1 capsule by mouth daily       vitamin C (ASCORBIC ACID) 500 MG tablet Take 500 mg by mouth daily       Vitamin E 180 MG (400 UNIT) CAPS Take 1 capsule by mouth daily       VITAMIN K PO Take by mouth daily       zinc gluconate 50 MG tablet Take 50 mg by mouth daily       azithromycin (ZITHROMAX) 250 MG tablet Two tablets first day, then one tablet daily for four days. (Patient not taking: Reported on 7/26/2024) 6 tablet 0     traZODone (DESYREL) 50 MG tablet Take 1-2 tablets ( mg) by mouth at bedtime (Patient not taking: Reported on 4/19/2024) 60 tablet 5          Review Of Systems  Skin: negative  Eyes: negative  Ears/Nose/Throat: negative  Respiratory: No shortness of breath, dyspnea on exertion, cough, or hemoptysis    O: Physical Exam:  wound healing well, no erythema or edema.  Thenar and hypothenar masses supple to palpation.  Radial /ulnar/ median nerve distributions intact to motor and sensory.      Lab:  non contributory    Images:  EXAM: RIGHT HAND 3 VIEWS  LOCATION: [facility]  DATE/TIME: 09/09/2024 10:17 PM CDT     INDICATION: Fell holding car keys. Palmar puncture wound. Pain with extension of the middle finger.  COMPARISON: None.                                                                      IMPRESSION:   1. A small amount of apparent gas in the soft tissues between the 2nd and 3rd metacarpal heads, likely relating to the known puncture wound.  2. No radiopaque foreign object in the right hand.  3. No visualized acute fracture or malalignment of the right hand.  4. Mild and moderate degenerative changes scattered within the interphalangeal joints and the 1st carpometacarpal joint.          A:    Foreign body puncture R palm after fall    P:   Will continue oral antibiotics until finished  Splint/brace continue as needed  Notify if exacerbation symptoms  See back one month, will discuss thumb CMC arthropathy further at that time             In addition to the above assessment and plan each active problem on Jackie's problem list was evaluated today. This included the questioning of Jackie for any medication problems. We will continue the current treatment plan for these active problems except as noted.        Again, thank you for allowing me to participate in the care of your patient.        Sincerely,        Alex Ocasio MD

## 2024-09-11 NOTE — PATIENT INSTRUCTIONS
Thank you for choosing [facility]!      FOLLOW-UP  Dr. Ocasio would like you to follow-up as needed. Please stop by the  on your way out or call [phone] to schedule.

## 2024-09-20 ENCOUNTER — TELEPHONE (OUTPATIENT)
Dept: FAMILY MEDICINE | Facility: CLINIC | Age: 69
End: 2024-09-20
Payer: COMMERCIAL

## 2024-09-20 NOTE — TELEPHONE ENCOUNTER
Patient calling when she is supposed to have her next colonoscopy. She states she had one last year and they saw something so is is wondering when she should have it done. Per care gap 3/29/28    Patient also reports that she is supposed to have a follow up chest xray as something was noted on the previous one    Will route to PCP to advise    Ama Loving RN on 9/20/2024 at 11:45 AM

## 2024-09-20 NOTE — TELEPHONE ENCOUNTER
I have not seen her since March 2022.  Looks like she has been seeing provider in [facility].  Please have her her current doctor to address.

## 2024-09-23 NOTE — TELEPHONE ENCOUNTER
For her colonoscopy they found polyps, but given pathology recommendations to repeat colonoscopy in 5 years. If she has further questions on this she certainly can contact their office.     As for chest xray, is she referring to her annual lung cancer screening? I see a chest xray was done in April of this year, no recommendations for follow up as it appears it was normal.       Yin Villegas, TEDDY CNP  Questions or concerns please feel free to send me a [facility] message or call me  Phone : [phone]

## 2024-09-23 NOTE — TELEPHONE ENCOUNTER
Called patient and relayed providers message. She was thankful for the call and expressed understanding.  No further questions or concerns at this time.    Ama Loving RN on 9/23/2024 at 2:55 PM

## 2024-10-15 DIAGNOSIS — N95.2 ATROPHIC VAGINITIS: ICD-10-CM

## 2024-10-15 NOTE — TELEPHONE ENCOUNTER
Requested Prescriptions   Pending Prescriptions Disp Refills    clobetasol (TEMOVATE) 0.05 % external ointment [Pharmacy Med Name: CLOBETASOL PROP 0.05% OINT 15GM] 15 g      Sig: APPLY TOPICALLY TWICE DAILY       There is no refill protocol information for this order        Last seen: 10/04/2023  Last prescribed: 9/02/2022    10/04/2023 OV Plan: clobetasol (TEMOVATE) 0.05 % external ointment   Follow up in 2-4 weeks, will then plan taper    Sending [facility] message with appointment reminder.     Citlali VELÁZQUEZ RN - [facility]

## 2024-10-17 RX ORDER — CLOBETASOL PROPIONATE 0.5 MG/G
OINTMENT TOPICAL
Qty: 15 G | Refills: 0 | OUTPATIENT
Start: 2024-10-17

## 2024-10-17 NOTE — TELEPHONE ENCOUNTER
I have not prescribed this for her since 2022. Needs follow up in clinic.       TEDDY Dhillon CNP  Questions or concerns please feel free to send me a [facility] message or call me  Phone : [phone]

## 2024-11-12 ENCOUNTER — TRANSFERRED RECORDS (OUTPATIENT)
Dept: HEALTH INFORMATION MANAGEMENT | Facility: CLINIC | Age: 69
End: 2024-11-12
Payer: COMMERCIAL

## 2024-11-27 ENCOUNTER — VIRTUAL VISIT (OUTPATIENT)
Dept: FAMILY MEDICINE | Facility: CLINIC | Age: 69
End: 2024-11-27
Payer: COMMERCIAL

## 2024-11-27 DIAGNOSIS — N90.4 LICHEN SCLEROSUS ET ATROPHICUS OF THE VULVA: Primary | ICD-10-CM

## 2024-11-27 DIAGNOSIS — N95.2 ATROPHIC VAGINITIS: ICD-10-CM

## 2024-11-27 PROCEDURE — 99441 PR PHYSICIAN TELEPHONE EVALUATION 5-10 MIN: CPT | Mod: 93 | Performed by: PHYSICIAN ASSISTANT

## 2024-11-27 RX ORDER — CLOBETASOL PROPIONATE 0.5 MG/G
OINTMENT TOPICAL
Qty: 60 G | Refills: 1 | Status: SHIPPED | OUTPATIENT
Start: 2024-11-27

## 2024-11-27 NOTE — PROGRESS NOTES
Jackie is a 69 year old who is being evaluated via a billable telephone visit.    What phone number would you like to be contacted at? [phone]  How would you like to obtain your AVS? MyChart  Originating Location (pt. Location): Home    Distant Location (provider location):  On-site    Assessment & Plan     Lichen sclerosus et atrophicus of the vulva  Atrophic vaginitis  Known issue, ongoing   - clobetasol (TEMOVATE) 0.05 % external ointment; Apply a small pea size amount at bedtime couple times a week as needed  Refill given, no side effects. Uses sparingly as needed.  Ok to continue with this regimen          Follow up if not improving or worsening symptoms     Subjective   Jackie is a 69 year old, presenting for the following health issues:  Recheck Medication        11/27/2024     2:25 PM   Additional Questions   Roomed by Jesica Mooney MA   Accompanied by Self     HPI       Per pt discuss Clobetasol Propionate, wanting a refill for med.        Objective           Vitals:  No vitals were obtained today due to virtual visit.    Physical Exam   General: Alert and no distress //Respiratory: No audible wheeze, cough, or shortness of breath // Psychiatric:  Appropriate affect, tone, and pace of words          Phone call duration: 8 minutes  Signed Electronically by: Emeterio Timmons PA-C

## 2024-12-21 ENCOUNTER — HEALTH MAINTENANCE LETTER (OUTPATIENT)
Age: 69
End: 2024-12-21

## 2025-02-10 ENCOUNTER — TELEPHONE (OUTPATIENT)
Dept: FAMILY MEDICINE | Facility: OTHER | Age: 70
End: 2025-02-10
Payer: COMMERCIAL

## 2025-02-10 NOTE — TELEPHONE ENCOUNTER
Madeline Quiroga, APRN CNP to [facility]   LW      2/10/25  5:01 PM  Please call and triage. Scheduled with me tomorrow see chief complaint. Should follow-up with provider managing or specialist. Do not see an autoimmune disease listed in problem list so unsure what she is referring to.        Per appointment notes:   Follow-up// autoimmune conditions, having more physical symptoms- wondering if meds need to be adjusted    No

## 2025-02-10 NOTE — TELEPHONE ENCOUNTER
"RN Triage    Patient Contact    Attempt # 1    RN did attempt to reach patient. No answer. Message left for patient to call the clinic back and ask to speak to a member of the care team. Wanting to triage \"more physical symptoms\" and advise that she likely should be making an appointment with the provider managing or her specialist. No autoimmune disease is listed on problem list so provider is not sure what this is referring to. Need more information. Need to triage symptom's.     Estee Bentley RN on 2/10/2025 at 5:36 PM     "

## 2025-02-10 NOTE — TELEPHONE ENCOUNTER
Patient returned call. She reports she has Hashimoto's and has been having more symptoms of being cold and tremors. She wants to ensure her thyroid levels are stable as she is taking her Levothyroxine daily.    Patient has seen multiple providers in the past, most recently Dr. Peres. Rescheduled patient tomorrow with her.    Closing encounter.    Omar Metzger RN on 2/10/2025 at 5:55 PM

## 2025-02-11 ENCOUNTER — OFFICE VISIT (OUTPATIENT)
Dept: FAMILY MEDICINE | Facility: OTHER | Age: 70
End: 2025-02-11
Payer: COMMERCIAL

## 2025-02-11 VITALS
WEIGHT: 115 LBS | SYSTOLIC BLOOD PRESSURE: 122 MMHG | TEMPERATURE: 98 F | DIASTOLIC BLOOD PRESSURE: 70 MMHG | BODY MASS INDEX: 21.03 KG/M2 | HEART RATE: 66 BPM | RESPIRATION RATE: 16 BRPM | OXYGEN SATURATION: 96 %

## 2025-02-11 DIAGNOSIS — E03.9 HYPOTHYROIDISM, UNSPECIFIED TYPE: Primary | ICD-10-CM

## 2025-02-11 DIAGNOSIS — Z87.891 HISTORY OF TOBACCO USE, PRESENTING HAZARDS TO HEALTH: ICD-10-CM

## 2025-02-11 DIAGNOSIS — N90.4 LICHEN SCLEROSUS ET ATROPHICUS OF THE VULVA: ICD-10-CM

## 2025-02-11 DIAGNOSIS — Z13.88 SCREENING FOR LEAD EXPOSURE: ICD-10-CM

## 2025-02-11 DIAGNOSIS — B35.1 ONYCHOMYCOSIS: ICD-10-CM

## 2025-02-11 DIAGNOSIS — E78.5 HYPERLIPIDEMIA, UNSPECIFIED HYPERLIPIDEMIA TYPE: ICD-10-CM

## 2025-02-11 DIAGNOSIS — H69.93 DYSFUNCTION OF BOTH EUSTACHIAN TUBES: ICD-10-CM

## 2025-02-11 DIAGNOSIS — R53.83 FATIGUE, UNSPECIFIED TYPE: ICD-10-CM

## 2025-02-11 DIAGNOSIS — J44.9 CHRONIC OBSTRUCTIVE PULMONARY DISEASE, UNSPECIFIED COPD TYPE (H): ICD-10-CM

## 2025-02-11 DIAGNOSIS — K29.40 AUTOIMMUNE GASTRITIS: ICD-10-CM

## 2025-02-11 DIAGNOSIS — R17 SERUM TOTAL BILIRUBIN ELEVATED: ICD-10-CM

## 2025-02-11 LAB
ALBUMIN SERPL BCG-MCNC: 4.4 G/DL (ref 3.5–5.2)
ALP SERPL-CCNC: 64 U/L (ref 40–150)
ALT SERPL W P-5'-P-CCNC: 12 U/L (ref 0–50)
ANION GAP SERPL CALCULATED.3IONS-SCNC: 13 MMOL/L (ref 7–15)
AST SERPL W P-5'-P-CCNC: 21 U/L (ref 0–45)
BILIRUB SERPL-MCNC: 1.5 MG/DL
BUN SERPL-MCNC: 12.8 MG/DL (ref 8–23)
CALCIUM SERPL-MCNC: 9.5 MG/DL (ref 8.8–10.4)
CHLORIDE SERPL-SCNC: 104 MMOL/L (ref 98–107)
CHOLEST SERPL-MCNC: 216 MG/DL
CREAT SERPL-MCNC: 0.76 MG/DL (ref 0.51–0.95)
EGFRCR SERPLBLD CKD-EPI 2021: 83 ML/MIN/1.73M2
ERYTHROCYTE [DISTWIDTH] IN BLOOD BY AUTOMATED COUNT: 12.2 % (ref 10–15)
FASTING STATUS PATIENT QL REPORTED: NO
FASTING STATUS PATIENT QL REPORTED: NO
GLUCOSE SERPL-MCNC: 92 MG/DL (ref 70–99)
HCO3 SERPL-SCNC: 24 MMOL/L (ref 22–29)
HCT VFR BLD AUTO: 38.8 % (ref 35–47)
HDLC SERPL-MCNC: 59 MG/DL
HGB BLD-MCNC: 13 G/DL (ref 11.7–15.7)
LDLC SERPL CALC-MCNC: 141 MG/DL
MCH RBC QN AUTO: 30.4 PG (ref 26.5–33)
MCHC RBC AUTO-ENTMCNC: 33.5 G/DL (ref 31.5–36.5)
MCV RBC AUTO: 91 FL (ref 78–100)
NONHDLC SERPL-MCNC: 157 MG/DL
PLATELET # BLD AUTO: 275 10E3/UL (ref 150–450)
POTASSIUM SERPL-SCNC: 4.1 MMOL/L (ref 3.4–5.3)
PROT SERPL-MCNC: 7.3 G/DL (ref 6.4–8.3)
RBC # BLD AUTO: 4.28 10E6/UL (ref 3.8–5.2)
SODIUM SERPL-SCNC: 141 MMOL/L (ref 135–145)
TRIGL SERPL-MCNC: 78 MG/DL
TSH SERPL DL<=0.005 MIU/L-ACNC: 2.16 UIU/ML (ref 0.3–4.2)
WBC # BLD AUTO: 7.3 10E3/UL (ref 4–11)

## 2025-02-11 PROCEDURE — 99000 SPECIMEN HANDLING OFFICE-LAB: CPT | Performed by: FAMILY MEDICINE

## 2025-02-11 PROCEDURE — 82248 BILIRUBIN DIRECT: CPT | Performed by: FAMILY MEDICINE

## 2025-02-11 PROCEDURE — 85027 COMPLETE CBC AUTOMATED: CPT | Performed by: FAMILY MEDICINE

## 2025-02-11 PROCEDURE — 84443 ASSAY THYROID STIM HORMONE: CPT | Performed by: FAMILY MEDICINE

## 2025-02-11 PROCEDURE — 84202 ASSAY RBC PROTOPORPHYRIN: CPT | Mod: 90 | Performed by: FAMILY MEDICINE

## 2025-02-11 PROCEDURE — 99215 OFFICE O/P EST HI 40 MIN: CPT | Performed by: FAMILY MEDICINE

## 2025-02-11 PROCEDURE — 80053 COMPREHEN METABOLIC PANEL: CPT | Performed by: FAMILY MEDICINE

## 2025-02-11 PROCEDURE — 80061 LIPID PANEL: CPT | Performed by: FAMILY MEDICINE

## 2025-02-11 PROCEDURE — 83655 ASSAY OF LEAD: CPT | Mod: 90 | Performed by: FAMILY MEDICINE

## 2025-02-11 PROCEDURE — 36415 COLL VENOUS BLD VENIPUNCTURE: CPT | Performed by: FAMILY MEDICINE

## 2025-02-11 RX ORDER — FLUTICASONE PROPIONATE 50 MCG
2 SPRAY, SUSPENSION (ML) NASAL DAILY PRN
Qty: 16 G | Refills: 0 | Status: SHIPPED | OUTPATIENT
Start: 2025-02-11

## 2025-02-11 NOTE — PROGRESS NOTES
Assessment & Plan     Hypothyroidism, unspecified type  Patient states she takes her medication consistently.  Will check TSH.    - TSH WITH FREE T4 REFLEX    Fatigue, unspecified type  Could be multifactorial.  Checking her thyroid.  Patient also has lead exposure and will get this checked.  Will check her CBC.  She has some nasal congestion and had plugging and this could be related to a viral illness.    Dysfunction of both eustachian tubes  We will try Flonase.  No obvious sign of bacterial infection.  Will treat with Flonase.  She could also try Sudafed.    - fluticasone (FLONASE) 50 MCG/ACT nasal spray; Spray 2 sprays into both nostrils daily as needed for rhinitis or allergies.    Autoimmune gastritis  Patient has multiple questions regarding autoimmune gastritis and discussed that I am not that familiar with it.  I recommend that she have a formal consult with gastroenterology aside from just having them scoped her once a year.  Perhaps they have a nutritionist who can answer more of her food related questions.  [facility] does her EGDs and will refer her to them for consultation.    - Adult GI  Referral - Consult Only; Future  - CBC with platelets    Lichen sclerosus et atrophicus of the vulva  Patient has lichen sclerosus.  She saw gynecology in 2023.  She states the clobetasol helped at first and she was able to taper off.  She then had recurrence of her symptoms and restarted her clobetasol but feels like things are worsening instead of improving.  She would like to see gynecology again.  Referral was placed.    - Ob/Gyn  Referral; Future    Hyperlipidemia, unspecified hyperlipidemia type  Labs drawn.    - Lipid panel reflex to direct LDL Non-fasting  - Comprehensive metabolic panel (BMP + Alb, Alk Phos, ALT, AST, Total. Bili, TP)    Screening for lead exposure  She is concerned about her exposure at work    - Lead Industrial Exposure Panel Adults    History of tobacco  use, presenting hazards to health  She would like to continue with lung cancer screening    - CT Chest Lung Cancer Screen Low Dose Without; Future    Onychomycosis  Discussed the poor success rate for topical therapy.  Discussed origins concerned about possible side effects.  Did discuss that she is not symptom attic from this and that it is okay for her not to treat.    Chronic obstructive pulmonary disease, unspecified COPD type (H)  No current symptoms.  She does not take inhalers.  Smoking cessation strongly advised.  She states she had quit for a while and now she is back to smoking about a pack a week.      40 minutes spent by me on the date of the encounter doing chart review, history and exam, documentation and further activities per the note      Subjective   Jackie is a 69 year old, presenting for the following health issues:  Thyroid Problem        2/11/2025    12:51 PM   Additional Questions   Roomed by audrey salmon     History of Present Illness       Hypothyroidism:     Since last visit, patient describes the following symptoms::  Fatigue, Hair loss and Tremors    She eats 0-1 servings of fruits and vegetables daily.She consumes 3 sweetened beverage(s) daily.She exercises with enough effort to increase her heart rate 9 or less minutes per day.  She exercises with enough effort to increase her heart rate 3 or less days per week. She is missing 1 dose(s) of medications per week.  She is not taking prescribed medications regularly due to remembering to take.     Was shivering last night, but didn't feel cold.  Feels tired and weak.  She has hypothyroidism thought to be secondary to Hashimoto's.  She states she has been taking her levothyroxine consistently even though pharmacy dispense looks like she has not picked it up since July.    Left ear plugging.  Has a mild headache for a couple weeks.  Took Tylenol a couple times but didn't help.  She denies fever.  She denies nasal congestion, sinus pain, sore  throat, cough or shortness of breath.  However then she states she just does not feel well.    Toenails have changed color.  She has tried over-the-counter topical antifungal products without improvement.  She denies pain.    States has autoimmune gastritis, has EGD once a year.  However she does not see gastroenterology formally.  She has multiple concerns about what she is able to eat with her autoimmune gastritis.     She works at [employer] and is concerned about her lead exposure.  She states she can have screening done at work but has not had it done for a few years and she is wondering if this is the cause for her fatigue.          Objective    /70   Pulse 66   Temp 98  F (36.7  C) (Temporal)   Resp 16   Wt 52.2 kg (115 lb)   LMP 03/26/2003 (Approximate)   SpO2 96%   BMI 21.03 kg/m    Body mass index is 21.03 kg/m .  Physical Exam   Gen: no apparent distress  HENT: Ears normal. Throat and pharynx normal. Neck supple. No adenopathy or masses in the neck or supraclavicular regions. Sinuses non tender.  Nose is congested with clear discharge.  Chest/CV: S1 and S2 normal, no murmurs, clicks, gallops or rubs. Regular rate and rhythm. Chest is clear; no wheezes or rales. No edema.  Abd: The abdomen is soft without tenderness, guarding, mass, rebound or organomegaly. Bowel sounds are normal.   Nails: All toenails are thick, yellow and disfigured and consistent with onychomycosis          Signed Electronically by: Radha Peres MD

## 2025-02-12 LAB
BILIRUB DIRECT SERPL-MCNC: <0.2 MG/DL (ref 0–0.3)
BILIRUB SERPL-MCNC: 1.5 MG/DL

## 2025-02-12 NOTE — ADDENDUM NOTE
Addended by: SAVAGE GARCIA on: 2/12/2025 08:10 AM     Modules accepted: Orders    
Addended by: SAVAGE GARCIA on: 2/12/2025 11:11 AM     Modules accepted: Orders    
34

## 2025-02-15 LAB
LEAD BLD-MCNC: 5.3 UG/DL
ZPP RBC-MCNC: 33 UG/DL
ZPP RBC-SRTO: 56 UMOLZPP/MOLHEM

## 2025-02-18 ENCOUNTER — TELEPHONE (OUTPATIENT)
Dept: FAMILY MEDICINE | Facility: OTHER | Age: 70
End: 2025-02-18
Payer: COMMERCIAL

## 2025-02-18 NOTE — TELEPHONE ENCOUNTER
Patient returning call. Relayed lab results. Scheduled 05/05 for lab recheck.     Patient states her last employer check was roughly 1.5 years ago. The value was 5. When she started at company 17 years ago her level was 1. She needs to get scheduled to recheck now, but unsure when she will actually get drawn.     She wanted outside check to see if her employer is similar is similar to [employer] patient agrees they are comparable.     Evans Goodwin RN on 2/18/2025 at 3:31 PM

## 2025-02-18 NOTE — TELEPHONE ENCOUNTER
Patient Contact    Attempt # 1    RN did attempt to reach patient. No answer. Message left for patient to call the clinic back and ask to speak to a member of the care team. Wanting to relay results message below.      Also sent [employer] [facility] [employer] message.     Cynthia Longoria RN on 2/18/2025 at 3:20 PM

## 2025-02-18 NOTE — TELEPHONE ENCOUNTER
----- Message from Radha Peres sent at 2/18/2025  9:57 AM CST -----  Patient didn't read [employer] message and also has new lead result    For lead, her level is minimally elevated, but not to the level of needing to be removed from work.  It would be helpful to know what her levels were when checked at work to know if there has been any change.  When does she get checked at work again?    Cholesterol is improving, keep up the good work.     Other labs are normal except for a mildly elevated bilirubin.  As the rest of your liver tests are normal, this most likely is a benign result, however, I would like to recheck it in a couple months.  This can be a lab only appointment and does not need to be fasting.

## 2025-02-19 DIAGNOSIS — E03.9 HYPOTHYROIDISM, UNSPECIFIED TYPE: ICD-10-CM

## 2025-02-20 RX ORDER — LEVOTHYROXINE SODIUM 50 UG/1
50 TABLET ORAL DAILY
Qty: 90 TABLET | Refills: 2 | Status: SHIPPED | OUTPATIENT
Start: 2025-02-20

## 2025-03-06 ENCOUNTER — TELEPHONE (OUTPATIENT)
Dept: FAMILY MEDICINE | Facility: CLINIC | Age: 70
End: 2025-03-06
Payer: COMMERCIAL

## 2025-03-06 DIAGNOSIS — Z87.891 HISTORY OF TOBACCO USE, PRESENTING HAZARDS TO HEALTH: Primary | ICD-10-CM

## 2025-03-06 RX ORDER — NICOTINE 21 MG/24HR
1 PATCH, TRANSDERMAL 24 HOURS TRANSDERMAL EVERY 24 HOURS
Qty: 30 PATCH | Refills: 0 | Status: SHIPPED | OUTPATIENT
Start: 2025-03-06

## 2025-03-06 NOTE — TELEPHONE ENCOUNTER
Patient presents to [facility] requesting a prescription for a patch to quit smoking.  She saw DR Peres on 02-11-25.  I asked her if this issue was discussed at that time, she said no however was asked if she smoked and now would like to quit.  Please advise if a prescription for a patch can be sent to [facility] [place].  Thank you

## 2025-03-17 ENCOUNTER — HOSPITAL ENCOUNTER (OUTPATIENT)
Dept: CT IMAGING | Facility: CLINIC | Age: 70
Discharge: HOME | End: 2025-03-17
Attending: FAMILY MEDICINE | Admitting: FAMILY MEDICINE
Payer: COMMERCIAL

## 2025-03-17 DIAGNOSIS — Z87.891 HISTORY OF TOBACCO USE, PRESENTING HAZARDS TO HEALTH: ICD-10-CM

## 2025-03-17 PROCEDURE — 71271 CT THORAX LUNG CANCER SCR C-: CPT

## 2025-03-26 ENCOUNTER — OFFICE VISIT (OUTPATIENT)
Dept: ORTHOPEDICS | Facility: CLINIC | Age: 70
End: 2025-03-26
Payer: COMMERCIAL

## 2025-03-26 VITALS — WEIGHT: 109.5 LBS | BODY MASS INDEX: 20.67 KG/M2 | HEIGHT: 61 IN

## 2025-03-26 DIAGNOSIS — M18.11 ARTHRITIS OF CARPOMETACARPAL (CMC) JOINT OF RIGHT THUMB: Primary | ICD-10-CM

## 2025-03-26 PROCEDURE — 99213 OFFICE O/P EST LOW 20 MIN: CPT | Mod: 25 | Performed by: ORTHOPAEDIC SURGERY

## 2025-03-26 PROCEDURE — 20600 DRAIN/INJ JOINT/BURSA W/O US: CPT | Mod: F5 | Performed by: ORTHOPAEDIC SURGERY

## 2025-03-26 RX ADMIN — BUPIVACAINE HYDROCHLORIDE 0.5 ML: 5 INJECTION, SOLUTION PERINEURAL at 15:27

## 2025-03-26 RX ADMIN — TRIAMCINOLONE ACETONIDE 20 MG: 40 INJECTION, SUSPENSION INTRA-ARTICULAR; INTRAMUSCULAR at 15:27

## 2025-03-26 RX ADMIN — LIDOCAINE HYDROCHLORIDE 0.5 ML: 10 INJECTION, SOLUTION INFILTRATION; PERINEURAL at 15:27

## 2025-03-26 NOTE — PROGRESS NOTES
Hand / Upper Extremity Injection/Arthrocentesis: R thumb CMC    Date/Time: 3/26/2025 3:27 PM    Performed by: Alex Ocasio MD  Authorized by: Alex Ocasio MD    Indications:  Pain  Needle Size:  25 G  Guidance: landmark    Approach:  Volar  Condition: osteoarthritis    Location:  Thumb  Site:  R thumb CMC    Medications:  20 mg triamcinolone 40 MG/ML; 0.5 mL BUPivacaine 0.5 %; 0.5 mL lidocaine 1 %  Outcome:  Tolerated well, no immediate complications  Procedure discussed: discussed risks, benefits, and alternatives    Consent Given by:  Patient  Timeout: timeout called immediately prior to procedure    Prep: patient was prepped and draped in usual sterile fashion

## 2025-03-26 NOTE — LETTER
3/26/2025      Jackie Siddiqi  [address]  [place] [address]      Dear Colleague,    Thank you for referring your patient, Jackie Siddiqi, to the [facility]. Please see a copy of my visit note below.    Hand / Upper Extremity Injection/Arthrocentesis: R thumb CMC    Date/Time: 3/26/2025 3:27 PM    Performed by: Alex Ocasio MD  Authorized by: Alex Ocasio MD    Indications:  Pain  Needle Size:  25 G  Guidance: landmark    Approach:  Volar  Condition: osteoarthritis    Location:  Thumb  Site:  R thumb CMC    Medications:  20 mg triamcinolone 40 MG/ML; 0.5 mL BUPivacaine 0.5 %; 0.5 mL lidocaine 1 %  Outcome:  Tolerated well, no immediate complications  Procedure discussed: discussed risks, benefits, and alternatives    Consent Given by:  Patient  Timeout: timeout called immediately prior to procedure    Prep: patient was prepped and draped in usual sterile fashion          Again, thank you for allowing me to participate in the care of your patient.        Sincerely,        Alex Ocasio MD    Electronically signed

## 2025-03-27 RX ORDER — TRIAMCINOLONE ACETONIDE 40 MG/ML
20 INJECTION, SUSPENSION INTRA-ARTICULAR; INTRAMUSCULAR
Status: COMPLETED | OUTPATIENT
Start: 2025-03-26 | End: 2025-03-26

## 2025-03-27 RX ORDER — BUPIVACAINE HYDROCHLORIDE 5 MG/ML
0.5 INJECTION, SOLUTION PERINEURAL
Status: COMPLETED | OUTPATIENT
Start: 2025-03-26 | End: 2025-03-26

## 2025-03-27 RX ORDER — LIDOCAINE HYDROCHLORIDE 10 MG/ML
0.5 INJECTION, SOLUTION INFILTRATION; PERINEURAL
Status: COMPLETED | OUTPATIENT
Start: 2025-03-26 | End: 2025-03-26

## 2025-03-27 NOTE — PROGRESS NOTES
S:  Patient works in an [employer].  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  Now exacerbation R thumb pain.  Symptoms related to previous infection resolved.         Patient Active Problem List   Diagnosis    Esophageal reflux    Lichen sclerosus et atrophicus of the vulva    Hypothyroidism, unspecified type    Hyperlipidemia LDL goal <160    Osteoarthritis of carpometacarpal joint of right thumb, unspecified osteoarthritis type    Chronic obstructive pulmonary disease, unspecified COPD type (H)    Diverticulosis of colon    Hx of bilateral mastectomy    LLQ abdominal pain    Slow transit constipation    Psychophysiological insomnia    Diaphragmatic hernia    Disorder of function of stomach    Obstruction of esophagus    Greater trochanteric bursitis, left    It band syndrome, left    Onychomycosis            Past Medical History:   Diagnosis Date    Abnormal Papanicolaou smear of cervix and cervical HPV     cryocautery    Breast cystic disease     Hiatal hernia     Hypothyroidism     Motion sickness     Nonsenile cataract     Peptic ulcer, unspecified site, unspecified as acute or chronic, without mention of hemorrhage, perforation, or obstruction     Peptic ulcer disease    Personal history of colonic polyps             Past Surgical History:   Procedure Laterality Date    CATARACT IOL, RT/LT      COLONOSCOPY  07/16/2007    COLONOSCOPY  1/18/2013    Procedure: COLONOSCOPY;  colonoscopy, Esophagoscopy, Gastroscopy, Duodenoscopy EGD, multiple biopsies;  Surgeon: Joe Benson MD;  Location: [facility]    COLONOSCOPY N/A 12/6/2017    Procedure: COMBINED COLONOSCOPY, SINGLE OR MULTIPLE BIOPSY/POLYPECTOMY BY BIOPSY;  colonoscopy with polypectomy by biopsy;  Surgeon: Tolu No MD;  Location: [facility]    COLONOSCOPY N/A 6/24/2020    Procedure: Colonoscopy with possible biopsy and/or polypectomy;  Surgeon: Obed Quick MD;  [facility]: [facility]    COLONOSCOPY N/A 3/29/2023     Procedure: COLONOSCOPY, WITH POLYPECTOMY;  Surgeon: Alex Farnces MD;  Location: [facility]    ENDOSCOPY  2007    Sm hiatus hernia    ESOPHAGOSCOPY, GASTROSCOPY, DUODENOSCOPY (EGD), COMBINED  2013    Procedure: COMBINED ESOPHAGOSCOPY, GASTROSCOPY, DUODENOSCOPY (EGD), BIOPSY SINGLE OR MULTIPLE;  ESOPHAGOGASTRODUODENOSCOPY WITH MULTIPLE BIOPSIES;  Surgeon: Joe Benson MD;  [facility]    MASTECTOMY, BILATERAL      PHACOEMULSIFICATION CLEAR CORNEA WITH STANDARD INTRAOCULAR LENS IMPLANT Left 2021    Procedure: LEFT PHACOEMULSIFICATION, CATARACT, WITH INTRAOCULAR LENS IMPLANT;  Surgeon: Foreign Snider MD;  Location: [facility]    PHACOEMULSIFICATION CLEAR CORNEA WITH STANDARD INTRAOCULAR LENS IMPLANT Right 2021    Procedure: RIGHT PHACOEMULSIFICATION, CATARACT, WITH INTRAOCULAR LENS IMPLANT;  Surgeon: Foreign Snider MD;  Location: [facility]    REMOVE AND REPLACE BREAST IMPLANT PROSTHESIS Bilateral 2021    Procedure: Bilateral breast revision and implant exchange;  Surgeon: JENNIFFER Miner MD;  [place] [facility] [place] [place]    [facility]  DELIVERY ONLY       x2    [facility] NONSPECIFIC PROCEDURE      Laparoscopic exam with adhesions    [facility] NONSPECIFIC PROCEDURE      Mastectomy for cystic breast disease, breast implants            Social History     Tobacco Use    Smoking status: Some Days     Current packs/day: 0.00     Average packs/day: 0.5 packs/day for 50.0 years (25.0 ttl pk-yrs)     Types: Cigarettes     Start date: 1969     Last attempt to quit: 2019     Years since quittin.5    Smokeless tobacco: Never    Tobacco comments:     1 pack per week, started age 11   Substance Use Topics    Alcohol use: No     Alcohol/week: 0.0 standard drinks of alcohol     Comment: holidays only            Family History   Problem Relation Age of Onset    Diabetes Father     Hypertension Father     Alcohol/Drug Father     Eye Disorder Father     Obesity Father     Breast Cancer Mother      Osteoporosis Mother     Thyroid Disease Mother     Cancer Mother         Bladder    Cancer Sister         fallopian tube cancer    Obesity Sister     Alzheimer Disease Sister     Cancer Sister         Skin Cancer    Allergies Daughter     Cancer Maternal Grandmother         uterine cancer    Liver Disease Daughter         Biliary Atresia    Genitourinary Problems Brother     No Known Problems Daughter     Heart Disease Brother         Heart Murmur    Glaucoma Maternal Grandfather     Genitourinary Problems Brother         kidney disease (two brothers)    Macular Degeneration No family hx of                Allergies   Allergen Reactions    No Known Drug Allergy             Current Outpatient Medications   Medication Sig Dispense Refill    Cholecalciferol (VITAMIN D3) 25 MCG (1000 UT) CAPS       clobetasol (TEMOVATE) 0.05 % external ointment Apply a small pea size amount at bedtime couple times a week as needed 60 g 1    Ferrous Sulfate (IRON) 325 (65 Fe) MG tablet Take 1 tablet by mouth daily. Not daily      fish oil-omega-3 fatty acids 1000 MG capsule Take 2 g by mouth daily      fluticasone (FLONASE) 50 MCG/ACT nasal spray Spray 2 sprays into both nostrils daily as needed for rhinitis or allergies. 16 g 0    Lactobacillus (PROBIOTIC ACIDOPHILUS PO)       levothyroxine (SYNTHROID/LEVOTHROID) 50 MCG tablet Take 1 tablet (50 mcg) by mouth daily. 90 tablet 2    nicotine (NICODERM CQ) 14 MG/24HR 24 hr patch Place 1 patch over 24 hours onto the skin every 24 hours. 30 patch 1    tretinoin (RETIN-A) 0.05 % external cream Apply topically at bedtime      Vitamin A 2400 MCG (8000 UT) CAPS Take 1 capsule by mouth daily      vitamin C (ASCORBIC ACID) 500 MG tablet Take 500 mg by mouth daily      VITAMIN K PO Take by mouth daily      zinc gluconate 50 MG tablet Take 50 mg by mouth daily            Review Of Systems  Skin: negative  Eyes: negative  Ears/Nose/Throat: negative  Respiratory: No shortness of breath, dyspnea on  exertion, cough, or hemoptysis    O: Physical Exam:  positive axial grind test R thumb CMC joint.  Palpable osteophytes and crepitus.  CMS intact to R hand.  No erythema.    Lab:  Cr 0.76, T bili 1.5, chol 216    Images:    Narrative & Impression   EXAM: RIGHT HAND 3 VIEWS  LOCATION: [facility]  DATE/TIME: 09/09/2024 10:17 PM CDT     INDICATION: Fell holding car keys. Palmar puncture wound. Pain with extension of the middle finger.  COMPARISON: None.                                                                      IMPRESSION:   1. A small amount of apparent gas in the soft tissues between the 2nd and 3rd metacarpal heads, likely relating to the known puncture wound.  2. No radiopaque foreign object in the right hand.  3. No visualized acute fracture or malalignment of the right hand.  4. Mild and moderate degenerative changes scattered within the interphalangeal joints and the 1st carpometacarpal joint.             A:  Osteoarthritis R thumb CMC      P:  Inject R thumb CMC after verbal and written consent/ ethyl chloride/ chloraprep  Follow outcomes:  immediately better  Thumb spica brace or gauntlet  Notify if exacerbation symptoms  See back 3 mos             In addition to the above assessment and plan each active problem on Jackie's problem list was evaluated today. This included the questioning of Jackie for any medication problems. We will continue the current treatment plan for these active problems except as noted.

## 2025-04-07 ENCOUNTER — OFFICE VISIT (OUTPATIENT)
Dept: OBGYN | Facility: OTHER | Age: 70
End: 2025-04-07
Attending: FAMILY MEDICINE
Payer: COMMERCIAL

## 2025-04-07 VITALS — DIASTOLIC BLOOD PRESSURE: 65 MMHG | HEART RATE: 57 BPM | SYSTOLIC BLOOD PRESSURE: 106 MMHG | OXYGEN SATURATION: 98 %

## 2025-04-07 DIAGNOSIS — N90.4 LICHEN SCLEROSUS ET ATROPHICUS OF THE VULVA: ICD-10-CM

## 2025-04-07 DIAGNOSIS — N89.8 VAGINAL DISCHARGE: Primary | ICD-10-CM

## 2025-04-07 PROCEDURE — 3074F SYST BP LT 130 MM HG: CPT | Performed by: OBSTETRICS & GYNECOLOGY

## 2025-04-07 PROCEDURE — 87210 SMEAR WET MOUNT SALINE/INK: CPT | Performed by: OBSTETRICS & GYNECOLOGY

## 2025-04-07 PROCEDURE — 3078F DIAST BP <80 MM HG: CPT | Performed by: OBSTETRICS & GYNECOLOGY

## 2025-04-07 PROCEDURE — 99459 PELVIC EXAMINATION: CPT | Performed by: OBSTETRICS & GYNECOLOGY

## 2025-04-07 PROCEDURE — 99214 OFFICE O/P EST MOD 30 MIN: CPT | Performed by: OBSTETRICS & GYNECOLOGY

## 2025-04-07 NOTE — PROGRESS NOTES
Subjective  69 year old non-pregnant female presents today complaining of worsening lichen sclerosis.  Patient has a known history of lichen sclerosis.  She has been dealing with this for many many years.  She has been using clobetasol intermittently, because she was told if she used it regularly it was bad for her.  She feels like there is a current flare of it.  She also notices vaginal discharge.  She has noticed sores in between her buttocks as well.  She does have a history of herpes and is not sure if that is what is happening today.  She has no problems urinating.  She is not sexually active.  We discussed the possibility of the discharge being from somewhere in the pelvis and when I mention this patient states her sister passed in her early 50s from fallopian tube cancer.  She admits to lower abdominal pain for the last few weeks as well.  Tobacco abuse.      I also reviewed notes from previous office visits by Dr. Craig.    ROS: 10 point ROS neg other than the symptoms noted above in the HPI.  Past Medical History:   Diagnosis Date    Abnormal Papanicolaou smear of cervix and cervical HPV     cryocautery    Breast cystic disease     Hiatal hernia     Hypothyroidism     Motion sickness     Nonsenile cataract     Peptic ulcer, unspecified site, unspecified as acute or chronic, without mention of hemorrhage, perforation, or obstruction     Peptic ulcer disease    Personal history of colonic polyps      Past Surgical History:   Procedure Laterality Date    CATARACT IOL, RT/LT      COLONOSCOPY  07/16/2007    COLONOSCOPY  1/18/2013    Procedure: COLONOSCOPY;  colonoscopy, Esophagoscopy, Gastroscopy, Duodenoscopy EGD, multiple biopsies;  Surgeon: Joe Benson MD;  [facility]:  [facility]    COLONOSCOPY N/A 12/6/2017    Procedure: COMBINED COLONOSCOPY, SINGLE OR MULTIPLE BIOPSY/POLYPECTOMY BY BIOPSY;  colonoscopy with polypectomy by biopsy;  Surgeon: Tolu No MD;  Location:  GI    COLONOSCOPY N/A  2020    Procedure: Colonoscopy with possible biopsy and/or polypectomy;  Surgeon: Obed Quick MD;  Location:  GI    COLONOSCOPY N/A 3/29/2023    Procedure: COLONOSCOPY, WITH POLYPECTOMY;  Surgeon: Alex Frances MD;  Location:  GI    ENDOSCOPY  2007    Sm hiatus hernia    ESOPHAGOSCOPY, GASTROSCOPY, DUODENOSCOPY (EGD), COMBINED  2013    Procedure: COMBINED ESOPHAGOSCOPY, GASTROSCOPY, DUODENOSCOPY (EGD), BIOPSY SINGLE OR MULTIPLE;  ESOPHAGOGASTRODUODENOSCOPY WITH MULTIPLE BIOPSIES;  Surgeon: Joe Benson MD;  Location:  GI    MASTECTOMY, BILATERAL      PHACOEMULSIFICATION CLEAR CORNEA WITH STANDARD INTRAOCULAR LENS IMPLANT Left 2021    Procedure: LEFT PHACOEMULSIFICATION, CATARACT, WITH INTRAOCULAR LENS IMPLANT;  Surgeon: Foreign Snider MD;  [facility]: [facility]    PHACOEMULSIFICATION CLEAR CORNEA WITH STANDARD INTRAOCULAR LENS IMPLANT Right 2021    Procedure: RIGHT PHACOEMULSIFICATION, CATARACT, WITH INTRAOCULAR LENS IMPLANT;  Surgeon: Foreign Snider MD;  Location:  OR    REMOVE AND REPLACE BREAST IMPLANT PROSTHESIS Bilateral 2021    Procedure: Bilateral breast revision and implant exchange;  Surgeon: JENNIFFER Miner MD;  [place] [facility] [place]    [facility]  DELIVERY ONLY       x2    [facility] NONSPECIFIC PROCEDURE      Laparoscopic exam with adhesions    [facility] NONSPECIFIC PROCEDURE      Mastectomy for cystic breast disease, breast implants     Family History   Problem Relation Age of Onset    Diabetes Father     Hypertension Father     Alcohol/Drug Father     Eye Disorder Father     Obesity Father     Breast Cancer Mother     Osteoporosis Mother     Thyroid Disease Mother     Cancer Mother         Bladder    Cancer Sister         fallopian tube cancer    Obesity Sister     Alzheimer Disease Sister     Cancer Sister         Skin Cancer    Allergies Daughter     Cancer Maternal Grandmother         uterine cancer    Liver Disease Daughter         Biliary  Atresia    Genitourinary Problems Brother     No Known Problems Daughter     Heart Disease Brother         Heart Murmur    Glaucoma Maternal Grandfather     Genitourinary Problems Brother         kidney disease (two brothers)    Macular Degeneration No family hx of      Social History     Tobacco Use    Smoking status: Some Days     Current packs/day: 0.00     Average packs/day: 0.5 packs/day for 50.0 years (25.0 ttl pk-yrs)     Types: Cigarettes     Start date: 1969     Last attempt to quit: 2019     Years since quittin.6    Smokeless tobacco: Never    Tobacco comments:     1 pack per week, started age 11   Substance Use Topics    Alcohol use: No     Alcohol/week: 0.0 standard drinks of alcohol     Comment: holidays only         Objective  Vitals: /65   Pulse 57   LMP 2003 (Approximate)   SpO2 98%   BMI= There is no height or weight on file to calculate BMI.    General appearance=well developed, well-nourished female  Gait=normal  Psych=mood is stable, alert and oriented x3  Abd=soft, Nontender/nondistended, no masses, no signs of hernias, no evidence of hepatosplenomegaly  PELVIC:    External genitalia: normal without lesions or masses, obvious signs of lichen sclerosis  Urethral meatus: no lesions or prolapse noted, normal size  Urethra: no masses, non tender  Bladder: non tender, no fullness  Vagina: vulvovaginal pallor/erythema, decreased vulvovaginal secretions/lubrication, decreased elasticity, no discharge, small amount of whitening at the introitus, wet prep obtained  Cervix: normal without lesion, no cervical motion tenderness, healthy  Uterus: small, mobile, nontender.  Adnexa: non tender, without masses  Rectal: deferred  Ext=no clubbing or cyanosis, no swelling      Assessment  1.)  Vaginal discharge   2.)  History of lichen sclerosis  3.)  Tobacco abuse  4.)  Family history of fallopian tube cancer      Plan  1.)  Wet prep  2.)  Pelvic ultrasound  3.)  Encouraged Vaseline use  along with Clobetasol      Acute, uncomplicated illness or injury and labs ordered.  Nursing notes read and reviewed    Babs Kelly DO

## 2025-04-07 NOTE — PATIENT INSTRUCTIONS
If you have labs or imaging done, the results will automatically release in [employer] without an interpretation.  Your health care professional will review those results and send an interpretation with recommendations as soon as possible, but this may be 1-3 business days.    If you have any questions regarding your visit, please contact your care team.     "[employer] Access Services: [phone]  [facility] CLINIC HOURS TELEPHONE NUMBER     Babs DO Betina Kelly - Certified Medical Assistant    Trudy Colvin-  Katheryn-     Monday- [place]  8:00 a.m - 5:00 p.m    Tuesday- [place]  7:00 a.m - 5:00 p.m    Friday- [place]  9:00 a.m - 5:00 p.m.    Typical Surgery Day: Thursday [facility]  [address]  [place] [address]  Appointment [phone]  Fax: [phone]   Imaging Scheduling- [phone]    [facility] Labor and Delivery  [address] [place] [address] [place] [address]  [phone]    [address] [address]  Phone: [phone]  Fax: [phone]   Imaging Scheduling- [phone]       **Surgeries** Our Surgery Schedulers will contact you to schedule. If you do not receive a call within 3 business days, please call [phone]    Urgent Care locations:  [facility] Monday-Friday  10 am - 8 pm  Saturday and    9 am - 5 pm  Monday-Friday   10 am - 8 pm  Saturday and    9 am - 5 pm   [phone]     If you need a medication refill, please contact your pharmacy. Please allow 3 business days for your refill to be completed.  As always, Thank you for trusting us with your healthcare needs!    see additional instructions from your care team below

## 2025-04-15 PROBLEM — D12.6 COLON ADENOMA: Status: ACTIVE | Noted: 2025-04-15

## 2025-04-16 ENCOUNTER — PATIENT OUTREACH (OUTPATIENT)
Dept: GASTROENTEROLOGY | Facility: CLINIC | Age: 70
End: 2025-04-16

## 2025-06-01 ENCOUNTER — HOSPITAL ENCOUNTER (EMERGENCY)
Facility: CLINIC | Age: 70
Discharge: HOME | End: 2025-06-01
Attending: FAMILY MEDICINE | Admitting: FAMILY MEDICINE
Payer: COMMERCIAL

## 2025-06-01 VITALS
OXYGEN SATURATION: 98 % | WEIGHT: 110 LBS | TEMPERATURE: 97.3 F | DIASTOLIC BLOOD PRESSURE: 75 MMHG | RESPIRATION RATE: 16 BRPM | SYSTOLIC BLOOD PRESSURE: 126 MMHG | BODY MASS INDEX: 20.61 KG/M2 | HEART RATE: 65 BPM

## 2025-06-01 DIAGNOSIS — S80.862A TICK BITE OF LEFT LOWER LEG, INITIAL ENCOUNTER: ICD-10-CM
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
## 2025-06-01 PROCEDURE — 99282 EMERGENCY DEPT VISIT SF MDM: CPT | Performed by: FAMILY MEDICINE

## 2025-06-01 PROCEDURE — 99283 EMERGENCY DEPT VISIT LOW MDM: CPT | Performed by: FAMILY MEDICINE

## 2025-06-01 RX ORDER — DOXYCYCLINE 100 MG/1
200 CAPSULE ORAL ONCE
Qty: 2 CAPSULE | Refills: 0 | Status: SHIPPED | OUTPATIENT
Start: 2025-06-01 | End: 2025-06-01

## 2025-06-01 ASSESSMENT — ACTIVITIES OF DAILY LIVING (ADL): ADLS_ACUITY_SCORE: 41

## 2025-06-01 NOTE — ED PROVIDER NOTES
History     Chief Complaint   Patient presents with    Insect Bite     HPI  Jackie Siddiqi is a 70 year old female who presents requesting antibiotic prophylaxis for Lyme exposure.  Patient states that she was camping and removed 2 ticks.  She thinks that they might of gotten on on Thursday so she is within the 72 hours.  She remove them with in that time.  She has not seen any spreading rash from the area.  Denies any joint pain or fevers or chills.    Allergies:  Allergies   Allergen Reactions    No Known Drug Allergy        Problem List:    Patient Active Problem List    Diagnosis Date Noted    Colon adenoma 04/15/2025     Priority: Medium    Onychomycosis 02/11/2025     Priority: Medium    Greater trochanteric bursitis, left 08/01/2024     Priority: Medium    It band syndrome, left 08/01/2024     Priority: Medium    Diaphragmatic hernia 05/21/2024     Priority: Medium    Disorder of function of stomach 01/30/2024     Priority: Medium    Obstruction of esophagus 01/30/2024     Priority: Medium    Slow transit constipation 11/05/2023     Priority: Medium    Psychophysiological insomnia 11/05/2023     Priority: Medium    Hx of bilateral mastectomy 03/09/2021     Priority: Medium    LLQ abdominal pain 03/09/2021     Priority: Medium    Diverticulosis of colon 04/06/2020     Priority: Medium    Chronic obstructive pulmonary disease, unspecified COPD type (H) 09/16/2019     Priority: Medium    Hypothyroidism, unspecified type 11/29/2016     Priority: Medium    Hyperlipidemia LDL goal <160 11/29/2016     Priority: Medium    Osteoarthritis of carpometacarpal joint of right thumb, unspecified osteoarthritis type 11/29/2016     Priority: Medium    Lichen sclerosus et atrophicus of the vulva 01/09/2012     Priority: Medium    Esophageal reflux 05/16/2005     Priority: Medium        Past Medical History:    Past Medical History:   Diagnosis Date    Abnormal Papanicolaou smear of cervix and cervical HPV     Breast  cystic disease     Hiatal hernia     Hypothyroidism     Motion sickness     Nonsenile cataract     Peptic ulcer, unspecified site, unspecified as acute or chronic, without mention of hemorrhage, perforation, or obstruction     Personal history of colonic polyps        Past Surgical History:    Past Surgical History:   Procedure Laterality Date    CATARACT IOL, RT/LT      COLONOSCOPY  07/16/2007    COLONOSCOPY  1/18/2013    Procedure: COLONOSCOPY;  colonoscopy, Esophagoscopy, Gastroscopy, Duodenoscopy EGD, multiple biopsies;  Surgeon: Joe Benson MD;  Location: [facility]    COLONOSCOPY N/A 12/6/2017    Procedure: COMBINED COLONOSCOPY, SINGLE OR MULTIPLE BIOPSY/POLYPECTOMY BY BIOPSY;  colonoscopy with polypectomy by biopsy;  Surgeon: Tolu No MD;  Location: [facility]    COLONOSCOPY N/A 6/24/2020    Procedure: Colonoscopy with possible biopsy and/or polypectomy;  Surgeon: Obed Quick MD;  Location: [facility]    COLONOSCOPY N/A 3/29/2023    Procedure: COLONOSCOPY, WITH POLYPECTOMY;  Surgeon: Alex Frances MD;  Location: [facility]    ENDOSCOPY  07/16/2007    Sm hiatus hernia    ESOPHAGOSCOPY, GASTROSCOPY, DUODENOSCOPY (EGD), COMBINED  1/18/2013    Procedure: COMBINED ESOPHAGOSCOPY, GASTROSCOPY, DUODENOSCOPY (EGD), BIOPSY SINGLE OR MULTIPLE;  ESOPHAGOGASTRODUODENOSCOPY WITH MULTIPLE BIOPSIES;  Surgeon: Joe Benson MD;  Location:  GI    MASTECTOMY, BILATERAL      PHACOEMULSIFICATION CLEAR CORNEA WITH STANDARD INTRAOCULAR LENS IMPLANT Left 4/26/2021    Procedure: LEFT PHACOEMULSIFICATION, CATARACT, WITH INTRAOCULAR LENS IMPLANT;  Surgeon: Foreign Snider MD;  Location: [facility]    PHACOEMULSIFICATION CLEAR CORNEA WITH STANDARD INTRAOCULAR LENS IMPLANT Right 4/12/2021    Procedure: RIGHT PHACOEMULSIFICATION, CATARACT, WITH INTRAOCULAR LENS IMPLANT;  Surgeon: Foreign Snider MD;  Location: [facility]    REMOVE AND REPLACE BREAST IMPLANT PROSTHESIS Bilateral 7/28/2021    Procedure: Bilateral breast revision  and implant exchange;  Surgeon: JENNIFFER Miner MD;  [place] [facility] [place]    [facility] [place]  DELIVERY ONLY       x2    [facility] [place] NONSPECIFIC PROCEDURE      Laparoscopic exam with adhesions    [facility] [place] NONSPECIFIC PROCEDURE      Mastectomy for cystic breast disease, breast implants       Family History:    Family History   Problem Relation Age of Onset    Diabetes Father     Hypertension Father     Alcohol/Drug Father     Eye Disorder Father     Obesity Father     Breast Cancer Mother     Osteoporosis Mother     Thyroid Disease Mother     Cancer Mother         Bladder    Cancer Sister         fallopian tube cancer    Obesity Sister     Alzheimer Disease Sister     Cancer Sister         Skin Cancer    Allergies Daughter     Cancer Maternal Grandmother         uterine cancer    Liver Disease Daughter         Biliary Atresia    Genitourinary Problems Brother     No Known Problems Daughter     Heart Disease Brother         Heart Murmur    Glaucoma Maternal Grandfather     Genitourinary Problems Brother         kidney disease (two brothers)    Macular Degeneration No family hx of        Social History:  Marital Status:  Single [1]  Social History     Tobacco Use    Smoking status: Some Days     Current packs/day: 0.00     Average packs/day: 0.5 packs/day for 50.0 years (25.0 ttl pk-yrs)     Types: Cigarettes     Start date: 1969     Last attempt to quit: 2019     Years since quittin.7    Smokeless tobacco: Never    Tobacco comments:     1 pack per week, started age 11   Vaping Use    Vaping status: Never Used   Substance Use Topics    Alcohol use: No     Alcohol/week: 0.0 standard drinks of alcohol     Comment: holidays only    Drug use: No        Medications:    Cholecalciferol (VITAMIN D3) 25 MCG (1000 UT) CAPS  clobetasol (TEMOVATE) 0.05 % external ointment  Ferrous Sulfate (IRON) 325 (65 Fe) MG tablet  fish oil-omega-3 fatty acids 1000 MG capsule  fluticasone (FLONASE) 50 MCG/ACT nasal spray  Lactobacillus  (PROBIOTIC ACIDOPHILUS PO)  levothyroxine (SYNTHROID/LEVOTHROID) 50 MCG tablet  nicotine (NICODERM CQ) 14 MG/24HR 24 hr patch  tretinoin (RETIN-A) 0.05 % external cream  Vitamin A 2400 MCG (8000 UT) CAPS  vitamin C (ASCORBIC ACID) 500 MG tablet  VITAMIN K PO  zinc gluconate 50 MG tablet          Review of Systems   All other systems reviewed and are negative.      Physical Exam   BP: 126/75  Pulse: 65  Temp: 97.3  F (36.3  C)  Resp: 16  Weight: 49.9 kg (110 lb)  SpO2: 98 %      Physical Exam  Vitals and nursing note reviewed.   Constitutional:       Appearance: Normal appearance.   HENT:      Head: Normocephalic and atraumatic.   Skin:     General: Skin is warm and dry.      Comments: Small area where the tick was noted is a red little splotch, no surrounding erythema or signs of a rash.   Neurological:      Mental Status: She is alert.         ED Course        Procedures           No results found for this or any previous visit (from the past 24 hours).    Medications - No data to display    Will go ahead and prescribe 200 mg of doxycycline for the one-time dose for Lyme's prophylaxis.  Patient fits the criteria for this.  Patient was told to follow-up if there is no improvement over the next few days.    Assessments & Plan (with Medical Decision Making)  Tick bite     I have reviewed the nursing notes.    I have reviewed the findings, diagnosis, plan and need for follow up with the patient.        6/1/2025   [facility] EMERGENCY DEPT       Kevin Navarro MD  06/01/25 [id number]

## 2025-06-01 NOTE — ED TRIAGE NOTES
Patient removed 2 ticks from herself and wants prophylactic antibiotics for lyme's     Triage Assessment (Adult)       Row Name 06/01/25 0977          Triage Assessment    Airway WDL WDL        Respiratory WDL    Respiratory WDL WDL        Skin Circulation/Temperature WDL    Skin Circulation/Temperature WDL WDL        Cardiac WDL    Cardiac WDL WDL        Peripheral/Neurovascular WDL    Peripheral Neurovascular WDL WDL        Cognitive/Neuro/Behavioral WDL    Cognitive/Neuro/Behavioral WDL WDL

## 2025-06-10 ENCOUNTER — HOSPITAL ENCOUNTER (EMERGENCY)
Facility: CLINIC | Age: 70
Discharge: HOME | End: 2025-06-10
Attending: NURSE PRACTITIONER | Admitting: NURSE PRACTITIONER
Payer: COMMERCIAL

## 2025-06-10 ENCOUNTER — APPOINTMENT (OUTPATIENT)
Dept: GENERAL RADIOLOGY | Facility: CLINIC | Age: 70
End: 2025-06-10
Attending: NURSE PRACTITIONER
Payer: COMMERCIAL

## 2025-06-10 ENCOUNTER — NURSE TRIAGE (OUTPATIENT)
Dept: FAMILY MEDICINE | Facility: CLINIC | Age: 70
End: 2025-06-10
Payer: COMMERCIAL

## 2025-06-10 VITALS
SYSTOLIC BLOOD PRESSURE: 131 MMHG | TEMPERATURE: 98.6 F | OXYGEN SATURATION: 95 % | HEART RATE: 73 BPM | RESPIRATION RATE: 20 BRPM | WEIGHT: 110.01 LBS | BODY MASS INDEX: 20.61 KG/M2 | DIASTOLIC BLOOD PRESSURE: 75 MMHG

## 2025-06-10 DIAGNOSIS — J06.9 VIRAL URI WITH COUGH: ICD-10-CM

## 2025-06-10 DIAGNOSIS — J44.1 COPD EXACERBATION (H): ICD-10-CM

## 2025-06-10 LAB
ANION GAP SERPL CALCULATED.3IONS-SCNC: 14 MMOL/L (ref 7–15)
BASOPHILS # BLD AUTO: 0.1 10E3/UL (ref 0–0.2)
BASOPHILS NFR BLD AUTO: 2 %
BUN SERPL-MCNC: 8.6 MG/DL (ref 8–23)
CALCIUM SERPL-MCNC: 9 MG/DL (ref 8.8–10.4)
CHLORIDE SERPL-SCNC: 104 MMOL/L (ref 98–107)
CREAT SERPL-MCNC: 0.66 MG/DL (ref 0.51–0.95)
EGFRCR SERPLBLD CKD-EPI 2021: >90 ML/MIN/1.73M2
EOSINOPHIL # BLD AUTO: 0.2 10E3/UL (ref 0–0.7)
EOSINOPHIL NFR BLD AUTO: 5 %
ERYTHROCYTE [DISTWIDTH] IN BLOOD BY AUTOMATED COUNT: 12.6 % (ref 10–15)
FLUAV RNA SPEC QL NAA+PROBE: NEGATIVE
FLUBV RNA RESP QL NAA+PROBE: NEGATIVE
GLUCOSE SERPL-MCNC: 115 MG/DL (ref 70–99)
HCO3 SERPL-SCNC: 24 MMOL/L (ref 22–29)
HCT VFR BLD AUTO: 37.5 % (ref 35–47)
HGB BLD-MCNC: 12.7 G/DL (ref 11.7–15.7)
IMM GRANULOCYTES # BLD: 0 10E3/UL
IMM GRANULOCYTES NFR BLD: 0 %
LYMPHOCYTES # BLD AUTO: 1.4 10E3/UL (ref 0.8–5.3)
LYMPHOCYTES NFR BLD AUTO: 29 %
MCH RBC QN AUTO: 30.3 PG (ref 26.5–33)
MCHC RBC AUTO-ENTMCNC: 33.9 G/DL (ref 31.5–36.5)
MCV RBC AUTO: 90 FL (ref 78–100)
MONOCYTES # BLD AUTO: 0.5 10E3/UL (ref 0–1.3)
MONOCYTES NFR BLD AUTO: 10 %
NEUTROPHILS # BLD AUTO: 2.5 10E3/UL (ref 1.6–8.3)
NEUTROPHILS NFR BLD AUTO: 54 %
NRBC # BLD AUTO: 0 10E3/UL
NRBC BLD AUTO-RTO: 0 /100
PLATELET # BLD AUTO: 254 10E3/UL (ref 150–450)
POTASSIUM SERPL-SCNC: 3.6 MMOL/L (ref 3.4–5.3)
RBC # BLD AUTO: 4.19 10E6/UL (ref 3.8–5.2)
RSV RNA SPEC NAA+PROBE: NEGATIVE
SARS-COV-2 RNA RESP QL NAA+PROBE: NEGATIVE
SODIUM SERPL-SCNC: 142 MMOL/L (ref 135–145)
WBC # BLD AUTO: 4.7 10E3/UL (ref 4–11)

## 2025-06-10 PROCEDURE — 82435 ASSAY OF BLOOD CHLORIDE: CPT | Performed by: NURSE PRACTITIONER

## 2025-06-10 PROCEDURE — 36415 COLL VENOUS BLD VENIPUNCTURE: CPT | Performed by: NURSE PRACTITIONER

## 2025-06-10 PROCEDURE — 99284 EMERGENCY DEPT VISIT MOD MDM: CPT | Performed by: NURSE PRACTITIONER

## 2025-06-10 PROCEDURE — 87637 SARSCOV2&INF A&B&RSV AMP PRB: CPT | Performed by: NURSE PRACTITIONER

## 2025-06-10 PROCEDURE — 71046 X-RAY EXAM CHEST 2 VIEWS: CPT

## 2025-06-10 PROCEDURE — 85025 COMPLETE CBC W/AUTO DIFF WBC: CPT | Performed by: NURSE PRACTITIONER

## 2025-06-10 PROCEDURE — 99284 EMERGENCY DEPT VISIT MOD MDM: CPT | Mod: 25 | Performed by: NURSE PRACTITIONER

## 2025-06-10 RX ORDER — AZITHROMYCIN 250 MG/1
TABLET, FILM COATED ORAL
Qty: 6 TABLET | Refills: 0 | Status: SHIPPED | OUTPATIENT
Start: 2025-06-10 | End: 2025-06-15

## 2025-06-10 RX ORDER — BENZONATATE 100 MG/1
100 CAPSULE ORAL 3 TIMES DAILY PRN
Qty: 15 CAPSULE | Refills: 0 | Status: SHIPPED | OUTPATIENT
Start: 2025-06-10

## 2025-06-10 ASSESSMENT — COLUMBIA-SUICIDE SEVERITY RATING SCALE - C-SSRS
1. IN THE PAST MONTH, HAVE YOU WISHED YOU WERE DEAD OR WISHED YOU COULD GO TO SLEEP AND NOT WAKE UP?: NO
6. HAVE YOU EVER DONE ANYTHING, STARTED TO DO ANYTHING, OR PREPARED TO DO ANYTHING TO END YOUR LIFE?: NO
2. HAVE YOU ACTUALLY HAD ANY THOUGHTS OF KILLING YOURSELF IN THE PAST MONTH?: NO

## 2025-06-10 ASSESSMENT — ACTIVITIES OF DAILY LIVING (ADL)
ADLS_ACUITY_SCORE: 41

## 2025-06-10 NOTE — DISCHARGE INSTRUCTIONS
Z-andre per package instructions. (Antibiotic)  Tessalon 1 capsule three times a day as needed for cough.  Warm water with honey can also help with cough.    Recheck for any worsening.

## 2025-06-10 NOTE — TELEPHONE ENCOUNTER
"Nurse Triage SBAR    Is this a 2nd Level Triage? NO    Situation: Patient called reporting cough, SOB, chest pain x 4 days.     Background: N/A    Assessment: Patient states she has had a \"pretty bad cough\" for about 4 days. She states it was dry the first 3 days, and now is productive with white sputum. She states she has coughing fits that cause her to be SOB. She states she also gets a little SOB with activity, but denies SOB at rest. She states she has felt feverish, has not been able to check her temp, and does not feel feverish right now. She states there is pain in the front and back of her chest, it comes and goes but it is present even when she is not coughing. States her lungs feel raw. She denies wheezing, hemoptysis, ankle swelling.     Protocol Recommended Disposition:   Go to ED Now    Recommendation: Per protocol, patient was advised to go to ED now. She agreed to go to ED, but states she is going to stop to get something to eat first. She was advised to call  for new or worsening symptoms prior to arriving to ED. She verbalized understanding and had no further questions or concerns.        BARRETT MitchellN, RN            Reason for Disposition   Chest pain present when not coughing    Additional Information   Negative: Bluish (or gray) lips or face   Negative: SEVERE difficulty breathing (e.g., struggling for each breath, speaks in single words)   Negative: Rapid onset of cough and has hives   Negative: Coughing started suddenly after medicine, an allergic food or bee sting   Negative: Difficulty breathing after exposure to flames, smoke, or fumes   Negative: Sounds like a life-threatening emergency to the triager   Negative: Previous asthma attacks and this feels like asthma attack   Negative: Dry cough (non-productive; no sputum or minimal clear sputum) and within 14 days of COVID-19 Exposure   Negative: MODERATE difficulty breathing (e.g., speaks in phrases, SOB even at rest, pulse 100-120) " and still present when not coughing    Protocols used: Cough-A-OH

## 2025-06-10 NOTE — ED PROVIDER NOTES
History     Chief Complaint   Patient presents with    Cough     HPI  Jackie Siddiqi is a 70 year old female with history of COPD, esophageal reflux, hypothyroidism, and hyperlipidemia who presents for evaluation of cough, congestion, and chills started 4 days ago.  Gradual worsening today.  No objective fevers.  Cough is slightly productive.  Reports pain into her back.  No significant shortness of breath.  She is a current everyday smoker, but states she quit smoking 4 days ago.  No prior history of asthma.    Allergies:  Allergies   Allergen Reactions    No Known Drug Allergy        Problem List:    Patient Active Problem List    Diagnosis Date Noted    Colon adenoma 04/15/2025     Priority: Medium    Onychomycosis 02/11/2025     Priority: Medium    Greater trochanteric bursitis, left 08/01/2024     Priority: Medium    It band syndrome, left 08/01/2024     Priority: Medium    Diaphragmatic hernia 05/21/2024     Priority: Medium    Disorder of function of stomach 01/30/2024     Priority: Medium    Obstruction of esophagus 01/30/2024     Priority: Medium    Slow transit constipation 11/05/2023     Priority: Medium    Psychophysiological insomnia 11/05/2023     Priority: Medium    Hx of bilateral mastectomy 03/09/2021     Priority: Medium    LLQ abdominal pain 03/09/2021     Priority: Medium    Diverticulosis of colon 04/06/2020     Priority: Medium    Chronic obstructive pulmonary disease, unspecified COPD type (H) 09/16/2019     Priority: Medium    Hypothyroidism, unspecified type 11/29/2016     Priority: Medium    Hyperlipidemia LDL goal <160 11/29/2016     Priority: Medium    Osteoarthritis of carpometacarpal joint of right thumb, unspecified osteoarthritis type 11/29/2016     Priority: Medium    Lichen sclerosus et atrophicus of the vulva 01/09/2012     Priority: Medium    Esophageal reflux 05/16/2005     Priority: Medium        Past Medical History:    Past Medical History:   Diagnosis Date    Abnormal  Papanicolaou smear of cervix and cervical HPV     Breast cystic disease     Hiatal hernia     Hypothyroidism     Motion sickness     Nonsenile cataract     Peptic ulcer, unspecified site, unspecified as acute or chronic, without mention of hemorrhage, perforation, or obstruction     Personal history of colonic polyps        Past Surgical History:    Past Surgical History:   Procedure Laterality Date    CATARACT IOL, RT/LT      COLONOSCOPY  07/16/2007    COLONOSCOPY  1/18/2013    Procedure: COLONOSCOPY;  colonoscopy, Esophagoscopy, Gastroscopy, Duodenoscopy EGD, multiple biopsies;  Surgeon: Joe Benson MD;  Location: [facility]    COLONOSCOPY N/A 12/6/2017    Procedure: COMBINED COLONOSCOPY, SINGLE OR MULTIPLE BIOPSY/POLYPECTOMY BY BIOPSY;  colonoscopy with polypectomy by biopsy;  Surgeon: Tolu No MD;  Location: [facility]    COLONOSCOPY N/A 6/24/2020    Procedure: Colonoscopy with possible biopsy and/or polypectomy;  Surgeon: Obed Quick MD;  Location: [facility]    COLONOSCOPY N/A 3/29/2023    Procedure: COLONOSCOPY, WITH POLYPECTOMY;  Surgeon: Alex Frances MD;  Location:  GI    ENDOSCOPY  07/16/2007    Sm hiatus hernia    ESOPHAGOSCOPY, GASTROSCOPY, DUODENOSCOPY (EGD), COMBINED  1/18/2013    Procedure: COMBINED ESOPHAGOSCOPY, GASTROSCOPY, DUODENOSCOPY (EGD), BIOPSY SINGLE OR MULTIPLE;  ESOPHAGOGASTRODUODENOSCOPY WITH MULTIPLE BIOPSIES;  Surgeon: Joe Benson MD;  Location: [facility]    MASTECTOMY, BILATERAL      PHACOEMULSIFICATION CLEAR CORNEA WITH STANDARD INTRAOCULAR LENS IMPLANT Left 4/26/2021    Procedure: LEFT PHACOEMULSIFICATION, CATARACT, WITH INTRAOCULAR LENS IMPLANT;  Surgeon: Foreign Snider MD;  Location: [facility]    PHACOEMULSIFICATION CLEAR CORNEA WITH STANDARD INTRAOCULAR LENS IMPLANT Right 4/12/2021    Procedure: RIGHT PHACOEMULSIFICATION, CATARACT, WITH INTRAOCULAR LENS IMPLANT;  Surgeon: Foreign Snider MD;  Location: [facility]    REMOVE AND REPLACE BREAST IMPLANT PROSTHESIS  Bilateral 2021    Procedure: Bilateral breast revision and implant exchange;  Surgeon: JENNIFFER Miner MD;  Location: [facility] [place]    [facility]  DELIVERY ONLY       x2    [facility] NONSPECIFIC PROCEDURE      Laparoscopic exam with adhesions    [facility] NONSPECIFIC PROCEDURE      Mastectomy for cystic breast disease, breast implants       Family History:    Family History   Problem Relation Age of Onset    Diabetes Father     Hypertension Father     Alcohol/Drug Father     Eye Disorder Father     Obesity Father     Breast Cancer Mother     Osteoporosis Mother     Thyroid Disease Mother     Cancer Mother         Bladder    Cancer Sister         fallopian tube cancer    Obesity Sister     Alzheimer Disease Sister     Cancer Sister         Skin Cancer    Allergies Daughter     Cancer Maternal Grandmother         uterine cancer    Liver Disease Daughter         Biliary Atresia    Genitourinary Problems Brother     No Known Problems Daughter     Heart Disease Brother         Heart Murmur    Glaucoma Maternal Grandfather     Genitourinary Problems Brother         kidney disease (two brothers)    Macular Degeneration No family hx of        Social History:  Marital Status:  Single [1]  Social History     Tobacco Use    Smoking status: Some Days     Current packs/day: 0.00     Average packs/day: 0.5 packs/day for 50.0 years (25.0 ttl pk-yrs)     Types: Cigarettes     Start date: 1969     Last attempt to quit: 2019     Years since quittin.7    Smokeless tobacco: Never    Tobacco comments:     1 pack per week, started age 11   Vaping Use    Vaping status: Never Used   Substance Use Topics    Alcohol use: No     Alcohol/week: 0.0 standard drinks of alcohol     Comment: holidays only    Drug use: No        Medications:    azithromycin (ZITHROMAX Z-GAURAV) 250 MG tablet  benzonatate (TESSALON) 100 MG capsule  Cholecalciferol (VITAMIN D3) 25 MCG (1000 UT) CAPS  clobetasol (TEMOVATE) 0.05 % external ointment  Ferrous  Sulfate (IRON) 325 (65 Fe) MG tablet  fish oil-omega-3 fatty acids 1000 MG capsule  fluticasone (FLONASE) 50 MCG/ACT nasal spray  Lactobacillus (PROBIOTIC ACIDOPHILUS PO)  levothyroxine (SYNTHROID/LEVOTHROID) 50 MCG tablet  nicotine (NICODERM CQ) 14 MG/24HR 24 hr patch  tretinoin (RETIN-A) 0.05 % external cream  Vitamin A 2400 MCG (8000 UT) CAPS  vitamin C (ASCORBIC ACID) 500 MG tablet  VITAMIN K PO  zinc gluconate 50 MG tablet          Review of Systems  As mentioned above in the history present illness. All other systems were reviewed and are negative.    Physical Exam   BP: 135/72  Pulse: 73  Temp: 98.6  F (37  C)  Resp: 20  Weight: 49.9 kg (110 lb 0.2 oz)  SpO2: 96 %      Physical Exam  Constitutional:       General: She is not in acute distress.     Appearance: She is well-developed. She is not ill-appearing.   HENT:      Head: Normocephalic and atraumatic.      Right Ear: External ear normal.      Left Ear: External ear normal.      Nose: Congestion present.      Mouth/Throat:      Mouth: Mucous membranes are moist.   Eyes:      Conjunctiva/sclera: Conjunctivae normal.   Cardiovascular:      Rate and Rhythm: Normal rate and regular rhythm.      Heart sounds: Normal heart sounds. No murmur heard.  Pulmonary:      Effort: Pulmonary effort is normal. No respiratory distress.      Breath sounds: Normal breath sounds.   Musculoskeletal:         General: Normal range of motion.   Skin:     General: Skin is warm and dry.      Findings: No rash.   Neurological:      General: No focal deficit present.      Mental Status: She is alert and oriented to person, place, and time.         ED Course        Procedures              Results for orders placed or performed during the hospital encounter of 06/10/25 (from the past 24 hours)   Influenza A/B, RSV and SARS-CoV2 PCR (COVID-19) Nose    Specimen: Nose; Swab   Result Value Ref Range    Influenza A PCR Negative Negative    Influenza B PCR Negative Negative    RSV PCR  Negative Negative    SARS CoV2 PCR Negative Negative    Narrative    Testing was performed using the Xpert Xpress CoV2/Flu/RSV Assay on the [employer] GeneXpert Instrument. This test should be ordered for the detection of SARS-CoV2, influenza, and RSV viruses in individuals with signs and symptoms of respiratory tract infection. This test is for in vitro diagnostic use under the [place] FDA for laboratories certified under CLIA to perform high or moderate complexity testing. This test has been [place] FDA cleared. A negative result does not rule out the presence of PCR inhibitors in the specimen or target RNA in concentration below the limit of detection for the assay. If only one viral target is positive but coinfection with multiple targets is suspected, the sample should be re-tested with another FDA cleared, approved, or authorized test, if coninfection would change clinical management. This test was validated by the [facility] [employer] [facility]. These laboratories are certified under the Clinical Laboratory Improvement Amendments of 1988 (CLIA-88) as qualified to perfom high complexity laboratory testing.   CBC with platelets differential    Narrative    The following orders were created for panel order CBC with platelets differential.  Procedure                               Abnormality         Status                     ---------                               -----------         ------                     CBC with platelets and ...[[id number]]                      Final result                 Please view results for these tests on the individual orders.   Basic metabolic panel   Result Value Ref Range    Sodium 142 135 - 145 mmol/L    Potassium 3.6 3.4 - 5.3 mmol/L    Chloride 104 98 - 107 mmol/L    Carbon Dioxide (CO2) 24 22 - 29 mmol/L    Anion Gap 14 7 - 15 mmol/L    Urea Nitrogen 8.6 8.0 - 23.0 mg/dL    Creatinine 0.66 0.51 - 0.95 mg/dL    GFR Estimate >90 >60 mL/min/1.73m2    Calcium 9.0 8.8 - 10.4 mg/dL    Glucose  115 (H) 70 - 99 mg/dL   CBC with platelets and differential   Result Value Ref Range    WBC Count 4.7 4.0 - 11.0 10e3/uL    RBC Count 4.19 3.80 - 5.20 10e6/uL    Hemoglobin 12.7 11.7 - 15.7 g/dL    Hematocrit 37.5 35.0 - 47.0 %    MCV 90 78 - 100 fL    MCH 30.3 26.5 - 33.0 pg    MCHC 33.9 31.5 - 36.5 g/dL    RDW 12.6 10.0 - 15.0 %    Platelet Count 254 150 - 450 10e3/uL    % Neutrophils 54 %    % Lymphocytes 29 %    % Monocytes 10 %    % Eosinophils 5 %    % Basophils 2 %    % Immature Granulocytes 0 %    NRBCs per 100 WBC 0 <1 /100    Absolute Neutrophils 2.5 1.6 - 8.3 10e3/uL    Absolute Lymphocytes 1.4 0.8 - 5.3 10e3/uL    Absolute Monocytes 0.5 0.0 - 1.3 10e3/uL    Absolute Eosinophils 0.2 0.0 - 0.7 10e3/uL    Absolute Basophils 0.1 0.0 - 0.2 10e3/uL    Absolute Immature Granulocytes 0.0 <=0.4 10e3/uL    Absolute NRBCs 0.0 10e3/uL   XR Chest 2 Views    Narrative    EXAM: XR CHEST 2 VIEWS  LOCATION: [facility]  DATE: 6/10/2025    INDICATION: cough  COMPARISON: 4/19/2024.      Impression    IMPRESSION: Cardiac silhouette is within normal limits. No focal airspace opacity. No pleural effusion or discernible pneumothorax. Breast implants.       Medications - No data to display    Assessments & Plan (with Medical Decision Making)     History and exam consistent with a viral respiratory illness. No evidence of pneumonia on chest xray. No concerning lab findings. No respiratory distress or hypoxia. Negative Covid-19/influenza/RSV.   Given her history of COPD and now more of a productive cough I will cover her with antibiotics and prescribe her course of azithromycin.  She do not have any wheezing noted on exam and does not endorse chest tightness or wheezing so I will defer any steroid such as prednisone for now.  She was provided prescription for Tessalon capsules for cough.  Patient instructed to return to the emergency department for any worsening symptoms.    Discharge Medication  List as of 6/10/2025  4:48 PM        START taking these medications    Details   azithromycin (ZITHROMAX Z-GAURAV) 250 MG tablet Two tablets on the first day, then one tablet daily for the next 4 days, Disp-6 tablet, R-0, E-Prescribe      benzonatate (TESSALON) 100 MG capsule Take 1 capsule (100 mg) by mouth 3 times daily as needed for cough., Disp-15 capsule, R-0, E-Prescribe             Final diagnoses:   Viral URI with cough   COPD exacerbation (H)       6/10/2025   [facility] EMERGENCY DEPT       Quinn, Ly Carrillo, TEDDY CNP  06/10/25 [id number]

## 2025-07-24 NOTE — PATIENT INSTRUCTIONS
To Schedule an Appointment 24/7  Call: [phone]    If you have any questions regarding your visit, Please contact your care team.  [facility]: [phone]  [facility] CLINIC HOURS TELEPHONE NUMBER   Cephas Agbeh, M.D.      Serge Campa-  [facility]-Surgery Scheduler       Monday - [place]:    8:00 am-4:45 pm  Tuesday - [place]:   8:00 am-4:45 pm  Friday-[place]:       8:00 am-4:45 pm  Typical Surgery Day:  [facility]   [address] [place] [address]   [phone]   Fax [phone]    Imaging Scheduling all locations  [phone] ([facility]   [address] [place] [address] [place] [address]   [phone]    [facility]  [address] [address] [address]  [phone]  Fax [phone]   Urgent Care locations:  [facility] Monday-Friday                               10 am - 8 pm  Saturday and Sunday                      9 am - 5 pm  Monday-Friday                              10 am- 8 pm  Saturday and Sunday                      9 am - 5 pm    [phone]   **Surgeries** Our Surgery Schedulers will contact you to schedule. If you do not receive a call within 3 business days, please call [phone].  If you need a medication refill, please contact your pharmacy. Please allow 3 business days for your refill to be completed.  As always, Thank you for trusting us with your healthcare needs!  see additional instructions from your care team below

## 2025-07-26 ENCOUNTER — NURSE TRIAGE (OUTPATIENT)
Dept: NURSING | Facility: CLINIC | Age: 70
End: 2025-07-26
Payer: COMMERCIAL

## 2025-07-26 NOTE — TELEPHONE ENCOUNTER
Nurse Triage SBAR    Is this a 2nd Level Triage? NO    Situation: feeling of something falling out of vagina    Background:   Patient reports feeling as if something is falling out of vagina.  Notes she is able to feel tissue at the opening of vagina that she can move around.  Tissue is not out of the vagina, but is stopped at the opening. She is able to urinate.  Reports mild lower abdominal pain.  She has appointment with OB/GYN on Monday to address issue.       Assessment: feeling of something falling out of vagina    Protocol Recommended Disposition:   See PCP Within 2 Weeks    Recommendation: Advised patient to keep appointment for Monday with OB/GYN. Reviewed concerning symptoms and when to call back.        Does the patient meet one of the following criteria for ADS visit consideration? 16+ years old, with an [facility] PCP     TIP  Providers, please consider if this condition is appropriate for management at one of our Acute and Diagnostic Services sites.     If patient is a good candidate, please use dotphrase <dot>triageresponse and select Refer to ADS to document.    Angeilta Mcfarland RN on 7/26/2025 at 4:37 PM        Reason for Disposition   Feels like something inside is falling out of vagina (e.g., pressure, heaviness, fullness)    Additional Information   Negative: Sounds like a life-threatening emergency to the triager   Negative: Followed a genital area injury (e.g., vagina, vulva)   Negative: Foreign body in vagina (e.g., tampon)   Negative: Vaginal bleeding is main symptom   Negative: Vaginal discharge is main symptom   Negative: Pain or burning with passing urine (urination) is main symptom   Negative: Menstrual cramps is main symptom   Negative: Abdomen pain is main symptom   Negative: Pubic lice suspected   Negative: Itching or rash of female genital area (e.g., labia, vagina, vulva)   Negative: Pregnant and labor suspected   Negative: Patient sounds very sick or weak to the triager   Negative: [1]  "SEVERE pain AND [2] not improved 2 hours after pain medicine   Negative: [1] Genital area looks infected (e.g., draining sore, spreading redness) AND [2] fever   Negative: [1] Something is hanging out of the vagina AND [2] can't easily be pushed back inside   Negative: MODERATE-SEVERE itching (i.e., interferes with school, work, or sleep)   Negative: [1] MILD-MODERATE pain AND [2] present > 24 hours  (Exception: Chronic pain.)   Negative: Genital area looks infected (e.g., draining sore, spreading redness)   Negative: Rash with painful tiny water blisters   Negative: [1] Rash (e.g., redness, tiny bumps, sore) of genital area AND [2] present > 24 hours   Negative: Tender lump (swelling or \"ball\") at vaginal opening   Negative: [1] Symptoms of a yeast infection (i.e., itchy, white discharge, not bad smelling) AND [2] not improved > 3 days following Care Advice   Negative: [1] Vaginal itching AND [2] not improved > 3 days following Care Advice   Negative: [1] Vaginal odor (bad smell) AND [2] not improved > 3 days following Care Advice   Negative: Patient is worried they have a sexually transmitted infection (STI)    Protocols used: Vaginal Symptoms-A-AH    "

## 2025-07-28 ENCOUNTER — OFFICE VISIT (OUTPATIENT)
Dept: OBGYN | Facility: CLINIC | Age: 70
End: 2025-07-28
Payer: COMMERCIAL

## 2025-07-28 VITALS
WEIGHT: 108.4 LBS | OXYGEN SATURATION: 96 % | HEART RATE: 50 BPM | DIASTOLIC BLOOD PRESSURE: 70 MMHG | BODY MASS INDEX: 20.31 KG/M2 | SYSTOLIC BLOOD PRESSURE: 106 MMHG

## 2025-07-28 DIAGNOSIS — N81.11 CYSTOCELE, MIDLINE: Primary | ICD-10-CM

## 2025-07-28 DIAGNOSIS — R35.0 URINARY FREQUENCY: ICD-10-CM

## 2025-07-28 LAB
ALBUMIN UR-MCNC: NEGATIVE MG/DL
APPEARANCE UR: CLEAR
BILIRUB UR QL STRIP: NEGATIVE
COLOR UR AUTO: YELLOW
GLUCOSE UR STRIP-MCNC: NEGATIVE MG/DL
HGB UR QL STRIP: ABNORMAL
KETONES UR STRIP-MCNC: NEGATIVE MG/DL
LEUKOCYTE ESTERASE UR QL STRIP: ABNORMAL
NITRATE UR QL: NEGATIVE
PH UR STRIP: 7 [PH] (ref 5–7)
RBC #/AREA URNS AUTO: ABNORMAL /HPF
SP GR UR STRIP: 1.01 (ref 1–1.03)
SQUAMOUS #/AREA URNS AUTO: ABNORMAL /LPF
UROBILINOGEN UR STRIP-ACNC: 1 E.U./DL
WBC #/AREA URNS AUTO: ABNORMAL /HPF

## 2025-07-28 PROCEDURE — 99459 PELVIC EXAMINATION: CPT | Performed by: OBSTETRICS & GYNECOLOGY

## 2025-07-28 PROCEDURE — 3078F DIAST BP <80 MM HG: CPT | Performed by: OBSTETRICS & GYNECOLOGY

## 2025-07-28 PROCEDURE — 99215 OFFICE O/P EST HI 40 MIN: CPT | Performed by: OBSTETRICS & GYNECOLOGY

## 2025-07-28 PROCEDURE — 81001 URINALYSIS AUTO W/SCOPE: CPT | Performed by: OBSTETRICS & GYNECOLOGY

## 2025-07-28 PROCEDURE — 3074F SYST BP LT 130 MM HG: CPT | Performed by: OBSTETRICS & GYNECOLOGY

## 2025-07-28 RX ORDER — NITROFURANTOIN 25; 75 MG/1; MG/1
100 CAPSULE ORAL 2 TIMES DAILY
Qty: 14 CAPSULE | Refills: 0 | Status: SHIPPED | OUTPATIENT
Start: 2025-07-28

## 2025-07-28 NOTE — PROGRESS NOTES
Jackie is a 70 year old  referred here by self  for consultation regarding feeling a small bulge in her vagina that recedes simultaneously.  She has felt this in the last 1 or 2 months.  She denies any pelvic pain.  She however has noticed some urinary frequency.  No abdominal pain or back pains.  Patient says she wanted to be evaluated for uterine prolapse..    ROS: Ten point review of systems was reviewed and negative except the above.    Gyne: - abn pap (last pap ), - STD's    Past Medical History:   Diagnosis Date    Abnormal Papanicolaou smear of cervix and cervical HPV     cryocautery    Breast cystic disease     Hiatal hernia     Hypothyroidism     Motion sickness     Nonsenile cataract     Peptic ulcer, unspecified site, unspecified as acute or chronic, without mention of hemorrhage, perforation, or obstruction     Peptic ulcer disease    Personal history of colonic polyps      Past Surgical History:   Procedure Laterality Date    CATARACT IOL, RT/LT      COLONOSCOPY  2007    COLONOSCOPY  2013    Procedure: COLONOSCOPY;  colonoscopy, Esophagoscopy, Gastroscopy, Duodenoscopy EGD, multiple biopsies;  Surgeon: Joe Benson MD;  Location:  GI    COLONOSCOPY N/A 2017    Procedure: COMBINED COLONOSCOPY, SINGLE OR MULTIPLE BIOPSY/POLYPECTOMY BY BIOPSY;  colonoscopy with polypectomy by biopsy;  Surgeon: Tolu No MD;  Location:  GI    COLONOSCOPY N/A 2020    Procedure: Colonoscopy with possible biopsy and/or polypectomy;  Surgeon: Obed Quick MD;  Location:  GI    COLONOSCOPY N/A 3/29/2023    Procedure: COLONOSCOPY, WITH POLYPECTOMY;  Surgeon: Alex Frances MD;  Location:  GI    ENDOSCOPY  2007    Sm hiatus hernia    ESOPHAGOSCOPY, GASTROSCOPY, DUODENOSCOPY (EGD), COMBINED  2013    Procedure: COMBINED ESOPHAGOSCOPY, GASTROSCOPY, DUODENOSCOPY (EGD), BIOPSY SINGLE OR MULTIPLE;  ESOPHAGOGASTRODUODENOSCOPY WITH MULTIPLE BIOPSIES;  Surgeon: Marcelino  MD Joe;  Location: [facility]    MASTECTOMY, BILATERAL      PHACOEMULSIFICATION CLEAR CORNEA WITH STANDARD INTRAOCULAR LENS IMPLANT Left 2021    Procedure: LEFT PHACOEMULSIFICATION, CATARACT, WITH INTRAOCULAR LENS IMPLANT;  Surgeon: Foreign Snider MD;  Location: [facility] OR    PHACOEMULSIFICATION CLEAR CORNEA WITH STANDARD INTRAOCULAR LENS IMPLANT Right 2021    Procedure: RIGHT PHACOEMULSIFICATION, CATARACT, WITH INTRAOCULAR LENS IMPLANT;  Surgeon: Foreign Snider MD;  Location: [facility] OR    REMOVE AND REPLACE BREAST IMPLANT PROSTHESIS Bilateral 2021    Procedure: Bilateral breast revision and implant exchange;  Surgeon: JENNIFFER Miner MD;  [place] [facility] [place]    [facility]  DELIVERY ONLY       x2    [facility] NONSPECIFIC PROCEDURE      Laparoscopic exam with adhesions    [facility] NONSPECIFIC PROCEDURE      Mastectomy for cystic breast disease, breast implants     Patient Active Problem List   Diagnosis    Esophageal reflux    Lichen sclerosus et atrophicus of the vulva    Hypothyroidism, unspecified type    Hyperlipidemia LDL goal <160    Osteoarthritis of carpometacarpal joint of right thumb, unspecified osteoarthritis type    Chronic obstructive pulmonary disease, unspecified COPD type (H)    Diverticulosis of colon    Hx of bilateral mastectomy    LLQ abdominal pain    Slow transit constipation    Psychophysiological insomnia    Diaphragmatic hernia    Disorder of function of stomach    Obstruction of esophagus    Greater trochanteric bursitis, left    It band syndrome, left    Onychomycosis    Colon adenoma       ALL/Meds: Her medication and allergy histories were reviewed and are documented in their appropriate chart areas.    SH: - tob, - EtOH,     FH: Her family history was reviewed and documented in its appropriate chart area.    PE: /70 (BP Location: Left arm, Patient Position: Sitting, Cuff Size: Adult Regular)   Pulse 50   Wt 49.2 kg (108 lb 6.4 oz)   LMP 2003  (Approximate)   SpO2 96%   BMI 20.31 kg/m    Body mass index is 20.31 kg/m .    General Appearance:  healthy, alert, active, no distress  HEENT: NCAT  Abdomen: Soft, nontender.  Normal bowel sounds.  No masses  Pelvic:       - Ext: NEFG,        - Urethra: no lesions, no masses, no hypermobility       - Urethral Meatus: normal appearance,        - Bladder: no tenderness, no masses       - Vagina: pink, moist, normal rugae, no lesions, no discharge       - Cervix: no lesions, parous       - Uterus: normal sized, AV/, mobile, normal contour       - Adnexa: no masses, no tenderness       - Rectal: deferred,        - Pelvic support: mild cystocele, no rectocele, no uterine prolapse  Nurse present for exam.  A/P    ICD-10-CM    1. Cystocele, midline  N81.11 UA with Microscopic reflex to Culture - lab collect     UA with Microscopic reflex to Culture - lab collect      2. Urinary frequency  R35.0 UA with Microscopic reflex to Culture - lab collect     UA with Microscopic reflex to Culture - lab collect        With the aid of the ACOG pamphlet we discussed pelvic prolapse symptoms and signs.  Specifically to her as she has a small midline cystocele that resolves simultaneously without Valsalva.  We discussed management options of lifestyle changes and Kegel exercises.  We also discussed devices such as pessaries and also also as a last resort surgical management..  At this time patient would like to pursue behavioral modifications and Kegel exercises.  Due to a urinary frequency we will proceed with a urinalysis.  She will be notified of the results.    45 minutes was spent face to face with the patient today discussing her history, diagnosis, and follow-up plan as noted above.  Over 50% of the visit was spent in counseling and coordination of care.        CEPHAS AGBEH, MD.

## 2025-08-04 ENCOUNTER — TELEPHONE (OUTPATIENT)
Dept: FAMILY MEDICINE | Facility: OTHER | Age: 70
End: 2025-08-04
Payer: COMMERCIAL

## 2025-08-04 ENCOUNTER — TELEPHONE (OUTPATIENT)
Dept: SCHEDULING | Facility: CLINIC | Age: 70
End: 2025-08-04
Payer: COMMERCIAL

## (undated) DEVICE — BNDG ELASTIC 6" DBL LENGTH UNSTERILE 6611-16

## (undated) DEVICE — DRSG KERLIX 4 1/2"X4YDS ROLL 6730

## (undated) DEVICE — ESU PENCIL SMOKE EVAC W/ROCKER SWITCH 0703-047-000

## (undated) DEVICE — PREP CHLORAPREP 26ML TINTED ORANGE  260815

## (undated) DEVICE — PREP POVIDONE IODINE SWABS X3

## (undated) DEVICE — SU ETHILON 10-0 TG140-6 12" 9003G

## (undated) DEVICE — SUCTION TIP YANKAUER W/O VENT K86

## (undated) DEVICE — ESU GROUND PAD ADULT W/CORD E7507

## (undated) DEVICE — SUCTION MANIFOLD NEPTUNE 2 SYS 1 PORT 702-025-000

## (undated) DEVICE — GLOVE PROTEXIS W/NEU-THERA 7.0  2D73TE70

## (undated) DEVICE — LINEN TOWEL PACK X5 5464

## (undated) DEVICE — TUBING SUCTION 6"X3/16" N56A

## (undated) DEVICE — PACK MINOR CUSTOM ASC

## (undated) DEVICE — SOL WATER IRRIG 1000ML BOTTLE 07139-09

## (undated) DEVICE — DRAPE U SPLIT 74X120" 29440

## (undated) DEVICE — SOL NACL 0.9% IRRIG 500ML BOTTLE 2F7123

## (undated) DEVICE — SOL NACL 0.9% INJ 1000ML BAG 2B1324X

## (undated) DEVICE — BASIN SET SINGLE STERILE 13752-624

## (undated) DEVICE — SOL WATER IRRIG 1000ML BOTTLE 2F7114

## (undated) DEVICE — KIT ENDO TURNOVER/PROCEDURE CARRY-ON 101822

## (undated) DEVICE — EYE PACK CUSTOM CATARACT AS12127-01

## (undated) DEVICE — SPONGE LAP 18X18" X8435

## (undated) DEVICE — CLOSURE SYS SKIN PREMIERPRO EXOFIN FUSION 4X22CM STRL 3472

## (undated) DEVICE — DRAPE IOBAN INCISE 23X17" 6650EZ

## (undated) DEVICE — BLADE KNIFE SURG 10 371110

## (undated) DEVICE — PREP POVIDONE IODINE SOLUTION 10% 4OZ BOTTLE 29906-004

## (undated) DEVICE — ESU ELEC BLADE 6" COATED E1450-6

## (undated) DEVICE — PAD CHUX UNDERPAD 30X30"

## (undated) DEVICE — STPL SKIN 35W 059037

## (undated) RX ORDER — EPHEDRINE SULFATE 50 MG/ML
INJECTION, SOLUTION INTRAMUSCULAR; INTRAVENOUS; SUBCUTANEOUS
Status: DISPENSED
Start: 2021-07-28

## (undated) RX ORDER — FENTANYL CITRATE 50 UG/ML
INJECTION, SOLUTION INTRAMUSCULAR; INTRAVENOUS
Status: DISPENSED
Start: 2021-07-28

## (undated) RX ORDER — LIDOCAINE HYDROCHLORIDE 20 MG/ML
INJECTION, SOLUTION EPIDURAL; INFILTRATION; INTRACAUDAL; PERINEURAL
Status: DISPENSED
Start: 2021-07-28

## (undated) RX ORDER — OXYCODONE HYDROCHLORIDE 5 MG/1
TABLET ORAL
Status: DISPENSED
Start: 2021-07-28

## (undated) RX ORDER — ACETAMINOPHEN 325 MG/1
TABLET ORAL
Status: DISPENSED
Start: 2021-07-28

## (undated) RX ORDER — DEXAMETHASONE SODIUM PHOSPHATE 4 MG/ML
INJECTION, SOLUTION INTRA-ARTICULAR; INTRALESIONAL; INTRAMUSCULAR; INTRAVENOUS; SOFT TISSUE
Status: DISPENSED
Start: 2021-07-28

## (undated) RX ORDER — EPINEPHRINE 1 MG/ML
INJECTION, SOLUTION INTRAMUSCULAR; SUBCUTANEOUS
Status: DISPENSED
Start: 2021-07-28

## (undated) RX ORDER — BUPIVACAINE HYDROCHLORIDE 2.5 MG/ML
INJECTION, SOLUTION EPIDURAL; INFILTRATION; INTRACAUDAL
Status: DISPENSED
Start: 2021-07-28

## (undated) RX ORDER — FENTANYL CITRATE 50 UG/ML
INJECTION, SOLUTION INTRAMUSCULAR; INTRAVENOUS
Status: DISPENSED
Start: 2017-12-06

## (undated) RX ORDER — FENTANYL CITRATE 50 UG/ML
INJECTION, SOLUTION INTRAMUSCULAR; INTRAVENOUS
Status: DISPENSED
Start: 2021-04-12

## (undated) RX ORDER — BALANCED SALT SOLUTION 6.4; .75; .48; .3; 3.9; 1.7 MG/ML; MG/ML; MG/ML; MG/ML; MG/ML; MG/ML
SOLUTION OPHTHALMIC
Status: DISPENSED
Start: 2021-04-12

## (undated) RX ORDER — CEFAZOLIN SODIUM 1 G/3ML
INJECTION, POWDER, FOR SOLUTION INTRAMUSCULAR; INTRAVENOUS
Status: DISPENSED
Start: 2021-07-28

## (undated) RX ORDER — FENTANYL CITRATE 50 UG/ML
INJECTION, SOLUTION INTRAMUSCULAR; INTRAVENOUS
Status: DISPENSED
Start: 2020-06-24

## (undated) RX ORDER — PROPOFOL 10 MG/ML
INJECTION, EMULSION INTRAVENOUS
Status: DISPENSED
Start: 2021-07-28

## (undated) RX ORDER — GENTAMICIN 40 MG/ML
INJECTION, SOLUTION INTRAMUSCULAR; INTRAVENOUS
Status: DISPENSED
Start: 2021-07-28

## (undated) RX ORDER — ACETAMINOPHEN 325 MG/1
TABLET ORAL
Status: DISPENSED
Start: 2021-04-26

## (undated) RX ORDER — ONDANSETRON 2 MG/ML
INJECTION INTRAMUSCULAR; INTRAVENOUS
Status: DISPENSED
Start: 2021-07-28